# Patient Record
Sex: FEMALE | Race: WHITE | NOT HISPANIC OR LATINO | Employment: OTHER | URBAN - METROPOLITAN AREA
[De-identification: names, ages, dates, MRNs, and addresses within clinical notes are randomized per-mention and may not be internally consistent; named-entity substitution may affect disease eponyms.]

---

## 2017-01-20 ENCOUNTER — LAB (OUTPATIENT)
Dept: LAB | Facility: HOSPITAL | Age: 82
End: 2017-01-20

## 2017-01-20 DIAGNOSIS — C18.9 MALIGNANT NEOPLASM OF COLON, UNSPECIFIED PART OF COLON (HCC): Primary | ICD-10-CM

## 2017-01-20 LAB
ALBUMIN SERPL-MCNC: 4.1 G/DL (ref 3.5–5.2)
ALBUMIN/GLOB SERPL: 1.5 G/DL (ref 1.1–2.4)
ALP SERPL-CCNC: 57 U/L (ref 38–116)
ALT SERPL W P-5'-P-CCNC: 14 U/L (ref 0–33)
ANION GAP SERPL CALCULATED.3IONS-SCNC: 8.9 MMOL/L
AST SERPL-CCNC: 19 U/L (ref 0–32)
BASOPHILS # BLD AUTO: 0.04 10*3/MM3 (ref 0–0.1)
BASOPHILS NFR BLD AUTO: 0.5 % (ref 0–1.1)
BILIRUB SERPL-MCNC: 0.4 MG/DL (ref 0.1–1.2)
BUN BLD-MCNC: 15 MG/DL (ref 6–20)
BUN/CREAT SERPL: 25.9 (ref 7.3–30)
CALCIUM SPEC-SCNC: 9.2 MG/DL (ref 8.5–10.2)
CEA SERPL-MCNC: 3.51 NG/ML
CHLORIDE SERPL-SCNC: 103 MMOL/L (ref 98–107)
CO2 SERPL-SCNC: 29.1 MMOL/L (ref 22–29)
CREAT BLD-MCNC: 0.58 MG/DL (ref 0.6–1.1)
DEPRECATED RDW RBC AUTO: 43.7 FL (ref 37–49)
EOSINOPHIL # BLD AUTO: 0.34 10*3/MM3 (ref 0–0.36)
EOSINOPHIL NFR BLD AUTO: 4.2 % (ref 1–5)
ERYTHROCYTE [DISTWIDTH] IN BLOOD BY AUTOMATED COUNT: 12.5 % (ref 11.7–14.5)
GFR SERPL CREATININE-BSD FRML MDRD: 100 ML/MIN/1.73
GLOBULIN UR ELPH-MCNC: 2.7 GM/DL (ref 1.8–3.5)
GLUCOSE BLD-MCNC: 88 MG/DL (ref 74–124)
HCT VFR BLD AUTO: 42.3 % (ref 34–45)
HGB BLD-MCNC: 13.9 G/DL (ref 11.5–14.9)
IMM GRANULOCYTES # BLD: 0.05 10*3/MM3 (ref 0–0.03)
IMM GRANULOCYTES NFR BLD: 0.6 % (ref 0–0.5)
LDH SERPL-CCNC: 197 U/L (ref 99–259)
LYMPHOCYTES # BLD AUTO: 1.59 10*3/MM3 (ref 1–3.5)
LYMPHOCYTES NFR BLD AUTO: 19.7 % (ref 20–49)
MCH RBC QN AUTO: 31.3 PG (ref 27–33)
MCHC RBC AUTO-ENTMCNC: 32.9 G/DL (ref 32–35)
MCV RBC AUTO: 95.3 FL (ref 83–97)
MONOCYTES # BLD AUTO: 0.88 10*3/MM3 (ref 0.25–0.8)
MONOCYTES NFR BLD AUTO: 10.9 % (ref 4–12)
NEUTROPHILS # BLD AUTO: 5.17 10*3/MM3 (ref 1.5–7)
NEUTROPHILS NFR BLD AUTO: 64.1 % (ref 39–75)
NRBC BLD MANUAL-RTO: 0 /100 WBC (ref 0–0)
PLATELET # BLD AUTO: 217 10*3/MM3 (ref 150–375)
PMV BLD AUTO: 9.6 FL (ref 8.9–12.1)
POTASSIUM BLD-SCNC: 4.7 MMOL/L (ref 3.5–4.7)
PROT SERPL-MCNC: 6.8 G/DL (ref 6.3–8)
RBC # BLD AUTO: 4.44 10*6/MM3 (ref 3.9–5)
SODIUM BLD-SCNC: 141 MMOL/L (ref 134–145)
URATE SERPL-MCNC: 3.3 MG/DL (ref 2.8–7.4)
WBC NRBC COR # BLD: 8.07 10*3/MM3 (ref 4–10)

## 2017-01-20 PROCEDURE — 80053 COMPREHEN METABOLIC PANEL: CPT | Performed by: INTERNAL MEDICINE

## 2017-01-20 PROCEDURE — 85025 COMPLETE CBC W/AUTO DIFF WBC: CPT | Performed by: INTERNAL MEDICINE

## 2017-01-20 PROCEDURE — 82378 CARCINOEMBRYONIC ANTIGEN: CPT | Performed by: INTERNAL MEDICINE

## 2017-01-20 PROCEDURE — 83615 LACTATE (LD) (LDH) ENZYME: CPT | Performed by: INTERNAL MEDICINE

## 2017-01-20 PROCEDURE — 84550 ASSAY OF BLOOD/URIC ACID: CPT | Performed by: INTERNAL MEDICINE

## 2017-01-20 PROCEDURE — 36415 COLL VENOUS BLD VENIPUNCTURE: CPT | Performed by: INTERNAL MEDICINE

## 2017-01-27 ENCOUNTER — OFFICE VISIT (OUTPATIENT)
Dept: ONCOLOGY | Facility: CLINIC | Age: 82
End: 2017-01-27

## 2017-01-27 ENCOUNTER — APPOINTMENT (OUTPATIENT)
Dept: LAB | Facility: HOSPITAL | Age: 82
End: 2017-01-27

## 2017-01-27 VITALS
TEMPERATURE: 98.5 F | HEIGHT: 63 IN | BODY MASS INDEX: 23.92 KG/M2 | SYSTOLIC BLOOD PRESSURE: 130 MMHG | DIASTOLIC BLOOD PRESSURE: 72 MMHG | RESPIRATION RATE: 16 BRPM | HEART RATE: 86 BPM | WEIGHT: 135 LBS

## 2017-01-27 DIAGNOSIS — R10.13 EPIGASTRIC PAIN: Primary | ICD-10-CM

## 2017-01-27 PROCEDURE — G0463 HOSPITAL OUTPT CLINIC VISIT: HCPCS | Performed by: INTERNAL MEDICINE

## 2017-01-27 PROCEDURE — 99214 OFFICE O/P EST MOD 30 MIN: CPT | Performed by: INTERNAL MEDICINE

## 2017-01-27 NOTE — PROGRESS NOTES
REASONS FOR FOLLOWUP:  Stage IIIB colon cancer (T4N1a), status post resection with abdominal wall invasion.  On 02/02/2015, she had a laparoscopic right colectomy.  Her proximal and distal surgical margins were clear; however, the tumor had extended through the wall of the bowel and invaded into the abdominal wall.  The abdominal wall invasion area was marked with surgical clips to guide postoperative radiation therapy.         HISTORY OF PRESENT ILLNESS: Ms. Damon returns today with history of stage IIIB colon cancer 2015. She returns today; rather anxious, as she has noted dull, persistent, generalized abdominal pain subcostally,  that has developed over the last 2 weeks. She does feel that it is worsened with coffee consumption and persists throughout the night. No other foods exacerbate pain. No accompanying diarrhea, or dark, tarry stools. No fevers, or chills. Her appetite has remained adequate and it has not interfered with daily activities. Her weight has remained stable. She has not taken anything for pain relief and is not presently on any antacids or proton pump inhibitors. She denies fevers or chills, and nausea and vomiting.     She is also concerned as she is looking to a possible, future knee surgery as her right knee has become very painful and stiff with ambulation. She would like to ensure that she has no other health concerns prior to proceeding with surgery.              PAST MEDICAL HISTORY:    1.;  Arthritis.    2.; Hepatitis.    3.; Infectious mononucleosis.     4.; No history of blood transfusions.    5.; Colonoscopy 5/2016-[facility]      OB/GYN HISTORY:  Menarche age 12, heavy flow, irregular with intercurrent bleeding.  Menopause in her 40s status post hysterectomy.  G3, P3.         HEMATOLOGIC/ONCOLOGIC HISTORY:  History from previous dates can be found in the separate document.         MEDICATIONS:  The current medication list was reviewed with the patient and updates in the EMR this date  per the medical assistant.  Medication dosages and frequencies were confirmed to be accurate.        ALLERGIES:  No known drug allergies.         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  No cigarette smoking in her past.  No alcohol consumption reported.  No illicit drugs or HIV, or hepatitis risk factors.         FAMILY HISTORY: Sister with colon cancer diagnosed at 60,  at 76.  Otherwise heart disease seems to run in the family.         REVIEW OF SYSTEMS:      PAIN:  See VITAL SIGNS below.     GENERAL:  No change in appetite or weight, no fevers, chills or sweats.     SKIN:  No rashes or nonhealing lesions.    HEME/LYMPH:  No anemia, easy bruising, bleeding or swollen nodes.    EYES:  No vision changes or diplopia.      ENT:  No tinnitus, hearing loss, gum bleeding, epistaxis, hoarseness or dysphagia.    RESPIRATORY:  No cough, shortness of breath, hemoptysis or wheezing.    CVS:  No chest pain, palpitations, orthopnea, dyspnea on exertion or PND.    GI:  See HPI.   :  No dysuria or hematuria.  No abnormal vaginal discharge or bleeding.     MUSCULOSKELETAL:  See HPI.     NEUROLOGICAL:  No dizziness, global weakness, loss of consciousness or seizures.    PSYCHIATRIC:  Increased nervousness, mood changes or depression.        VITAL SIGNS:     TEMP:  98.8     WEIGHT: 135     BP:  122/78     PULSE: 79     RESP:  16     PAIN:  0 out of 10     ECO        PHYSICAL EXAMINATION:    GENERAL:  Well-developed, well-nourished female in no acute distress.    SKIN:  Warm, dry without rashes, purpura or petechiae.    HEAD:  Normocephalic.    EYES:  Pupils equal, round and reactive to light.  EOMs intact.  Conjunctivae normal.    EARS:  Hearing intact.    NOSE:  Septum midline.  No excoriations or nasal discharge.    MOUTH:  Tongue is well-papillated; no stomatitis or ulcers.  Lips normal.      NECK:  Supple with good range of motion; no thyromegaly or masses, no JVD or bruits.     LYMPHATICS:  No cervical, supraclavicular, or axillary adenopathy.    CHEST:  Lungs clear to percussion and auscultation.    CARDIAC:  Regular rate and rhythm without murmurs, rubs or gallops.    ABDOMEN:  Soft, nontender with no organomegaly or masses.    EXTREMITIES:  No clubbing, cyanosis or edema.    NEUROLOGICAL:  No focal neurological deficits.            LABORATORY DATA:  No labs.        ASSESSMENT/PLAN:     1.    History of stage IIIB colon cancer February 2015:. She does report new, generalized diffuse abdominal pain, which she describes as dull in nature. This pain transpired approximately 2 weeks ago. With impending knee surgery, I feel that it is prudent to proceed with a CT scan of chest, abdomen, and pelvis to ensure that there is no recurrence of disease or new issues for concern developing. I have also discussed trying other the counter antacids such as Ranitidine to reduce acid build up pain could be gastritis-related.   2.   She will have CT scans of Chest, Abdomen, and Pelvis in the next few weeks with lab and MD review in one month.   3. She was advised to call our office or seek emergency attention with worsening abdominal pain, or with any additional issues of concern.       I have reviewed the notes, assessments, and/or procedures performed by PADMINI Power.  I concur with her/his documentation of Riri Damon.

## 2017-02-16 RX ORDER — SIMVASTATIN 40 MG
TABLET ORAL
Qty: 90 TABLET | Refills: 0 | Status: SHIPPED | OUTPATIENT
Start: 2017-02-16 | End: 2017-04-17 | Stop reason: SDUPTHER

## 2017-02-20 ENCOUNTER — HOSPITAL ENCOUNTER (OUTPATIENT)
Dept: PET IMAGING | Facility: HOSPITAL | Age: 82
Discharge: HOME | End: 2017-02-20
Attending: INTERNAL MEDICINE | Admitting: INTERNAL MEDICINE

## 2017-02-20 DIAGNOSIS — R10.13 EPIGASTRIC PAIN: ICD-10-CM

## 2017-02-20 LAB — CREAT BLDA-MCNC: 0.5 MG/DL (ref 0.6–1.3)

## 2017-02-20 PROCEDURE — 82565 ASSAY OF CREATININE: CPT

## 2017-02-20 PROCEDURE — 74177 CT ABD & PELVIS W/CONTRAST: CPT

## 2017-02-20 PROCEDURE — 25510000001 DIATRIZOATE MEGLUMINE & SODIUM PER 1 ML: Performed by: INTERNAL MEDICINE

## 2017-02-20 PROCEDURE — 71260 CT THORAX DX C+: CPT

## 2017-02-20 PROCEDURE — 0 IOPAMIDOL 61 % SOLUTION: Performed by: INTERNAL MEDICINE

## 2017-02-20 RX ADMIN — DIATRIZOATE MEGLUMINE AND DIATRIZOATE SODIUM 30 ML: 660; 100 LIQUID ORAL; RECTAL at 08:30

## 2017-02-20 RX ADMIN — IOPAMIDOL 85 ML: 612 INJECTION, SOLUTION INTRAVENOUS at 09:45

## 2017-02-23 ENCOUNTER — OFFICE VISIT (OUTPATIENT)
Dept: ONCOLOGY | Facility: CLINIC | Age: 82
End: 2017-02-23

## 2017-02-23 ENCOUNTER — APPOINTMENT (OUTPATIENT)
Dept: LAB | Facility: HOSPITAL | Age: 82
End: 2017-02-23

## 2017-02-23 VITALS
SYSTOLIC BLOOD PRESSURE: 132 MMHG | HEIGHT: 64 IN | OXYGEN SATURATION: 93 % | WEIGHT: 130.6 LBS | BODY MASS INDEX: 22.3 KG/M2 | RESPIRATION RATE: 16 BRPM | HEART RATE: 81 BPM | DIASTOLIC BLOOD PRESSURE: 80 MMHG | TEMPERATURE: 98.6 F

## 2017-02-23 DIAGNOSIS — C18.9 MALIGNANT NEOPLASM OF COLON, UNSPECIFIED PART OF COLON (HCC): Primary | ICD-10-CM

## 2017-02-23 PROCEDURE — G0463 HOSPITAL OUTPT CLINIC VISIT: HCPCS | Performed by: INTERNAL MEDICINE

## 2017-02-23 PROCEDURE — 99214 OFFICE O/P EST MOD 30 MIN: CPT | Performed by: INTERNAL MEDICINE

## 2017-04-17 RX ORDER — SIMVASTATIN 40 MG
TABLET ORAL
Qty: 90 TABLET | Refills: 0 | Status: SHIPPED | OUTPATIENT
Start: 2017-04-17 | End: 2017-06-19 | Stop reason: SDUPTHER

## 2017-05-30 ENCOUNTER — OFFICE VISIT (OUTPATIENT)
Dept: INTERNAL MEDICINE | Facility: CLINIC | Age: 82
End: 2017-05-30

## 2017-05-30 VITALS
TEMPERATURE: 98.6 F | DIASTOLIC BLOOD PRESSURE: 66 MMHG | HEART RATE: 82 BPM | SYSTOLIC BLOOD PRESSURE: 136 MMHG | BODY MASS INDEX: 22.5 KG/M2 | OXYGEN SATURATION: 97 % | HEIGHT: 64 IN | WEIGHT: 131.8 LBS

## 2017-05-30 DIAGNOSIS — R93.7 ABNORMAL FINDINGS ON DIAGNOSTIC IMAGING OF OTHER PARTS OF MUSCULOSKELETAL SYSTEM: ICD-10-CM

## 2017-05-30 DIAGNOSIS — Z13.820 SCREENING FOR OSTEOPOROSIS: ICD-10-CM

## 2017-05-30 DIAGNOSIS — Z00.00 MEDICARE ANNUAL WELLNESS VISIT, SUBSEQUENT: ICD-10-CM

## 2017-05-30 DIAGNOSIS — E78.2 MIXED HYPERLIPIDEMIA: ICD-10-CM

## 2017-05-30 DIAGNOSIS — I27.20 PULMONARY HYPERTENSION (HCC): Primary | ICD-10-CM

## 2017-05-30 LAB
ALBUMIN SERPL-MCNC: 4.3 G/DL (ref 3.5–5.2)
ALBUMIN/GLOB SERPL: 1.6 G/DL
ALP SERPL-CCNC: 53 U/L (ref 39–117)
ALT SERPL-CCNC: 15 U/L (ref 1–33)
AST SERPL-CCNC: 20 U/L (ref 1–32)
BILIRUB SERPL-MCNC: 0.5 MG/DL (ref 0.1–1.2)
BUN SERPL-MCNC: 12 MG/DL (ref 8–23)
BUN/CREAT SERPL: 20 (ref 7–25)
CALCIUM SERPL-MCNC: 9.7 MG/DL (ref 8.6–10.5)
CHLORIDE SERPL-SCNC: 104 MMOL/L (ref 98–107)
CHOLEST SERPL-MCNC: 213 MG/DL (ref 0–200)
CO2 SERPL-SCNC: 26.7 MMOL/L (ref 22–29)
CREAT SERPL-MCNC: 0.6 MG/DL (ref 0.57–1)
GLOBULIN SER CALC-MCNC: 2.7 GM/DL
GLUCOSE SERPL-MCNC: 99 MG/DL (ref 65–99)
HDLC SERPL-MCNC: 68 MG/DL (ref 40–60)
LDLC SERPL CALC-MCNC: 130 MG/DL (ref 0–100)
POTASSIUM SERPL-SCNC: 5.1 MMOL/L (ref 3.5–5.2)
PROT SERPL-MCNC: 7 G/DL (ref 6–8.5)
SODIUM SERPL-SCNC: 141 MMOL/L (ref 136–145)
TRIGL SERPL-MCNC: 76 MG/DL (ref 0–150)
VLDLC SERPL CALC-MCNC: 15.2 MG/DL (ref 5–40)

## 2017-05-30 PROCEDURE — 99213 OFFICE O/P EST LOW 20 MIN: CPT | Performed by: FAMILY MEDICINE

## 2017-05-30 PROCEDURE — 96160 PT-FOCUSED HLTH RISK ASSMT: CPT | Performed by: FAMILY MEDICINE

## 2017-05-30 PROCEDURE — G0439 PPPS, SUBSEQ VISIT: HCPCS | Performed by: FAMILY MEDICINE

## 2017-05-30 RX ORDER — LISINOPRIL 2.5 MG/1
2.5 TABLET ORAL DAILY
Qty: 30 TABLET | Refills: 6 | Status: SHIPPED | OUTPATIENT
Start: 2017-05-30 | End: 2018-05-22 | Stop reason: SDUPTHER

## 2017-06-02 ENCOUNTER — TELEPHONE (OUTPATIENT)
Dept: INTERNAL MEDICINE | Facility: CLINIC | Age: 82
End: 2017-06-02

## 2017-06-02 NOTE — TELEPHONE ENCOUNTER
----- Message from Phoenix Velazquez MD sent at 5/31/2017 12:59 PM EDT -----  Cholesterol slightly elevated recommend following a low-cholesterol diet otherwise labs good follow-up 6 months

## 2017-06-05 ENCOUNTER — OFFICE VISIT (OUTPATIENT)
Dept: ONCOLOGY | Facility: CLINIC | Age: 82
End: 2017-06-05

## 2017-06-05 ENCOUNTER — LAB (OUTPATIENT)
Dept: LAB | Facility: HOSPITAL | Age: 82
End: 2017-06-05

## 2017-06-05 VITALS
SYSTOLIC BLOOD PRESSURE: 134 MMHG | DIASTOLIC BLOOD PRESSURE: 70 MMHG | OXYGEN SATURATION: 97 % | BODY MASS INDEX: 22.71 KG/M2 | HEART RATE: 86 BPM | WEIGHT: 133 LBS | RESPIRATION RATE: 14 BRPM | TEMPERATURE: 98.1 F | HEIGHT: 64 IN

## 2017-06-05 DIAGNOSIS — R42 ORTHOSTATIC LIGHTHEADEDNESS: ICD-10-CM

## 2017-06-05 DIAGNOSIS — M19.90 ARTHRITIS: ICD-10-CM

## 2017-06-05 DIAGNOSIS — C18.9 MALIGNANT NEOPLASM OF COLON, UNSPECIFIED PART OF COLON (HCC): Primary | ICD-10-CM

## 2017-06-05 LAB
BASOPHILS # BLD AUTO: 0.05 10*3/MM3 (ref 0–0.1)
BASOPHILS NFR BLD AUTO: 0.6 % (ref 0–1.1)
DEPRECATED RDW RBC AUTO: 43.1 FL (ref 37–49)
EOSINOPHIL # BLD AUTO: 0.51 10*3/MM3 (ref 0–0.36)
EOSINOPHIL NFR BLD AUTO: 5.6 % (ref 1–5)
ERYTHROCYTE [DISTWIDTH] IN BLOOD BY AUTOMATED COUNT: 12.7 % (ref 11.7–14.5)
HCT VFR BLD AUTO: 43 % (ref 34–45)
HGB BLD-MCNC: 15.3 G/DL (ref 11.5–14.9)
IMM GRANULOCYTES # BLD: 0.04 10*3/MM3 (ref 0–0.03)
IMM GRANULOCYTES NFR BLD: 0.4 % (ref 0–0.5)
LYMPHOCYTES # BLD AUTO: 2.26 10*3/MM3 (ref 1–3.5)
LYMPHOCYTES NFR BLD AUTO: 25 % (ref 20–49)
MCH RBC QN AUTO: 33.1 PG (ref 27–33)
MCHC RBC AUTO-ENTMCNC: 35.6 G/DL (ref 32–35)
MCV RBC AUTO: 93.1 FL (ref 83–97)
MONOCYTES # BLD AUTO: 1.13 10*3/MM3 (ref 0.25–0.8)
MONOCYTES NFR BLD AUTO: 12.5 % (ref 4–12)
NEUTROPHILS # BLD AUTO: 5.04 10*3/MM3 (ref 1.5–7)
NEUTROPHILS NFR BLD AUTO: 55.9 % (ref 39–75)
NRBC BLD MANUAL-RTO: 0 /100 WBC (ref 0–0)
PLATELET # BLD AUTO: 191 10*3/MM3 (ref 150–375)
PMV BLD AUTO: 10.2 FL (ref 8.9–12.1)
RBC # BLD AUTO: 4.62 10*6/MM3 (ref 3.9–5)
WBC NRBC COR # BLD: 9.03 10*3/MM3 (ref 4–10)

## 2017-06-05 PROCEDURE — 36416 COLLJ CAPILLARY BLOOD SPEC: CPT | Performed by: INTERNAL MEDICINE

## 2017-06-05 PROCEDURE — 85025 COMPLETE CBC W/AUTO DIFF WBC: CPT | Performed by: INTERNAL MEDICINE

## 2017-06-05 PROCEDURE — 99213 OFFICE O/P EST LOW 20 MIN: CPT | Performed by: INTERNAL MEDICINE

## 2017-06-05 NOTE — PROGRESS NOTES
Subjective .     REASONS FOR FOLLOWUP:     Stage IIIB colon cancer (T4N1a), status post resection with abdominal wall invasion.     History of Present Illness    Mrs. Damon returns today for the above mentioned history with persistent fatigue and lightheadedness. She has been receiving local steroid injections to her knees for chronic arthritis and is questioning if this may be related. This is likely not the case and upon further questioning, patient reports that she has had some recent hypertension. Her primary care doctor increased lisinopril to dosing and lightheadedness worsened after this likely inducing orthostatic hypotension. Her hemoglobin is actually mild increased at 15.3 so this is not the cause for fatigue and dizziness. She has since reduced her dose of lisinopril to one half tablet daily and I have asked her to continue on this and evaluate for improvement of symptoms. She is agreeable. Otherwise, she is feeling well. She is maintaining adequate nutrition and hydration. Bowels and urination remain regular. She had been experiencing epigastric pain at her last office visit and this has resolved with the addition of daily zantac. She has no other concerns today.     Past Medical History:   Diagnosis Date   • Colon carcinoma     Stage IIIB, T4N1a   • Edema    • History of edema     LE   • History of rheumatic fever    • Hyperlipidemia    • Hypertension     NO MEDS   • Infectious mononucleosis    • Infectious viral hepatitis    • Mitral regurgitation    • OA (osteoarthritis)    • Osteoporosis    • Palpitations    • Tricuspid regurgitation    • Venous insufficiency    • Vitamin D deficiency        ONCOLOGIC HISTORY:  (History from previous dates can be found in the separate document.)    Current Outpatient Prescriptions on File Prior to Visit   Medication Sig Dispense Refill   • aspirin 81 MG tablet Take 1 tablet by mouth 2 (two) times a week.     • lisinopril (PRINIVIL,ZESTRIL) 2.5 MG tablet Take 1  "tablet by mouth Daily. 30 tablet 6   • meloxicam (MOBIC) 7.5 MG tablet Take 1 tablet by mouth Daily. 90 tablet 2   • Multiple Vitamins-Minerals (MULTIVITAMIN ADULT PO) Take 1 tablet by mouth Daily.     • Omega-3 Fatty Acids (FISH OIL) 1000 MG capsule capsule Take 1 capsule by mouth 2 (two) times a week.     • simvastatin (ZOCOR) 40 MG tablet TAKE 1 TABLET EVERY DAY 90 tablet 0     No current facility-administered medications on file prior to visit.        ALLERGIES:   No Known Allergies    Social History     Social History   • Marital status:      Spouse name: Marcus   • Number of children: 3   • Years of education: College     Occupational History   [place] [employer] [place]     Social History Main Topics   • Smoking status: Never Smoker   • Smokeless tobacco: Never Used   • Alcohol use No   • Drug use: No      Comment: caffine use   • Sexual activity: Not on file     Other Topics Concern   • Not on file     Social History Narrative         Cancer-related family history includes Cancer (age of onset: 60) in her sister.     Review of Systems  A comprehensive 14 point review of systems was performed and was negative except as mentioned.    Objective      Vitals:    06/05/17 1047   BP: 134/70   Pulse: 86   Resp: 14   Temp: 98.1 °F (36.7 °C)   SpO2: 97%   Weight: 133 lb (60.3 kg)   Height: 63.58\" (161.5 cm)   PainSc: 5  Comment: pain in knees     Current Status 6/5/2017   ECOG score 0       Physical Exam    GENERAL:  Well-developed, well-nourished in no acute distress.   SKIN:  Warm, dry without rashes, purpura or petechiae.  EYES:  Pupils equal, round and reactive to light.  EOMs intact.  Conjunctivae normal.  EARS:  Hearing intact.  NOSE:  Septum midline.  No excoriations or nasal discharge.  MOUTH:  Tongue is well-papillated; no stomatitis or ulcers.  Lips normal.  THROAT:  Oropharynx without lesions or exudates.  NECK:  Supple with good range of motion; no thyromegaly or masses, no " JVD.  LYMPHATICS:  No cervical, supraclavicular, axillary or inguinal adenopathy.  CHEST:  Lungs clear to auscultation. Good airflow.  CARDIAC:  Regular rate and rhythm without murmurs, rubs or gallops. Normal S1,S2.  ABDOMEN:  Soft, nontender with no hepatosplenomegaly or masses.  EXTREMITIES:  No clubbing, cyanosis or edema.  NEUROLOGICAL:  Cranial Nerves II-XII grossly intact.  No focal neurological deficits.  PSYCHIATRIC:  Normal affect and mood.      RECENT LABS:  Hematology WBC   Date Value Ref Range Status   06/05/2017 9.03 4.00 - 10.00 10*3/mm3 Final     RBC   Date Value Ref Range Status   06/05/2017 4.62 3.90 - 5.00 10*6/mm3 Final     Hemoglobin   Date Value Ref Range Status   06/05/2017 15.3 (H) 11.5 - 14.9 g/dL Final     Hematocrit   Date Value Ref Range Status   06/05/2017 43.0 34.0 - 45.0 % Final     Platelets   Date Value Ref Range Status   06/05/2017 191 150 - 375 10*3/mm3 Final        Assessment/Plan   Orthostatic hypotension/fatigue: Due to recent hypertension, patient increased lisinopril dosing and then developed orthostatic hypotension. Of note, she did have fatigue and mild lightheadedness prior to lisinopril adjustments. She is now taking only one half a tablet daily to see if this provides improvement in dizziness. I have asked her to follow up with her primary care provider if this persists. She is agreeable.     Stage IIIB colon ca: [facility] per CT C/A/P (2/20/2017). She has no new concerning issues today and is feeling well, overall. Epigastric pain at previous office visit has resolved with the addition of a daily antacid. I will see her in 3 months with repeat CT scans just prior. She knows to call our office with any concerns prior to her next office visit.     I have reviewed the notes, assessments, and/or procedures performed by Shani De La O APR I concur with her/his documentation of Riri Damon.                  Cc:  Yfn Mcneil MD

## 2017-06-06 ENCOUNTER — HOSPITAL ENCOUNTER (OUTPATIENT)
Dept: BONE DENSITY | Facility: HOSPITAL | Age: 82
Discharge: HOME | End: 2017-06-06
Admitting: FAMILY MEDICINE

## 2017-06-06 DIAGNOSIS — R93.7 ABNORMAL FINDINGS ON DIAGNOSTIC IMAGING OF OTHER PARTS OF MUSCULOSKELETAL SYSTEM: ICD-10-CM

## 2017-06-06 DIAGNOSIS — Z00.00 MEDICARE ANNUAL WELLNESS VISIT, SUBSEQUENT: ICD-10-CM

## 2017-06-06 PROCEDURE — 77080 DXA BONE DENSITY AXIAL: CPT

## 2017-06-09 ENCOUNTER — TELEPHONE (OUTPATIENT)
Dept: INTERNAL MEDICINE | Facility: CLINIC | Age: 82
End: 2017-06-09

## 2017-06-09 NOTE — TELEPHONE ENCOUNTER
----- Message from Phoenix Velazquez MD sent at 6/7/2017  1:22 PM EDT -----  Bone density stable with some mild osteopenia continue present medications

## 2017-06-15 ENCOUNTER — OFFICE VISIT (OUTPATIENT)
Dept: CARDIOLOGY | Facility: CLINIC | Age: 82
End: 2017-06-15

## 2017-06-15 VITALS
BODY MASS INDEX: 22.53 KG/M2 | SYSTOLIC BLOOD PRESSURE: 124 MMHG | WEIGHT: 132 LBS | DIASTOLIC BLOOD PRESSURE: 76 MMHG | HEART RATE: 83 BPM | HEIGHT: 64 IN

## 2017-06-15 DIAGNOSIS — I10 ESSENTIAL HYPERTENSION: ICD-10-CM

## 2017-06-15 DIAGNOSIS — I05.1 RHEUMATIC MITRAL REGURGITATION: ICD-10-CM

## 2017-06-15 DIAGNOSIS — I27.20 PULMONARY HYPERTENSION (HCC): ICD-10-CM

## 2017-06-15 DIAGNOSIS — I07.1 RHEUMATIC TRICUSPID VALVE REGURGITATION: Primary | ICD-10-CM

## 2017-06-15 PROCEDURE — 99214 OFFICE O/P EST MOD 30 MIN: CPT | Performed by: NURSE PRACTITIONER

## 2017-06-16 PROBLEM — I10 ESSENTIAL HYPERTENSION: Status: ACTIVE | Noted: 2017-06-16

## 2017-06-16 PROCEDURE — 93000 ELECTROCARDIOGRAM COMPLETE: CPT | Performed by: NURSE PRACTITIONER

## 2017-06-16 NOTE — PROGRESS NOTES
Date of Office Visit: 06/15/2017  Encounter Provider: PADMINI Jiménez  Place of Service: [facility] CARDIOLOGY  Patient Name: Riri Damon  :1934    Chief Complaint   Patient presents with   • Muscle Pain   :     HPI: Riri Damon is a 82 y.o. female comes in today for  Yearly followup.  She is a patient of Dr. Serrano and I am seeing the patient for the first time today.  She has a history of mild tricuspid regurgitation.  She had rheumatic fever as a child and infectious mononucleosis.  She has some lower extremity swelling due to venous insufficiency and some hypertension.  Her last echocardiogram was in  and revealed an ejection fraction of 60%, normal diastolic function and normal right ventricular systolic pressure.  She had trace to mild tricuspid insufficiency and small to perimembranous ventricular septal defect.  She has had adenocarcinoma of the right colon and received treatment.      Today, she comes in for followup.  She has been feeling well over the past year.  She asked for more information regarding tricuspid insufficiency.  Her biggest complaint is that she has some leg cramps during the night and occurs in her calf.  This has happened for several years.  She also has a tickle in her throat at night which happens on and off.  She has had this for a couple of years.  She has some edema in the summer.  Otherwise, she feels well.          Past Medical History:   Diagnosis Date   • Colon carcinoma     Stage IIIB, T4N1a   • Edema    • History of edema     LE   • History of rheumatic fever    • Hyperlipidemia    • Hypertension     NO MEDS   • Infectious mononucleosis    • Infectious viral hepatitis    • Mitral regurgitation    • OA (osteoarthritis)    • Osteoporosis    • Palpitations    • Tricuspid regurgitation    • Venous insufficiency    • Vitamin D deficiency        Past Surgical History:   Procedure Laterality Date   • COLECTOMY PARTIAL /  "TOTAL  02/02/2015 2/2015 due to adenocarcinoma   • COLONOSCOPY      JAN 2015   • COLONOSCOPY N/A 5/25/2016    Procedure: COLONOSCOPY to ANASTAMOSIS AND TERMINAL ILEUM;  Surgeon: Yfn Mcneil MD;  Location: [facility] ENDOSCOPY;  Service:    • HYSTERECTOMY     • KNEE SURGERY             Review of Systems   Constitution: Negative for fever and malaise/fatigue.   HENT: Negative for ear pain, hearing loss, nosebleeds and sore throat.    Eyes: Negative for double vision, pain, vision loss in left eye, vision loss in right eye and visual disturbance.   Cardiovascular: Negative for claudication and leg swelling.        Leg cramping   Respiratory: Positive for cough. Negative for snoring and wheezing.    Endocrine: Negative for cold intolerance, heat intolerance and polyuria.   Skin: Negative for color change, itching and rash.   Musculoskeletal: Negative for joint pain, joint swelling and muscle cramps.   Gastrointestinal: Negative for abdominal pain, diarrhea, melena, nausea and vomiting.   Genitourinary: Negative for bladder incontinence and hematuria.   Neurological: Negative for excessive daytime sleepiness, dizziness, light-headedness, paresthesias and seizures.   Psychiatric/Behavioral: Negative for depression. The patient is not nervous/anxious.    All other systems reviewed and are negative.    All other systems reviewed and are negative    No Known Allergies    All aspects of family and social history reviewed.          Objective:     Vitals:    06/15/17 1033   BP: 124/76   BP Location: Left arm   Pulse: 83   Weight: 132 lb (59.9 kg)   Height: 63.5\" (161.3 cm)     Body mass index is 23.02 kg/(m^2).    PHYSICAL EXAM:  Physical Exam   Constitutional: She is oriented to person, place, and time. She appears well-developed and well-nourished. No distress.   HENT:   Head: Normocephalic and atraumatic.   Eyes: Conjunctivae are normal. No scleral icterus.   Neck: Neck supple. No JVD present. Carotid bruit is not " present. No thyromegaly present.   Cardiovascular: Normal rate, regular rhythm, S1 normal, S2 normal, normal heart sounds and intact distal pulses.   No extrasystoles are present. PMI is not displaced.  Exam reveals no gallop.    No murmur heard.  Pulses:       Carotid pulses are 2+ on the right side, and 2+ on the left side.       Radial pulses are 2+ on the right side, and 2+ on the left side.        Dorsalis pedis pulses are 2+ on the right side, and 2+ on the left side.        Posterior tibial pulses are 2+ on the right side, and 2+ on the left side.   Pulmonary/Chest: Effort normal and breath sounds normal. No respiratory distress. She has no wheezes. She has no rhonchi. She has no rales. She exhibits no tenderness.   Abdominal: Soft. Bowel sounds are normal. She exhibits no distension, no abdominal bruit and no mass. There is no tenderness.   Musculoskeletal: She exhibits no edema or deformity.   Lymphadenopathy:     She has no cervical adenopathy.   Neurological: She is alert and oriented to person, place, and time. No cranial nerve deficit.   Skin: Skin is warm and dry. No rash noted. She is not diaphoretic. No cyanosis. No pallor. Nails show no clubbing.   Psychiatric: She has a normal mood and affect. Judgment normal.   Vitals reviewed.        ECG 12 Lead  Date/Time: 6/16/2017 3:43 PM  Performed by: RAHEEM MITCHELL  Authorized by: RAHEEM MITCHELL   Rhythm: sinus rhythm  Rate: normal  BPM: 78  Conduction: conduction normal  Conduction: LAFB  ST Segments: ST segments normal  T Waves: T waves normal  QRS axis: normal  Other: no other findings  Clinical impression: normal ECG  Comments: Indication : HTN                Assessment:       Diagnosis Plan   1. Rheumatic tricuspid valve regurgitation     2. Rheumatic mitral regurgitation     3. Pulmonary hypertension     4. Essential hypertension          Orders Placed This Encounter   Procedures   • ECG 12 Lead     This order was created via procedure  documentation       Current Outpatient Prescriptions   Medication Sig Dispense Refill   • lisinopril (PRINIVIL,ZESTRIL) 2.5 MG tablet Take 1 tablet by mouth Daily. 30 tablet 6   • meloxicam (MOBIC) 7.5 MG tablet Take 1 tablet by mouth Daily. 90 tablet 2   • Multiple Vitamins-Minerals (MULTIVITAMIN ADULT PO) Take 1 tablet by mouth Daily.     • Omega-3 Fatty Acids (FISH OIL) 1000 MG capsule capsule Take 1 capsule by mouth 2 (two) times a week.     • simvastatin (ZOCOR) 40 MG tablet TAKE 1 TABLET EVERY DAY 90 tablet 0   • aspirin 81 MG tablet Take 1 tablet by mouth 4 (Four) Times a Week.       No current facility-administered medications for this visit.             Plan:        1. Mild tricuspid insufficiency and mild mitral insufficiency - she has no new symptoms to indicate that this would be worsening.    2. Hypertension - well controlled.    3. Hyperlipidemia - well controlled on Lipitor.    4. Lower extremity edema - due to venous insufficiency.  She has cramping.  She has had normal laboratory work drawn.  Recommended magnesium for her.              Follow up in office in one year.     As always, it has been a pleasure to participate in this patient's care.      Sincerely,      PADMINI Jiménez

## 2017-06-19 RX ORDER — SIMVASTATIN 40 MG
TABLET ORAL
Qty: 90 TABLET | Refills: 0 | Status: SHIPPED | OUTPATIENT
Start: 2017-06-19 | End: 2017-08-30 | Stop reason: SDUPTHER

## 2017-08-04 ENCOUNTER — RESULTS ENCOUNTER (OUTPATIENT)
Dept: INTERNAL MEDICINE | Facility: CLINIC | Age: 82
End: 2017-08-04

## 2017-08-04 DIAGNOSIS — M19.90 ARTHRITIS: ICD-10-CM

## 2017-08-30 ENCOUNTER — OFFICE VISIT (OUTPATIENT)
Dept: INTERNAL MEDICINE | Facility: CLINIC | Age: 82
End: 2017-08-30

## 2017-08-30 VITALS
HEIGHT: 64 IN | TEMPERATURE: 99.4 F | HEART RATE: 100 BPM | SYSTOLIC BLOOD PRESSURE: 118 MMHG | WEIGHT: 135 LBS | OXYGEN SATURATION: 96 % | BODY MASS INDEX: 23.05 KG/M2 | DIASTOLIC BLOOD PRESSURE: 60 MMHG

## 2017-08-30 DIAGNOSIS — G89.29 CHRONIC PAIN OF RIGHT KNEE: ICD-10-CM

## 2017-08-30 DIAGNOSIS — E78.2 MIXED HYPERLIPIDEMIA: ICD-10-CM

## 2017-08-30 DIAGNOSIS — M25.561 CHRONIC PAIN OF RIGHT KNEE: ICD-10-CM

## 2017-08-30 DIAGNOSIS — I10 ESSENTIAL HYPERTENSION: Primary | ICD-10-CM

## 2017-08-30 PROCEDURE — 99214 OFFICE O/P EST MOD 30 MIN: CPT | Performed by: FAMILY MEDICINE

## 2017-08-30 RX ORDER — SIMVASTATIN 40 MG
40 TABLET ORAL NIGHTLY
Qty: 90 TABLET | Refills: 2 | Status: SHIPPED | OUTPATIENT
Start: 2017-08-30 | End: 2018-11-08 | Stop reason: SDUPTHER

## 2017-08-30 NOTE — PROGRESS NOTES
Riri Damon is a 82 y.o. female.  Seen 08/30/2017    Assessment/Plan   Problem List Items Addressed This Visit        Cardiovascular and Mediastinum    Hyperlipidemia    Relevant Medications    simvastatin (ZOCOR) 40 MG tablet    Essential hypertension - Primary       Musculoskeletal and Integument    Knee pain           Continue monitoring blood pressure can continue lisinopril recommend 2.5 mg every other day regularly she had stopped it because she was getting the readings below 90 she discontinue the meloxicam is going to follow up with orthopedics for her right knee pain otherwise follow up in this office in 6 months or as needed  Subjective     Chief Complaint   Patient presents with   • follow up to hyperlipidemia   • follow up to hypertension     patient states she takes half an lisinopril 2.5 mg tablet when her blood pressure is elevated   Arthritis  Social History   Substance Use Topics   • Smoking status: Never Smoker   • Smokeless tobacco: Never Used   • Alcohol use No       History of Present Illness   Patient presents for follow-up of chronic medical problems of hyperlipidemia hypertension or arthritis she's been taking meloxicam daily however she reduce her dose of the lisinopril because low blood pressure readings is drinking adequate fluid and urinating 5 day ×24 hours is clear and colorless  The following portions of the patient's history were reviewed and updated as appropriate:PMHroutine: Social history , Past Medical History, Surgical history , Allergies, Current Medications, Active Problem List and Health Maintenance    Review of Systems   Constitutional: Negative for activity change, appetite change and unexpected weight change.   HENT: Negative for nosebleeds and trouble swallowing.    Eyes: Negative for pain and visual disturbance.   Respiratory: Negative for chest tightness, shortness of breath and wheezing.    Cardiovascular: Negative for chest pain and palpitations.  "  Gastrointestinal: Negative for abdominal pain and blood in stool.   Endocrine: Negative.    Genitourinary: Negative for difficulty urinating and hematuria.   Musculoskeletal: Negative for joint swelling.   Skin: Negative for color change and rash.   Allergic/Immunologic: Negative.    Neurological: Negative for syncope and speech difficulty.   Hematological: Negative for adenopathy.   Psychiatric/Behavioral: Negative for agitation and confusion.   All other systems reviewed and are negative.      Objective   Vitals:    08/30/17 0959   BP: 118/60   BP Location: Right arm   Patient Position: Sitting   Cuff Size: Adult   Pulse: 100   Temp: 99.4 °F (37.4 °C)   TempSrc: Oral   SpO2: 96%   Weight: 135 lb (61.2 kg)   Height: 63.5\" (161.3 cm)     Body mass index is 23.54 kg/(m^2).  Physical Exam   Constitutional: She is oriented to person, place, and time. She appears well-developed and well-nourished. No distress.   HENT:   Head: Normocephalic and atraumatic.   Right Ear: External ear normal.   Left Ear: External ear normal.   Eyes: Conjunctivae and EOM are normal. Pupils are equal, round, and reactive to light. Right eye exhibits no discharge. Left eye exhibits no discharge. No scleral icterus.   Neck: Normal range of motion. Neck supple. No tracheal deviation present. No thyromegaly present.   Cardiovascular: Normal rate, regular rhythm, normal heart sounds, intact distal pulses and normal pulses.  Exam reveals no gallop.    No murmur heard.  Pulmonary/Chest: Effort normal and breath sounds normal. No respiratory distress. She has no wheezes. She has no rales.   Musculoskeletal: Normal range of motion.   Neurological: She is alert and oriented to person, place, and time. She exhibits normal muscle tone. Coordination normal.   Skin: Skin is warm. No rash noted. No erythema. No pallor.   Psychiatric: She has a normal mood and affect. Her behavior is normal. Judgment and thought content normal.   Nursing note and vitals " reviewed.    Reviewed Data:  No visits with results within 1 Month(s) from this visit.  Latest known visit with results is:    Lab on 06/05/2017   Component Date Value Ref Range Status   • WBC 06/05/2017 9.03  4.00 - 10.00 10*3/mm3 Final   • RBC 06/05/2017 4.62  3.90 - 5.00 10*6/mm3 Final   • Hemoglobin 06/05/2017 15.3* 11.5 - 14.9 g/dL Final   • Hematocrit 06/05/2017 43.0  34.0 - 45.0 % Final   • MCV 06/05/2017 93.1  83.0 - 97.0 fL Final   • MCH 06/05/2017 33.1* 27.0 - 33.0 pg Final   • MCHC 06/05/2017 35.6* 32.0 - 35.0 g/dL Final   • RDW 06/05/2017 12.7  11.7 - 14.5 % Final   • RDW-SD 06/05/2017 43.1  37.0 - 49.0 fl Final   • MPV 06/05/2017 10.2  8.9 - 12.1 fL Final   • Platelets 06/05/2017 191  150 - 375 10*3/mm3 Final   • Neutrophil % 06/05/2017 55.9  39.0 - 75.0 % Final   • Lymphocyte % 06/05/2017 25.0  20.0 - 49.0 % Final   • Monocyte % 06/05/2017 12.5* 4.0 - 12.0 % Final   • Eosinophil % 06/05/2017 5.6* 1.0 - 5.0 % Final   • Basophil % 06/05/2017 0.6  0.0 - 1.1 % Final   • Immature Grans % 06/05/2017 0.4  0.0 - 0.5 % Final   • Neutrophils, Absolute 06/05/2017 5.04  1.50 - 7.00 10*3/mm3 Final   • Lymphocytes, Absolute 06/05/2017 2.26  1.00 - 3.50 10*3/mm3 Final   • Monocytes, Absolute 06/05/2017 1.13* 0.25 - 0.80 10*3/mm3 Final   • Eosinophils, Absolute 06/05/2017 0.51* 0.00 - 0.36 10*3/mm3 Final   • Basophils, Absolute 06/05/2017 0.05  0.00 - 0.10 10*3/mm3 Final   • Immature Grans, Absolute 06/05/2017 0.04* 0.00 - 0.03 10*3/mm3 Final   • nRBC 06/05/2017 0.0  0.0 - 0.0 /100 WBC Final

## 2017-09-06 ENCOUNTER — LAB (OUTPATIENT)
Dept: LAB | Facility: HOSPITAL | Age: 82
End: 2017-09-06

## 2017-09-06 ENCOUNTER — HOSPITAL ENCOUNTER (OUTPATIENT)
Dept: PET IMAGING | Facility: HOSPITAL | Age: 82
Discharge: HOME | End: 2017-09-06
Attending: INTERNAL MEDICINE | Admitting: INTERNAL MEDICINE

## 2017-09-06 DIAGNOSIS — I34.0 MITRAL VALVE INSUFFICIENCY, UNSPECIFIED ETIOLOGY: Primary | ICD-10-CM

## 2017-09-06 DIAGNOSIS — Z13.820 SCREENING FOR OSTEOPOROSIS: ICD-10-CM

## 2017-09-06 DIAGNOSIS — I27.20 PULMONARY HYPERTENSION (HCC): ICD-10-CM

## 2017-09-06 DIAGNOSIS — C18.9 MALIGNANT NEOPLASM OF COLON, UNSPECIFIED PART OF COLON (HCC): ICD-10-CM

## 2017-09-06 LAB
ALBUMIN SERPL-MCNC: 5.2 G/DL (ref 3.5–5.2)
ALBUMIN/GLOB SERPL: 1.5 G/DL (ref 1.1–2.4)
ALP SERPL-CCNC: 68 U/L (ref 38–116)
ALT SERPL W P-5'-P-CCNC: 15 U/L (ref 0–33)
ANION GAP SERPL CALCULATED.3IONS-SCNC: 17.3 MMOL/L
AST SERPL-CCNC: 22 U/L (ref 0–32)
BASOPHILS # BLD AUTO: 0.04 10*3/MM3 (ref 0–0.1)
BASOPHILS NFR BLD AUTO: 0.5 % (ref 0–1.1)
BILIRUB SERPL-MCNC: 0.6 MG/DL (ref 0.1–1.2)
BUN BLD-MCNC: 13 MG/DL (ref 6–20)
BUN/CREAT SERPL: 25.5 (ref 7.3–30)
CALCIUM SPEC-SCNC: 10 MG/DL (ref 8.5–10.2)
CEA SERPL-MCNC: 3.48 NG/ML
CHLORIDE SERPL-SCNC: 98 MMOL/L (ref 98–107)
CO2 SERPL-SCNC: 25.7 MMOL/L (ref 22–29)
CREAT BLD-MCNC: 0.51 MG/DL (ref 0.6–1.1)
CREAT BLDA-MCNC: 0.5 MG/DL (ref 0.6–1.3)
DEPRECATED RDW RBC AUTO: 44.4 FL (ref 37–49)
EOSINOPHIL # BLD AUTO: 0.22 10*3/MM3 (ref 0–0.36)
EOSINOPHIL NFR BLD AUTO: 2.5 % (ref 1–5)
ERYTHROCYTE [DISTWIDTH] IN BLOOD BY AUTOMATED COUNT: 12.5 % (ref 11.7–14.5)
GFR SERPL CREATININE-BSD FRML MDRD: 115 ML/MIN/1.73
GLOBULIN UR ELPH-MCNC: 3.4 GM/DL (ref 1.8–3.5)
GLUCOSE BLD-MCNC: 67 MG/DL (ref 74–124)
HCT VFR BLD AUTO: 48.8 % (ref 34–45)
HGB BLD-MCNC: 16.5 G/DL (ref 11.5–14.9)
IMM GRANULOCYTES # BLD: 0.03 10*3/MM3 (ref 0–0.03)
IMM GRANULOCYTES NFR BLD: 0.3 % (ref 0–0.5)
LYMPHOCYTES # BLD AUTO: 1.84 10*3/MM3 (ref 1–3.5)
LYMPHOCYTES NFR BLD AUTO: 21.1 % (ref 20–49)
MCH RBC QN AUTO: 32.9 PG (ref 27–33)
MCHC RBC AUTO-ENTMCNC: 33.8 G/DL (ref 32–35)
MCV RBC AUTO: 97.4 FL (ref 83–97)
MONOCYTES # BLD AUTO: 0.75 10*3/MM3 (ref 0.25–0.8)
MONOCYTES NFR BLD AUTO: 8.6 % (ref 4–12)
NEUTROPHILS # BLD AUTO: 5.83 10*3/MM3 (ref 1.5–7)
NEUTROPHILS NFR BLD AUTO: 67 % (ref 39–75)
NRBC BLD MANUAL-RTO: 0 /100 WBC (ref 0–0)
PLATELET # BLD AUTO: 288 10*3/MM3 (ref 150–375)
PMV BLD AUTO: 11 FL (ref 8.9–12.1)
POTASSIUM BLD-SCNC: 4.1 MMOL/L (ref 3.5–4.7)
PROT SERPL-MCNC: 8.6 G/DL (ref 6.3–8)
RBC # BLD AUTO: 5.01 10*6/MM3 (ref 3.9–5)
SODIUM BLD-SCNC: 141 MMOL/L (ref 134–145)
WBC NRBC COR # BLD: 8.71 10*3/MM3 (ref 4–10)

## 2017-09-06 PROCEDURE — 0 IOPAMIDOL 61 % SOLUTION: Performed by: INTERNAL MEDICINE

## 2017-09-06 PROCEDURE — 80053 COMPREHEN METABOLIC PANEL: CPT | Performed by: INTERNAL MEDICINE

## 2017-09-06 PROCEDURE — 74177 CT ABD & PELVIS W/CONTRAST: CPT

## 2017-09-06 PROCEDURE — 36415 COLL VENOUS BLD VENIPUNCTURE: CPT | Performed by: INTERNAL MEDICINE

## 2017-09-06 PROCEDURE — 0 DIATRIZOATE MEGLUMINE & SODIUM PER 1 ML: Performed by: INTERNAL MEDICINE

## 2017-09-06 PROCEDURE — 82378 CARCINOEMBRYONIC ANTIGEN: CPT | Performed by: INTERNAL MEDICINE

## 2017-09-06 PROCEDURE — 85025 COMPLETE CBC W/AUTO DIFF WBC: CPT | Performed by: INTERNAL MEDICINE

## 2017-09-06 PROCEDURE — 82565 ASSAY OF CREATININE: CPT

## 2017-09-06 PROCEDURE — 71260 CT THORAX DX C+: CPT

## 2017-09-06 RX ADMIN — DIATRIZOATE MEGLUMINE AND DIATRIZOATE SODIUM 30 ML: 660; 100 LIQUID ORAL; RECTAL at 08:00

## 2017-09-06 RX ADMIN — IOPAMIDOL 85 ML: 612 INJECTION, SOLUTION INTRAVENOUS at 08:34

## 2017-09-12 ENCOUNTER — APPOINTMENT (OUTPATIENT)
Dept: LAB | Facility: HOSPITAL | Age: 82
End: 2017-09-12

## 2017-09-12 ENCOUNTER — OFFICE VISIT (OUTPATIENT)
Dept: ONCOLOGY | Facility: CLINIC | Age: 82
End: 2017-09-12

## 2017-09-12 VITALS
DIASTOLIC BLOOD PRESSURE: 72 MMHG | TEMPERATURE: 98.4 F | WEIGHT: 131.8 LBS | HEART RATE: 93 BPM | BODY MASS INDEX: 23.35 KG/M2 | HEIGHT: 63 IN | OXYGEN SATURATION: 97 % | RESPIRATION RATE: 14 BRPM | SYSTOLIC BLOOD PRESSURE: 132 MMHG

## 2017-09-12 DIAGNOSIS — C18.9 MALIGNANT NEOPLASM OF COLON, UNSPECIFIED PART OF COLON (HCC): Primary | ICD-10-CM

## 2017-09-12 PROCEDURE — G0463 HOSPITAL OUTPT CLINIC VISIT: HCPCS | Performed by: INTERNAL MEDICINE

## 2017-09-12 PROCEDURE — 99213 OFFICE O/P EST LOW 20 MIN: CPT | Performed by: INTERNAL MEDICINE

## 2017-09-12 NOTE — PROGRESS NOTES
Subjective .     REASONS FOR FOLLOWUP:     Stage IIIB colon cancer (T4N1a), status post resection with abdominal wall invasion.     History of Present Illness    Mrs. Damon returns today for CT scans in review. Additionally she had anemia studies.    Past Medical History:   Diagnosis Date   • Colon carcinoma     Stage IIIB, T4N1a   • Edema    • History of edema     LE   • History of rheumatic fever    • Hyperlipidemia    • Hypertension     NO MEDS   • Infectious mononucleosis    • Infectious viral hepatitis    • Mitral regurgitation    • OA (osteoarthritis)    • Osteoporosis    • Palpitations    • Tricuspid regurgitation    • Venous insufficiency    • Vitamin D deficiency        ONCOLOGIC HISTORY:  (History from previous dates can be found in the separate document.)    Current Outpatient Prescriptions on File Prior to Visit   Medication Sig Dispense Refill   • aspirin 81 MG tablet Take 1 tablet by mouth 4 (Four) Times a Week.     • lisinopril (PRINIVIL,ZESTRIL) 2.5 MG tablet Take 1 tablet by mouth Daily. 30 tablet 6   • meloxicam (MOBIC) 7.5 MG tablet Take 1 tablet by mouth Daily. 90 tablet 2   • Multiple Vitamins-Minerals (MULTIVITAMIN ADULT PO) Take 1 tablet by mouth Daily.     • Omega-3 Fatty Acids (FISH OIL) 1000 MG capsule capsule Take 1 capsule by mouth 2 (two) times a week.     • simvastatin (ZOCOR) 40 MG tablet Take 1 tablet by mouth Every Night. 90 tablet 2     No current facility-administered medications on file prior to visit.        ALLERGIES:   No Known Allergies    Social History     Social History   • Marital status:      Spouse name: Marcus   • Number of children: 3   • Years of education: College     Occupational History   •  [employer] [place]   •  Retired     Social History Main Topics   • Smoking status: Never Smoker   • Smokeless tobacco: Never Used   • Alcohol use No   • Drug use: No      Comment: caffine use   • Sexual activity: Not on file     Other Topics Concern   • Not  "on file     Social History Narrative         Cancer-related family history includes Cancer (age of onset: 60) in her sister.     Review of Systems  A comprehensive 14 point review of systems was performed and was negative except as mentioned.    Objective      Vitals:    09/12/17 0856   BP: 132/72   Pulse: 93   Resp: 14   Temp: 98.4 °F (36.9 °C)   TempSrc: Oral   SpO2: 97%   Weight: 131 lb 12.8 oz (59.8 kg)   Height: 63.19\" (160.5 cm)  Comment: new height with out shoes on   PainSc: 0-No pain  Comment: RT knee pain at times when walking     Current Status 9/12/2017   ECOG score 0       Physical Exam    GENERAL:  Well-developed, well-nourished in no acute distress.   SKIN:  Warm, dry without rashes, purpura or petechiae.  EYES:  Pupils equal, round and reactive to light.  EOMs intact.  Conjunctivae normal.  EARS:  Hearing intact.  NOSE:  Septum midline.  No excoriations or nasal discharge.  MOUTH:  Tongue is well-papillated; no stomatitis or ulcers.  Lips normal.  THROAT:  Oropharynx without lesions or exudates.  NECK:  Supple with good range of motion; no thyromegaly or masses, no JVD.  LYMPHATICS:  No cervical, supraclavicular, axillary or inguinal adenopathy.  CHEST:  Lungs clear to auscultation. Good airflow.  CARDIAC:  Regular rate and rhythm without murmurs, rubs or gallops. Normal S1,S2.  ABDOMEN:  Soft, nontender with no hepatosplenomegaly or masses.  EXTREMITIES:  No clubbing, cyanosis or edema.  NEUROLOGICAL:  Cranial Nerves II-XII grossly intact.  No focal neurological deficits.  PSYCHIATRIC:  Normal affect and mood.      RECENT LABS:  Hematology WBC   Date Value Ref Range Status   09/06/2017 8.71 4.00 - 10.00 10*3/mm3 Final     RBC   Date Value Ref Range Status   09/06/2017 5.01 (H) 3.90 - 5.00 10*6/mm3 Final     Hemoglobin   Date Value Ref Range Status   09/06/2017 16.5 (H) 11.5 - 14.9 g/dL Final     Hematocrit   Date Value Ref Range Status   09/06/2017 48.8 (H) 34.0 - 45.0 % Final     Platelets   Date " Value Ref Range Status   09/06/2017 288 150 - 375 10*3/mm3 Final        Assessment/Plan   Orthostatic hypotension/fatigue: Stage IIIB colon ca: HERSON per CT C/A/P (9/6/2017).   We will plan a re-survey in 6 months.

## 2017-09-19 ENCOUNTER — CLINICAL SUPPORT (OUTPATIENT)
Dept: INTERNAL MEDICINE | Facility: CLINIC | Age: 82
End: 2017-09-19

## 2017-09-19 DIAGNOSIS — Z00.00 PREVENTATIVE HEALTH CARE: Primary | ICD-10-CM

## 2017-09-19 PROCEDURE — G0008 ADMIN INFLUENZA VIRUS VAC: HCPCS | Performed by: FAMILY MEDICINE

## 2017-10-25 ENCOUNTER — HOSPITAL ENCOUNTER (OUTPATIENT)
Dept: GENERAL RADIOLOGY | Facility: HOSPITAL | Age: 82
Discharge: HOME | End: 2017-10-25
Admitting: ORTHOPAEDIC SURGERY

## 2017-10-25 ENCOUNTER — APPOINTMENT (OUTPATIENT)
Dept: PREADMISSION TESTING | Facility: HOSPITAL | Age: 82
End: 2017-10-25

## 2017-10-25 ENCOUNTER — HOSPITAL ENCOUNTER (OUTPATIENT)
Dept: GENERAL RADIOLOGY | Facility: HOSPITAL | Age: 82
Discharge: HOME | End: 2017-10-25

## 2017-10-25 VITALS
SYSTOLIC BLOOD PRESSURE: 158 MMHG | BODY MASS INDEX: 22.33 KG/M2 | HEART RATE: 88 BPM | HEIGHT: 65 IN | RESPIRATION RATE: 18 BRPM | TEMPERATURE: 99 F | WEIGHT: 134 LBS | DIASTOLIC BLOOD PRESSURE: 80 MMHG | OXYGEN SATURATION: 98 %

## 2017-10-25 LAB
ALBUMIN SERPL-MCNC: 4.4 G/DL (ref 3.5–5.2)
ALBUMIN/GLOB SERPL: 1.6 G/DL
ALP SERPL-CCNC: 55 U/L (ref 39–117)
ALT SERPL W P-5'-P-CCNC: 16 U/L (ref 1–33)
ANION GAP SERPL CALCULATED.3IONS-SCNC: 10.4 MMOL/L
AST SERPL-CCNC: 20 U/L (ref 1–32)
BILIRUB SERPL-MCNC: 0.4 MG/DL (ref 0.1–1.2)
BILIRUB UR QL STRIP: NEGATIVE
BUN BLD-MCNC: 11 MG/DL (ref 8–23)
BUN/CREAT SERPL: 19 (ref 7–25)
CALCIUM SPEC-SCNC: 9.6 MG/DL (ref 8.6–10.5)
CHLORIDE SERPL-SCNC: 104 MMOL/L (ref 98–107)
CLARITY UR: ABNORMAL
CO2 SERPL-SCNC: 30.6 MMOL/L (ref 22–29)
COLOR UR: YELLOW
CREAT BLD-MCNC: 0.58 MG/DL (ref 0.57–1)
DEPRECATED RDW RBC AUTO: 46.6 FL (ref 37–54)
ERYTHROCYTE [DISTWIDTH] IN BLOOD BY AUTOMATED COUNT: 12.8 % (ref 11.7–13)
GFR SERPL CREATININE-BSD FRML MDRD: 99 ML/MIN/1.73
GLOBULIN UR ELPH-MCNC: 2.8 GM/DL
GLUCOSE BLD-MCNC: 97 MG/DL (ref 65–99)
GLUCOSE UR STRIP-MCNC: NEGATIVE MG/DL
HCT VFR BLD AUTO: 44.8 % (ref 35.6–45.5)
HGB BLD-MCNC: 14.7 G/DL (ref 11.9–15.5)
HGB UR QL STRIP.AUTO: NEGATIVE
INR PPP: 0.99 (ref 0.9–1.1)
KETONES UR QL STRIP: NEGATIVE
LEUKOCYTE ESTERASE UR QL STRIP.AUTO: NEGATIVE
MCH RBC QN AUTO: 32.5 PG (ref 26.9–32)
MCHC RBC AUTO-ENTMCNC: 32.8 G/DL (ref 32.4–36.3)
MCV RBC AUTO: 99.1 FL (ref 80.5–98.2)
NITRITE UR QL STRIP: NEGATIVE
PH UR STRIP.AUTO: 7.5 [PH] (ref 5–8)
PLATELET # BLD AUTO: 193 10*3/MM3 (ref 140–500)
PMV BLD AUTO: 11.1 FL (ref 6–12)
POTASSIUM BLD-SCNC: 4.1 MMOL/L (ref 3.5–5.2)
PROT SERPL-MCNC: 7.2 G/DL (ref 6–8.5)
PROT UR QL STRIP: NEGATIVE
PROTHROMBIN TIME: 12.7 SECONDS (ref 11.7–14.2)
RBC # BLD AUTO: 4.52 10*6/MM3 (ref 3.9–5.2)
SODIUM BLD-SCNC: 145 MMOL/L (ref 136–145)
SP GR UR STRIP: 1.01 (ref 1–1.03)
UROBILINOGEN UR QL STRIP: ABNORMAL
WBC NRBC COR # BLD: 6.69 10*3/MM3 (ref 4.5–10.7)

## 2017-10-25 PROCEDURE — 85610 PROTHROMBIN TIME: CPT | Performed by: ORTHOPAEDIC SURGERY

## 2017-10-25 PROCEDURE — 73560 X-RAY EXAM OF KNEE 1 OR 2: CPT

## 2017-10-25 PROCEDURE — 85027 COMPLETE CBC AUTOMATED: CPT | Performed by: ORTHOPAEDIC SURGERY

## 2017-10-25 PROCEDURE — 71020 HC CHEST PA AND LATERAL: CPT

## 2017-10-25 PROCEDURE — 81003 URINALYSIS AUTO W/O SCOPE: CPT | Performed by: ORTHOPAEDIC SURGERY

## 2017-10-25 PROCEDURE — 80053 COMPREHEN METABOLIC PANEL: CPT | Performed by: ORTHOPAEDIC SURGERY

## 2017-10-25 PROCEDURE — 36415 COLL VENOUS BLD VENIPUNCTURE: CPT

## 2017-10-25 ASSESSMENT — KOOS JR: KOOS JR SCORE: 10

## 2017-10-25 NOTE — DISCHARGE INSTRUCTIONS

## 2017-10-27 ENCOUNTER — RESULTS ENCOUNTER (OUTPATIENT)
Dept: INTERNAL MEDICINE | Facility: CLINIC | Age: 82
End: 2017-10-27

## 2017-10-27 DIAGNOSIS — M19.90 ARTHRITIS: ICD-10-CM

## 2017-10-31 ENCOUNTER — TRANSCRIBE ORDERS (OUTPATIENT)
Dept: ADMINISTRATIVE | Facility: HOSPITAL | Age: 82
End: 2017-10-31

## 2017-10-31 DIAGNOSIS — Z12.31 VISIT FOR SCREENING MAMMOGRAM: Primary | ICD-10-CM

## 2017-11-09 ENCOUNTER — ANESTHESIA EVENT (OUTPATIENT)
Dept: PERIOP | Facility: HOSPITAL | Age: 82
End: 2017-11-09

## 2017-11-09 ENCOUNTER — HOSPITAL ENCOUNTER (INPATIENT)
Facility: HOSPITAL | Age: 82
LOS: 3 days | Discharge: HOME HEALTH | End: 2017-11-12
Attending: ORTHOPAEDIC SURGERY | Admitting: ORTHOPAEDIC SURGERY

## 2017-11-09 ENCOUNTER — APPOINTMENT (OUTPATIENT)
Dept: GENERAL RADIOLOGY | Facility: HOSPITAL | Age: 82
End: 2017-11-09
Attending: ORTHOPAEDIC SURGERY

## 2017-11-09 ENCOUNTER — ANESTHESIA (OUTPATIENT)
Dept: PERIOP | Facility: HOSPITAL | Age: 82
End: 2017-11-09

## 2017-11-09 DIAGNOSIS — M19.90 ARTHRITIS: ICD-10-CM

## 2017-11-09 DIAGNOSIS — M25.561 ACUTE PAIN OF RIGHT KNEE: ICD-10-CM

## 2017-11-09 DIAGNOSIS — R26.2 DIFFICULTY WALKING: Primary | ICD-10-CM

## 2017-11-09 DIAGNOSIS — M17.11 OSTEOARTHRITIS OF RIGHT KNEE, UNSPECIFIED OSTEOARTHRITIS TYPE: ICD-10-CM

## 2017-11-09 PROBLEM — M17.9 OA (OSTEOARTHRITIS) OF KNEE: Status: ACTIVE | Noted: 2017-11-09

## 2017-11-09 LAB
ABO GROUP BLD: NORMAL
BLD GP AB SCN SERPL QL: NEGATIVE
RH BLD: NEGATIVE

## 2017-11-09 PROCEDURE — C1776 JOINT DEVICE (IMPLANTABLE): HCPCS | Performed by: ORTHOPAEDIC SURGERY

## 2017-11-09 PROCEDURE — 25010000002 HYDROMORPHONE PER 4 MG: Performed by: NURSE ANESTHETIST, CERTIFIED REGISTERED

## 2017-11-09 PROCEDURE — 25010000002 MIDAZOLAM PER 1 MG: Performed by: ANESTHESIOLOGY

## 2017-11-09 PROCEDURE — 97162 PT EVAL MOD COMPLEX 30 MIN: CPT

## 2017-11-09 PROCEDURE — C1713 ANCHOR/SCREW BN/BN,TIS/BN: HCPCS | Performed by: ORTHOPAEDIC SURGERY

## 2017-11-09 PROCEDURE — 73560 X-RAY EXAM OF KNEE 1 OR 2: CPT

## 2017-11-09 PROCEDURE — 25010000002 PROPOFOL 10 MG/ML EMULSION: Performed by: NURSE ANESTHETIST, CERTIFIED REGISTERED

## 2017-11-09 PROCEDURE — 25010000002 MORPHINE (PF) 10 MG/ML SOLUTION 1 ML VIAL: Performed by: ORTHOPAEDIC SURGERY

## 2017-11-09 PROCEDURE — 86850 RBC ANTIBODY SCREEN: CPT | Performed by: ORTHOPAEDIC SURGERY

## 2017-11-09 PROCEDURE — 25010000002 FENTANYL CITRATE (PF) 100 MCG/2ML SOLUTION: Performed by: ANESTHESIOLOGY

## 2017-11-09 PROCEDURE — 25010000003 CEFAZOLIN IN DEXTROSE 2-4 GM/100ML-% SOLUTION: Performed by: ORTHOPAEDIC SURGERY

## 2017-11-09 PROCEDURE — 86900 BLOOD TYPING SEROLOGIC ABO: CPT | Performed by: ORTHOPAEDIC SURGERY

## 2017-11-09 PROCEDURE — 25010000002 DEXAMETHASONE PER 1 MG: Performed by: NURSE ANESTHETIST, CERTIFIED REGISTERED

## 2017-11-09 PROCEDURE — 25010000002 FENTANYL CITRATE (PF) 100 MCG/2ML SOLUTION: Performed by: NURSE ANESTHETIST, CERTIFIED REGISTERED

## 2017-11-09 PROCEDURE — 25010000002 ROPIVACAINE PER 1 MG: Performed by: ORTHOPAEDIC SURGERY

## 2017-11-09 PROCEDURE — 25010000002 ONDANSETRON PER 1 MG: Performed by: NURSE ANESTHETIST, CERTIFIED REGISTERED

## 2017-11-09 PROCEDURE — 25010000002 EPINEPHRINE PER 0.1 MG: Performed by: ORTHOPAEDIC SURGERY

## 2017-11-09 PROCEDURE — 25010000002 FENTANYL CITRATE (PF) 250 MCG/5ML SOLUTION

## 2017-11-09 PROCEDURE — 25010000002 KETOROLAC TROMETHAMINE PER 15 MG: Performed by: ORTHOPAEDIC SURGERY

## 2017-11-09 PROCEDURE — 86901 BLOOD TYPING SEROLOGIC RH(D): CPT | Performed by: ORTHOPAEDIC SURGERY

## 2017-11-09 PROCEDURE — 0SRC0J9 REPLACEMENT OF RIGHT KNEE JOINT WITH SYNTHETIC SUBSTITUTE, CEMENTED, OPEN APPROACH: ICD-10-PCS | Performed by: ORTHOPAEDIC SURGERY

## 2017-11-09 PROCEDURE — 97110 THERAPEUTIC EXERCISES: CPT

## 2017-11-09 DEVICE — BEAR TIB/KN VANGUARD CR DCM 12X63/67MM NS: Type: IMPLANTABLE DEVICE | Site: KNEE | Status: FUNCTIONAL

## 2017-11-09 DEVICE — CMT BONE PALACOS 120001: Type: IMPLANTABLE DEVICE | Site: KNEE | Status: FUNCTIONAL

## 2017-11-09 DEVICE — COMP FEM/KN VANGUARD INTLK CR 62.5MM NS RT: Type: IMPLANTABLE DEVICE | Site: KNEE | Status: FUNCTIONAL

## 2017-11-09 DEVICE — TRY TIB INTERLK PRI 67MM: Type: IMPLANTABLE DEVICE | Site: KNEE | Status: FUNCTIONAL

## 2017-11-09 DEVICE — CAP TOTL KN CMT PREMIUM: Type: IMPLANTABLE DEVICE | Site: KNEE | Status: FUNCTIONAL

## 2017-11-09 DEVICE — STEM TIB PRI FINN 46X40MM: Type: IMPLANTABLE DEVICE | Site: KNEE | Status: FUNCTIONAL

## 2017-11-09 DEVICE — PAT 3PEG THN 31X6.2  31MM: Type: IMPLANTABLE DEVICE | Site: KNEE | Status: FUNCTIONAL

## 2017-11-09 RX ORDER — FENTANYL CITRATE 50 UG/ML
50 INJECTION, SOLUTION INTRAMUSCULAR; INTRAVENOUS
Status: DISCONTINUED | OUTPATIENT
Start: 2017-11-09 | End: 2017-11-09 | Stop reason: HOSPADM

## 2017-11-09 RX ORDER — FERROUS SULFATE 325(65) MG
325 TABLET ORAL
Status: DISCONTINUED | OUTPATIENT
Start: 2017-11-09 | End: 2017-11-12 | Stop reason: HOSPADM

## 2017-11-09 RX ORDER — MIDAZOLAM HYDROCHLORIDE 1 MG/ML
2 INJECTION INTRAMUSCULAR; INTRAVENOUS
Status: CANCELLED | OUTPATIENT
Start: 2017-11-09

## 2017-11-09 RX ORDER — MIDAZOLAM HYDROCHLORIDE 1 MG/ML
1 INJECTION INTRAMUSCULAR; INTRAVENOUS
Status: CANCELLED | OUTPATIENT
Start: 2017-11-09

## 2017-11-09 RX ORDER — DIAZEPAM 5 MG/1
5 TABLET ORAL EVERY 6 HOURS PRN
Status: DISCONTINUED | OUTPATIENT
Start: 2017-11-09 | End: 2017-11-12 | Stop reason: HOSPADM

## 2017-11-09 RX ORDER — PROMETHAZINE HYDROCHLORIDE 25 MG/ML
12.5 INJECTION, SOLUTION INTRAMUSCULAR; INTRAVENOUS ONCE AS NEEDED
Status: DISCONTINUED | OUTPATIENT
Start: 2017-11-09 | End: 2017-11-09 | Stop reason: HOSPADM

## 2017-11-09 RX ORDER — ACETAMINOPHEN 500 MG
1000 TABLET ORAL ONCE
Status: COMPLETED | OUTPATIENT
Start: 2017-11-09 | End: 2017-11-09

## 2017-11-09 RX ORDER — DOCUSATE SODIUM 100 MG/1
100 CAPSULE, LIQUID FILLED ORAL 2 TIMES DAILY PRN
Status: DISCONTINUED | OUTPATIENT
Start: 2017-11-09 | End: 2017-11-12 | Stop reason: HOSPADM

## 2017-11-09 RX ORDER — CLINDAMYCIN PHOSPHATE 900 MG/50ML
900 INJECTION INTRAVENOUS EVERY 8 HOURS
Status: COMPLETED | OUTPATIENT
Start: 2017-11-09 | End: 2017-11-09

## 2017-11-09 RX ORDER — MORPHINE SULFATE 2 MG/ML
6 INJECTION, SOLUTION INTRAMUSCULAR; INTRAVENOUS
Status: DISCONTINUED | OUTPATIENT
Start: 2017-11-09 | End: 2017-11-12 | Stop reason: HOSPADM

## 2017-11-09 RX ORDER — PROMETHAZINE HYDROCHLORIDE 25 MG/1
25 TABLET ORAL ONCE AS NEEDED
Status: DISCONTINUED | OUTPATIENT
Start: 2017-11-09 | End: 2017-11-09 | Stop reason: HOSPADM

## 2017-11-09 RX ORDER — HYDRALAZINE HYDROCHLORIDE 20 MG/ML
5 INJECTION INTRAMUSCULAR; INTRAVENOUS
Status: DISCONTINUED | OUTPATIENT
Start: 2017-11-09 | End: 2017-11-09 | Stop reason: HOSPADM

## 2017-11-09 RX ORDER — HYDROCODONE BITARTRATE AND ACETAMINOPHEN 7.5; 325 MG/1; MG/1
1 TABLET ORAL ONCE AS NEEDED
Status: DISCONTINUED | OUTPATIENT
Start: 2017-11-09 | End: 2017-11-09 | Stop reason: HOSPADM

## 2017-11-09 RX ORDER — EPHEDRINE SULFATE 50 MG/ML
5 INJECTION, SOLUTION INTRAVENOUS ONCE AS NEEDED
Status: DISCONTINUED | OUTPATIENT
Start: 2017-11-09 | End: 2017-11-09 | Stop reason: HOSPADM

## 2017-11-09 RX ORDER — PROMETHAZINE HYDROCHLORIDE 25 MG/1
12.5 TABLET ORAL ONCE AS NEEDED
Status: DISCONTINUED | OUTPATIENT
Start: 2017-11-09 | End: 2017-11-09 | Stop reason: HOSPADM

## 2017-11-09 RX ORDER — FLUMAZENIL 0.1 MG/ML
0.2 INJECTION INTRAVENOUS AS NEEDED
Status: DISCONTINUED | OUTPATIENT
Start: 2017-11-09 | End: 2017-11-09 | Stop reason: HOSPADM

## 2017-11-09 RX ORDER — SENNA AND DOCUSATE SODIUM 50; 8.6 MG/1; MG/1
2 TABLET, FILM COATED ORAL 2 TIMES DAILY
Status: DISCONTINUED | OUTPATIENT
Start: 2017-11-09 | End: 2017-11-12 | Stop reason: HOSPADM

## 2017-11-09 RX ORDER — DIAZEPAM 5 MG/ML
5 INJECTION, SOLUTION INTRAMUSCULAR; INTRAVENOUS 2 TIMES DAILY PRN
Status: ACTIVE | OUTPATIENT
Start: 2017-11-09 | End: 2017-11-10

## 2017-11-09 RX ORDER — OXYCODONE HYDROCHLORIDE AND ACETAMINOPHEN 5; 325 MG/1; MG/1
2 TABLET ORAL EVERY 4 HOURS PRN
Status: DISCONTINUED | OUTPATIENT
Start: 2017-11-09 | End: 2017-11-12 | Stop reason: HOSPADM

## 2017-11-09 RX ORDER — FENTANYL CITRATE 50 UG/ML
INJECTION, SOLUTION INTRAMUSCULAR; INTRAVENOUS
Status: COMPLETED
Start: 2017-11-09 | End: 2017-11-09

## 2017-11-09 RX ORDER — NALOXONE HCL 0.4 MG/ML
0.2 VIAL (ML) INJECTION AS NEEDED
Status: DISCONTINUED | OUTPATIENT
Start: 2017-11-09 | End: 2017-11-09 | Stop reason: HOSPADM

## 2017-11-09 RX ORDER — SODIUM CHLORIDE, SODIUM LACTATE, POTASSIUM CHLORIDE, CALCIUM CHLORIDE 600; 310; 30; 20 MG/100ML; MG/100ML; MG/100ML; MG/100ML
9 INJECTION, SOLUTION INTRAVENOUS CONTINUOUS
Status: CANCELLED | OUTPATIENT
Start: 2017-11-09

## 2017-11-09 RX ORDER — SODIUM CHLORIDE, SODIUM LACTATE, POTASSIUM CHLORIDE, CALCIUM CHLORIDE 600; 310; 30; 20 MG/100ML; MG/100ML; MG/100ML; MG/100ML
INJECTION, SOLUTION INTRAVENOUS CONTINUOUS PRN
Status: DISCONTINUED | OUTPATIENT
Start: 2017-11-09 | End: 2017-11-09 | Stop reason: SURG

## 2017-11-09 RX ORDER — LABETALOL HYDROCHLORIDE 5 MG/ML
5 INJECTION, SOLUTION INTRAVENOUS
Status: DISCONTINUED | OUTPATIENT
Start: 2017-11-09 | End: 2017-11-09 | Stop reason: HOSPADM

## 2017-11-09 RX ORDER — SODIUM CHLORIDE 450 MG/100ML
100 INJECTION, SOLUTION INTRAVENOUS CONTINUOUS
Status: DISCONTINUED | OUTPATIENT
Start: 2017-11-09 | End: 2017-11-12 | Stop reason: HOSPADM

## 2017-11-09 RX ORDER — DEXAMETHASONE SODIUM PHOSPHATE 10 MG/ML
INJECTION INTRAMUSCULAR; INTRAVENOUS AS NEEDED
Status: DISCONTINUED | OUTPATIENT
Start: 2017-11-09 | End: 2017-11-09 | Stop reason: SURG

## 2017-11-09 RX ORDER — CELECOXIB 200 MG/1
200 CAPSULE ORAL ONCE
Status: COMPLETED | OUTPATIENT
Start: 2017-11-09 | End: 2017-11-09

## 2017-11-09 RX ORDER — SODIUM CHLORIDE 0.9 % (FLUSH) 0.9 %
1-10 SYRINGE (ML) INJECTION AS NEEDED
Status: DISCONTINUED | OUTPATIENT
Start: 2017-11-09 | End: 2017-11-12 | Stop reason: HOSPADM

## 2017-11-09 RX ORDER — HYDROMORPHONE HCL 110MG/55ML
PATIENT CONTROLLED ANALGESIA SYRINGE INTRAVENOUS AS NEEDED
Status: DISCONTINUED | OUTPATIENT
Start: 2017-11-09 | End: 2017-11-09 | Stop reason: SURG

## 2017-11-09 RX ORDER — PROPOFOL 10 MG/ML
VIAL (ML) INTRAVENOUS AS NEEDED
Status: DISCONTINUED | OUTPATIENT
Start: 2017-11-09 | End: 2017-11-09 | Stop reason: SURG

## 2017-11-09 RX ORDER — DIPHENHYDRAMINE HCL 25 MG
50 CAPSULE ORAL EVERY 6 HOURS PRN
Status: DISCONTINUED | OUTPATIENT
Start: 2017-11-09 | End: 2017-11-12 | Stop reason: HOSPADM

## 2017-11-09 RX ORDER — ACETAMINOPHEN 325 MG/1
650 TABLET ORAL EVERY 4 HOURS PRN
Status: DISCONTINUED | OUTPATIENT
Start: 2017-11-09 | End: 2017-11-12 | Stop reason: HOSPADM

## 2017-11-09 RX ORDER — CLINDAMYCIN PHOSPHATE 900 MG/50ML
900 INJECTION INTRAVENOUS ONCE
Status: DISCONTINUED | OUTPATIENT
Start: 2017-11-09 | End: 2017-11-09

## 2017-11-09 RX ORDER — FENTANYL CITRATE 50 UG/ML
INJECTION, SOLUTION INTRAMUSCULAR; INTRAVENOUS
Status: COMPLETED | OUTPATIENT
Start: 2017-11-09 | End: 2017-11-09

## 2017-11-09 RX ORDER — ONDANSETRON 2 MG/ML
4 INJECTION INTRAMUSCULAR; INTRAVENOUS EVERY 6 HOURS PRN
Status: DISCONTINUED | OUTPATIENT
Start: 2017-11-09 | End: 2017-11-12 | Stop reason: HOSPADM

## 2017-11-09 RX ORDER — ONDANSETRON 4 MG/1
4 TABLET, FILM COATED ORAL EVERY 6 HOURS PRN
Status: DISCONTINUED | OUTPATIENT
Start: 2017-11-09 | End: 2017-11-12 | Stop reason: HOSPADM

## 2017-11-09 RX ORDER — NALOXONE HCL 0.4 MG/ML
0.4 VIAL (ML) INJECTION
Status: DISCONTINUED | OUTPATIENT
Start: 2017-11-09 | End: 2017-11-12 | Stop reason: HOSPADM

## 2017-11-09 RX ORDER — ONDANSETRON 2 MG/ML
4 INJECTION INTRAMUSCULAR; INTRAVENOUS ONCE AS NEEDED
Status: DISCONTINUED | OUTPATIENT
Start: 2017-11-09 | End: 2017-11-09 | Stop reason: HOSPADM

## 2017-11-09 RX ORDER — FENTANYL CITRATE 50 UG/ML
INJECTION, SOLUTION INTRAMUSCULAR; INTRAVENOUS AS NEEDED
Status: DISCONTINUED | OUTPATIENT
Start: 2017-11-09 | End: 2017-11-09 | Stop reason: SURG

## 2017-11-09 RX ORDER — LISINOPRIL 2.5 MG/1
2.5 TABLET ORAL AS NEEDED
Status: DISCONTINUED | OUTPATIENT
Start: 2017-11-09 | End: 2017-11-12 | Stop reason: HOSPADM

## 2017-11-09 RX ORDER — DIPHENHYDRAMINE HYDROCHLORIDE 50 MG/ML
12.5 INJECTION INTRAMUSCULAR; INTRAVENOUS
Status: DISCONTINUED | OUTPATIENT
Start: 2017-11-09 | End: 2017-11-09 | Stop reason: HOSPADM

## 2017-11-09 RX ORDER — OXYCODONE HYDROCHLORIDE AND ACETAMINOPHEN 5; 325 MG/1; MG/1
1 TABLET ORAL EVERY 4 HOURS PRN
Status: DISCONTINUED | OUTPATIENT
Start: 2017-11-09 | End: 2017-11-12 | Stop reason: HOSPADM

## 2017-11-09 RX ORDER — OXYCODONE AND ACETAMINOPHEN 7.5; 325 MG/1; MG/1
1 TABLET ORAL ONCE AS NEEDED
Status: DISCONTINUED | OUTPATIENT
Start: 2017-11-09 | End: 2017-11-09 | Stop reason: HOSPADM

## 2017-11-09 RX ORDER — FAMOTIDINE 10 MG/ML
20 INJECTION, SOLUTION INTRAVENOUS ONCE
Status: CANCELLED | OUTPATIENT
Start: 2017-11-09 | End: 2017-11-09

## 2017-11-09 RX ORDER — HYDROMORPHONE HYDROCHLORIDE 1 MG/ML
0.5 INJECTION, SOLUTION INTRAMUSCULAR; INTRAVENOUS; SUBCUTANEOUS
Status: DISCONTINUED | OUTPATIENT
Start: 2017-11-09 | End: 2017-11-09 | Stop reason: HOSPADM

## 2017-11-09 RX ORDER — CEFAZOLIN SODIUM 2 G/100ML
2 INJECTION, SOLUTION INTRAVENOUS ONCE
Status: COMPLETED | OUTPATIENT
Start: 2017-11-09 | End: 2017-11-09

## 2017-11-09 RX ORDER — ATORVASTATIN CALCIUM 20 MG/1
20 TABLET, FILM COATED ORAL DAILY
Status: DISCONTINUED | OUTPATIENT
Start: 2017-11-09 | End: 2017-11-12 | Stop reason: HOSPADM

## 2017-11-09 RX ORDER — ACETAMINOPHEN 325 MG/1
325 TABLET ORAL EVERY 4 HOURS PRN
Status: DISCONTINUED | OUTPATIENT
Start: 2017-11-09 | End: 2017-11-12 | Stop reason: HOSPADM

## 2017-11-09 RX ORDER — ONDANSETRON 4 MG/1
4 TABLET, ORALLY DISINTEGRATING ORAL EVERY 6 HOURS PRN
Status: DISCONTINUED | OUTPATIENT
Start: 2017-11-09 | End: 2017-11-12 | Stop reason: HOSPADM

## 2017-11-09 RX ORDER — MIDAZOLAM HYDROCHLORIDE 5 MG/ML
INJECTION INTRAMUSCULAR; INTRAVENOUS
Status: COMPLETED | OUTPATIENT
Start: 2017-11-09 | End: 2017-11-09

## 2017-11-09 RX ORDER — SODIUM CHLORIDE 0.9 % (FLUSH) 0.9 %
1-10 SYRINGE (ML) INJECTION AS NEEDED
Status: CANCELLED | OUTPATIENT
Start: 2017-11-09

## 2017-11-09 RX ORDER — ASPIRIN 325 MG
325 TABLET, DELAYED RELEASE (ENTERIC COATED) ORAL 2 TIMES DAILY WITH MEALS
Status: DISCONTINUED | OUTPATIENT
Start: 2017-11-09 | End: 2017-11-12 | Stop reason: HOSPADM

## 2017-11-09 RX ORDER — ROCURONIUM BROMIDE 10 MG/ML
INJECTION, SOLUTION INTRAVENOUS AS NEEDED
Status: DISCONTINUED | OUTPATIENT
Start: 2017-11-09 | End: 2017-11-09 | Stop reason: SURG

## 2017-11-09 RX ORDER — FENTANYL CITRATE 50 UG/ML
50 INJECTION, SOLUTION INTRAMUSCULAR; INTRAVENOUS
Status: CANCELLED | OUTPATIENT
Start: 2017-11-09

## 2017-11-09 RX ORDER — ONDANSETRON 2 MG/ML
INJECTION INTRAMUSCULAR; INTRAVENOUS AS NEEDED
Status: DISCONTINUED | OUTPATIENT
Start: 2017-11-09 | End: 2017-11-09 | Stop reason: SURG

## 2017-11-09 RX ORDER — LIDOCAINE HYDROCHLORIDE 20 MG/ML
INJECTION, SOLUTION INFILTRATION; PERINEURAL AS NEEDED
Status: DISCONTINUED | OUTPATIENT
Start: 2017-11-09 | End: 2017-11-09 | Stop reason: SURG

## 2017-11-09 RX ORDER — EPHEDRINE SULFATE 50 MG/ML
INJECTION, SOLUTION INTRAVENOUS AS NEEDED
Status: DISCONTINUED | OUTPATIENT
Start: 2017-11-09 | End: 2017-11-09 | Stop reason: SURG

## 2017-11-09 RX ORDER — BISACODYL 10 MG
10 SUPPOSITORY, RECTAL RECTAL DAILY PRN
Status: DISCONTINUED | OUTPATIENT
Start: 2017-11-09 | End: 2017-11-12 | Stop reason: HOSPADM

## 2017-11-09 RX ORDER — KETOROLAC TROMETHAMINE 30 MG/ML
15 INJECTION, SOLUTION INTRAMUSCULAR; INTRAVENOUS EVERY 8 HOURS PRN
Status: DISCONTINUED | OUTPATIENT
Start: 2017-11-09 | End: 2017-11-12 | Stop reason: HOSPADM

## 2017-11-09 RX ORDER — PROMETHAZINE HYDROCHLORIDE 25 MG/1
25 SUPPOSITORY RECTAL ONCE AS NEEDED
Status: DISCONTINUED | OUTPATIENT
Start: 2017-11-09 | End: 2017-11-09 | Stop reason: HOSPADM

## 2017-11-09 RX ORDER — MAGNESIUM HYDROXIDE 1200 MG/15ML
LIQUID ORAL AS NEEDED
Status: DISCONTINUED | OUTPATIENT
Start: 2017-11-09 | End: 2017-11-09 | Stop reason: HOSPADM

## 2017-11-09 RX ORDER — UREA 10 %
1 LOTION (ML) TOPICAL NIGHTLY PRN
Status: DISCONTINUED | OUTPATIENT
Start: 2017-11-09 | End: 2017-11-12 | Stop reason: HOSPADM

## 2017-11-09 RX ORDER — TRANEXAMIC ACID 100 MG/ML
INJECTION, SOLUTION INTRAVENOUS AS NEEDED
Status: DISCONTINUED | OUTPATIENT
Start: 2017-11-09 | End: 2017-11-09 | Stop reason: SURG

## 2017-11-09 RX ADMIN — ASPIRIN 325 MG: 325 TABLET, DELAYED RELEASE ORAL at 18:19

## 2017-11-09 RX ADMIN — FENTANYL CITRATE 50 MCG: 50 INJECTION INTRAMUSCULAR; INTRAVENOUS at 09:28

## 2017-11-09 RX ADMIN — TRANEXAMIC ACID 1000 MG: 100 INJECTION, SOLUTION INTRAVENOUS at 08:05

## 2017-11-09 RX ADMIN — SODIUM CHLORIDE, POTASSIUM CHLORIDE, SODIUM LACTATE AND CALCIUM CHLORIDE: 600; 310; 30; 20 INJECTION, SOLUTION INTRAVENOUS at 07:11

## 2017-11-09 RX ADMIN — CLINDAMYCIN PHOSPHATE 900 MG: 18 INJECTION, SOLUTION INTRAMUSCULAR; INTRAVENOUS at 18:18

## 2017-11-09 RX ADMIN — HYDROMORPHONE HYDROCHLORIDE 0.5 MG: 1 INJECTION, SOLUTION INTRAMUSCULAR; INTRAVENOUS; SUBCUTANEOUS at 09:21

## 2017-11-09 RX ADMIN — DOCUSATE SODIUM -SENNOSIDES 2 TABLET: 50; 8.6 TABLET, COATED ORAL at 18:18

## 2017-11-09 RX ADMIN — CELECOXIB 200 MG: 200 CAPSULE ORAL at 05:53

## 2017-11-09 RX ADMIN — OXYCODONE HYDROCHLORIDE AND ACETAMINOPHEN 1 TABLET: 5; 325 TABLET ORAL at 20:41

## 2017-11-09 RX ADMIN — FERROUS SULFATE TAB 325 MG (65 MG ELEMENTAL FE) 325 MG: 325 (65 FE) TAB at 13:00

## 2017-11-09 RX ADMIN — MIDAZOLAM HYDROCHLORIDE 2 MG: 5 INJECTION, SOLUTION INTRAMUSCULAR; INTRAVENOUS at 06:37

## 2017-11-09 RX ADMIN — CEFAZOLIN SODIUM 2 G: 2 INJECTION, SOLUTION INTRAVENOUS at 07:11

## 2017-11-09 RX ADMIN — SODIUM CHLORIDE 100 ML/HR: 4.5 INJECTION, SOLUTION INTRAVENOUS at 22:22

## 2017-11-09 RX ADMIN — SUGAMMADEX 200 MG: 100 INJECTION, SOLUTION INTRAVENOUS at 08:30

## 2017-11-09 RX ADMIN — ATORVASTATIN CALCIUM 20 MG: 20 TABLET, FILM COATED ORAL at 13:00

## 2017-11-09 RX ADMIN — FENTANYL CITRATE 50 MCG: 50 INJECTION INTRAMUSCULAR; INTRAVENOUS at 07:36

## 2017-11-09 RX ADMIN — FENTANYL CITRATE 50 MCG: 50 INJECTION INTRAMUSCULAR; INTRAVENOUS at 09:37

## 2017-11-09 RX ADMIN — FENTANYL CITRATE 50 MCG: 50 INJECTION INTRAMUSCULAR; INTRAVENOUS at 09:24

## 2017-11-09 RX ADMIN — HYDROMORPHONE HYDROCHLORIDE 0.4 MG: 2 INJECTION, SOLUTION INTRAMUSCULAR; INTRAVENOUS; SUBCUTANEOUS at 08:02

## 2017-11-09 RX ADMIN — ROCURONIUM BROMIDE 35 MG: 10 INJECTION INTRAVENOUS at 07:17

## 2017-11-09 RX ADMIN — FENTANYL CITRATE 50 MCG: 50 INJECTION INTRAMUSCULAR; INTRAVENOUS at 08:45

## 2017-11-09 RX ADMIN — CEFAZOLIN SODIUM 2 G: 2 INJECTION, SOLUTION INTRAVENOUS at 08:29

## 2017-11-09 RX ADMIN — FENTANYL CITRATE 50 MCG: 50 INJECTION INTRAMUSCULAR; INTRAVENOUS at 06:37

## 2017-11-09 RX ADMIN — SODIUM CHLORIDE 100 ML/HR: 4.5 INJECTION, SOLUTION INTRAVENOUS at 12:48

## 2017-11-09 RX ADMIN — ACETAMINOPHEN 1000 MG: 500 TABLET ORAL at 05:53

## 2017-11-09 RX ADMIN — CLINDAMYCIN PHOSPHATE 900 MG: 18 INJECTION, SOLUTION INTRAMUSCULAR; INTRAVENOUS at 13:00

## 2017-11-09 RX ADMIN — LIDOCAINE HYDROCHLORIDE 50 MG: 20 INJECTION, SOLUTION INFILTRATION; PERINEURAL at 07:17

## 2017-11-09 RX ADMIN — DEXAMETHASONE SODIUM PHOSPHATE 8 MG: 10 INJECTION INTRAMUSCULAR; INTRAVENOUS at 07:32

## 2017-11-09 RX ADMIN — EPHEDRINE SULFATE 10 MG: 50 INJECTION INTRAMUSCULAR; INTRAVENOUS; SUBCUTANEOUS at 07:56

## 2017-11-09 RX ADMIN — ONDANSETRON 4 MG: 2 INJECTION INTRAMUSCULAR; INTRAVENOUS at 07:32

## 2017-11-09 RX ADMIN — HYDROMORPHONE HYDROCHLORIDE 0.5 MG: 1 INJECTION, SOLUTION INTRAMUSCULAR; INTRAVENOUS; SUBCUTANEOUS at 09:15

## 2017-11-09 RX ADMIN — SODIUM CHLORIDE, POTASSIUM CHLORIDE, SODIUM LACTATE AND CALCIUM CHLORIDE: 600; 310; 30; 20 INJECTION, SOLUTION INTRAVENOUS at 08:35

## 2017-11-09 RX ADMIN — FENTANYL CITRATE 50 MCG: 50 INJECTION INTRAMUSCULAR; INTRAVENOUS at 09:45

## 2017-11-09 RX ADMIN — FENTANYL CITRATE 50 MCG: 50 INJECTION INTRAMUSCULAR; INTRAVENOUS at 07:17

## 2017-11-09 RX ADMIN — PROPOFOL 170 MG: 10 INJECTION, EMULSION INTRAVENOUS at 07:17

## 2017-11-09 RX ADMIN — MUPIROCIN: 20 OINTMENT TOPICAL at 18:18

## 2017-11-09 NOTE — H&P
History and Physical    Patient Name:  Riri Damon  YOB: 1934    Medical Records Number:  [id number]    Date of Admission:  11/9/2017  5:27 AM    Chief Complaint:  OA (osteoarthritis) of knee [M17.10]    Riri Damon is a 83 y.o. female who presents c/o severe right knee pain.  The pain has been on and off for many years, worsening to the point where the pain is becoming disabling.  The pain is a constant dull ache with occasional sharp, stabbing pain.  The patient has failed conservative treatment and would like to proceed with total knee arthroplasty.    /81  Pulse 91  Temp 98 °F (36.7 °C) (Oral)   Resp 12  LMP  (LMP Unknown)  SpO2 97%    Past Medical History:    Past Medical History:   Diagnosis Date   • Anxiety    • Colon carcinoma     Stage IIIB, T4N1a   • Edema    • History of edema     LE   • History of rheumatic fever    • Hyperlipidemia    • Hypertension     MEDS PRN   • Infectious mononucleosis    • Infectious viral hepatitis    • Mitral regurgitation    • OA (osteoarthritis)    • Osteoporosis    • Palpitations    • Tricuspid regurgitation    • Venous insufficiency    • Vitamin D deficiency        Social History:    Social History     Social History   • Marital status:      Spouse name: Marcus   • Number of children: 3   • Years of education: College     Occupational History   •  [employer] [place]   •  Retired     Social History Main Topics   • Smoking status: Never Smoker   • Smokeless tobacco: Never Used   • Alcohol use No   • Drug use: No      Comment: caffine use   • Sexual activity: Defer     Other Topics Concern   • Not on file     Social History Narrative       Family History:    Family History   Problem Relation Age of Onset   • Alzheimer's disease Mother    • Heart disease Mother    • Heart attack Father    • Heart disease Father    • Heart disease Other    • Cancer Sister 60     Colon   • Malig Hyperthermia Neg Hx        Current  Medications:    Current Facility-Administered Medications:   •  ceFAZolin in dextrose (ANCEF) IVPB solution 2 g, 2 g, Intravenous, Once, Andrea Caballero MD  •  ropivacaine (NAROPIN) 50 mL, Morphine (PF) 5 mg, ketorolac (TORADOL) 30 mg, EPINEPHrine PF (ADRENALIN) 0.3 mg in sodium chloride 0.9 % 101.8 mL, , Injection, Once, Andrea Caballero MD    Allergies:  No Known Allergies    Review of Systems:   HEENT: Patient denies any headaches, vision changes, change in hearing, or tinnitus, Patient denies any rhinorrhea,epistaxis, sinus pain, mouth or dental problems, sore throat or hoarseness, or dysphagia  Pulmonary: Patient denies any cough, congestion, SOA, or wheezing  Cardiovascular: Patient denies any chest pain, dyspnea, palpitations, weakness, intolerance of exercise, varicosities, swelling of extremities, known murmur  Gastrointestinal:  Patient denies nausea, vomiting, diarrhea, constipation, loss  of appetite, change in appetite, dysphagia, gas, heartburn, melena, change in bowel habits, use of laxatives or other drugs to alter the function of the gastrointestinal tract.  Genital/Urinary: Patient denies dysuria, change in color of urine, change in frequency of urination, pain with urgency, incontinence, retention, or nocturia.  Musculoskeletal: Patient denies increased warmth; redness; or swelling of joints; limitation of function; deformity; crepitation: pain in a joint or an extremity, the neck, or the back, especially with movement.  Neurological: Patient denies dizziness, tremor, ataxia, difficulty in speaking, change in speech, paresthesia, loss of sensation, seizures, syncope, changes in memory.  Endocrine system: Patient denies tremors, palpitations, intolerance of heat or cold, polyuria, polydipsia, polyphagia, diaphoresis, exophthalmos, or goiter.  Psychological: Patient denies thoughts/plans or harming self or other; depression,  insomnia, night terrors, dieudonne, memory loss, disorientation.  Skin:  Patient denies any bruising, rashes, discoloration, pruritus, wounds, ulcers, decubiti, changes in the hair or nails  Hematopoietic: Patient denies history of spontaneous or excessive bleeding, epistaxis, hematuria, melena, fatigue, enlarged or tender lymph nodes, pallor, history of anemia.      Physical Exam:   Awake, A&O x3, affect normal, no acute distress  Ambulating with a limp due to knee pain  Knee ROM is limited due to pain (5-115)  Strength is 4/5 in the quad, hamstring and calf  Cap refill is normal, Sensation intact    Card:  RR, HD Stable  Pulm:  Regular breathing, no S.O.A  Abd:  Soft, NT, ND    Lab Results (last 24 hours)     ** No results found for the last 24 hours. **          Xr Knee 1 Or 2 View Right    Result Date: 10/25/2017  Narrative: RIGHT KNEE, 2 VIEWS CHEST  HISTORY: Preop chest x-ray, knee pain.  FINDINGS:  RIGHT KNEE: There is severe loss of joint space involving the medial compartment. Moderate size marginal osteophytes are noted medially. Chondrocalcinosis is appreciated. There is no evidence of fracture. A small suprapatellar effusion is noted.  CHEST: 2 views of the chest demonstrate the heart to be within normal limits in size. There is mild dextroscoliosis of the thoracic spine. There is no evidence of focal infiltrate, effusion or of congestive failure.  This report was finalized on 10/25/2017 10:40 AM by Dr. Ion Krishna MD.      Xr Chest Pa & Lateral    Result Date: 10/25/2017  Narrative: RIGHT KNEE, 2 VIEWS CHEST  HISTORY: Preop chest x-ray, knee pain.  FINDINGS:  RIGHT KNEE: There is severe loss of joint space involving the medial compartment. Moderate size marginal osteophytes are noted medially. Chondrocalcinosis is appreciated. There is no evidence of fracture. A small suprapatellar effusion is noted.  CHEST: 2 views of the chest demonstrate the heart to be within normal limits in size. There is mild dextroscoliosis of the thoracic spine. There is no evidence of focal  infiltrate, effusion or of congestive failure.  This report was finalized on 10/25/2017 10:40 AM by Dr. Ion Krishna MD.        Assessment:  End-stage Primary Right Knee Osteoarthritis    Plan:  Patient's pain is becoming disabling, despite extensive conservative treatment.  Radiographs reveal end-stage degenerative changes.  The risks of surgery, including, but not limited to, heart attack, stroke, dying, DVT, arthrofibrosis and infection were discussed.  The alternatives and benefits were also discussed.  All questions answered and the patient wishes to proceed with right total knee arthroplasty.

## 2017-11-09 NOTE — ANESTHESIA POSTPROCEDURE EVALUATION
Patient: Riri Damon    Procedure Summary     Date Anesthesia Start Anesthesia Stop Room / Location    11/09/17 0711 0859  [facility] /  [facility]       Procedure Diagnosis Surgeon Provider    RIGHT TOTAL KNEE ARTHROPLASTY (Right Knee) No diagnosis on file. MD Rhiannon Pierre MD          Anesthesia Type: general  Last vitals  BP   150/74 (11/09/17 0948)   Temp   36.8 °C (98.2 °F) (11/09/17 0855)   Pulse   97 (11/09/17 0948)   Resp   20 (11/09/17 0933)     SpO2   98 % (11/09/17 0948)     Post Anesthesia Care and Evaluation    Patient location during evaluation: PACU  Patient participation: complete - patient participated  Level of consciousness: awake and alert  Pain management: adequate  Airway patency: patent  Anesthetic complications: No anesthetic complications    Cardiovascular status: acceptable  Respiratory status: acceptable  Hydration status: acceptable

## 2017-11-09 NOTE — PLAN OF CARE
Problem: Inpatient Physical Therapy  Goal: Transfer Training Goal 1 LTG- PT  Outcome: Ongoing (interventions implemented as appropriate)    11/09/17 1341   Transfer Training PT LTG   Transfer Training PT LTG, Date Established 11/09/17   Transfer Training PT LTG, Time to Achieve 4 days   Transfer Training PT LTG, Activity Type all transfers   Transfer Training PT LTG, [place] Level supervision required   Transfer Training PT LTG, Assist Device walker, rolling       Goal: Gait Training Goal LTG- PT  Outcome: Ongoing (interventions implemented as appropriate)    11/09/17 1341   Gait Training PT LTG   Gait Training Goal PT LTG, Date Established 11/09/17   Gait Training Goal PT LTG, Time to Achieve 4 days   Gait Training Goal PT LTG, [place] Level contact guard assist   Gait Training Goal PT LTG, Assist Device walker, rolling   Gait Training Goal PT LTG, Distance to Achieve household distances

## 2017-11-09 NOTE — THERAPY EVALUATION
Acute Care - Physical Therapy Initial Evaluation  [facility]     Patient Name: Riri Damon  : 1934  MRN: [id number]  Today's Date: 2017   Onset of Illness/Injury or Date of Surgery Date: 17  Date of Referral to PT: 17  Referring Physician: Federico      Admit Date: 2017     Visit Dx:    ICD-10-CM ICD-9-CM   1. Difficulty walking R26.2 719.7     Patient Active Problem List   Diagnosis   • Malignant neoplasm of colon   • Hyperlipidemia   • Low back pain   • Mitral valve insufficiency   • Palpitations   • Pulmonary hypertension   • Knee pain   • Tricuspid valve insufficiency   • Arthritis   • Medicare annual wellness visit, subsequent   • Screening for osteoporosis   • Orthostatic lightheadedness   • Essential hypertension   • OA (osteoarthritis) of knee     Past Medical History:   Diagnosis Date   • Anxiety    • Colon carcinoma     Stage IIIB, T4N1a   • Edema    • History of edema     LE   • History of rheumatic fever    • Hyperlipidemia    • Hypertension     MEDS PRN   • Infectious mononucleosis    • Infectious viral hepatitis    • Mitral regurgitation    • OA (osteoarthritis)    • Osteoporosis    • Palpitations    • Tricuspid regurgitation    • Venous insufficiency    • Vitamin D deficiency      Past Surgical History:   Procedure Laterality Date   • COLECTOMY PARTIAL / TOTAL  2015 due to adenocarcinoma   • COLONOSCOPY      2015   • COLONOSCOPY N/A 2016    Procedure: COLONOSCOPY to ANASTAMOSIS AND TERMINAL ILEUM;  Surgeon: Ynf Mcneil MD;  Location: [facility] ENDOSCOPY;  Service:    • HYSTERECTOMY     • KNEE SURGERY Left 2006    ARTHROSCOPY   • TONSILLECTOMY            PT ASSESSMENT (last 72 hours)      PT Evaluation       17 1310 17 1030    Rehab Evaluation    Document Type evaluation  -EJ     Subjective Information agree to therapy;complains of;weakness;fatigue;dizziness  -EJ     Patient Effort, Rehab Treatment adequate  -EJ     Symptoms  Noted During/After Treatment increased pain;fatigue;dizziness  -EJ     General Information    Onset of Illness/Injury or Date of Surgery Date 11/09/17  -EJ     Referring Physician Federico  -EJ     General Observations reclined in chair, no acute distress, lethargic  -EJ     Precautions/Limitations fall precautions  -EJ     Prior Level of Function independent:;all household mobility;community mobility;ADL's  -EJ     Equipment Currently Used at Home none  -EJ     Plans/Goals Discussed With patient and family;agreed upon  -EJ     Barriers to Rehab none identified  -EJ     Living Environment    Lives With spouse  -EJ spouse  -AH    Living Arrangements house  -EJ house  -AH    Home Accessibility stairs to enter home;stairs within home  -EJ bed and bath are not on the first floor  -AH    Number of Stairs to Enter Home 4  -EJ     Number of Stairs Within Home 12  -EJ     Stair Railings at Home inside, present on right side  -EJ inside, present on right side  -AH    Type of Financial/Environmental Concern  none  -AH    Transportation Available  car;family or friend will provide  -    Living Environment Comment  na  -    Clinical Impression    Date of Referral to PT 11/09/17  -EJ     PT Diagnosis R TKR  -EJ     Patient/Family Goals Statement Pt plans home at [place]  -EJ     Criteria for Skilled Therapeutic Interventions Met treatment indicated  -EJ     Impairments Found (describe specific impairments) gait, locomotion, and balance  -EJ     Rehab Potential good, to achieve stated therapy goals  -EJ     Pain Assessment    Pain Assessment 0-10  -EJ     Pain Score 5  -EJ     Pain Location Knee  -EJ     Pain Orientation Right  -EJ     Cognitive Assessment/Intervention    Current Cognitive/Communication Assessment functional  -EJ     Orientation Status oriented x 4  -EJ     Follows Commands/Answers Questions 100% of the time  -EJ     Personal Safety WNL/WFL  -EJ     Personal Safety Interventions fall prevention program  maintained;gait belt;supervised activity;nonskid shoes/slippers when out of bed  -EJ     ROM (Range of Motion)    General ROM no range of motion deficits identified   x R Knee  -EJ     MMT (Manual Muscle Testing)    General MMT Assessment no strength deficits identified   x R knee  -EJ     Mobility Assessment/Training    Extremity Weight-Bearing Status right lower extremity  -EJ     Right Lower Extremity Weight-Bearing weight-bearing as tolerated  -EJ     Bed Mobility, Assessment/Treatment    Bed Mob, Supine to Sit, [place] not tested  -EJ     Bed Mob, Sit to Supine, [place] not tested  -EJ     Bed Mobility, Comment up in chair  -EJ     Transfer Assessment/Treatment    Transfers, Sit-Stand [place] verbal cues required;nonverbal cues required (demo/gesture);minimum assist (75% patient effort)  -EJ     Transfers, Stand-Sit [place] verbal cues required;nonverbal cues required (demo/gesture);minimum assist (75% patient effort);moderate assist (50% patient effort)  -EJ     Transfers, Sit-Stand-Sit, Assist Device rolling walker  -EJ     Transfer, Safety Issues step length decreased;knees buckling;weight-shifting ability decreased  -EJ     Transfer, Impairments strength decreased;impaired balance;pain  -EJ     Transfer, Comment R knee giving out, pt also groggy and lethargic  -EJ     Gait Assessment/Treatment    Gait, [place] Level verbal cues required;nonverbal cues required (demo/gesture);moderate assist (50% patient effort)  -EJ     Gait, Assistive Device rolling walker  -EJ     Gait, Distance (Feet) 2  -EJ     Gait, Gait Deviations antalgic;[place] decreased;knee buckling;step length decreased  -EJ     Gait, Safety Issues sequencing ability decreased;step length decreased;weight-shifting ability decreased;loses balance backward  -EJ     Gait, Impairments impaired balance;strength decreased;ROM decreased;pain  -EJ     Gait, Comment R knee buckling, pt lethargic, unsafe to attempt further  ambulation.  -EJ     Therapy Exercises    Exercise Protocols total knee  -EJ     Total Knee Exercises right:;10 reps;completed protocol;with assist  -EJ     Positioning and Restraints    Pre-Treatment Position sitting in chair/recliner  -EJ     Post Treatment Position chair  -EJ     In Chair notified nsg;reclined;call light within reach;encouraged to call for assist;with family/caregiver  -EJ       11/09/17 1028 11/09/17 0605    General Information    Equipment Currently Used at Home none  -AH none  -SP    Living Environment    Lives With  spouse  -SP    Living Arrangements  house  -SP    Home Accessibility  bed and bath are not on the first floor  -SP    Stair Railings at Home  inside, present on right side  -SP    Type of Financial/Environmental Concern  none  -SP    Transportation Available  car;family or friend will provide  -SP      User Key  (r) = Recorded By, (t) = Taken By, (c) = Cosigned By    Initials Name Provider Type    SP Jody Ibarra, RN Registered Nurse    LYUDMILA Goodwin, RN Registered Nurse    ALEC Isidro, PT Physical Therapist          Physical Therapy Education     Title: PT OT SLP Therapies (Active)     Topic: Physical Therapy (Active)     Point: Mobility training (Done)    Learning Progress Summary    Learner Readiness Method Response Comment Documented by Status   Patient Acceptance HONG REILLY NR   11/09/17 1341 Done   Family Acceptance HONG REILLY NR   11/09/17 1341 Done               Point: Home exercise program (Done)    Learning Progress Summary    Learner Readiness Method Response Comment Documented by Status   Patient Acceptance HONG REILLY NR   11/09/17 1341 Done   Family Acceptance HONG REILLY NR   11/09/17 1341 Done               Point: Precautions (Done)    Learning Progress Summary    Learner Readiness Method Response Comment Documented by Status   Patient Acceptance HONG REILLY NR   11/09/17 1341 Done   Family Acceptance HONG REILLY NR   11/09/17 1341 Done                       User Key     Initials Effective Dates Name Provider Type Discipline    EJ 04/21/17 -  Ryann Isidro, PT Physical Therapist PT                PT Recommendation and Plan  Anticipated Discharge Disposition: home with assist, home with home health  Planned Therapy Interventions: bed mobility training, gait training, home exercise program, patient/family education, ROM (Range of Motion), stair training, strengthening, stretching, transfer training  PT Frequency: 2 times/day             IP PT Goals       11/09/17 1343 11/09/17 1341       Transfer Training PT LTG    Transfer Training PT LTG, Date Established  11/09/17  -EJ     Transfer Training PT LTG, Time to Achieve  4 days  -EJ     Transfer Training PT LTG, Activity Type  all transfers  -EJ     Transfer Training PT LTG, [place] Level  supervision required  -EJ     Transfer Training PT LTG, Assist Device  walker, rolling  -EJ     Gait Training PT LTG    Gait Training Goal PT LTG, Date Established  11/09/17  -EJ     Gait Training Goal PT LTG, Time to Achieve  4 days  -EJ     Gait Training Goal PT LTG, [place] Level  contact guard assist  -EJ     Gait Training Goal PT LTG, Assist Device  walker, rolling  -EJ     Gait Training Goal PT LTG, Distance to Achieve  household distances  -EJ     Stair Training PT LTG    Stair Training Goal PT LTG, Date Established 11/09/17  -EJ      Stair Training Goal PT LTG, Time to Achieve 4 days  -EJ      Stair Training Goal PT LTG, Number of Steps 4  -EJ      Stair Training Goal PT LTG, [place] Level contact guard assist  -EJ      Stair Training Goal PT LTG, Assist Device 1 handrail  -EJ      Range of Motion PT LTG    Range of Motion Goal PT LTG, Date Established 11/09/17  -EJ      Range of Motion Goal PT LTG, Time to Achieve 4 days  -EJ      Range fo Motion Goal PT LTG, Joint R knee  -EJ      Range of Motion Goal PT LTG, AAROM Measure 5-90  -EJ        User Key  (r) = Recorded By, (t) = Taken By, (c) = Cosigned By     Initials Name Provider Type    ALEC Isidro, PT Physical Therapist                Outcome Measures       11/09/17 1300          How much help from another person do you currently need...    Turning from your back to your side while in flat bed without using bedrails? 3  -EJ      Moving from lying on back to sitting on the side of a flat bed without bedrails? 2  -EJ      Moving to and from a bed to a chair (including a wheelchair)? 2  -EJ      Standing up from a chair using your arms (e.g., wheelchair, bedside chair)? 2  -EJ      Climbing 3-5 steps with a railing? 1  -EJ      To walk in hospital room? 1  -EJ      AM-PAC 6 Clicks Score 11  -EJ      Functional Assessment    Outcome Measure Options AM-PAC 6 Clicks Basic Mobility (PT)  -EJ        User Key  (r) = Recorded By, (t) = Taken By, (c) = Cosigned By    Initials Name Provider Type    ALEC Isidro PT Physical Therapist           Time Calculation:         PT Charges       11/09/17 1344          Time Calculation    Start Time 1310  -EJ      Stop Time 1333  -EJ      Time Calculation (min) 23 min  -EJ      PT Received On 11/09/17  -ALEC      PT - Next Appointment 11/10/17  -ALEC      PT Goal Re-Cert Due Date 11/13/17  -        User Key  (r) = Recorded By, (t) = Taken By, (c) = Cosigned By    Initials Name Provider Type    ALEC Isidro PT Physical Therapist          Therapy Charges for Today     Code Description Service Date Service Provider Modifiers Qty    16365479354 HC PT EVAL MOD COMPLEXITY 2 11/9/2017 Ryann Isidro, PT GP 1    98854207797 HC PT THER PROC EA 15 MIN 11/9/2017 Ryann Isidro, PT GP 1          PT G-Codes  Outcome Measure Options: AM-PAC 6 Clicks Basic Mobility (PT)      Ryann Isidro PT  11/9/2017

## 2017-11-09 NOTE — ANESTHESIA PROCEDURE NOTES
Airway  Urgency: elective    Date/Time: 11/9/2017 7:22 AM  End Time:11/9/2017 7:27 AM  Airway not difficult    General Information and Staff    Patient location during procedure: OR  Anesthesiologist: WAYNE HALL  CRNA: ANIRUDH TURNER    Indications and Patient Condition  Indications for airway management: airway protection    Preoxygenated: yes  MILS maintained throughout  Mask difficulty assessment: 1 - vent by mask    Final Airway Details  Final airway type: endotracheal airway      Successful airway: ETT  Cuffed: yes   Successful intubation technique: direct laryngoscopy  Facilitating devices/methods: intubating stylet and cricoid pressure  Endotracheal tube insertion site: oral  Blade: Kandy  Blade size: #3  ETT size: 7.0 mm  Cormack-Lehane Classification: grade I - full view of glottis  Placement verified by: chest auscultation and capnometry   Cuff volume (mL): 6  Measured from: lips  ETT to lips (cm): 21  Number of attempts at approach: 1    Additional Comments  PreO2 X 5 mins. SIVI. Atraumatic Intubation. BBS= , teeth intact as per preop exam

## 2017-11-09 NOTE — PLAN OF CARE
Problem: Patient Care Overview (Adult)  Goal: Plan of Care Review  Outcome: Ongoing (interventions implemented as appropriate)    11/09/17 1546   Coping/Psychosocial Response Interventions   Plan Of Care Reviewed With patient   Patient Care Overview   Progress improving   Outcome Evaluation   Outcome Summary/Follow up Plan pain under contrl and up to chair with an assist of 2. patient educated about htn and htn medications       Goal: Adult Individualization and Mutuality  Outcome: Ongoing (interventions implemented as appropriate)  Goal: Discharge Needs Assessment  Outcome: Ongoing (interventions implemented as appropriate)    Problem: Perioperative Period (Adult)  Goal: Signs and Symptoms of Listed Potential Problems Will be Absent or Manageable (Perioperative Period)  Outcome: Ongoing (interventions implemented as appropriate)    Problem: Fall Risk (Adult)  Goal: Identify Related Risk Factors and Signs and Symptoms  Outcome: Outcome(s) achieved Date Met:  11/09/17  Goal: Absence of Falls  Outcome: Ongoing (interventions implemented as appropriate)    Problem: Knee Replacement, Total (Adult)  Goal: Signs and Symptoms of Listed Potential Problems Will be Absent or Manageable (Knee Replacement, Total)  Outcome: Ongoing (interventions implemented as appropriate)

## 2017-11-09 NOTE — PROGRESS NOTES
Continued Stay Note  [facility]     Patient Name: Riri Damon  MRN: [id number]  Today's Date: 11/9/2017    Admit Date: 11/9/2017          Discharge Plan       11/09/17 1557    Case Management/Social Work Plan    Additional Comments Per Pre-Operative Orthopedic Assessment the patient plans to return home upon d/c.  Met with the patient, her spouse and daughter at bedside who confirmed plans to return home upon d/c with HH services but was unsure which HH agency she wanted to use.  The patient was provided with a HH/SNF list and informed that CCP will follow up to obtain patient's HH agency of choice to make referral.  CCP will follow up with patient and family for HH agency of choice and make referral for patient's HH services.  JOVANI Mcgregor              Discharge Codes     None            JOVANI Muñoz

## 2017-11-09 NOTE — ANESTHESIA PREPROCEDURE EVALUATION
Anesthesia Evaluation            Airway   Mallampati: II  TM distance: >3 FB  Neck ROM: full  small opening  Dental - normal exam     Pulmonary    (-) asthma, shortness of breath, recent URI, not a smoker  Cardiovascular     (+) hypertension, valvular problems/murmurs MR and TI, hyperlipidemia  Dysrhythmias: no assc symptoms.      Neuro/Psych  (-) seizures, dizziness/light headedness, syncope  GI/Hepatic/Renal/Endo    (+)  hepatitis,     Musculoskeletal     Abdominal    Substance History      OB/GYN          Other      history of cancer (colon)                                    Anesthesia Plan    ASA 3     general     intravenous induction   Anesthetic plan and risks discussed with patient.

## 2017-11-09 NOTE — PLAN OF CARE
Problem: Inpatient Physical Therapy  Goal: Stair Training Goal LTG- PT  Outcome: Ongoing (interventions implemented as appropriate)    11/09/17 1343   Stair Training PT LTG   Stair Training Goal PT LTG, Date Established 11/09/17   Stair Training Goal PT LTG, Time to Achieve 4 days   Stair Training Goal PT LTG, Number of Steps 4   Stair Training Goal PT LTG, [place] Level contact guard assist   Stair Training Goal PT LTG, Assist Device 1 handrail       Goal: Range of Motion Goal LTG- PT  Outcome: Ongoing (interventions implemented as appropriate)    11/09/17 1343   Range of Motion PT LTG   Range of Motion Goal PT LTG, Date Established 11/09/17   Range of Motion Goal PT LTG, Time to Achieve 4 days   Range fo Motion Goal PT LTG, Joint R knee   Range of Motion Goal PT LTG, AAROM Measure 5-90

## 2017-11-09 NOTE — OP NOTE
Operative Note    Patient Name:  Riri Damon  YOB: 1934  Medical Records Number:  [id number]    Date of Procedure:  11/9/2017    Pre-operative Diagnosis:  Primary Osteoarthritis Right Knee    Post-operative Diagnosis:  Primary Osteoarthritis Right Knee    Procedure Performed:  Right Total Knee Arthroplasty    Implants:  Biomet Vanguard TKA, 62.5 Femoral Component, 67 Tibial Tray with a 40 mm Modular Stem, 31 3-Peg Patella, 12 std. Polyethylene Insert    Surgeon:  Andrea Caballero M.D.    Assistant: Porter Mattson (who was present during the critical portions of the case, thereby decreasing operative time and patient morbidity)    Anesthetic Type:  General    Estimated Blood Loss:  250cc's    Specimens:   Order Name Source Comment Collection Info Order Time   TYPE AND SCREEN   Collected By: Jody Ibarra RN 11/9/2017  5:38 AM       No Complications      Indications for Procedure:  Riri Damno is a 83 y.o. female suffering from end stage degenerative changes in the right knee.  The patients pain is becoming disabling, despite extensive conservative care, including NSAIDS, therapy and injections.  The patient wishes to undergo right total knee arthroplasty.  The risks, benefits and alternatives were discussed and the patient wishes to proceed with total knee arthroplasty.      Procedure Performed:    After informed consent was obtained and pre-operative IV Kefzol given, prior to tourniquet inflation, the patient was taken to the operating room and placed supine on the operating table.  After general anesthesia induced, the patient's right lower extremity was prepped with chloraprep and draped in a sterile fashion.    A midline incision was made overlying the right knee and we sharply dissected down to expose the parapatellar retinaculum.  A mid-vastus, muscle sparing, parapatellar arthrotomy was performed and we elevated the soft tissue both medially and laterally.  The menisci and ACL  were excised.  We everted the patella and measured this to be 22 mm and we removed 7 mm of bone down to 15mm.  We measured the patella to be 31 and drilled our three drill holes.  We protected the patella with the patella protector and turned our attention to the femur and the tibia.    We drilled drill holes into the femoral and the tibial intramedullary canals and proceeded to irrigate the canals to remove the fatty marrow.  We used the intramedullary jacqui and the 5 degree valgus cut block to make our distal femoral cut.  Once we had a smooth surface, we measured the femur to be 62.5.  We assured we had rotational alignment using the epicondylar axis, then we used the four-in-one cut block to make our anterior, posterior, anterior and posterior chamfer cuts.  We placed our femoral trial, had excellent fit, and extended the knee and marked Long[place]'s line on the tibia.      We then turned our attention to the tibia, where we irrigated the canal again.  We used the intramedullary jacqui and the 3 degree posterior sloped cut block to remove 2 mm of bone from the effected side of the tibia.  Prior to making our cut, we assured we had rotational alignment using the external guide and [place]'s line.  Once we had a smooth surface, we measured the tibia to be 67.  We then removed soft tissue and bony debris from the posterior aspect of the knee.    We placed our trial implants and had excellent fit, range of motion, stability and ligament balance, throughout a complete arc of motion with a 12 std. polyethylene liner.  We removed our trial implants, punched the tibial keel, copiously irrigated the knee, gave 1 gm of IV Tranxemic Acid, then cemented our implants in place using palacos cement.  Once the cement cured, we trialed again with the 10, 12 and 14 standard and posterior lipped polyethylene liners.  The 12 std. gave us the best range of motion, stability and ligament balance, throughout a complete arc of motion.  We  "removed the trials, copiously irrigated the knee, gave IV antibiotics and local anesthetic, then placed our permanent polyethylene liner.  We placed a 1/8\" hemovac drain, then closed the arthrotomy with #1 Vicryl pop-off sutures.  The subcutaneous tissue was closed with 2-0 vicryl pop-off sutures.  The skin was closed with staples.  We placed a sterile dressing of Xeroform, 4x4's, abdominal pads, cast padding and an Ace Wrap.  The patient was then awakened from general anesthesia and taken to the recovery room in stable condition.    The patient will be started on Aspirin 325mg twice daily for DVT prophylaxis.  IV antibiotics will be discontinued within 24 hours of surgery.  Immediately prior to surgery, there were no acute Thromboembolic nor Cardiovascular risk factors.  An updated Medical Reconciliation form is on the chart.  "

## 2017-11-09 NOTE — ANESTHESIA PROCEDURE NOTES
Peripheral Block    Patient location during procedure: holding area  Start time: 11/9/2017 6:31 AM  Stop time: 11/9/2017 6:43 AM  Reason for block: at surgeon's request and post-op pain management  Performed by  Anesthesiologist: WAYNE HALL  Preanesthetic Checklist  Completed: patient identified, site marked, surgical consent, pre-op evaluation, timeout performed, IV checked, risks and benefits discussed and monitors and equipment checked  Prep:  Pt Position: prone  Sterile barriers:cap, gloves and mask  Prep: ChloraPrep  Patient monitoring: blood pressure monitoring, continuous pulse oximetry and EKG  Procedure  Sedation:yes  Performed under: local infiltration  Guidance:ultrasound guided    Laterality:right  Block Type:adductor canal block  Injection Technique:single-shotNeedle Gauge:20 G    Medications  Local Injected:ropivacaine 0.5% Local Amount Injected:30mL  Post Assessment  Injection Assessment: negative aspiration for heme, no paresthesia on injection and incremental injection  Patient Tolerance:comfortable throughout block  Complications:no  Additional Notes  Divided dosing, without difficulty

## 2017-11-10 LAB
ANION GAP SERPL CALCULATED.3IONS-SCNC: 12.2 MMOL/L
BUN BLD-MCNC: 7 MG/DL (ref 8–23)
BUN/CREAT SERPL: 14.9 (ref 7–25)
CALCIUM SPEC-SCNC: 8.8 MG/DL (ref 8.6–10.5)
CHLORIDE SERPL-SCNC: 105 MMOL/L (ref 98–107)
CO2 SERPL-SCNC: 24.8 MMOL/L (ref 22–29)
CREAT BLD-MCNC: 0.47 MG/DL (ref 0.57–1)
GFR SERPL CREATININE-BSD FRML MDRD: 127 ML/MIN/1.73
GLUCOSE BLD-MCNC: 92 MG/DL (ref 65–99)
HCT VFR BLD AUTO: 35.9 % (ref 35.6–45.5)
HGB BLD-MCNC: 11.9 G/DL (ref 11.9–15.5)
POTASSIUM BLD-SCNC: 3.8 MMOL/L (ref 3.5–5.2)
SODIUM BLD-SCNC: 142 MMOL/L (ref 136–145)

## 2017-11-10 PROCEDURE — 80048 BASIC METABOLIC PNL TOTAL CA: CPT | Performed by: ORTHOPAEDIC SURGERY

## 2017-11-10 PROCEDURE — 85014 HEMATOCRIT: CPT | Performed by: ORTHOPAEDIC SURGERY

## 2017-11-10 PROCEDURE — 97110 THERAPEUTIC EXERCISES: CPT

## 2017-11-10 PROCEDURE — 85018 HEMOGLOBIN: CPT | Performed by: ORTHOPAEDIC SURGERY

## 2017-11-10 PROCEDURE — 97150 GROUP THERAPEUTIC PROCEDURES: CPT

## 2017-11-10 PROCEDURE — 25010000002 KETOROLAC TROMETHAMINE PER 15 MG: Performed by: ORTHOPAEDIC SURGERY

## 2017-11-10 RX ADMIN — DOCUSATE SODIUM -SENNOSIDES 2 TABLET: 50; 8.6 TABLET, COATED ORAL at 17:55

## 2017-11-10 RX ADMIN — ASPIRIN 325 MG: 325 TABLET, DELAYED RELEASE ORAL at 17:55

## 2017-11-10 RX ADMIN — OXYCODONE HYDROCHLORIDE AND ACETAMINOPHEN 2 TABLET: 5; 325 TABLET ORAL at 19:21

## 2017-11-10 RX ADMIN — OXYCODONE HYDROCHLORIDE AND ACETAMINOPHEN 1 TABLET: 5; 325 TABLET ORAL at 11:07

## 2017-11-10 RX ADMIN — MUPIROCIN: 20 OINTMENT TOPICAL at 17:55

## 2017-11-10 RX ADMIN — KETOROLAC TROMETHAMINE 15 MG: 30 INJECTION, SOLUTION INTRAMUSCULAR at 20:49

## 2017-11-10 RX ADMIN — FERROUS SULFATE TAB 325 MG (65 MG ELEMENTAL FE) 325 MG: 325 (65 FE) TAB at 10:01

## 2017-11-10 RX ADMIN — ASPIRIN 325 MG: 325 TABLET, DELAYED RELEASE ORAL at 10:01

## 2017-11-10 RX ADMIN — OXYCODONE HYDROCHLORIDE AND ACETAMINOPHEN 1 TABLET: 5; 325 TABLET ORAL at 15:11

## 2017-11-10 RX ADMIN — MUPIROCIN: 20 OINTMENT TOPICAL at 10:01

## 2017-11-10 RX ADMIN — ATORVASTATIN CALCIUM 20 MG: 20 TABLET, FILM COATED ORAL at 20:48

## 2017-11-10 RX ADMIN — DOCUSATE SODIUM -SENNOSIDES 2 TABLET: 50; 8.6 TABLET, COATED ORAL at 10:01

## 2017-11-10 NOTE — PROGRESS NOTES
Continued Stay Note  [facility]     Patient Name: Riri Damon  MRN: [id number]  Today's Date: 11/10/2017    Admit Date: 11/9/2017          Discharge Plan       11/10/17 1045    Case Management/Social Work Plan    Plan Home with [facility]    Patient/Family In Agreement With Plan yes    Additional Comments Followed up with patient and she requests a referral to [facility]. Referral given to [facility].               Discharge Codes     None            Terri Gibson RN

## 2017-11-10 NOTE — PLAN OF CARE
Problem: Patient Care Overview (Adult)  Goal: Plan of Care Review  Outcome: Ongoing (interventions implemented as appropriate)    11/10/17 0544   Coping/Psychosocial Response Interventions   Plan Of Care Reviewed With patient   Patient Care Overview   Progress improving   Outcome Evaluation   Outcome Summary/Follow up Plan vitals stable. pt expressed pain. med given per orders. POD #1 today. plan is to go home with home health at some point. will continue to monitor.          Problem: Fall Risk (Adult)  Goal: Absence of Falls  Outcome: Ongoing (interventions implemented as appropriate)    Problem: Knee Replacement, Total (Adult)  Goal: Signs and Symptoms of Listed Potential Problems Will be Absent or Manageable (Knee Replacement, Total)  Outcome: Ongoing (interventions implemented as appropriate)

## 2017-11-10 NOTE — THERAPY TREATMENT NOTE
Acute Care - Physical Therapy Treatment Note  [facility]     Patient Name: Riri Daomn  : 1934  MRN: [id number]  Today's Date: 11/10/2017  Onset of Illness/Injury or Date of Surgery Date: 17  Date of Referral to PT: 17  Referring Physician: Federico    Admit Date: 2017    Visit Dx:    ICD-10-CM ICD-9-CM   1. Difficulty walking R26.2 719.7     Patient Active Problem List   Diagnosis   • Malignant neoplasm of colon   • Hyperlipidemia   • Low back pain   • Mitral valve insufficiency   • Palpitations   • Pulmonary hypertension   • Knee pain   • Tricuspid valve insufficiency   • Arthritis   • Medicare annual wellness visit, subsequent   • Screening for osteoporosis   • Orthostatic lightheadedness   • Essential hypertension   • OA (osteoarthritis) of knee               Adult Rehabilitation Note       11/10/17 4687          Rehab Assessment/Intervention    Discipline physical therapist  -PC      Document Type therapy note (daily note)  -PC      Subjective Information agree to therapy;complains of;weakness;fatigue;pain  -PC      Patient Effort, Rehab Treatment good  -PC      Recorded by [PC] Delores Mckinney, PT      Pain Assessment    Pain Score 5  -PC      Pain Location Knee  -PC      Pain Orientation Right  -PC      Recorded by [PC] Delores Mckinney, PT      Cognitive Assessment/Intervention    Current Cognitive/Communication Assessment functional  -PC      Recorded by [PC] Delores Mckinney, PT      ROM (Range of Motion)    General ROM Detail 15-80  -PC      Recorded by [PC] Delores Mckinney, PT      Transfer Assessment/Treatment    Transfers, Sit-Stand Oscar minimum assist (75% patient effort)  -PC      Transfers, Stand-Sit Oscar minimum assist (75% patient effort)  -PC      Transfers, Sit-Stand-Sit, Assist Device rolling walker  -PC      Recorded by [PC] Delores Mckinney, PT      Gait Assessment/Treatment    Gait, Oscar Level minimum assist (75% patient effort)  -PC      Gait,  Assistive Device rolling walker  -PC      Gait, Distance (Feet) 30  -PC      Gait, Gait Deviations forward flexed posture;antalgic;step length decreased  -PC      Recorded by [PC] Delores Mckinney, PT      Therapy Exercises    Exercise Protocols total knee  -PC      Total Knee Exercises right:;15 reps;completed protocol;with assist  -PC      Recorded by [PC] Delores Mckinney, PT      Positioning and Restraints    Pre-Treatment Position sitting in chair/recliner  -PC      Post Treatment Position chair  -PC      Recorded by [PC] Delores Mckinney, PT        User Key  (r) = Recorded By, (t) = Taken By, (c) = Cosigned By    Initials Name Effective Dates    PC Delores Mckinney, PT 12/01/15 -                 IP PT Goals       11/09/17 1343 11/09/17 1341       Transfer Training PT LTG    Transfer Training PT LTG, Date Established  11/09/17  -EJ     Transfer Training PT LTG, Time to Achieve  4 days  -EJ     Transfer Training PT LTG, Activity Type  all transfers  -EJ     Transfer Training PT LTG, [place] Level  supervision required  -EJ     Transfer Training PT LTG, Assist Device  walker, rolling  -EJ     Gait Training PT LTG    Gait Training Goal PT LTG, Date Established  11/09/17  -EJ     Gait Training Goal PT LTG, Time to Achieve  4 days  -EJ     Gait Training Goal PT LTG, [place] Level  contact guard assist  -EJ     Gait Training Goal PT LTG, Assist Device  walker, rolling  -EJ     Gait Training Goal PT LTG, Distance to Achieve  household distances  -EJ     Stair Training PT LTG    Stair Training Goal PT LTG, Date Established 11/09/17  -EJ      Stair Training Goal PT LTG, Time to Achieve 4 days  -EJ      Stair Training Goal PT LTG, Number of Steps 4  -EJ      Stair Training Goal PT LTG, [place] Level contact guard assist  -EJ      Stair Training Goal PT LTG, Assist Device 1 handrail  -EJ      Range of Motion PT LTG    Range of Motion Goal PT LTG, Date Established 11/09/17  -EJ      Range of Motion Goal PT LTG, Time  to Achieve 4 days  -EJ      Range fo Motion Goal PT LTG, Joint R knee  -EJ      Range of Motion Goal PT LTG, AAROM Measure 5-90  -EJ        User Key  (r) = Recorded By, (t) = Taken By, (c) = Cosigned By    Initials Name Provider Type    ALEC Isidro, PT Physical Therapist          Physical Therapy Education     Title: PT OT SLP Therapies (Done)     Topic: Physical Therapy (Done)     Point: Mobility training (Done)    Learning Progress Summary    Learner Readiness Method Response Comment Documented by Status   Patient Acceptance E,D DIONI AC,NR   11/10/17 1311 Done    Acceptance E,TB VU   11/09/17 1546 Done    Acceptance E,D VU,NR   11/09/17 1341 Done   Family Acceptance E,TB VU   11/09/17 1546 Done    Acceptance E,D VU,NR   11/09/17 1341 Done               Point: Home exercise program (Done)    Learning Progress Summary    Learner Readiness Method Response Comment Documented by Status   Patient Acceptance E,D DIONI AC,NR   11/10/17 1311 Done    Acceptance E,TB VU   11/09/17 1546 Done    Acceptance E,D VU,NR   11/09/17 1341 Done   Family Acceptance E,TB VU   11/09/17 1546 Done    Acceptance E,D VU,NR   11/09/17 1341 Done               Point: Body mechanics (Done)    Learning Progress Summary    Learner Readiness Method Response Comment Documented by Status   Patient Acceptance E,D DIONI AC,NR   11/10/17 1311 Done               Point: Precautions (Done)    Learning Progress Summary    Learner Readiness Method Response Comment Documented by Status   Patient Acceptance E,D DIONI AC,NR   11/10/17 1311 Done    Acceptance E,TB VU   11/09/17 1546 Done    Acceptance E,D VU,NR   11/09/17 1341 Done   Family Acceptance E,TB VU   11/09/17 1546 Done    Acceptance E,D VU,NR   11/09/17 1341 Done                      User Key     Initials Effective Dates Name Provider Type Discipline     08/31/17 -  Daysi Goodwin, RN Registered Nurse Nurse     12/01/15 -  Delores Mckinney PT Physical Therapist  PT    EJ 04/21/17 -  Ryann Isidro, PT Physical Therapist PT                    PT Recommendation and Plan  Anticipated Discharge Disposition: home with assist, home with home health  Planned Therapy Interventions: bed mobility training, gait training, home exercise program, patient/family education, ROM (Range of Motion), stair training, strengthening, stretching, transfer training  PT Frequency: 2 times/day  Plan of Care Review  Plan Of Care Reviewed With: patient  Progress: progress towards functional goals is fair  Outcome Summary/Follow up Plan: improved today, but still fatigues quickly, able to tolerate weight through RLE and walk 30 ft with walker, progressing with ROM as well          Outcome Measures       11/10/17 1300 11/09/17 1300       How much help from another person do you currently need...    Turning from your back to your side while in flat bed without using bedrails? 3  -PC 3  -EJ     Moving from lying on back to sitting on the side of a flat bed without bedrails? 3  -PC 2  -EJ     Moving to and from a bed to a chair (including a wheelchair)? 3  -PC 2  -EJ     Standing up from a chair using your arms (e.g., wheelchair, bedside chair)? 3  -PC 2  -EJ     Climbing 3-5 steps with a railing? 2  -PC 1  -EJ     To walk in hospital room? 3  -PC 1  -EJ     AM-PAC 6 Clicks Score 17  -PC 11  -EJ     Functional Assessment    Outcome Measure Options  AM-PAC 6 Clicks Basic Mobility (PT)  -EJ       User Key  (r) = Recorded By, (t) = Taken By, (c) = Cosigned By    Initials Name Provider Type    DIANDRA Mckinney, PT Physical Therapist    EJ Ryann Isidro, PT Physical Therapist           Time Calculation:         PT Charges       11/10/17 1312          Time Calculation    Start Time 0830  -PC      Stop Time 0915  -PC      Time Calculation (min) 45 min  -PC      PT Received On 11/10/17  -PC        User Key  (r) = Recorded By, (t) = Taken By, (c) = Cosigned By    Initials Name Provider Type    DIANDRA CASEY  Faye, PT Physical Therapist          Therapy Charges for Today     Code Description Service Date Service Provider Modifiers Qty    [id number]  PT THER PROC EA 15 MIN 11/10/2017 Delores Mckinney, PT GP 1    [id number]  PT THER PROC GROUP 11/10/2017 Dleores Mckinney, PT GP 1          PT G-Codes  Outcome Measure Options: AM-PAC 6 Clicks Basic Mobility (PT)    Delores Mckinney, PT  11/10/2017

## 2017-11-10 NOTE — DISCHARGE PLACEMENT REQUEST
"Riri Damon (83 y.o. Female)     Date of Birth Social Security Number Address Home Phone MRN    1934  [id number] [id number] [phone] [id number]    [place] Marital Status          Latter-day        Admission Date Admission Type Admitting Provider Attending Provider Department, Room/Bed    11/9/17 Elective Andrea Caballero MD Goodin, Robert A, MD [address], P889/1    Discharge Date Discharge Disposition Discharge Destination                      Attending Provider: Andrea Caballero MD     Allergies:  No Known Allergies    Isolation:  None   Infection:  None   Code Status:  FULL    Ht:  65\" (165.1 cm)   Wt:  134 lb (60.8 kg)    Admission Cmt:  None   Principal Problem:  None                Active Insurance as of 11/9/2017     Primary Coverage     Payor Plan Insurance Group Employer/Plan Group    HUMANA MEDICARE REPLACEMENT HUMANA MEDICARE REPL [id number]     Payor Plan Address Payor Plan Phone Number Effective From Effective To    [address] [address] [phone] 1/1/2014     [place] [address]       Subscriber Name Subscriber Birth Date Member ID       RIRI DAMON 1934                  Emergency Contacts      (Rel.) Home Phone Work Phone Mobile Phone    Marcus Damon (Spouse) [phone] -- --    Matt Damon (Son) [phone] -- [phone]    Marito Damon (Son) [phone] -- [phone]              "

## 2017-11-10 NOTE — PROGRESS NOTES
"Ortho POD 1    Patients Name:  Riri Damon  YOB: 1934  Medical Records Number:  [id number]    No complaints except pain    /69 (BP Location: Left arm, Patient Position: Sitting)  Pulse 76  Temp 97 °F (36.1 °C) (Oral)   Resp 18  Ht 65\" (165.1 cm)  Wt 134 lb (60.8 kg)  LMP  (LMP Unknown)  SpO2 98%  BMI 22.3 kg/m2    RLE:  NVI, calf nontender, sensation intact  No signs of DVT    Incision: clean, intact    Lab Results (last 24 hours)     Procedure Component Value Units Date/Time    Hemoglobin & Hematocrit, Blood [[id number]]  (Normal) Collected:  11/10/17 0400    Specimen:  Blood Updated:  11/10/17 0421     Hemoglobin 11.9 g/dL      Hematocrit 35.9 %     Basic Metabolic Panel [[id number]]  (Abnormal) Collected:  11/10/17 0400    Specimen:  Blood Updated:  11/10/17 0434     Glucose 92 mg/dL      BUN 7 (L) mg/dL      Creatinine 0.47 (L) mg/dL      Sodium 142 mmol/L      Potassium 3.8 mmol/L      Chloride 105 mmol/L      CO2 24.8 mmol/L      Calcium 8.8 mg/dL      eGFR Non African Amer 127 mL/min/1.73      BUN/Creatinine Ratio 14.9     Anion Gap 12.2 mmol/L     Narrative:       The MDRD GFR formula is only valid for adults with stable renal function between ages 18 and 70.          Xr Knee 1 Or 2 View Right    Result Date: 11/9/2017  Narrative: PORTABLE JOINT X-RAY  HISTORY: Right knee pain. Arthritis, postop arthroplasty.  Portable x-ray of the right knee is provided.  FINDINGS:  There is arthroplasty hardware, positioned as expected.  No periprosthetic fracture is identified.  There are expected post operative changes in the soft tissues.      Impression: Right knee arthroplasty as expected..  This report was finalized on 11/9/2017 10:08 AM by Dr. Se Hogue MD.      Xr Knee 1 Or 2 View Right    Result Date: 10/25/2017  Narrative: RIGHT KNEE, 2 VIEWS CHEST  HISTORY: Preop chest x-ray, knee pain.  FINDINGS:  RIGHT KNEE: There is severe loss of joint space involving the medial " compartment. Moderate size marginal osteophytes are noted medially. Chondrocalcinosis is appreciated. There is no evidence of fracture. A small suprapatellar effusion is noted.  CHEST: 2 views of the chest demonstrate the heart to be within normal limits in size. There is mild dextroscoliosis of the thoracic spine. There is no evidence of focal infiltrate, effusion or of congestive failure.  This report was finalized on 10/25/2017 10:40 AM by Dr. Ion Krishna MD.      Xr Chest Pa & Lateral    Result Date: 10/25/2017  Narrative: RIGHT KNEE, 2 VIEWS CHEST  HISTORY: Preop chest x-ray, knee pain.  FINDINGS:  RIGHT KNEE: There is severe loss of joint space involving the medial compartment. Moderate size marginal osteophytes are noted medially. Chondrocalcinosis is appreciated. There is no evidence of fracture. A small suprapatellar effusion is noted.  CHEST: 2 views of the chest demonstrate the heart to be within normal limits in size. There is mild dextroscoliosis of the thoracic spine. There is no evidence of focal infiltrate, effusion or of congestive failure.  This report was finalized on 10/25/2017 10:40 AM by Dr. Ion Krishna MD.        S/p Right TKA  PT-WBAT with walker  ASA for DVT prophylaxis

## 2017-11-10 NOTE — PLAN OF CARE
Problem: Patient Care Overview (Adult)  Goal: Plan of Care Review  Outcome: Ongoing (interventions implemented as appropriate)    11/10/17 1839   Coping/Psychosocial Response Interventions   Plan Of Care Reviewed With patient   Patient Care Overview   Progress improving   Outcome Evaluation   Outcome Summary/Follow up Plan POD1. Increased pain today, PO pain medication for management. Ambulates well with walker, assist x1. Hx htn, BP stable, continue to monitor.       Goal: Adult Individualization and Mutuality  Outcome: Ongoing (interventions implemented as appropriate)  Goal: Discharge Needs Assessment  Outcome: Ongoing (interventions implemented as appropriate)    Problem: Perioperative Period (Adult)  Goal: Signs and Symptoms of Listed Potential Problems Will be Absent or Manageable (Perioperative Period)  Outcome: Outcome(s) achieved Date Met:  11/10/17    11/10/17 1839   Perioperative Period   Problems Assessed (Perioperative Period) all   Problems Present (Perioperative Period) pain         Problem: Fall Risk (Adult)  Goal: Absence of Falls  Outcome: Ongoing (interventions implemented as appropriate)    11/10/17 1839   Fall Risk (Adult)   Absence of Falls achieves outcome         Problem: Knee Replacement, Total (Adult)  Goal: Signs and Symptoms of Listed Potential Problems Will be Absent or Manageable (Knee Replacement, Total)  Outcome: Ongoing (interventions implemented as appropriate)    11/10/17 1839   Knee Replacement, Total   Problems Assessed (Total Knee Replacement) all   Problems Present (Total Knee Replacement) pain;decreased range of motion

## 2017-11-10 NOTE — PLAN OF CARE
Problem: Patient Care Overview (Adult)  Goal: Plan of Care Review  Outcome: Ongoing (interventions implemented as appropriate)    11/10/17 1311   Coping/Psychosocial Response Interventions   Plan Of Care Reviewed With patient   Patient Care Overview   Progress progress towards functional goals is fair   Outcome Evaluation   Outcome Summary/Follow up Plan improved today, but still fatigues quickly, able to tolerate weight through RLE and walk 30 ft with walker, progressing with ROM as well

## 2017-11-10 NOTE — THERAPY TREATMENT NOTE
Acute Care - Physical Therapy Treatment Note  [facility]     Patient Name: Riri Damon  : 1934  MRN: [id number]  Today's Date: 11/10/2017  Onset of Illness/Injury or Date of Surgery Date: 17  Date of Referral to PT: 17  Referring Physician: Federico    Admit Date: 2017    Visit Dx:    ICD-10-CM ICD-9-CM   1. Difficulty walking R26.2 719.7     Patient Active Problem List   Diagnosis   • Malignant neoplasm of colon   • Hyperlipidemia   • Low back pain   • Mitral valve insufficiency   • Palpitations   • Pulmonary hypertension   • Knee pain   • Tricuspid valve insufficiency   • Arthritis   • Medicare annual wellness visit, subsequent   • Screening for osteoporosis   • Orthostatic lightheadedness   • Essential hypertension   • OA (osteoarthritis) of knee               Adult Rehabilitation Note       11/10/17 1423 11/10/17 1307       Rehab Assessment/Intervention    Discipline physical therapist  -PC physical therapist  -PC     Document Type therapy note (daily note)  -PC therapy note (daily note)  -PC     Subjective Information agree to therapy  -PC agree to therapy;complains of;weakness;fatigue;pain  -PC     Patient Effort, Rehab Treatment  good  -PC     Patient Effort, Rehab Treatment Comment feeling better this afternoon  -PC      Recorded by [PC] Delores Mckinney, PT [PC] Delores Mckinney, PT     Pain Assessment    Pain Assessment 0-10  -PC      Pain Score 5  -PC 5  -PC     Pain Location Knee  -PC Knee  -PC     Pain Orientation Right  -PC Right  -PC     Pain Intervention(s) Medication (See MAR);Repositioned  -PC      Recorded by [PC] Delores Mckinney PT [PC] Delores Mckinney PT     Cognitive Assessment/Intervention    Current Cognitive/Communication Assessment functional  -PC functional  -PC     Recorded by [PC] Delores Mckinney PT [PC] Delores Mckinney PT     ROM (Range of Motion)    General ROM Detail  15-80  -PC     Recorded by  [PC] Delores Mckinney PT     Transfer Assessment/Treatment     Transfers, Sit-Stand [place] stand by assist  -PC minimum assist (75% patient effort)  -PC     Transfers, Stand-Sit [place] stand by assist  -PC minimum assist (75% patient effort)  -PC     Transfers, Sit-Stand-Sit, Assist Device rolling walker  -PC rolling walker  -PC     Recorded by [PC] Delores Mckinney, PT [PC] Delores Mckinney PT     Gait Assessment/Treatment    Gait, [place] Level contact guard assist  -PC minimum assist (75% patient effort)  -PC     Gait, Assistive Device rolling walker  -PC rolling walker  -PC     Gait, Distance (Feet) 40  -PC 30  -PC     Gait, Gait Deviations antalgic;carolina decreased  -PC forward flexed posture;antalgic;step length decreased  -PC     Recorded by [PC] Delores Mckinney PT [PC] Delores Mckinney PT     Stairs Assessment/Treatment    Number of Stairs 4  -PC      Stairs, Handrail Location right side (ascending)  -PC      Stairs, [place] Level contact guard assist;minimum assist (75% patient effort)  -PC      Recorded by [PC] Delores Mckinney PT      Therapy Exercises    Exercise Protocols total knee  -PC total knee  -PC     Total Knee Exercises right:;completed protocol;with assist;20 reps  -PC right:;15 reps;completed protocol;with assist  -PC     Recorded by [PC] Delores Mckinney PT [PC] Delores Mckinney PT     Positioning and Restraints    Pre-Treatment Position sitting in chair/recliner  -PC sitting in chair/recliner  -PC     Post Treatment Position chair  -PC chair  -PC     Recorded by [PC] Delores Mckinney PT [PC] Delores Mckinney, PT       User Key  (r) = Recorded By, (t) = Taken By, (c) = Cosigned By    Initials Name Effective Dates    PC Delores Mckinney PT 12/01/15 -                 IP PT Goals       11/09/17 1343 11/09/17 1341       Transfer Training PT LTG    Transfer Training PT LTG, Date Established  11/09/17  -EJ     Transfer Training PT LTG, Time to Achieve  4 days  -EJ     Transfer Training PT LTG, Activity Type  all transfers  -EJ     Transfer Training  PT LTG, [place] Level  supervision required  -EJ     Transfer Training PT LTG, Assist Device  walker, rolling  -EJ     Gait Training PT LTG    Gait Training Goal PT LTG, Date Established  11/09/17  -EJ     Gait Training Goal PT LTG, Time to Achieve  4 days  -EJ     Gait Training Goal PT LTG, [place] Level  contact guard assist  -EJ     Gait Training Goal PT LTG, Assist Device  walker, rolling  -EJ     Gait Training Goal PT LTG, Distance to Achieve  household distances  -EJ     Stair Training PT LTG    Stair Training Goal PT LTG, Date Established 11/09/17  -EJ      Stair Training Goal PT LTG, Time to Achieve 4 days  -EJ      Stair Training Goal PT LTG, Number of Steps 4  -EJ      Stair Training Goal PT LTG, [place] Level contact guard assist  -EJ      Stair Training Goal PT LTG, Assist Device 1 handrail  -EJ      Range of Motion PT LTG    Range of Motion Goal PT LTG, Date Established 11/09/17  -EJ      Range of Motion Goal PT LTG, Time to Achieve 4 days  -EJ      Range fo Motion Goal PT LTG, Joint R knee  -EJ      Range of Motion Goal PT LTG, AAROM Measure 5-90  -EJ        User Key  (r) = Recorded By, (t) = Taken By, (c) = Cosigned By    Initials Name Provider Type    ALEC Isidro PT Physical Therapist          Physical Therapy Education     Title: PT OT SLP Therapies (Done)     Topic: Physical Therapy (Done)     Point: Mobility training (Done)    Learning Progress Summary    Learner Readiness Method Response Comment Documented by Status   Patient Acceptance HONG REILLY DU, NR   11/10/17 1311 Done    Acceptance LIZETTE REILLY   11/09/17 1546 Done    Acceptance HONG REILLY NR   11/09/17 1341 Done   Family Acceptance LIZETTE REILLY   11/09/17 1546 Done    Acceptance HONG REILLY NR   11/09/17 1341 Done               Point: Home exercise program (Done)    Learning Progress Summary    Learner Readiness Method Response Comment Documented by Status   Patient Acceptance HONG REILLY DU, NR   11/10/17 1311 Done    Acceptance  E,TB VU   11/09/17 1546 Done    Acceptance E,D VU,NR   11/09/17 1341 Done   Family Acceptance E,TB VU   11/09/17 1546 Done    Acceptance E,D VU,NR   11/09/17 1341 Done               Point: Body mechanics (Done)    Learning Progress Summary    Learner Readiness Method Response Comment Documented by Status   Patient Acceptance E,D VU,DU,NR   11/10/17 1311 Done               Point: Precautions (Done)    Learning Progress Summary    Learner Readiness Method Response Comment Documented by Status   Patient Acceptance E,D VU,DU,NR   11/10/17 1311 Done    Acceptance E,TB VU   11/09/17 1546 Done    Acceptance E,D VU,NR   11/09/17 1341 Done   Family Acceptance E,TB VU   11/09/17 1546 Done    Acceptance E,D VU,NR   11/09/17 1341 Done                      User Key     Initials Effective Dates Name Provider Type Discipline     08/31/17 -  Daysi Goodwin, RN Registered Nurse Nurse     12/01/15 -  Delores Mckinney, PT Physical Therapist PT     04/21/17 -  Ryann Isidro, PT Physical Therapist PT                    PT Recommendation and Plan  Anticipated Discharge Disposition: home with assist, home with home health  Planned Therapy Interventions: bed mobility training, gait training, home exercise program, patient/family education, ROM (Range of Motion), stair training, strengthening, stretching, transfer training  PT Frequency: 2 times/day  Plan of Care Review  Plan Of Care Reviewed With: patient  Progress: progress towards functional goals is fair  Outcome Summary/Follow up Plan: improved today, but still fatigues quickly, able to tolerate weight through RLE and walk 30 ft with walker, progressing with ROM as well          Outcome Measures       11/10/17 1300 11/09/17 1300       How much help from another person do you currently need...    Turning from your back to your side while in flat bed without using bedrails? 3  -PC 3  -EJ     Moving from lying on back to sitting on the side of a flat bed  without bedrails? 3  -PC 2  -EJ     Moving to and from a bed to a chair (including a wheelchair)? 3  -PC 2  -EJ     Standing up from a chair using your arms (e.g., wheelchair, bedside chair)? 3  -PC 2  -EJ     Climbing 3-5 steps with a railing? 2  -PC 1  -EJ     To walk in hospital room? 3  -PC 1  -EJ     AM-PAC 6 Clicks Score 17  -PC 11  -EJ     Functional Assessment    Outcome Measure Options  AM-PAC 6 Clicks Basic Mobility (PT)  -EJ       User Key  (r) = Recorded By, (t) = Taken By, (c) = Cosigned By    Initials Name Provider Type    PC Delores Mckinney, PT Physical Therapist    EJ Ryann Isidro, PT Physical Therapist           Time Calculation:         PT Charges       11/10/17 1427 11/10/17 1312       Time Calculation    Start Time 1300  -PC 0830  -PC     Stop Time 1340  -PC 0915  -PC     Time Calculation (min) 40 min  -PC 45 min  -PC     PT Received On 11/10/17  -PC 11/10/17  -PC     PT - Next Appointment 11/11/17  -PC        User Key  (r) = Recorded By, (t) = Taken By, (c) = Cosigned By    Initials Name Provider Type    PC Delores Mckinney, PT Physical Therapist          Therapy Charges for Today     Code Description Service Date Service Provider Modifiers Qty    17769285903 HC PT THER PROC EA 15 MIN 11/10/2017 Delores Mckinney, PT GP 1    52902483419 HC PT THER PROC GROUP 11/10/2017 Deloreslibra Mckinney, PT GP 1    39056261268 HC PT THER PROC EA 15 MIN 11/10/2017 Deloreslibra Mckinney, PT GP 1    36408412584 HC PT THER PROC GROUP 11/10/2017 Deloreslibra Mckinney, PT GP 1          PT G-Codes  Outcome Measure Options: AM-PAC 6 Clicks Basic Mobility (PT)    Delores Mckinney PT  11/10/2017

## 2017-11-11 LAB
HCT VFR BLD AUTO: 34.4 % (ref 35.6–45.5)
HGB BLD-MCNC: 11.2 G/DL (ref 11.9–15.5)

## 2017-11-11 PROCEDURE — 85014 HEMATOCRIT: CPT | Performed by: ORTHOPAEDIC SURGERY

## 2017-11-11 PROCEDURE — 97150 GROUP THERAPEUTIC PROCEDURES: CPT

## 2017-11-11 PROCEDURE — 85018 HEMOGLOBIN: CPT | Performed by: ORTHOPAEDIC SURGERY

## 2017-11-11 PROCEDURE — 25010000002 KETOROLAC TROMETHAMINE PER 15 MG: Performed by: ORTHOPAEDIC SURGERY

## 2017-11-11 PROCEDURE — 97110 THERAPEUTIC EXERCISES: CPT

## 2017-11-11 RX ORDER — OXYCODONE HYDROCHLORIDE AND ACETAMINOPHEN 5; 325 MG/1; MG/1
1-2 TABLET ORAL EVERY 4 HOURS PRN
Qty: 80 TABLET | Refills: 0 | Status: SHIPPED | OUTPATIENT
Start: 2017-11-11 | End: 2018-03-08

## 2017-11-11 RX ORDER — PSEUDOEPHEDRINE HCL 30 MG
100 TABLET ORAL 2 TIMES DAILY PRN
Qty: 40 CAPSULE | Refills: 1 | Status: SHIPPED | OUTPATIENT
Start: 2017-11-11

## 2017-11-11 RX ORDER — UREA 10 %
2 LOTION (ML) TOPICAL NIGHTLY PRN
Status: DISCONTINUED | OUTPATIENT
Start: 2017-11-11 | End: 2017-11-12 | Stop reason: HOSPADM

## 2017-11-11 RX ADMIN — OXYCODONE HYDROCHLORIDE AND ACETAMINOPHEN 2 TABLET: 5; 325 TABLET ORAL at 00:19

## 2017-11-11 RX ADMIN — ATORVASTATIN CALCIUM 20 MG: 20 TABLET, FILM COATED ORAL at 21:18

## 2017-11-11 RX ADMIN — ASPIRIN 325 MG: 325 TABLET, DELAYED RELEASE ORAL at 18:05

## 2017-11-11 RX ADMIN — Medication 2 MG: at 21:17

## 2017-11-11 RX ADMIN — KETOROLAC TROMETHAMINE 15 MG: 30 INJECTION, SOLUTION INTRAMUSCULAR at 21:17

## 2017-11-11 RX ADMIN — ASPIRIN 325 MG: 325 TABLET, DELAYED RELEASE ORAL at 08:35

## 2017-11-11 RX ADMIN — OXYCODONE HYDROCHLORIDE AND ACETAMINOPHEN 2 TABLET: 5; 325 TABLET ORAL at 06:32

## 2017-11-11 RX ADMIN — OXYCODONE HYDROCHLORIDE AND ACETAMINOPHEN 2 TABLET: 5; 325 TABLET ORAL at 21:17

## 2017-11-11 RX ADMIN — DOCUSATE SODIUM -SENNOSIDES 2 TABLET: 50; 8.6 TABLET, COATED ORAL at 18:05

## 2017-11-11 RX ADMIN — DOCUSATE SODIUM -SENNOSIDES 2 TABLET: 50; 8.6 TABLET, COATED ORAL at 08:35

## 2017-11-11 RX ADMIN — Medication 1 MG: at 00:26

## 2017-11-11 RX ADMIN — FERROUS SULFATE TAB 325 MG (65 MG ELEMENTAL FE) 325 MG: 325 (65 FE) TAB at 08:35

## 2017-11-11 RX ADMIN — OXYCODONE HYDROCHLORIDE AND ACETAMINOPHEN 2 TABLET: 5; 325 TABLET ORAL at 10:49

## 2017-11-11 NOTE — PROGRESS NOTES
"Ortho POD 2    Patient Name:  Riri Damon  YOB: 1934  Medical Records Number:  [id number]    No complaints except pain    /66 (BP Location: Right arm, Patient Position: Sitting)  Pulse 82  Temp 98.3 °F (36.8 °C) (Oral)   Resp 16  Ht 65\" (165.1 cm)  Wt 134 lb (60.8 kg)  LMP  (LMP Unknown)  SpO2 96%  BMI 22.3 kg/m2    RLE:  NVI, calf nontender, sensation intact  No signs of DVT    Incision: clean, no infection    Lab Results (last 24 hours)     Procedure Component Value Units Date/Time    Hemoglobin & Hematocrit, Blood [[id number]]  (Abnormal) Collected:  11/11/17 0505    Specimen:  Blood Updated:  11/11/17 0583     Hemoglobin 11.2 (L) g/dL      Hematocrit 34.4 (L) %           S/p Right TKA  WBAT with walker  ASA for DVT prophylaxis  Home tomorrow  "

## 2017-11-11 NOTE — THERAPY TREATMENT NOTE
Acute Care - Physical Therapy Treatment Note  [facility]     Patient Name: Riri Damon  : 1934  MRN: [id number]  Today's Date: 2017  Onset of Illness/Injury or Date of Surgery Date: 17  Date of Referral to PT: 17  Referring Physician: Federico    Admit Date: 2017    Visit Dx:    ICD-10-CM ICD-9-CM   1. Difficulty walking R26.2 719.7   2. Osteoarthritis of right knee, unspecified osteoarthritis type M17.11 715.96   3. Acute pain of right knee M25.561 719.46   4. Arthritis M19.90 716.90     Patient Active Problem List   Diagnosis   • Malignant neoplasm of colon   • Hyperlipidemia   • Low back pain   • Mitral valve insufficiency   • Palpitations   • Pulmonary hypertension   • Knee pain   • Tricuspid valve insufficiency   • Arthritis   • Medicare annual wellness visit, subsequent   • Screening for osteoporosis   • Orthostatic lightheadedness   • Essential hypertension   • OA (osteoarthritis) of knee               Adult Rehabilitation Note       17 1305 17 0900 11/10/17 1423    Rehab Assessment/Intervention    Discipline physical therapy assistant  - physical therapy assistant  - physical therapist  -PC    Document Type therapy note (daily note)  - therapy note (daily note)  - therapy note (daily note)  -PC    Subjective Information agree to therapy;complains of;fatigue;pain  - agree to therapy;complains of;weakness;fatigue;pain;swelling  - agree to therapy  -PC    Patient Effort, Rehab Treatment Comment   feeling better this afternoon  -PC    Precautions/Limitations fall precautions  - fall precautions  -     Recorded by [JM] Jossie Hebert PTA [JM] Jossie Hebert PTA [PC] Delores Mckinney, PT    Pain Assessment    Pain Assessment 0-10  -JM 0-10  -JM 0-10  -PC    Pain Score 6  -JM 5  -JM 5  -PC    Pain Type Surgical pain  -JM Surgical pain  -JM     Pain Location Knee  -JM Knee  -JM Knee  -PC    Pain Orientation Right  -JM Right  -JM Right  -PC    Pain  Intervention(s) Medication (See MAR);Repositioned;Cold applied  -JM Medication (See MAR);Repositioned;Cold applied  -JM Medication (See MAR);Repositioned  -PC    Response to Interventions nissa  -JM nissa, multiple rests req  -JM     Recorded by [JM] Jossie Hebert PTA [JM] Jossie Hebert PTA [PC] Delores Mckinney, PT    Cognitive Assessment/Intervention    Current Cognitive/Communication Assessment   functional  -PC    Recorded by   [PC] Delores Mckinney, PT    ROM (Range of Motion)    General ROM Detail  -10-78  -JM     Recorded by  [JM] Jossie Hebert PTA     Bed Mobility, Assessment/Treatment    Bed Mobility, Scoot/Bridge, [place] conditional independence;verbal cues required;nonverbal cues required (demo/gesture)  -JM conditional independence;verbal cues required;nonverbal cues required (demo/gesture)  -     Bed Mobility, Comment in chair  -JM in chair  -JM     Recorded by [JM] Jossie Hebert PTA [JM] Jossie Hebert PTA     Transfer Assessment/Treatment    Transfers, Sit-Stand [place] contact guard assist;verbal cues required  - contact guard assist;verbal cues required  - stand by assist  -PC    Transfers, Stand-Sit [place] contact guard assist;verbal cues required  - contact guard assist;verbal cues required  -JM stand by assist  -PC    Transfers, Sit-Stand-Sit, Assist Device rolling walker  - rolling walker  - rolling walker  -PC    Recorded by [JM] Jossie Hebert PTA [JM] Jossie Hebert PTA [PC] Delores Mckinney, PT    Gait Assessment/Treatment    Gait, [place] Level contact guard assist;verbal cues required  - contact guard assist;verbal cues required  - contact guard assist  -PC    Gait, Assistive Device rolling walker  -JM rolling walker  -JM rolling walker  -PC    Gait, Distance (Feet) 100  -JM 65  -JM 40  -PC    Gait, Gait Deviations antalgic;[place] decreased  -JM antalgic;[place] decreased;forward flexed posture;step length decreased  - antalgic;[place]  decreased  -PC    Gait, Safety Issues  step length decreased;balance decreased during turns  -     Gait, Impairments strength decreased  - strength decreased  -     Gait, Comment  improved step length w/cues  -JM     Recorded by [JM] Jossie Hebert PTA [JM] Jossie Hebert PTA [PC] Delores Mckinney, PT    Stairs Assessment/Treatment    Number of Stairs   4  -PC    Stairs, Handrail Location   right side (ascending)  -PC    Stairs, [place] Level   contact guard assist;minimum assist (75% patient effort)  -PC    Recorded by   [PC] Delores Mckinney, PT    Therapy Exercises    Exercise Protocols total knee  -JM total knee  -JM total knee  -PC    Total Knee Exercises right:;completed protocol;with assist;SLR;SAQ;30 reps   to initiate, then indep  -JM right:;25 reps;completed protocol;with assist;SLR;SAQ   to initiate, then indep  -JM right:;completed protocol;with assist;20 reps  -PC    Recorded by [JM] Jossie Hebert PTA [JM] Jossie Hebert PTA [PC] Delores Mckinney, PT    Positioning and Restraints    Pre-Treatment Position sitting in chair/recliner  -JM sitting in chair/recliner  -JM sitting in chair/recliner  -PC    Post Treatment Position   chair  -PC    In Chair reclined;with family/caregiver;encouraged to call for assist   educ on placing brakes on chair once in rm and calling nsg  -JM reclined;with family/caregiver;encouraged to call for assist  -     Recorded by [JM] Jossie Hebert PTA [JM] Jossie Hebert PTA [PC] Delores Mckinney, PT      11/10/17 3767          Rehab Assessment/Intervention    Discipline physical therapist  -PC      Document Type therapy note (daily note)  -PC      Subjective Information agree to therapy;complains of;weakness;fatigue;pain  -PC      Patient Effort, Rehab Treatment good  -PC      Recorded by [PC] Delores Mckinney, PT      Pain Assessment    Pain Score 5  -PC      Pain Location Knee  -PC      Pain Orientation Right  -PC      Recorded by [PC] Delores Mckinney, PT       Cognitive Assessment/Intervention    Current Cognitive/Communication Assessment functional  -PC      Recorded by [PC] Delores Mckinney, PT      ROM (Range of Motion)    General ROM Detail 15-80  -PC      Recorded by [PC] Delores Mckinney PT      Transfer Assessment/Treatment    Transfers, Sit-Stand [place] minimum assist (75% patient effort)  -PC      Transfers, Stand-Sit [place] minimum assist (75% patient effort)  -PC      Transfers, Sit-Stand-Sit, Assist Device rolling walker  -PC      Recorded by [PC] Delores Mckinney, PT      Gait Assessment/Treatment    Gait, [place] Level minimum assist (75% patient effort)  -PC      Gait, Assistive Device rolling walker  -PC      Gait, Distance (Feet) 30  -PC      Gait, Gait Deviations forward flexed posture;antalgic;step length decreased  -PC      Recorded by [PC] Delores Mckinney, PT      Therapy Exercises    Exercise Protocols total knee  -PC      Total Knee Exercises right:;15 reps;completed protocol;with assist  -PC      Recorded by [PC] Delores Mckinney, PT      Positioning and Restraints    Pre-Treatment Position sitting in chair/recliner  -PC      Post Treatment Position chair  -PC      Recorded by [PC] Delores Mckinney, PT        User Key  (r) = Recorded By, (t) = Taken By, (c) = Cosigned By    Initials Name Effective Dates    PC Delores Mckinney, PT 12/01/15 -     EDYTA Hebert, PTA 02/18/16 -                 IP PT Goals       11/09/17 1343 11/09/17 1341       Transfer Training PT LTG    Transfer Training PT LTG, Date Established  11/09/17  -EJ     Transfer Training PT LTG, Time to Achieve  4 days  -EJ     Transfer Training PT LTG, Activity Type  all transfers  -EJ     Transfer Training PT LTG, [place] Level  supervision required  -EJ     Transfer Training PT LTG, Assist Device  walker, rolling  -EJ     Gait Training PT LTG    Gait Training Goal PT LTG, Date Established  11/09/17  -EJ     Gait Training Goal PT LTG, Time to Achieve  4 days  -EJ     Gait  Training Goal PT LTG, [place] Level  contact guard assist  -EJ     Gait Training Goal PT LTG, Assist Device  walker, rolling  -EJ     Gait Training Goal PT LTG, Distance to Achieve  household distances  -EJ     Stair Training PT LTG    Stair Training Goal PT LTG, Date Established 11/09/17  -EJ      Stair Training Goal PT LTG, Time to Achieve 4 days  -EJ      Stair Training Goal PT LTG, Number of Steps 4  -EJ      Stair Training Goal PT LTG, [place] Level contact guard assist  -EJ      Stair Training Goal PT LTG, Assist Device 1 handrail  -EJ      Range of Motion PT LTG    Range of Motion Goal PT LTG, Date Established 11/09/17  -EJ      Range of Motion Goal PT LTG, Time to Achieve 4 days  -EJ      Range fo Motion Goal PT LTG, Joint R knee  -EJ      Range of Motion Goal PT LTG, AAROM Measure 5-90  -EJ        User Key  (r) = Recorded By, (t) = Taken By, (c) = Cosigned By    Initials Name Provider Type    ALEC Isidro, PT Physical Therapist          Physical Therapy Education     Title: PT OT SLP Therapies (Done)     Topic: Physical Therapy (Done)     Point: Mobility training (Done)    Learning Progress Summary    Learner Readiness Method Response Comment Documented by Status   Patient Acceptance E,TBHONG NR   11/11/17 1020 Done    Acceptance EHONG DU, NR   11/10/17 1311 Done    Acceptance LIZETTE REILLY [facility] 11/09/17 1546 Done    Acceptance E,D VU,NR   11/09/17 1341 Done   Family Acceptance E,TBHONG NR   11/11/17 1020 Done    Acceptance LIZETTE REILLY   11/09/17 1546 Done    Acceptance E,D VU,NR   11/09/17 1341 Done               Point: Home exercise program (Done)    Learning Progress Summary    Learner Readiness Method Response Comment Documented by Status   Patient Acceptance E,TBHONG NR   11/11/17 1020 Done    Acceptance ED DIONI AC NR   11/10/17 1311 Done    Acceptance LIZETTE REILLY [facility] 11/09/17 1546 Done    Acceptance E,D VU,NR   11/09/17 1341 Done   Family Acceptance E,TBHONG NR   11/11/17  1020 Done    Acceptance E,TB VU   11/09/17 1546 Done    Acceptance E,D VU,NR   11/09/17 1341 Done               Point: Body mechanics (Done)    Learning Progress Summary    Learner Readiness Method Response Comment Documented by Status   Patient Acceptance E,TB,D VU,NR   11/11/17 1020 Done    Acceptance E,D VU,DU,NR   11/10/17 1311 Done   Family Acceptance E,TB,D VU,NR   11/11/17 1020 Done               Point: Precautions (Done)    Learning Progress Summary    Learner Readiness Method Response Comment Documented by Status   Patient Acceptance E,TB,D VU,NR   11/11/17 1020 Done    Acceptance E,D VU,DU,NR   11/10/17 1311 Done    Acceptance E,TB VU   11/09/17 1546 Done    Acceptance E,D VU,NR   11/09/17 1341 Done   Family Acceptance E,TB,D VU,NR   11/11/17 1020 Done    Acceptance E,TB VU   11/09/17 1546 Done    Acceptance E,D VU,NR   11/09/17 1341 Done                      User Key     Initials Effective Dates Name Provider Type Discipline     08/31/17 -  Daysi Goodwin, RN Registered Nurse Nurse     12/01/15 -  Delores Mckinney, PT Physical Therapist PT     02/18/16 -  Jossie Hebert, RIC[facility] Physical Therapy Assistant PT     04/21/17 -  Ryann Isidro, PT Physical Therapist PT                    PT Recommendation and Plan  Anticipated Discharge Disposition: home with assist, home with home health  Planned Therapy Interventions: bed mobility training, gait training, home exercise program, patient/family education, ROM (Range of Motion), stair training, strengthening, stretching, transfer training  PT Frequency: 2 times/day  Plan of Care Review  Plan Of Care Reviewed With: patient, spouse, son  Progress: improving  Outcome Summary/Follow up Plan: incr amb dist          Outcome Measures       11/11/17 1000 11/10/17 1300 11/09/17 1300    How much help from another person do you currently need...    Turning from your back to your side while in flat bed without using bedrails? 3  - 3   -PC 3  -EJ    Moving from lying on back to sitting on the side of a flat bed without bedrails? 3  -JM 3  -PC 2  -EJ    Moving to and from a bed to a chair (including a wheelchair)? 3  -JM 3  -PC 2  -EJ    Standing up from a chair using your arms (e.g., wheelchair, bedside chair)? 3  -JM 3  -PC 2  -EJ    Climbing 3-5 steps with a railing? 3  -JM 2  -PC 1  -EJ    To walk in hospital room? 3  -JM 3  -PC 1  -EJ    AM-PAC 6 Clicks Score 18  -JM 17  -PC 11  -EJ    Functional Assessment    Outcome Measure Options   AM-PAC 6 Clicks Basic Mobility (PT)  -EJ      User Key  (r) = Recorded By, (t) = Taken By, (c) = Cosigned By    Initials Name Provider Type    DIANDRA Mckinney, PT Physical Therapist    EDYTA Hebert PTA Physical Therapy Assistant    ALEC Isidro, PT Physical Therapist           Time Calculation:         PT Charges       11/11/17 1729 11/11/17 1047       Time Calculation    Start Time 1304  - 0851  -     Stop Time 1415  - 1000  -     Time Calculation (min) 71 min  - 69 min  -     PT Received On 11/11/17  - 11/11/17  -     PT - Next Appointment 11/12/17  - 11/11/17  -       User Key  (r) = Recorded By, (t) = Taken By, (c) = Cosigned By    Initials Name Provider Type    EDYTA Hebert PTA Physical Therapy Assistant          Therapy Charges for Today     Code Description Service Date Service Provider Modifiers Qty    94660558844 HC PT THER PROC EA 15 MIN 11/11/2017 Jossie Hebert PTA GP 3    86111253372 HC PT THER PROC GROUP 11/11/2017 Jossie Hebert PTA GP 1    92707226939 HC PT THER PROC EA 15 MIN 11/11/2017 Jossie Hebert PTA GP 2    13386711968 HC PT THER PROC GROUP 11/11/2017 Jossie Hebert PTA GP 1          PT G-Codes  Outcome Measure Options: AM-PAC 6 Clicks Basic Mobility (PT)    Jossie Hebert PTA  11/11/2017

## 2017-11-11 NOTE — DISCHARGE SUMMARY
Discharge Summary    Patient Name:  Riri Damon  YOB: 1934  Medical Records Number:  [id number]    Date of Admission:  11/9/2017  Date of Discharge:  11/12/2017    Primary Discharge Diagnosis:  OA (osteoarthritis) of knee [M17.10]    Secondary Discharge Diagnosis:    Problem List Items Addressed This Visit     None          Procedures Performed:  Right Total Knee Arthroplasty      Hospital Course:    Riri Damon is a 83 y.o.  female who underwent successful right tka on 11/9/2017.  Riri Damon was started on Aspirin 325mg po twice daily immediately post-operatively for DVT prophylaxis.  On post-op day 1 the patients dressing was changed and their incision was clean, with no signs of infection and their calf was soft, with no signs of DVT.  The patient progressed well with physical therapy and the patients hemoglobin remained stable. On post-operative day 3 the patient was felt ready for discharge.      Total Knee Joint Replacement Discharge Instructions:    I. ACTIVITIES:  1. Exercises:  ? Complete exercise program as taught by the hospital physical therapist 2 times per day  ? Exercise program will be advanced by the physical therapist  ? During the day be up ambulating every 2 hours (while awake) for short distances  ? Complete the ankle pump exercises at least 10 times per hour (while awake)  ? Elevate legs most of the day the first week post operatively and thereafter elevate legs when in bed and for at least 30 minutes during the day. Caution must be taken to avoid pillow placement under the bend of the knee as this can led to flexion contractures of the knee.  ? Use cold packs 20-30 minutes approximately 5 times per day. This should be done before and after completing your exercises and at any time you are experiencing pain/ stiffness in your operative extremity.      2. Activities of Daily Living:  ? No tub baths, hot tubs, or swimming pools for 4 weeks  ? May shower and let  water run over the incision on post-operative day #5 if no drainage. Do not scrub or rub the incision. Simply let the water run over the incision and pat dry.    II. Restrictions  ? Do not cross legs or kneel  ? Your surgeon will discuss with you when you will be able to drive again.  ? Weight bearing is as tolerated  ? First week stay inside on even terrain. May go up and down stairs one stair at a time utilizing the hand rail  ? After one week, you may venture outside.    III. Precautions:  ? Everyone that comes near you should wash their hands  ? No elective dental, genital-urinary, or colon procedures or surgical procedures for 12 weeks after surgery unless absolutely necessary.  ?  If dental work or surgical procedure is deemed absolutely necessary, you will need to contact your surgeon as you will need to take antibiotics 1 hour prior to any dental work (including teeth cleanings).  ? Please discuss with your surgeon prophylactic antibiotics as the length of time this intervention will be necessary for you varies with each patient’s health history and situation.  ? Avoid sick people. If you must be around someone who is ill, they should wear a mask.  ? Avoid visits to the Emergency Room or Urgent Care unless you are having a life threatening event.   ? If ordered stockings are to be placed on in the morning and removed at night. Monitor the stockings to ensure that any swelling is not causing the stockings to become too tight. In this case, remove stockings immediately.    IV. INCISION CARE:  ? Wash your hands prior to dressing changes  ? Change the dressing as needed to keep incision clean and dry. Utilize dry gauze and paper tape. Avoid touching the side of the gauze that goes against the incision with your hands.  ? No creams or ointments to the incision  ? May remove dressing once the incision is free of drainage  ? Do not touch or pick at the incision  ? Check incision every day and notify surgeon  immediately if any of the following signs or symptoms are noted:  o Increase in redness  o Increase in swelling around the incision and of the entire extremity  o Increase in pain  o Drainage oozing from the incision  o Pulling apart of the edges of the incision  o Increase in overall body temperature (greater than 100.5 degrees)  ? Your surgeon will instruct you regarding suture or staple removal    V. Medications:   1. Anticoagulants: You will be discharged on an anticoagulant. This is a prophylactic medication that helps prevent blood clots during your post-operative period. The type and length of dosage varies based on your individual needs, procedure performed, and surgeon’s preference.  ? While taking the anticoagulant, you should avoid taking any additional aspirin, ibuprofen (Advil or Motrin), Aleve (Naprosyn) or other non-steroidal anti-inflammatory medications.   ? Notify surgeon immediately if any rizwana bleeding is noted in the urine, stool, emesis, or from the nose or the incision. Blood in the stool will often appear as black rather than red. Blood in urine may appear as pink. Blood in emesis may appear as brown/black like coffee grounds.  ? You will need to apply pressure for longer periods of time to any cuts or abrasions to stop bleeding  ? Avoid alcohol while taking anticoagulants    2. Stool Softeners: You will be at greater risk of constipation after surgery due to being less mobile and the pain medications.   ? Take stool softeners as instructed by your surgeon while on pain medications. Over the counter Colace 100 mg 1-2 capsules twice daily.   ? If stools become too loose or too frequent, please decreases the dosage or stop the stool softener.  ? If constipation occurs despite use of stool softeners, you are to continue the stool softeners and add a laxative (Milk of Magnesia 1 ounce daily as needed)  ? Drink plenty of fluids, and eat fruits and vegetables during your recovery time    3. Pain  Medications utilized after surgery are narcotics and the law requires that the following information be given to all patients that are prescribed narcotics:  ? CLASSIFICATION: Pain medications are called Opioids and are narcotics  ? LEGALITIES: It is illegal to share narcotics with others and to drive within 24 hours of taking narcotics  ? POTENTIAL SIDE EFFECTS: Potential side effects of opioids include: nausea, vomiting, itching, dizziness, drowsiness, dry mouth, constipation, and difficulty urinating.  ? POTENTIAL ADVERSE EFFECTS:   o Opioid tolerance can develop with use of pain medications and this simply means that it requires more and more of the medication to control pain; however, this is seen more in patients that use opioids for longer periods of time.  o Opioid dependence can develop with use of Opioids and this simply means that to stop the medication can cause withdrawal symptoms; however, this is seen with patients that use Opioids for longer periods of time.  o Opioid addiction can develop with use of Opioids and the incidence of this is very unlikely in patients who take the medications as ordered and stop the medications as instructed.  o Opioid overdose can be dangerous, but is unlikely when the medication is taken as ordered and stopped when ordered. It is important not to mix opioids with alcohol or with and type of sedative such as Benadryl as this can lead to over sedation and respiratory difficulty.  ? DOSAGE:   o Pain medications will need to be taken consistently for the first week to decrease pain and promote adequate pain relief and participation in physical therapy.  o After the initial surgical pain begins to resolve, you may begin to decrease the pain medication. By the end of 6-8 weeks, you should be off of pain medications.  o Refills will not be given by the office during evening hours, on weekends, or after 6-8 weeks post-op.  o To seek refills on pain medications during the  initial 6 week post-operative period, you must call the office 48 hours in advance to request the refill. The office will then notify you when to  the prescription. DO NOT wait until you are out of the medication to request a refill.    V. FOLLOW-UP VISITS:  ? You will need to follow up in the office with your surgeon in 3 weeks. Please call this number [phone] to schedule this appointment.  If you have any concerns or suspected complications prior to your follow up visit, please call your surgeons office. Do not wait until your appointment time if you suspect complications. These will need to be addressed in the office promptly.      Discharge Medications:     1) Percocet 5/325 mg  1-2 po q 4-6 hours for pain control  2)  Aspirin 325 mg po twice daily for 2 weeks, then once daily for 4 weeks.    CC:Phoenix Velazquez MD:Andrea Caballero MD

## 2017-11-11 NOTE — PROGRESS NOTES
Continued Stay Note  [facility]     Patient Name: Riri Damon  MRN: [id number]  Today's Date: 11/11/2017    Admit Date: 11/9/2017          Discharge Plan       11/11/17 1327    Case Management/Social Work Plan    Plan Home via private auto with [facility] to follow and hospital bed to be supplied by [facility]    Patient/Family In Agreement With Plan yes    Additional Comments Received CCP page from staff RN earlier in the day who states pt is requesting a hospital bed be delivered to her home at or after DC tomorrow. Spoke with pt via phone and she would like [facility] to provide hospital bed. Spoke with Suad with [facility] about the hospital bed and she states pt could have a 4-20% co-pay and she can not run the pt's insurance until after she has an order for the bed and the appropriate documentation of need for a hospital bed. Pt has an order in [employer] that needs co-signature. Updated staff RN. CCP to follow.............JW              Discharge Codes     None        Expected Discharge Date and Time     Expected Discharge Date Expected Discharge Time    Nov 12, 2017             Nikki Gutierrez, RN

## 2017-11-11 NOTE — THERAPY TREATMENT NOTE
Acute Care - Physical Therapy Treatment Note  [facility]     Patient Name: Riri Damon  : 1934  MRN: [id number]  Today's Date: 2017  Onset of Illness/Injury or Date of Surgery Date: 17  Date of Referral to PT: 17  Referring Physician: Federico    Admit Date: 2017    Visit Dx:    ICD-10-CM ICD-9-CM   1. Difficulty walking R26.2 719.7   2. Osteoarthritis of right knee, unspecified osteoarthritis type M17.11 715.96   3. Acute pain of right knee M25.561 719.46   4. Arthritis M19.90 716.90     Patient Active Problem List   Diagnosis   • Malignant neoplasm of colon   • Hyperlipidemia   • Low back pain   • Mitral valve insufficiency   • Palpitations   • Pulmonary hypertension   • Knee pain   • Tricuspid valve insufficiency   • Arthritis   • Medicare annual wellness visit, subsequent   • Screening for osteoporosis   • Orthostatic lightheadedness   • Essential hypertension   • OA (osteoarthritis) of knee               Adult Rehabilitation Note       17 0900 11/10/17 1423 11/10/17 1307    Rehab Assessment/Intervention    Discipline physical therapy assistant  -JM physical therapist  -PC physical therapist  -PC    Document Type therapy note (daily note)  -JM therapy note (daily note)  -PC therapy note (daily note)  -PC    Subjective Information agree to therapy;complains of;weakness;fatigue;pain;swelling  - agree to therapy  -PC agree to therapy;complains of;weakness;fatigue;pain  -PC    Patient Effort, Rehab Treatment   good  -PC    Patient Effort, Rehab Treatment Comment  feeling better this afternoon  -PC     Precautions/Limitations fall precautions  -      Recorded by [JM] Jossie Hebert PTA [PC] Delores Mckinney, PT [PC] Delorse Mckinney, PT    Pain Assessment    Pain Assessment 0-10  -JM 0-10  -PC     Pain Score 5  -JM 5  -PC 5  -PC    Pain Type Surgical pain  -      Pain Location Knee  -JM Knee  -PC Knee  -PC    Pain Orientation Right  -JM Right  -PC Right  -PC    Pain  Intervention(s) Medication (See MAR);Repositioned;Cold applied  - Medication (See MAR);Repositioned  -     Response to Interventions nissa, multiple rests req  -      Recorded by [JM] Jossie Hebert PTA [PC] Delores Mckinney, PT [PC] Delores Mckinney, PT    Cognitive Assessment/Intervention    Current Cognitive/Communication Assessment  functional  -PC functional  -PC    Recorded by  [PC] Delores Mckinney, PT [PC] Delores Mckinney, PT    ROM (Range of Motion)    General ROM Detail -10-78  -  15-80  -PC    Recorded by [JM] Jossie Hebert PTA  [PC] Delores Mckinney, PT    Bed Mobility, Assessment/Treatment    Bed Mobility, Scoot/Bridge, [place] conditional independence;verbal cues required;nonverbal cues required (demo/gesture)  -      Bed Mobility, Comment in chair  -      Recorded by [] Jossie Hebert PTA      Transfer Assessment/Treatment    Transfers, Sit-Stand [place] contact guard assist;verbal cues required  - stand by assist  - minimum assist (75% patient effort)  -    Transfers, Stand-Sit [place] contact guard assist;verbal cues required  - stand by assist  - minimum assist (75% patient effort)  -    Transfers, Sit-Stand-Sit, Assist Device rolling walker  - rolling walker  - rolling walker  -    Recorded by [] Jossie Hebert PTA [PC] Delores Mckinney, PT [PC] Delores Mckinney, PT    Gait Assessment/Treatment    Gait, [place] Level contact guard assist;verbal cues required  - contact guard assist  - minimum assist (75% patient effort)  -    Gait, Assistive Device rolling walker  - rolling walker  - rolling walker  -    Gait, Distance (Feet) 65  -JM 40  -PC 30  -PC    Gait, Gait Deviations antalgic;carol[place] decreased;forward flexed posture;step length decreased  - antalgic;[place]ol[place] decreased  - forward flexed posture;antalgic;step length decreased  -    Gait, Safety Issues step length decreased;balance decreased during turns  -      Gait,  Impairments strength decreased  -      Gait, Comment improved step length w/cues  -JM      Recorded by [JM] Jossie Hebert PTA [PC] Delores Mckinney, PT [PC] Delores Mckinney, PT    Stairs Assessment/Treatment    Number of Stairs  4  -PC     Stairs, Handrail Location  right side (ascending)  -     Stairs, [place] Level  contact guard assist;minimum assist (75% patient effort)  -PC     Recorded by  [PC] Delores Mckinney, PT     Therapy Exercises    Exercise Protocols total knee  -JM total knee  -PC total knee  -PC    Total Knee Exercises right:;25 reps;completed protocol;with assist;SLR;SAQ   to initiate, then indep  -JM right:;completed protocol;with assist;20 reps  -PC right:;15 reps;completed protocol;with assist  -PC    Recorded by [] Jossie Hebert PTA [PC] Delores Mckinney, PT [PC] Delores Mckinney, PT    Positioning and Restraints    Pre-Treatment Position sitting in chair/recliner  -JM sitting in chair/recliner  -PC sitting in chair/recliner  -PC    Post Treatment Position  chair  -PC chair  -PC    In Chair reclined;with family/caregiver;encouraged to call for assist  -JM      Recorded by [] Jossie Hebert PTA [PC] Delores Mckinney, PT [PC] Delores Mckinney, PT      User Key  (r) = Recorded By, (t) = Taken By, (c) = Cosigned By    Initials Name Effective Dates     Delores Mckinney PT 12/01/15 -     EDYTA Hebert PTA 02/18/16 -                 IP PT Goals       11/09/17 1343 11/09/17 1341       Transfer Training PT LTG    Transfer Training PT LTG, Date Established  11/09/17  -EJ     Transfer Training PT LTG, Time to Achieve  4 days  -EJ     Transfer Training PT LTG, Activity Type  all transfers  -EJ     Transfer Training PT LTG, [place] Level  supervision required  -EJ     Transfer Training PT LTG, Assist Device  walker, rolling  -EJ     Gait Training PT LTG    Gait Training Goal PT LTG, Date Established  11/09/17  -EJ     Gait Training Goal PT LTG, Time to Achieve  4 days  -EJ     Gait  Training Goal PT LTG, [place] Level  contact guard assist  -EJ     Gait Training Goal PT LTG, Assist Device  walker, rolling  -EJ     Gait Training Goal PT LTG, Distance to Achieve  household distances  -EJ     Stair Training PT LTG    Stair Training Goal PT LTG, Date Established 11/09/17  -EJ      Stair Training Goal PT LTG, Time to Achieve 4 days  -EJ      Stair Training Goal PT LTG, Number of Steps 4  -EJ      Stair Training Goal PT LTG, [place] Level contact guard assist  -EJ      Stair Training Goal PT LTG, Assist Device 1 handrail  -EJ      Range of Motion PT LTG    Range of Motion Goal PT LTG, Date Established 11/09/17  -EJ      Range of Motion Goal PT LTG, Time to Achieve 4 days  -EJ      Range fo Motion Goal PT LTG, Joint R knee  -EJ      Range of Motion Goal PT LTG, AAROM Measure 5-90  -EJ        User Key  (r) = Recorded By, (t) = Taken By, (c) = Cosigned By    Initials Name Provider Type    ALEC Isidro, PT Physical Therapist          Physical Therapy Education     Title: PT OT SLP Therapies (Done)     Topic: Physical Therapy (Done)     Point: Mobility training (Done)    Learning Progress Summary    Learner Readiness Method Response Comment Documented by Status   Patient Acceptance E,TBHONG NR   11/11/17 1020 Done    Acceptance EHONG DU, NR   11/10/17 1311 Done    Acceptance LIZETTE REILLY [facility] 11/09/17 1546 Done    Acceptance E,D VU,NR   11/09/17 1341 Done   Family Acceptance E,TBHONG NR   11/11/17 1020 Done    Acceptance LIZETTE REILLY   11/09/17 1546 Done    Acceptance E,D VU,NR   11/09/17 1341 Done               Point: Home exercise program (Done)    Learning Progress Summary    Learner Readiness Method Response Comment Documented by Status   Patient Acceptance E,TBHONG NR   11/11/17 1020 Done    Acceptance ED DIONI AC NR   11/10/17 1311 Done    Acceptance LIZETTE REILLY [facility] 11/09/17 1546 Done    Acceptance E,D VU,NR   11/09/17 1341 Done   Family Acceptance E,TBHONG NR   11/11/17  1020 Done    Acceptance E,TB VU   11/09/17 1546 Done    Acceptance E,D VU,NR   11/09/17 1341 Done               Point: Body mechanics (Done)    Learning Progress Summary    Learner Readiness Method Response Comment Documented by Status   Patient Acceptance E,TB,D VU,NR   11/11/17 1020 Done    Acceptance E,D VU,DU,NR   11/10/17 1311 Done   Family Acceptance E,TB,D VU,NR   11/11/17 1020 Done               Point: Precautions (Done)    Learning Progress Summary    Learner Readiness Method Response Comment Documented by Status   Patient Acceptance E,TB,D VU,NR   11/11/17 1020 Done    Acceptance E,D VU,DU,NR   11/10/17 1311 Done    Acceptance E,TB VU   11/09/17 1546 Done    Acceptance E,D VU,NR   11/09/17 1341 Done   Family Acceptance E,TB,D VU,NR   11/11/17 1020 Done    Acceptance E,TB VU   11/09/17 1546 Done    Acceptance E,D VU,NR   11/09/17 1341 Done                      User Key     Initials Effective Dates Name Provider Type Discipline     08/31/17 -  Daysi Goodwin, RN Registered Nurse Nurse     12/01/15 -  Delores Mckinney, PT Physical Therapist PT     02/18/16 -  Jossie Hebert, RICCO Physical Therapy Assistant PT     04/21/17 -  Ryann Isidro, PT Physical Therapist PT                    PT Recommendation and Plan  Anticipated Discharge Disposition: home with assist, home with home health  Planned Therapy Interventions: bed mobility training, gait training, home exercise program, patient/family education, ROM (Range of Motion), stair training, strengthening, stretching, transfer training  PT Frequency: 2 times/day  Plan of Care Review  Plan Of Care Reviewed With: patient, spouse, son  Progress: improving  Outcome Summary/Follow up Plan: incr amb dist          Outcome Measures       11/11/17 1000 11/10/17 1300 11/09/17 1300    How much help from another person do you currently need...    Turning from your back to your side while in flat bed without using bedrails? 3  - 3   -PC 3  -EJ    Moving from lying on back to sitting on the side of a flat bed without bedrails? 3  -JM 3  -PC 2  -EJ    Moving to and from a bed to a chair (including a wheelchair)? 3  -JM 3  -PC 2  -EJ    Standing up from a chair using your arms (e.g., wheelchair, bedside chair)? 3  -JM 3  -PC 2  -EJ    Climbing 3-5 steps with a railing? 3  -JM 2  -PC 1  -EJ    To walk in hospital room? 3  -JM 3  -PC 1  -EJ    AM-PAC 6 Clicks Score 18  -JM 17  -PC 11  -EJ    Functional Assessment    Outcome Measure Options   AM-PAC 6 Clicks Basic Mobility (PT)  -EJ      User Key  (r) = Recorded By, (t) = Taken By, (c) = Cosigned By    Initials Name Provider Type    DIANDRA Mckinney, PT Physical Therapist    EDYTA Hebert PTA Physical Therapy Assistant    ALEC Isidro, PT Physical Therapist           Time Calculation:         PT Charges       11/11/17 1729 11/11/17 1047       Time Calculation    Start Time 1304  - 0851  -     Stop Time 1415  - 1000  -     Time Calculation (min) 71 min  - 69 min  -     PT Received On 11/11/17  - 11/11/17  -     PT - Next Appointment 11/12/17  - 11/11/17  -       User Key  (r) = Recorded By, (t) = Taken By, (c) = Cosigned By    Initials Name Provider Type    EDYTA Hebert PTA Physical Therapy Assistant          Therapy Charges for Today     Code Description Service Date Service Provider Modifiers Qty    51977007775 HC PT THER PROC EA 15 MIN 11/11/2017 Jossie Hebert PTA GP 3    90987745610 HC PT THER PROC GROUP 11/11/2017 Jossie Hebert PTA GP 1    36264948430 HC PT THER PROC EA 15 MIN 11/11/2017 Jossie Hebert PTA GP 2    60257847849 HC PT THER PROC GROUP 11/11/2017 Jossie Hebert PTA GP 1          PT G-Codes  Outcome Measure Options: AM-PAC 6 Clicks Basic Mobility (PT)    Jossie Hebert PTA  11/11/2017

## 2017-11-11 NOTE — PLAN OF CARE
Problem: Patient Care Overview (Adult)  Goal: Plan of Care Review  Outcome: Ongoing (interventions implemented as appropriate)    11/11/17 7190   Coping/Psychosocial Response Interventions   Plan Of Care Reviewed With patient;spouse;son   Patient Care Overview   Progress improving   Outcome Evaluation   Outcome Summary/Follow up Plan incr amb dist

## 2017-11-12 VITALS
WEIGHT: 134 LBS | DIASTOLIC BLOOD PRESSURE: 58 MMHG | SYSTOLIC BLOOD PRESSURE: 98 MMHG | RESPIRATION RATE: 16 BRPM | OXYGEN SATURATION: 97 % | BODY MASS INDEX: 22.33 KG/M2 | HEART RATE: 82 BPM | HEIGHT: 65 IN | TEMPERATURE: 97.3 F

## 2017-11-12 LAB
HCT VFR BLD AUTO: 34 % (ref 35.6–45.5)
HGB BLD-MCNC: 11.3 G/DL (ref 11.9–15.5)

## 2017-11-12 PROCEDURE — 97110 THERAPEUTIC EXERCISES: CPT

## 2017-11-12 PROCEDURE — 85014 HEMATOCRIT: CPT | Performed by: ORTHOPAEDIC SURGERY

## 2017-11-12 PROCEDURE — 85018 HEMOGLOBIN: CPT | Performed by: ORTHOPAEDIC SURGERY

## 2017-11-12 PROCEDURE — 97150 GROUP THERAPEUTIC PROCEDURES: CPT

## 2017-11-12 RX ADMIN — ATORVASTATIN CALCIUM 20 MG: 20 TABLET, FILM COATED ORAL at 08:01

## 2017-11-12 RX ADMIN — DOCUSATE SODIUM -SENNOSIDES 2 TABLET: 50; 8.6 TABLET, COATED ORAL at 08:00

## 2017-11-12 RX ADMIN — ASPIRIN 325 MG: 325 TABLET, DELAYED RELEASE ORAL at 08:01

## 2017-11-12 RX ADMIN — OXYCODONE HYDROCHLORIDE AND ACETAMINOPHEN 2 TABLET: 5; 325 TABLET ORAL at 03:35

## 2017-11-12 RX ADMIN — OXYCODONE HYDROCHLORIDE AND ACETAMINOPHEN 2 TABLET: 5; 325 TABLET ORAL at 12:00

## 2017-11-12 RX ADMIN — OXYCODONE HYDROCHLORIDE AND ACETAMINOPHEN 2 TABLET: 5; 325 TABLET ORAL at 08:00

## 2017-11-12 RX ADMIN — FERROUS SULFATE TAB 325 MG (65 MG ELEMENTAL FE) 325 MG: 325 (65 FE) TAB at 08:01

## 2017-11-12 NOTE — PROGRESS NOTES
Case Management Discharge Note    Final Note: dc'd home today with [facility] to follow and hospital bed to be delivered by [facility] to her home.    Discharge Placement     Facility/Agency Request Status Selected? Address Phone Number Fax Number    [facility] Accepted    [address] [address] [phone]        Terri Gibson RN 11/10/2017 10:48    Called to [facility].                   Other: Other (private auto)    Discharge Codes: 06  Discharged/transferred to home under care of organized home health service organization in anticipation of skilled care

## 2017-11-12 NOTE — PLAN OF CARE
Problem: Patient Care Overview (Adult)  Goal: Plan of Care Review  Outcome: Ongoing (interventions implemented as appropriate)    11/12/17 0525   Coping/Psychosocial Response Interventions   Plan Of Care Reviewed With patient   Patient Care Overview   Progress improving   Outcome Evaluation   Outcome Summary/Follow up Plan HTN well controlled. pain well controlled. pt amb well with assist and wx.         Problem: Fall Risk (Adult)  Goal: Absence of Falls  Outcome: Ongoing (interventions implemented as appropriate)    11/12/17 0525   Fall Risk (Adult)   Absence of Falls making progress toward outcome         Problem: Knee Replacement, Total (Adult)  Goal: Signs and Symptoms of Listed Potential Problems Will be Absent or Manageable (Knee Replacement, Total)  Outcome: Ongoing (interventions implemented as appropriate)    11/12/17 0525   Knee Replacement, Total   Problems Assessed (Total Knee Replacement) all   Problems Present (Total Knee Replacement) decreased range of motion;functional decline/self care deficit

## 2017-11-12 NOTE — PROGRESS NOTES
Continued Stay Note  [facility]     Patient Name: Riri Damon  MRN: [id number]  Today's Date: 11/12/2017    Admit Date: 11/9/2017          Discharge Plan       11/12/17 1133    Case Management/Social Work Plan    Plan Home via private auto with [facility] to follow and hospital bed to be supplied by [facility].    Patient/Family In Agreement With Plan yes    Additional Comments Spoke with Dr. Iyer and he co-signed order for hospital bed. Faxed co-signed order to [facility] and called answering service who will contact Gissell--on call rep from [facility]. Spoke with staff RN, who states she is currently at pt's bedside, to update and staff RN updated pt on status of bed order with [facility]. Staff RN states she will wait to hear back from CCP before discharging pt in case there is anything further [facility] would need. Received return call from Lorenzo and she states she will call pt in her room to discuss hospital bed delivery. CCP to follow...........JW               Discharge Codes     None        Expected Discharge Date and Time     Expected Discharge Date Expected Discharge Time    Nov 12, 2017             Nikki Gutierrez, RN

## 2017-11-12 NOTE — PROGRESS NOTES
"Ortho POD 3    Patient Name:  Riri Damon  YOB: 1934  Medical Records Number:  [id number]    No complaints     BP 98/58 (BP Location: Left arm, Patient Position: Lying)  Pulse 82  Temp 97.3 °F (36.3 °C) (Oral)   Resp 16  Ht 65\" (165.1 cm)  Wt 134 lb (60.8 kg)  LMP  (LMP Unknown)  SpO2 97%  BMI 22.3 kg/m2    RLE:  NVI, calf nontender, sensation intact  No signs of DVT    Incision: clean, no infection    Lab Results (last 24 hours)     Procedure Component Value Units Date/Time    Hemoglobin & Hematocrit, Blood [[id number]]  (Abnormal) Collected:  11/12/17 0433    Specimen:  Blood Updated:  11/12/17 0459     Hemoglobin 11.3 (L) g/dL      Hematocrit 34.0 (L) %           S/p Right TKA  WBAT with walker  ASA for DVT prophylaxis  D/c today. Orders placed.    I have examined this patient with HARSHIL Gardiner.   I have reviewed the labs, done the physical examination.  I agree with above evaluation and plan and  Note.    "

## 2017-11-12 NOTE — PLAN OF CARE
Problem: Patient Care Overview (Adult)  Goal: Plan of Care Review  Outcome: Ongoing (interventions implemented as appropriate)    11/11/17 1800   Coping/Psychosocial Response Interventions   Plan Of Care Reviewed With patient   Patient Care Overview   Progress improving   Outcome Evaluation   Outcome Summary/Follow up Plan Patient is ambulaitng well using walker and stand by assist. Vitals are stable and voiding function is intact. Pain is well controlled with po meds. Patient educated on bp monitoring and med compliance. patient anticipates d/c home tomorrow with hh.          Problem: Fall Risk (Adult)  Goal: Absence of Falls  Outcome: Ongoing (interventions implemented as appropriate)    11/11/17 1800   Fall Risk (Adult)   Absence of Falls achieves outcome         Problem: Knee Replacement, Total (Adult)  Goal: Signs and Symptoms of Listed Potential Problems Will be Absent or Manageable (Knee Replacement, Total)  Outcome: Ongoing (interventions implemented as appropriate)    11/11/17 1800   Knee Replacement, Total   Problems Assessed (Total Knee Replacement) all   Problems Present (Total Knee Replacement) pain;functional decline/self care deficit;decreased range of motion

## 2017-11-12 NOTE — THERAPY TREATMENT NOTE
Acute Care - Physical Therapy Treatment Note  [facility]     Patient Name: Riri Damon  : 1934  MRN: [id number]  Today's Date: 2017  Onset of Illness/Injury or Date of Surgery Date: 17  Date of Referral to PT: 17  Referring Physician: Federico    Admit Date: 2017    Visit Dx:    ICD-10-CM ICD-9-CM   1. Difficulty walking R26.2 719.7   2. Osteoarthritis of right knee, unspecified osteoarthritis type M17.11 715.96   3. Acute pain of right knee M25.561 719.46   4. Arthritis M19.90 716.90     Patient Active Problem List   Diagnosis   • Malignant neoplasm of colon   • Hyperlipidemia   • Low back pain   • Mitral valve insufficiency   • Palpitations   • Pulmonary hypertension   • Knee pain   • Tricuspid valve insufficiency   • Arthritis   • Medicare annual wellness visit, subsequent   • Screening for osteoporosis   • Orthostatic lightheadedness   • Essential hypertension   • OA (osteoarthritis) of knee               Adult Rehabilitation Note       17 1036 17 1305 17 0900    Rehab Assessment/Intervention    Discipline physical therapy assistant  - physical therapy assistant  - physical therapy assistant  -    Document Type therapy note (daily note)  - therapy note (daily note)  - therapy note (daily note)  -    Subjective Information agree to therapy;complains of;weakness;fatigue;pain  - agree to therapy;complains of;fatigue;pain  - agree to therapy;complains of;weakness;fatigue;pain;swelling  -    Precautions/Limitations fall precautions  - fall precautions  - fall precautions  -    Recorded by [JM] Jossie Hebert PTA [JM] Jossie Hebert PTA [JM] Jossie Hebert PTA    Pain Assessment    Pain Assessment 0-10  -JM 0-10  -JM 0-10  -JM    Pain Score 6  -JM 6  -JM 5  -JM    Pain Type Surgical pain  -JM Surgical pain  -JM Surgical pain  -    Pain Location Knee  - Knee  -JM Knee  -    Pain Orientation Right  - Right  - Right  -     Pain Intervention(s) Medication (See MAR);Repositioned;Cold applied  -JM Medication (See MAR);Repositioned;Cold applied  -JM Medication (See MAR);Repositioned;Cold applied  -JM    Response to Interventions nissa  -JM nissa  -JM nissa, multiple rests req  -JM    Recorded by [JM] Jossie Hebert PTA [JM] Jossie Hebert PTA [JM] Jossie Hebert PTA    ROM (Range of Motion)    General ROM Detail -3-82  -JM  -10-78  -JM    Recorded by [JM] Jossie Hebert PTA  [JM] Jossie Hebert PTA    Bed Mobility, Assessment/Treatment    Bed Mobility, Scoot/Bridge, Bent conditional independence;verbal cues required;nonverbal cues required (demo/gesture)  - conditional independence;verbal cues required;nonverbal cues required (demo/gesture)  - conditional independence;verbal cues required;nonverbal cues required (demo/gesture)  -    Bed Mobility, Comment  in chair  -JM in chair  -JM    Recorded by [EDYTA] Jossie Hebert PTA [JM] Jossie Hebert PTA [JM] Jossie Hebert PTA    Transfer Assessment/Treatment    Transfers, Sit-Stand Bent verbal cues required;supervision required  - contact guard assist;verbal cues required  - contact guard assist;verbal cues required  -    Transfers, Stand-Sit Bent verbal cues required;supervision required  - contact guard assist;verbal cues required  - contact guard assist;verbal cues required  -    Transfers, Sit-Stand-Sit, Assist Device rolling walker  - rolling walker  -JM rolling walker  -JM    Recorded by [JM] Jossie Hebert PTA [JM] Jossie Hebert PTA [JM] Jossie Hebert PTA    Gait Assessment/Treatment    Gait, Bent Level supervision required;verbal cues required  - contact guard assist;verbal cues required  - contact guard assist;verbal cues required  -    Gait, Assistive Device rolling walker  -JM rolling walker  - rolling walker  -JM    Gait, Distance (Feet) 150  -  -JM 65  -JM    Gait, Gait Deviations  antalgic;carolina  decreased  -JM antalgic;carolina decreased;forward flexed posture;step length decreased  -JM    Gait, Safety Issues   step length decreased;balance decreased during turns  -JM    Gait, Impairments strength decreased  -JM strength decreased  -JM strength decreased  -    Gait, Comment fatigued, but nissa well  -JM  improved step length w/cues  -JM    Recorded by [EDYTA] Jossie Hebert PTA [JM] Jossie Hebert PTA [JM] Jossie Hebert PTA    Therapy Exercises    Exercise Protocols total knee  -JM total knee  -JM total knee  -JM    Total Knee Exercises right:;completed protocol;30 reps  -JM right:;completed protocol;with assist;SLR;SAQ;30 reps   to initiate, then indep  -JM right:;25 reps;completed protocol;with assist;SLR;SAQ   to initiate, then indep  -JM    Recorded by [EDYTA] Jossie Hebert PTA [EDYTA] Jossie Hebert PTA [JM] Jossie Hebert PTA    Positioning and Restraints    Pre-Treatment Position sitting in chair/recliner  -JM sitting in chair/recliner  -JM sitting in chair/recliner  -JM    Post Treatment Position bed  -JM      In Bed supine;call light within reach;encouraged to call for assist;with family/caregiver;with nsg  -JM      In Chair  reclined;with family/caregiver;encouraged to call for assist   educ on placing brakes on chair once in rm and calling nsg  -JM reclined;with family/caregiver;encouraged to call for assist  -JM    Recorded by [EDYTA] Jossie Hebert PTA [JM] Jossie Hebert PTA [JM] Jossie Hebert PTA      11/10/17 1423 11/10/17 1307       Rehab Assessment/Intervention    Discipline physical therapist  -PC physical therapist  -PC     Document Type therapy note (daily note)  -PC therapy note (daily note)  -PC     Subjective Information agree to therapy  -PC agree to therapy;complains of;weakness;fatigue;pain  -PC     Patient Effort, Rehab Treatment  good  -PC     Patient Effort, Rehab Treatment Comment feeling better this afternoon  -PC      Recorded by [PC] Delores Mckinney, PT [PC] Delores CASEY  Faye, MARTHA     Pain Assessment    Pain Assessment 0-10  -PC      Pain Score 5  -PC 5  -PC     Pain Location Knee  -PC Knee  -PC     Pain Orientation Right  -PC Right  -PC     Pain Intervention(s) Medication (See MAR);Repositioned  -PC      Recorded by [PC] Delores Mckinney, PT [PC] Delores Mckinney PT     Cognitive Assessment/Intervention    Current Cognitive/Communication Assessment functional  -PC functional  -PC     Recorded by [PC] Delores Mckinney, PT [PC] Delores Mckinney, PT     ROM (Range of Motion)    General ROM Detail  15-80  -PC     Recorded by  [PC] Delores Mckinney PT     Transfer Assessment/Treatment    Transfers, Sit-Stand [place] stand by assist  -PC minimum assist (75% patient effort)  -PC     Transfers, Stand-Sit [place] stand by assist  -PC minimum assist (75% patient effort)  -PC     Transfers, Sit-Stand-Sit, Assist Device rolling walker  -PC rolling walker  -PC     Recorded by [PC] Delores Mckinney, PT [PC] Delores Mckinney PT     Gait Assessment/Treatment    Gait, [place] Level contact guard assist  -PC minimum assist (75% patient effort)  -PC     Gait, Assistive Device rolling walker  -PC rolling walker  -PC     Gait, Distance (Feet) 40  -PC 30  -PC     Gait, Gait Deviations antalgic;carolina decreased  -PC forward flexed posture;antalgic;step length decreased  -PC     Recorded by [PC] Delores Mckinney PT [PC] Delores Mckinney PT     Stairs Assessment/Treatment    Number of Stairs 4  -PC      Stairs, Handrail Location right side (ascending)  -PC      Stairs, [place] Level contact guard assist;minimum assist (75% patient effort)  -PC      Recorded by [PC] Delores Mckinney PT      Therapy Exercises    Exercise Protocols total knee  -PC total knee  -PC     Total Knee Exercises right:;completed protocol;with assist;20 reps  -PC right:;15 reps;completed protocol;with assist  -PC     Recorded by [PC] Delores Mckinney PT [PC] Delores Mckinney, PT     Positioning and Restraints    Pre-Treatment  Position sitting in chair/recliner  -PC sitting in chair/recliner  -PC     Post Treatment Position chair  -PC chair  -PC     Recorded by [PC] Delores Mckinney, PT [PC] Delores Mckinney, PT       User Key  (r) = Recorded By, (t) = Taken By, (c) = Cosigned By    Initials Name Effective Dates    PC Delores Mckinney, PT 12/01/15 -     JM Jossie Hebert, PTA 02/18/16 -                 IP PT Goals       11/09/17 1343 11/09/17 1341       Transfer Training PT LTG    Transfer Training PT LTG, Date Established  11/09/17  -EJ     Transfer Training PT LTG, Time to Achieve  4 days  -EJ     Transfer Training PT LTG, Activity Type  all transfers  -EJ     Transfer Training PT LTG, [place] Level  supervision required  -EJ     Transfer Training PT LTG, Assist Device  walker, rolling  -EJ     Gait Training PT LTG    Gait Training Goal PT LTG, Date Established  11/09/17  -EJ     Gait Training Goal PT LTG, Time to Achieve  4 days  -EJ     Gait Training Goal PT LTG, [place] Level  contact guard assist  -EJ     Gait Training Goal PT LTG, Assist Device  walker, rolling  -EJ     Gait Training Goal PT LTG, Distance to Achieve  household distances  -EJ     Stair Training PT LTG    Stair Training Goal PT LTG, Date Established 11/09/17  -EJ      Stair Training Goal PT LTG, Time to Achieve 4 days  -EJ      Stair Training Goal PT LTG, Number of Steps 4  -EJ      Stair Training Goal PT LTG, [place] Level contact guard assist  -EJ      Stair Training Goal PT LTG, Assist Device 1 handrail  -EJ      Range of Motion PT LTG    Range of Motion Goal PT LTG, Date Established 11/09/17  -EJ      Range of Motion Goal PT LTG, Time to Achieve 4 days  -EJ      Range fo Motion Goal PT LTG, Joint R knee  -EJ      Range of Motion Goal PT LTG, AAROM Measure 5-90  -EJ        User Key  (r) = Recorded By, (t) = Taken By, (c) = Cosigned By    Initials Name Provider Type    ALEC Isidro, MARTHA Physical Therapist          Physical Therapy Education      Title: PT OT SLP Therapies (Resolved)     Topic: Physical Therapy (Resolved)     Point: Mobility training (Resolved)    Learning Progress Summary    Learner Readiness Method Response Comment Documented by Status   Patient Acceptance E,TB,D DU   11/12/17 1039 Done    Acceptance E,TB,D VU,NR   11/11/17 1020 Done    Acceptance E,D VU,DU,NR   11/10/17 1311 Done    Acceptance E,TB VU   11/09/17 1546 Done    Acceptance E,D VU,NR   11/09/17 1341 Done   Family Acceptance E,TB,D DU   11/12/17 1039 Done    Acceptance E,TB,D VU,NR   11/11/17 1020 Done    Acceptance E,TB VU   11/09/17 1546 Done    Acceptance E,D VU,NR   11/09/17 1341 Done               Point: Home exercise program (Resolved)    Learning Progress Summary    Learner Readiness Method Response Comment Documented by Status   Patient Acceptance E,TB,D DU   11/12/17 1039 Done    Acceptance E,TB,D VU,NR   11/11/17 1020 Done    Acceptance E,D VU,DU,NR   11/10/17 1311 Done    Acceptance E,TB VU   11/09/17 1546 Done    Acceptance E,D VU,NR   11/09/17 1341 Done   Family Acceptance E,TB,D DU   11/12/17 1039 Done    Acceptance E,TB,D VU,NR   11/11/17 1020 Done    Acceptance E,TB VU   11/09/17 1546 Done    Acceptance E,D VU,NR  EJ 11/09/17 1341 Done               Point: Body mechanics (Resolved)    Learning Progress Summary    Learner Readiness Method Response Comment Documented by Status   Patient Acceptance E,TB,D DU   11/12/17 1039 Done    Acceptance E,TB,D VU,NR   11/11/17 1020 Done    Acceptance E,D VU,DU,NR   11/10/17 1311 Done   Family Acceptance E,TB,D DU   11/12/17 1039 Done    Acceptance E,TB,D VU,NR   11/11/17 1020 Done               Point: Precautions (Resolved)    Learning Progress Summary    Learner Readiness Method Response Comment Documented by Status   Patient Acceptance E,TB,D DU   11/12/17 1039 Done    Acceptance E,TB,D VU,NR   11/11/17 1020 Done    Acceptance HONG REILLY DU, NR PC 11/10/17 1311 Done    Acceptance  E,TB VU   11/09/17 1546 Done    Acceptance E,D VU,NR   11/09/17 1341 Done   Family Acceptance E,TB,D DU   11/12/17 1039 Done    Acceptance E,TB,D VU,NR   11/11/17 1020 Done    Acceptance E,TB VU   11/09/17 1546 Done    Acceptance E,D VU,NR   11/09/17 1341 Done                      User Key     Initials Effective Dates Name Provider Type Discipline     08/31/17 -  Daysi Goodwin, RN Registered Nurse Nurse     12/01/15 -  Delores Mckinney, PT Physical Therapist PT     02/18/16 -  Jossie Hebert, RICCO Physical Therapy Assistant PT     04/21/17 -  Ryann Isidro, PT Physical Therapist PT                    PT Recommendation and Plan  Anticipated Discharge Disposition: home with assist, home with home health  Planned Therapy Interventions: bed mobility training, gait training, home exercise program, patient/family education, ROM (Range of Motion), stair training, strengthening, stretching, transfer training  PT Frequency: 2 times/day  Plan of Care Review  Plan Of Care Reviewed With: patient, spouse, son  Progress: improving  Outcome Summary/Follow up Plan: incr amb dist          Outcome Measures       11/12/17 1000 11/11/17 1000 11/10/17 1300    How much help from another person do you currently need...    Turning from your back to your side while in flat bed without using bedrails? 4  -JM 3  -JM 3  -PC    Moving from lying on back to sitting on the side of a flat bed without bedrails? 4  - 3  -JM 3  -PC    Moving to and from a bed to a chair (including a wheelchair)? 4  - 3  -JM 3  -PC    Standing up from a chair using your arms (e.g., wheelchair, bedside chair)? 4  - 3  -JM 3  -PC    Climbing 3-5 steps with a railing? 3  -JM 3  -JM 2  -PC    To walk in hospital room? 4  - 3  -JM 3  -PC    AM-PAC 6 Clicks Score 23  -JM 18  -JM 17  -PC      11/09/17 1300          How much help from another person do you currently need...    Turning from your back to your side while in flat bed without  using bedrails? 3  -EJ      Moving from lying on back to sitting on the side of a flat bed without bedrails? 2  -EJ      Moving to and from a bed to a chair (including a wheelchair)? 2  -EJ      Standing up from a chair using your arms (e.g., wheelchair, bedside chair)? 2  -EJ      Climbing 3-5 steps with a railing? 1  -EJ      To walk in hospital room? 1  -EJ      AM-PAC 6 Clicks Score 11  -EJ      Functional Assessment    Outcome Measure Options AM-PAC 6 Clicks Basic Mobility (PT)  -        User Key  (r) = Recorded By, (t) = Taken By, (c) = Cosigned By    Initials Name Provider Type    DIANDRA Mckinney, PT Physical Therapist    EDYTA Hebert PTA Physical Therapy Assistant    ALEC Isidro, PT Physical Therapist           Time Calculation:         PT Charges       11/12/17 1040          Time Calculation    Start Time 0835  -      Stop Time 0930  -      Time Calculation (min) 55 min  -      PT Received On 11/12/17  -        User Key  (r) = Recorded By, (t) = Taken By, (c) = Cosigned By    Initials Name Provider Type    EDYTA Hebert PTA Physical Therapy Assistant          Therapy Charges for Today     Code Description Service Date Service Provider Modifiers Qty    37842931518 HC PT THER PROC EA 15 MIN 11/11/2017 Jossie Hebert PTA GP 3    57413498128 HC PT THER PROC GROUP 11/11/2017 Jossie Hebert PTA GP 1    49838251381 HC PT THER PROC EA 15 MIN 11/11/2017 Jossie Hebert PTA GP 2    78199278995 HC PT THER PROC GROUP 11/11/2017 Jossie Hebert PTA GP 1    04861262642 HC PT THER PROC EA 15 MIN 11/12/2017 Jossie Hebert PTA GP 1    43974761336 HC PT THER PROC GROUP 11/12/2017 Jossie Hebert PTA GP 1          PT G-Codes  Outcome Measure Options: AM-PAC 6 Clicks Basic Mobility (PT)    Jossie Hebert PTA  11/12/2017

## 2017-11-12 NOTE — PLAN OF CARE
Problem: Patient Care Overview (Adult)  Goal: Plan of Care Review  Outcome: Ongoing (interventions implemented as appropriate)    11/12/17 1025   Coping/Psychosocial Response Interventions   Plan Of Care Reviewed With patient   Patient Care Overview   Progress improving   Outcome Evaluation   Outcome Summary/Follow up Plan pt doing well with therapy. vss. dressing c/d/i. family at bedside. ambulating with min assist x1. walker in room. pain is controlled wtih PO pain medicaiton. discussed with pt the imporatnce of the use of IS every 2 hours. pt demonstrates understanding. educated pt on the importance of blood pressure monitoring and medications as pt has a h/o htn. pt to be d/c'd home with home health today.        Goal: Adult Individualization and Mutuality  Outcome: Ongoing (interventions implemented as appropriate)  Goal: Discharge Needs Assessment  Outcome: Ongoing (interventions implemented as appropriate)    Problem: Perioperative Period (Adult)  Goal: Signs and Symptoms of Listed Potential Problems Will be Absent or Manageable (Perioperative Period)  Outcome: Ongoing (interventions implemented as appropriate)    Problem: Fall Risk (Adult)  Goal: Identify Related Risk Factors and Signs and Symptoms  Outcome: Ongoing (interventions implemented as appropriate)  Goal: Absence of Falls  Outcome: Ongoing (interventions implemented as appropriate)    Problem: Knee Replacement, Total (Adult)  Goal: Signs and Symptoms of Listed Potential Problems Will be Absent or Manageable (Knee Replacement, Total)  Outcome: Ongoing (interventions implemented as appropriate)

## 2017-11-24 ENCOUNTER — HOSPITAL ENCOUNTER (EMERGENCY)
Facility: HOSPITAL | Age: 82
Discharge: HOME | End: 2017-11-24
Attending: EMERGENCY MEDICINE | Admitting: EMERGENCY MEDICINE

## 2017-11-24 ENCOUNTER — APPOINTMENT (OUTPATIENT)
Dept: CARDIOLOGY | Facility: HOSPITAL | Age: 82
End: 2017-11-24
Attending: EMERGENCY MEDICINE

## 2017-11-24 VITALS
HEIGHT: 65 IN | OXYGEN SATURATION: 97 % | WEIGHT: 134 LBS | HEART RATE: 78 BPM | DIASTOLIC BLOOD PRESSURE: 77 MMHG | RESPIRATION RATE: 16 BRPM | BODY MASS INDEX: 22.33 KG/M2 | SYSTOLIC BLOOD PRESSURE: 160 MMHG | TEMPERATURE: 98.3 F

## 2017-11-24 DIAGNOSIS — I82.491 ACUTE DEEP VEIN THROMBOSIS (DVT) OF OTHER SPECIFIED VEIN OF RIGHT LOWER EXTREMITY (HCC): Primary | ICD-10-CM

## 2017-11-24 LAB
ALBUMIN SERPL-MCNC: 3.8 G/DL (ref 3.5–5.2)
ALBUMIN/GLOB SERPL: 1.4 G/DL
ALP SERPL-CCNC: 57 U/L (ref 39–117)
ALT SERPL W P-5'-P-CCNC: 30 U/L (ref 1–33)
ANION GAP SERPL CALCULATED.3IONS-SCNC: 9.5 MMOL/L
AST SERPL-CCNC: 28 U/L (ref 1–32)
BASOPHILS # BLD AUTO: 0.02 10*3/MM3 (ref 0–0.2)
BASOPHILS NFR BLD AUTO: 0.3 % (ref 0–1.5)
BH CV LOW VAS RIGHT GASTRONEMIUS VESSEL: 1
BH CV LOWER VASCULAR LEFT COMMON FEMORAL AUGMENT: NORMAL
BH CV LOWER VASCULAR LEFT COMMON FEMORAL COMPETENT: NORMAL
BH CV LOWER VASCULAR LEFT COMMON FEMORAL COMPRESS: NORMAL
BH CV LOWER VASCULAR LEFT COMMON FEMORAL PHASIC: NORMAL
BH CV LOWER VASCULAR LEFT COMMON FEMORAL SPONT: NORMAL
BH CV LOWER VASCULAR RIGHT COMMON FEMORAL AUGMENT: NORMAL
BH CV LOWER VASCULAR RIGHT COMMON FEMORAL COMPETENT: NORMAL
BH CV LOWER VASCULAR RIGHT COMMON FEMORAL COMPRESS: NORMAL
BH CV LOWER VASCULAR RIGHT COMMON FEMORAL PHASIC: NORMAL
BH CV LOWER VASCULAR RIGHT COMMON FEMORAL SPONT: NORMAL
BH CV LOWER VASCULAR RIGHT DISTAL FEMORAL COMPRESS: NORMAL
BH CV LOWER VASCULAR RIGHT GASTRONEMIUS COMPRESS: NORMAL
BH CV LOWER VASCULAR RIGHT GASTRONEMIUS THROMBUS: NORMAL
BH CV LOWER VASCULAR RIGHT GREATER SAPH AK COMPRESS: NORMAL
BH CV LOWER VASCULAR RIGHT GREATER SAPH BK COMPRESS: NORMAL
BH CV LOWER VASCULAR RIGHT LESSER SAPH COMPRESS: NORMAL
BH CV LOWER VASCULAR RIGHT MID FEMORAL AUGMENT: NORMAL
BH CV LOWER VASCULAR RIGHT MID FEMORAL COMPETENT: NORMAL
BH CV LOWER VASCULAR RIGHT MID FEMORAL COMPRESS: NORMAL
BH CV LOWER VASCULAR RIGHT MID FEMORAL PHASIC: NORMAL
BH CV LOWER VASCULAR RIGHT MID FEMORAL SPONT: NORMAL
BH CV LOWER VASCULAR RIGHT PERONEAL COMPRESS: NORMAL
BH CV LOWER VASCULAR RIGHT POPLITEAL AUGMENT: NORMAL
BH CV LOWER VASCULAR RIGHT POPLITEAL COMPETENT: NORMAL
BH CV LOWER VASCULAR RIGHT POPLITEAL COMPRESS: NORMAL
BH CV LOWER VASCULAR RIGHT POPLITEAL PHASIC: NORMAL
BH CV LOWER VASCULAR RIGHT POPLITEAL SPONT: NORMAL
BH CV LOWER VASCULAR RIGHT POSTERIOR TIBIAL COMPRESS: NORMAL
BH CV LOWER VASCULAR RIGHT PROXIMAL FEMORAL COMPRESS: NORMAL
BH CV LOWER VASCULAR RIGHT SAPHENOFEMORAL JUNCTION AUGMENT: NORMAL
BH CV LOWER VASCULAR RIGHT SAPHENOFEMORAL JUNCTION COMPETENT: NORMAL
BH CV LOWER VASCULAR RIGHT SAPHENOFEMORAL JUNCTION COMPRESS: NORMAL
BH CV LOWER VASCULAR RIGHT SAPHENOFEMORAL JUNCTION PHASIC: NORMAL
BH CV LOWER VASCULAR RIGHT SAPHENOFEMORAL JUNCTION SPONT: NORMAL
BILIRUB SERPL-MCNC: 0.2 MG/DL (ref 0.1–1.2)
BUN BLD-MCNC: 12 MG/DL (ref 8–23)
BUN/CREAT SERPL: 24.5 (ref 7–25)
CALCIUM SPEC-SCNC: 9.1 MG/DL (ref 8.6–10.5)
CHLORIDE SERPL-SCNC: 103 MMOL/L (ref 98–107)
CO2 SERPL-SCNC: 27.5 MMOL/L (ref 22–29)
CREAT BLD-MCNC: 0.49 MG/DL (ref 0.57–1)
DEPRECATED RDW RBC AUTO: 50.2 FL (ref 37–54)
EOSINOPHIL # BLD AUTO: 0.21 10*3/MM3 (ref 0–0.7)
EOSINOPHIL NFR BLD AUTO: 3.3 % (ref 0.3–6.2)
ERYTHROCYTE [DISTWIDTH] IN BLOOD BY AUTOMATED COUNT: 13.8 % (ref 11.7–13)
GFR SERPL CREATININE-BSD FRML MDRD: 121 ML/MIN/1.73
GLOBULIN UR ELPH-MCNC: 2.7 GM/DL
GLUCOSE BLD-MCNC: 93 MG/DL (ref 65–99)
HCT VFR BLD AUTO: 35.1 % (ref 35.6–45.5)
HGB BLD-MCNC: 11.4 G/DL (ref 11.9–15.5)
IMM GRANULOCYTES # BLD: 0.02 10*3/MM3 (ref 0–0.03)
IMM GRANULOCYTES NFR BLD: 0.3 % (ref 0–0.5)
LYMPHOCYTES # BLD AUTO: 1.11 10*3/MM3 (ref 0.9–4.8)
LYMPHOCYTES NFR BLD AUTO: 17.3 % (ref 19.6–45.3)
MCH RBC QN AUTO: 32.3 PG (ref 26.9–32)
MCHC RBC AUTO-ENTMCNC: 32.5 G/DL (ref 32.4–36.3)
MCV RBC AUTO: 99.4 FL (ref 80.5–98.2)
MONOCYTES # BLD AUTO: 0.76 10*3/MM3 (ref 0.2–1.2)
MONOCYTES NFR BLD AUTO: 11.8 % (ref 5–12)
NEUTROPHILS # BLD AUTO: 4.3 10*3/MM3 (ref 1.9–8.1)
NEUTROPHILS NFR BLD AUTO: 67 % (ref 42.7–76)
PLATELET # BLD AUTO: 313 10*3/MM3 (ref 140–500)
PMV BLD AUTO: 10 FL (ref 6–12)
POTASSIUM BLD-SCNC: 4.3 MMOL/L (ref 3.5–5.2)
PROT SERPL-MCNC: 6.5 G/DL (ref 6–8.5)
RBC # BLD AUTO: 3.53 10*6/MM3 (ref 3.9–5.2)
SODIUM BLD-SCNC: 140 MMOL/L (ref 136–145)
WBC NRBC COR # BLD: 6.42 10*3/MM3 (ref 4.5–10.7)

## 2017-11-24 PROCEDURE — 93971 EXTREMITY STUDY: CPT

## 2017-11-24 PROCEDURE — 85025 COMPLETE CBC W/AUTO DIFF WBC: CPT | Performed by: EMERGENCY MEDICINE

## 2017-11-24 PROCEDURE — 99284 EMERGENCY DEPT VISIT MOD MDM: CPT

## 2017-11-24 PROCEDURE — 80053 COMPREHEN METABOLIC PANEL: CPT | Performed by: EMERGENCY MEDICINE

## 2017-11-24 RX ADMIN — RIVAROXABAN 15 MG: 15 TABLET, FILM COATED ORAL at 13:32

## 2017-11-24 NOTE — PROGRESS NOTES
Clinical Pharmacy Services: Patient Discharge Counseling    Ms. Damon is a 84 y/o F diagnosed in the ED today with acute right calf vein thrombosis. Patient is being discharged on rivaroxaban as a new medications.    I counseled patient and family on the following:  · Dosing: Rivaroxaban 15mg PO BID x 3 weeks, then rivaroxaban 20mg daily thereafter  · Administration: Take BID with food x 3 weeks, then take 20mg once daily with dinner  · Side effects: Reviewed signs/symptoms of bleeding and when to seek care  · Drug interactions: Counseled to avoid NSAIDs but that acetaminophen was okay    Patient and family were given opportunity to ask questions. Patient/family verbalized understanding of information provided. Patient was provided with co-pay card.    Please call if questions.    Opal Garcia, PharmD, BCPS  Clinical Pharmacy Specialist, Emergency Medicine  Asc Phone: [phone]

## 2017-11-24 NOTE — DISCHARGE INSTRUCTIONS
Take xarelto as directed and follow up with Starr Velazquez and Federico. You will need to follow up within the next 1-2 weeks.  Please return to the ED if you develop trouble breathing or chest pain.

## 2017-11-24 NOTE — ED PROVIDER NOTES
" EMERGENCY DEPARTMENT ENCOUNTER    CHIEF COMPLAINT  Chief Complaint: right knee swelling  History given by: pt  History limited by: nothing  Room Number: HALE/E  PMD: Phoenix Velazquez MD      HPI:  Pt is a 83 y.o. female who presents complaining of right knee swelling which worsened this week after recieving a knee replacement on 11/9/17. The pt called Dr. Caballero (ortho) this morning and was instructed to present to the ED for a doppler. The pt has been able to ambulate with a walker. The pt also c/o calf \"aching\" and behind the knee pain. The pt denies rash, redness, fever, SOA, discharge from knee joint, and chest pain. The pt has a history of jake cancer but is currently in remission. The pt states that she took two pain pills this morning with mild relief.         Duration:  One week  Onset: gradual  Timing: constant  Location: right knee  Radiation: none  Quality: swelling  Intensity/Severity: moderate  Progression: worsening   Associated Symptoms: calf \"aching\"  Aggravating Factors: none  Alleviating Factors: none  Previous Episodes: The pt has recently had her right knee replaced on 11/9  Treatment before arrival: The pt states that she took two pain pills this morning with mild relief.       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   • Arthritis 05/18/2016   • Medicare annual wellness visit, subsequent 05/30/2017   • Screening for osteoporosis 05/30/2017   • Orthostatic lightheadedness 06/05/2017   • Essential hypertension 06/16/2017   • OA (osteoarthritis) of knee 11/09/2017     Resolved Ambulatory Problems     Diagnosis Date Noted   • Abdominal pain 05/18/2016   • Abnormal mammogram 05/18/2016   • " Mass of colon 05/18/2016   • Vitamin D deficiency 05/18/2016     Past Medical History:   Diagnosis Date   • Anxiety    • Colon carcinoma    • Edema    • History of edema    • History of rheumatic fever    • Hyperlipidemia    • Hypertension    • Infectious mononucleosis    • Infectious viral hepatitis    • Mitral regurgitation    • OA (osteoarthritis)    • Osteoporosis    • Palpitations    • Tricuspid regurgitation    • Venous insufficiency    • Vitamin D deficiency        PAST SURGICAL HISTORY  Past Surgical History:   Procedure Laterality Date   • COLECTOMY PARTIAL / TOTAL  02/02/2015 2/2015 due to adenocarcinoma   • COLONOSCOPY      JAN 2015   • COLONOSCOPY N/A 5/25/2016    Procedure: COLONOSCOPY to ANASTAMOSIS AND TERMINAL ILEUM;  Surgeon: Yfn Mcneil MD;  Location: [facility] ENDOSCOPY;  Service:    • HYSTERECTOMY     • KNEE SURGERY Left 2006    ARTHROSCOPY   • MA TOTAL KNEE ARTHROPLASTY Right 11/9/2017    Procedure: RIGHT TOTAL KNEE ARTHROPLASTY;  Surgeon: Andrea Caballero MD;  Location: [facility] MAIN OR;  Service: Orthopedics   • TONSILLECTOMY         FAMILY HISTORY  Family History   Problem Relation Age of Onset   • Alzheimer's disease Mother    • Heart disease Mother    • Heart attack Father    • Heart disease Father    • Heart disease Other    • Cancer Sister 60     Colon   • Malig Hyperthermia Neg Hx        SOCIAL HISTORY  Social History     Social History   • Marital status:      Spouse name: Marcus   • Number of children: 3   • Years of education: College     Occupational History   •  [employer]   •  Retired     Social History Main Topics   • Smoking status: Never Smoker   • Smokeless tobacco: Never Used   • Alcohol use No   • Drug use: No      Comment: caffine use   • Sexual activity: Defer     Other Topics Concern   • Not on file     Social History Narrative       ALLERGIES  Review of patient's allergies indicates no known allergies.    REVIEW OF SYSTEMS  Review of Systems  "  Constitutional: Negative for fever.   HENT: Negative for sore throat.    Eyes: Negative.    Respiratory: Negative for cough and shortness of breath.    Cardiovascular: Negative for chest pain.   Gastrointestinal: Negative for abdominal pain, diarrhea and vomiting.   Genitourinary: Negative for dysuria.   Musculoskeletal: Negative for neck pain.        Right knee swelling and pain. R calf \"aching and pain behind R knee\"   Skin: Negative for rash.   Allergic/Immunologic: Negative.    Neurological: Negative for weakness, numbness and headaches.   Hematological: Negative.    Psychiatric/Behavioral: Negative.    All other systems reviewed and are negative.      PHYSICAL EXAM  ED Triage Vitals   Temp Heart Rate Resp BP SpO2   11/24/17 1110 11/24/17 1110 11/24/17 1110 11/24/17 1110 11/24/17 1110   98.5 °F (36.9 °C) 92 16 167/74 97 %      Temp src Heart Rate Source Patient Position BP Location FiO2 (%)   11/24/17 1343 11/24/17 1110 11/24/17 1239 11/24/17 1239 --   Tympanic Monitor Lying Left arm          Physical Exam   Constitutional: She is oriented to person, place, and time. She appears distressed (mild).   HENT:   Head: Normocephalic and atraumatic.   Eyes: EOM are normal. Pupils are equal, round, and reactive to light.   Neck: Normal range of motion.   Cardiovascular: Normal rate, regular rhythm and normal heart sounds.    No murmur heard.  Pulmonary/Chest: Effort normal and breath sounds normal. No respiratory distress. She has no wheezes. She has no rales.   Abdominal: Soft. Bowel sounds are normal. She exhibits no distension. There is no tenderness.   Musculoskeletal:   Right leg more swollen then L. R calf tenderness, no Shola's sign. R knee anterior knee incision with staples in place, no erythema and no discharge. R medial thigh tenderness.   Neurological: She is alert and oriented to person, place, and time. She has normal sensation and normal strength.   Skin: Skin is warm and dry.   Psychiatric: Affect " normal.   Nursing note and vitals reviewed.      LAB RESULTS  Lab Results (last 24 hours)     Procedure Component Value Units Date/Time    CBC & Differential [[id number]] Collected:  11/24/17 1133    Specimen:  Blood Updated:  11/24/17 1144    Narrative:       The following orders were created for panel order CBC & Differential.  Procedure                               Abnormality         Status                     ---------                               -----------         ------                     CBC Auto Differential[[id number]]        Abnormal            Final result                 Please view results for these tests on the individual orders.    Comprehensive Metabolic Panel [[id number]]  (Abnormal) Collected:  11/24/17 1133    Specimen:  Blood Updated:  11/24/17 1208     Glucose 93 mg/dL      BUN 12 mg/dL      Creatinine 0.49 (L) mg/dL      Sodium 140 mmol/L      Potassium 4.3 mmol/L      Chloride 103 mmol/L      CO2 27.5 mmol/L      Calcium 9.1 mg/dL      Total Protein 6.5 g/dL      Albumin 3.80 g/dL      ALT (SGPT) 30 U/L      AST (SGOT) 28 U/L      Alkaline Phosphatase 57 U/L      Total Bilirubin 0.2 mg/dL      eGFR Non African Amer 121 mL/min/1.73      Globulin 2.7 gm/dL      A/G Ratio 1.4 g/dL      BUN/Creatinine Ratio 24.5     Anion Gap 9.5 mmol/L     Narrative:       The MDRD GFR formula is only valid for adults with stable renal function between ages 18 and 70.    CBC Auto Differential [[id number]]  (Abnormal) Collected:  11/24/17 1133    Specimen:  Blood Updated:  11/24/17 1144     WBC 6.42 10*3/mm3      RBC 3.53 (L) 10*6/mm3      Hemoglobin 11.4 (L) g/dL      Hematocrit 35.1 (L) %      MCV 99.4 (H) fL      MCH 32.3 (H) pg      MCHC 32.5 g/dL      RDW 13.8 (H) %      RDW-SD 50.2 fl      MPV 10.0 fL      Platelets 313 10*3/mm3      Neutrophil % 67.0 %      Lymphocyte % 17.3 (L) %      Monocyte % 11.8 %      Eosinophil % 3.3 %      Basophil % 0.3 %      Immature Grans % 0.3 %      Neutrophils, Absolute  4.30 10*3/mm3      Lymphocytes, Absolute 1.11 10*3/mm3      Monocytes, Absolute 0.76 10*3/mm3      Eosinophils, Absolute 0.21 10*3/mm3      Basophils, Absolute 0.02 10*3/mm3      Immature Grans, Absolute 0.02 10*3/mm3           I ordered the above labs and reviewed the results    RADIOLOGY  No orders to display      Doppler US: acute calf vein DVT to RLE.     I ordered the above noted radiological studies. Interpreted by radiologist. Reviewed by me in PACS.       PROCEDURES  Procedures      PROGRESS AND CONSULTS  ED Course     1115: Ordered labs and Doppler of RLE for further evaluation.     1238: Rechecked pt, she was resting comfortably. Informed pt that I am still waiting on her doppler US results.     1240: Discussed US results with US tech, which showed an acute calf vein DVT.     1244: Placed call to ortho.     1315: Discussed pt's case with Della Arizmendi for Dr. Caballero (ortho), who instructed for pt to be put on xarelto and discharged home.    1319: Rechecked pt, she was resting comfortably. Discussed imaging results which showed that the pt has a DVT in her right calf. Discussed plan to discharge pt with xarelto. Pt was informed that she cannot take non steroidal with xarelto. The pt was informed that she can use tylenol. Pt informed that she will need to be evaluated if she hits her head. Pt also informed that bruising easily will occur. The pt was instructed to refrain from massaging to her right calf during physical therapy. The pt should stop her mobic and aspirin while on xarelto. The pt agrees with and understands plan to discharge. All questions were addressed at this time.    MEDICAL DECISION MAKING  Results were reviewed/discussed with the patient and they were also made aware of online access. Pt also made aware that some labs, such as cultures, will not be resulted during ER visit and follow up with PMD is necessary.     MDM  Number of Diagnoses or Management Options     Amount and/or Complexity of  Data Reviewed  Clinical lab tests: ordered and reviewed (Creatinine: 0.49)  Tests in the radiology section of CPT®: ordered and reviewed (Doppler RLE: showed )  Decide to obtain previous medical records or to obtain history from someone other than the patient: yes  Review and summarize past medical records: yes (Pt had a R knee replacement on 11/9 by Dr. Caballero (ortho))  Discuss the patient with other providers: yes (Della Arizmendi for Dr. Caballero (ortho))    Patient Progress  Patient progress: stable         DIAGNOSIS  Final diagnoses:   Acute deep vein thrombosis (DVT) of other specified vein of right lower extremity       DISPOSITION  DISCHARGE    Patient discharged in stable condition.    Reviewed implications of results, diagnosis, meds, responsibility to follow up, warning signs and symptoms of possible worsening, potential complications and reasons to return to ER.    Patient/Family voiced understanding of above instructions.    Discussed plan for discharge, as there is no emergent indication for admission.  Pt/family is agreeable and understands need for follow up and repeat testing.  Pt is aware that discharge does not mean that nothing is wrong but it indicates no emergency is present that requires admission and they must continue care with follow-up as given below or physician of their choice.     FOLLOW-UP  Andrea Caballero MD  [address]  [address]  [place] [facility] [id number] [phone]    Schedule an appointment as soon as possible for a visit      Phoenix Velazquez MD  [address]  [address]  [place] [facility] [id number] [phone]    Schedule an appointment as soon as possible for a visit           Medication List      New Prescriptions          rivaroxaban 15 MG tablet   Commonly known as:  XARELTO   Take 1 tablet by mouth 2 (Two) Times a Day With Meals.         Changed          lisinopril 2.5 MG tablet   Commonly known as:  PRINIVIL,ZESTRIL   Take 1 tablet by mouth Daily.   What changed:    - when to  take this  - reasons to take this         Stop          aspirin 325 MG EC tablet               Latest Documented Vital Signs:  As of 8:35 PM  BP- 160/77 HR- 78 Temp- 98.3 °F (36.8 °C) (Tympanic) O2 sat- 97%    --  Documentation assistance provided by sushil Lobato for Dr. Olvera.  Information recorded by the scribe was done at my direction and has been verified and validated by me.     Richelle Lobato  11/24/17 1328       Jaxon Olvera MD  11/24/17 2039

## 2017-12-05 ENCOUNTER — OFFICE VISIT (OUTPATIENT)
Dept: INTERNAL MEDICINE | Facility: CLINIC | Age: 82
End: 2017-12-05

## 2017-12-05 VITALS
WEIGHT: 134.6 LBS | BODY MASS INDEX: 22.4 KG/M2 | RESPIRATION RATE: 18 BRPM | TEMPERATURE: 98.5 F | HEART RATE: 107 BPM | SYSTOLIC BLOOD PRESSURE: 124 MMHG | OXYGEN SATURATION: 96 % | DIASTOLIC BLOOD PRESSURE: 79 MMHG

## 2017-12-05 DIAGNOSIS — I82.4Z1 DVT, LOWER EXTREMITY, DISTAL, ACUTE, RIGHT (HCC): Primary | ICD-10-CM

## 2017-12-05 PROCEDURE — 99213 OFFICE O/P EST LOW 20 MIN: CPT | Performed by: FAMILY MEDICINE

## 2017-12-05 NOTE — PROGRESS NOTES
Riri Damon is a 83 y.o. female.      Assessment/Plan   Problem List Items Addressed This Visit        Cardiovascular and Mediastinum    DVT, lower extremity, distal, acute, right - Primary           Continue present management of clot with anticoagulant therapy should be referred back to orthopedics for ongoing management      Chief Complaint   Patient presents with   • blood clot     s/p right knee replacement on 11/09     Social History   Substance Use Topics   • Smoking status: Never Smoker   • Smokeless tobacco: Never Used   • Alcohol use No       History of Present Illness   Patient comes in for follow-up of a blood clot status post right knee replacement 1109 she has persistent pain she joe anticoagulation therapy is not falling down there is no weakness she has no shortness of breath.  RECORDS WERE REVIEWED  The following portions of the patient's history were reviewed and updated as appropriate:PMHroutine: Social history , Past Medical History, Allergies, Current Medications, Active Problem List and Health Maintenance    Review of Systems   Constitutional: Negative.    Respiratory: Negative.    Cardiovascular: Negative.    Gastrointestinal: Negative.    Musculoskeletal: Positive for gait problem, joint swelling and myalgias.   Hematological: Negative.        Objective   Vitals:    12/05/17 1053   BP: 124/79   BP Location: Right arm   Patient Position: Sitting   Cuff Size: Adult   Pulse: 107   Resp: 18   Temp: 98.5 °F (36.9 °C)   TempSrc: Oral   SpO2: 96%   Weight: 61.1 kg (134 lb 9.6 oz)     Body mass index is 22.4 kg/(m^2).  Physical Exam   Constitutional: She is oriented to person, place, and time. She appears well-developed and well-nourished.   HENT:   Head: Normocephalic and atraumatic.   Cardiovascular: Normal rate and regular rhythm.    Pulmonary/Chest: Effort normal and breath sounds normal.   Musculoskeletal:   Right knee swollen with Steri-Strips applied has support hose inferior patella  right knee 50% larger left knee   Neurological: She is alert and oriented to person, place, and time.     Reviewed Data:  Admission on 11/24/2017, Discharged on 11/24/2017   Component Date Value Ref Range Status   • Glucose 11/24/2017 93  65 - 99 mg/dL Final   • BUN 11/24/2017 12  8 - 23 mg/dL Final   • Creatinine 11/24/2017 0.49* 0.57 - 1.00 mg/dL Final   • Sodium 11/24/2017 140  136 - 145 mmol/L Final   • Potassium 11/24/2017 4.3  3.5 - 5.2 mmol/L Final   • Chloride 11/24/2017 103  98 - 107 mmol/L Final   • CO2 11/24/2017 27.5  22.0 - 29.0 mmol/L Final   • Calcium 11/24/2017 9.1  8.6 - 10.5 mg/dL Final   • Total Protein 11/24/2017 6.5  6.0 - 8.5 g/dL Final   • Albumin 11/24/2017 3.80  3.50 - 5.20 g/dL Final   • ALT (SGPT) 11/24/2017 30  1 - 33 U/L Final   • AST (SGOT) 11/24/2017 28  1 - 32 U/L Final   • Alkaline Phosphatase 11/24/2017 57  39 - 117 U/L Final   • Total Bilirubin 11/24/2017 0.2  0.1 - 1.2 mg/dL Final   • eGFR Non  Amer 11/24/2017 121  >60 mL/min/1.73 Final   • Globulin 11/24/2017 2.7  gm/dL Final   • A/G Ratio 11/24/2017 1.4  g/dL Final   • BUN/Creatinine Ratio 11/24/2017 24.5  7.0 - 25.0 Final   • Anion Gap 11/24/2017 9.5  mmol/L Final   • Right GastronemiusSoleal Vessel 11/24/2017 1   Final   • Right Common Femoral Spont 11/24/2017 Y   Final   • Right Common Femoral Phasic 11/24/2017 Y   Final   • Right Common Femoral Augment 11/24/2017 Y   Final   • Right Common Femoral Competent 11/24/2017 Y   Final   • Right Common Femoral Compress 11/24/2017 C   Final   • Right Saphenofemoral Junction Spont 11/24/2017 Y   Final   • Right Saphenofemoral Junction Phas* 11/24/2017 Y   Final   • Right Saphenofemoral Junction Augm* 11/24/2017 Y   Final   • Right Saphenofemoral Junction Comp* 11/24/2017 Y   Final   • Right Saphenofemoral Junction Comp* 11/24/2017 C   Final   • Right Proximal Femoral Compress 11/24/2017 C   Final   • Right Mid Femoral Spont 11/24/2017 Y   Final   • Right Mid Femoral Phasic  11/24/2017 Y   Final   • Right Mid Femoral Augment 11/24/2017 Y   Final   • Right Mid Femoral Competent 11/24/2017 Y   Final   • Right Mid Femoral Compress 11/24/2017 C   Final   • Right Distal Femoral Compress 11/24/2017 C   Final   • Right Popliteal Spont 11/24/2017 Y   Final   • Right Popliteal Phasic 11/24/2017 Y   Final   • Right Popliteal Augment 11/24/2017 Y   Final   • Right Popliteal Competent 11/24/2017 Y   Final   • Right Popliteal Compress 11/24/2017 C   Final   • Right Posterior Tibial Compress 11/24/2017 C   Final   • Right Peroneal Compress 11/24/2017 C   Final   • Right GastronemiusSoleal Compress 11/24/2017 P   Final   • Right GastronemiusSoleal Thrombus 11/24/2017 A   Final   • Right Greater Saph AK Compress 11/24/2017 C   Final   • Right Greater Saph BK Compress 11/24/2017 C   Final   • Right Lesser Saph Compress 11/24/2017 C   Final   • Left Common Femoral Spont 11/24/2017 Y   Final   • Left Common Femoral Phasic 11/24/2017 Y   Final   • Left Common Femoral Augment 11/24/2017 Y   Final   • Left Common Femoral Competent 11/24/2017 Y   Final   • Left Common Femoral Compress 11/24/2017 C   Final   • WBC 11/24/2017 6.42  4.50 - 10.70 10*3/mm3 Final   • RBC 11/24/2017 3.53* 3.90 - 5.20 10*6/mm3 Final   • Hemoglobin 11/24/2017 11.4* 11.9 - 15.5 g/dL Final   • Hematocrit 11/24/2017 35.1* 35.6 - 45.5 % Final   • MCV 11/24/2017 99.4* 80.5 - 98.2 fL Final   • MCH 11/24/2017 32.3* 26.9 - 32.0 pg Final   • MCHC 11/24/2017 32.5  32.4 - 36.3 g/dL Final   • RDW 11/24/2017 13.8* 11.7 - 13.0 % Final   • RDW-SD 11/24/2017 50.2  37.0 - 54.0 fl Final   • MPV 11/24/2017 10.0  6.0 - 12.0 fL Final   • Platelets 11/24/2017 313  140 - 500 10*3/mm3 Final   • Neutrophil % 11/24/2017 67.0  42.7 - 76.0 % Final   • Lymphocyte % 11/24/2017 17.3* 19.6 - 45.3 % Final   • Monocyte % 11/24/2017 11.8  5.0 - 12.0 % Final   • Eosinophil % 11/24/2017 3.3  0.3 - 6.2 % Final   • Basophil % 11/24/2017 0.3  0.0 - 1.5 % Final   • Immature  Grans % 11/24/2017 0.3  0.0 - 0.5 % Final   • Neutrophils, Absolute 11/24/2017 4.30  1.90 - 8.10 10*3/mm3 Final   • Lymphocytes, Absolute 11/24/2017 1.11  0.90 - 4.80 10*3/mm3 Final   • Monocytes, Absolute 11/24/2017 0.76  0.20 - 1.20 10*3/mm3 Final   • Eosinophils, Absolute 11/24/2017 0.21  0.00 - 0.70 10*3/mm3 Final   • Basophils, Absolute 11/24/2017 0.02  0.00 - 0.20 10*3/mm3 Final   • Immature Grans, Absolute 11/24/2017 0.02  0.00 - 0.03 10*3/mm3 Final   Admission on 11/09/2017, Discharged on 11/12/2017   Component Date Value Ref Range Status   • ABO Type 11/09/2017 B   Final   • RH type 11/09/2017 Negative   Final   • Antibody Screen 11/09/2017 Negative   Final   • Glucose 11/10/2017 92  65 - 99 mg/dL Final   • BUN 11/10/2017 7* 8 - 23 mg/dL Final   • Creatinine 11/10/2017 0.47* 0.57 - 1.00 mg/dL Final   • Sodium 11/10/2017 142  136 - 145 mmol/L Final   • Potassium 11/10/2017 3.8  3.5 - 5.2 mmol/L Final   • Chloride 11/10/2017 105  98 - 107 mmol/L Final   • CO2 11/10/2017 24.8  22.0 - 29.0 mmol/L Final   • Calcium 11/10/2017 8.8  8.6 - 10.5 mg/dL Final   • eGFR Non African Amer 11/10/2017 127  >60 mL/min/1.73 Final   • BUN/Creatinine Ratio 11/10/2017 14.9  7.0 - 25.0 Final   • Anion Gap 11/10/2017 12.2  mmol/L Final   • Hemoglobin 11/10/2017 11.9  11.9 - 15.5 g/dL Final   • Hematocrit 11/10/2017 35.9  35.6 - 45.5 % Final   • Hemoglobin 11/11/2017 11.2* 11.9 - 15.5 g/dL Final   • Hematocrit 11/11/2017 34.4* 35.6 - 45.5 % Final   • Hemoglobin 11/12/2017 11.3* 11.9 - 15.5 g/dL Final   • Hematocrit 11/12/2017 34.0* 35.6 - 45.5 % Final

## 2017-12-08 ENCOUNTER — TRANSCRIBE ORDERS (OUTPATIENT)
Dept: ADMINISTRATIVE | Facility: HOSPITAL | Age: 82
End: 2017-12-08

## 2017-12-08 DIAGNOSIS — Z12.39 SCREENING BREAST EXAMINATION: Primary | ICD-10-CM

## 2017-12-12 PROBLEM — I82.4Z1 DVT, LOWER EXTREMITY, DISTAL, ACUTE, RIGHT (HCC): Status: ACTIVE | Noted: 2017-12-12

## 2017-12-19 ENCOUNTER — HOSPITAL ENCOUNTER (OUTPATIENT)
Dept: MAMMOGRAPHY | Facility: HOSPITAL | Age: 82
Discharge: HOME | End: 2017-12-19
Admitting: FAMILY MEDICINE

## 2017-12-19 DIAGNOSIS — Z12.39 SCREENING BREAST EXAMINATION: ICD-10-CM

## 2017-12-19 PROCEDURE — G0202 SCR MAMMO BI INCL CAD: HCPCS

## 2017-12-20 ENCOUNTER — TELEPHONE (OUTPATIENT)
Dept: INTERNAL MEDICINE | Facility: CLINIC | Age: 82
End: 2017-12-20

## 2017-12-20 NOTE — TELEPHONE ENCOUNTER
----- Message from Phoenix Velazquez MD sent at 12/19/2017  5:07 PM EST -----  Mammography no evidence of malignancy

## 2018-01-08 ENCOUNTER — LAB (OUTPATIENT)
Dept: LAB | Facility: HOSPITAL | Age: 83
End: 2018-01-08
Attending: ORTHOPAEDIC SURGERY

## 2018-01-08 ENCOUNTER — TRANSCRIBE ORDERS (OUTPATIENT)
Dept: ADMINISTRATIVE | Facility: HOSPITAL | Age: 83
End: 2018-01-08

## 2018-01-08 DIAGNOSIS — M25.50 ARTHRALGIA, UNSPECIFIED JOINT: ICD-10-CM

## 2018-01-08 DIAGNOSIS — M25.50 ARTHRALGIA, UNSPECIFIED JOINT: Primary | ICD-10-CM

## 2018-01-08 LAB
ALBUMIN SERPL-MCNC: 4.5 G/DL (ref 3.5–5.2)
ALBUMIN/GLOB SERPL: 1.4 G/DL
ALP SERPL-CCNC: 69 U/L (ref 39–117)
ALT SERPL W P-5'-P-CCNC: 10 U/L (ref 1–33)
ANION GAP SERPL CALCULATED.3IONS-SCNC: 12.7 MMOL/L
AST SERPL-CCNC: 20 U/L (ref 1–32)
BASOPHILS # BLD AUTO: 0.02 10*3/MM3 (ref 0–0.2)
BASOPHILS NFR BLD AUTO: 0.2 % (ref 0–1.5)
BILIRUB SERPL-MCNC: 0.4 MG/DL (ref 0.1–1.2)
BUN BLD-MCNC: 13 MG/DL (ref 8–23)
BUN/CREAT SERPL: 22 (ref 7–25)
CALCIUM SPEC-SCNC: 9.2 MG/DL (ref 8.6–10.5)
CHLORIDE SERPL-SCNC: 100 MMOL/L (ref 98–107)
CO2 SERPL-SCNC: 26.3 MMOL/L (ref 22–29)
CREAT BLD-MCNC: 0.59 MG/DL (ref 0.57–1)
DEPRECATED RDW RBC AUTO: 45.9 FL (ref 37–54)
EOSINOPHIL # BLD AUTO: 0.1 10*3/MM3 (ref 0–0.7)
EOSINOPHIL NFR BLD AUTO: 1.2 % (ref 0.3–6.2)
ERYTHROCYTE [DISTWIDTH] IN BLOOD BY AUTOMATED COUNT: 12.7 % (ref 11.7–13)
GFR SERPL CREATININE-BSD FRML MDRD: 97 ML/MIN/1.73
GLOBULIN UR ELPH-MCNC: 3.2 GM/DL
GLUCOSE BLD-MCNC: 93 MG/DL (ref 65–99)
HCT VFR BLD AUTO: 45.3 % (ref 35.6–45.5)
HGB BLD-MCNC: 14.6 G/DL (ref 11.9–15.5)
IMM GRANULOCYTES # BLD: 0.02 10*3/MM3 (ref 0–0.03)
IMM GRANULOCYTES NFR BLD: 0.2 % (ref 0–0.5)
LYMPHOCYTES # BLD AUTO: 1.59 10*3/MM3 (ref 0.9–4.8)
LYMPHOCYTES NFR BLD AUTO: 19.7 % (ref 19.6–45.3)
MCH RBC QN AUTO: 31.8 PG (ref 26.9–32)
MCHC RBC AUTO-ENTMCNC: 32.2 G/DL (ref 32.4–36.3)
MCV RBC AUTO: 98.7 FL (ref 80.5–98.2)
MONOCYTES # BLD AUTO: 0.92 10*3/MM3 (ref 0.2–1.2)
MONOCYTES NFR BLD AUTO: 11.4 % (ref 5–12)
NEUTROPHILS # BLD AUTO: 5.43 10*3/MM3 (ref 1.9–8.1)
NEUTROPHILS NFR BLD AUTO: 67.3 % (ref 42.7–76)
PLATELET # BLD AUTO: 250 10*3/MM3 (ref 140–500)
PMV BLD AUTO: 10.9 FL (ref 6–12)
POTASSIUM BLD-SCNC: 4.4 MMOL/L (ref 3.5–5.2)
PROT SERPL-MCNC: 7.7 G/DL (ref 6–8.5)
RBC # BLD AUTO: 4.59 10*6/MM3 (ref 3.9–5.2)
SODIUM BLD-SCNC: 139 MMOL/L (ref 136–145)
WBC NRBC COR # BLD: 8.08 10*3/MM3 (ref 4.5–10.7)

## 2018-01-08 PROCEDURE — 36415 COLL VENOUS BLD VENIPUNCTURE: CPT

## 2018-01-08 PROCEDURE — 80053 COMPREHEN METABOLIC PANEL: CPT

## 2018-01-08 PROCEDURE — 85025 COMPLETE CBC W/AUTO DIFF WBC: CPT

## 2018-01-09 ENCOUNTER — ANESTHESIA EVENT (OUTPATIENT)
Dept: PERIOP | Facility: HOSPITAL | Age: 83
End: 2018-01-09

## 2018-01-09 ENCOUNTER — ANESTHESIA (OUTPATIENT)
Dept: PERIOP | Facility: HOSPITAL | Age: 83
End: 2018-01-09

## 2018-01-09 ENCOUNTER — HOSPITAL ENCOUNTER (OUTPATIENT)
Facility: HOSPITAL | Age: 83
Setting detail: HOSPITAL OUTPATIENT SURGERY
Discharge: HOME | End: 2018-01-09
Attending: ORTHOPAEDIC SURGERY | Admitting: ORTHOPAEDIC SURGERY

## 2018-01-09 VITALS
TEMPERATURE: 98.4 F | BODY MASS INDEX: 22.01 KG/M2 | SYSTOLIC BLOOD PRESSURE: 150 MMHG | RESPIRATION RATE: 18 BRPM | HEART RATE: 76 BPM | HEIGHT: 65 IN | WEIGHT: 132.1 LBS | OXYGEN SATURATION: 98 % | DIASTOLIC BLOOD PRESSURE: 71 MMHG

## 2018-01-09 PROCEDURE — 25010000002 FENTANYL CITRATE (PF) 100 MCG/2ML SOLUTION: Performed by: NURSE ANESTHETIST, CERTIFIED REGISTERED

## 2018-01-09 PROCEDURE — 93005 ELECTROCARDIOGRAM TRACING: CPT | Performed by: ORTHOPAEDIC SURGERY

## 2018-01-09 PROCEDURE — 25010000002 ONDANSETRON PER 1 MG: Performed by: NURSE ANESTHETIST, CERTIFIED REGISTERED

## 2018-01-09 PROCEDURE — 25010000002 DEXAMETHASONE PER 1 MG: Performed by: NURSE ANESTHETIST, CERTIFIED REGISTERED

## 2018-01-09 PROCEDURE — 25010000002 MIDAZOLAM PER 1 MG: Performed by: ANESTHESIOLOGY

## 2018-01-09 PROCEDURE — 93010 ELECTROCARDIOGRAM REPORT: CPT | Performed by: INTERNAL MEDICINE

## 2018-01-09 PROCEDURE — 25010000002 METHYLPREDNISOLONE PER 80 MG: Performed by: ORTHOPAEDIC SURGERY

## 2018-01-09 PROCEDURE — 25010000002 PROPOFOL 10 MG/ML EMULSION: Performed by: NURSE ANESTHETIST, CERTIFIED REGISTERED

## 2018-01-09 RX ORDER — DEXAMETHASONE SODIUM PHOSPHATE 10 MG/ML
INJECTION INTRAMUSCULAR; INTRAVENOUS AS NEEDED
Status: DISCONTINUED | OUTPATIENT
Start: 2018-01-09 | End: 2018-01-09 | Stop reason: SURG

## 2018-01-09 RX ORDER — METHYLPREDNISOLONE ACETATE 80 MG/ML
INJECTION, SUSPENSION INTRA-ARTICULAR; INTRALESIONAL; INTRAMUSCULAR; SOFT TISSUE AS NEEDED
Status: DISCONTINUED | OUTPATIENT
Start: 2018-01-09 | End: 2018-01-09 | Stop reason: HOSPADM

## 2018-01-09 RX ORDER — LIDOCAINE HYDROCHLORIDE 20 MG/ML
INJECTION, SOLUTION INFILTRATION; PERINEURAL AS NEEDED
Status: DISCONTINUED | OUTPATIENT
Start: 2018-01-09 | End: 2018-01-09 | Stop reason: SURG

## 2018-01-09 RX ORDER — LABETALOL HYDROCHLORIDE 5 MG/ML
5 INJECTION, SOLUTION INTRAVENOUS
Status: DISCONTINUED | OUTPATIENT
Start: 2018-01-09 | End: 2018-01-09 | Stop reason: HOSPADM

## 2018-01-09 RX ORDER — SODIUM CHLORIDE, SODIUM LACTATE, POTASSIUM CHLORIDE, CALCIUM CHLORIDE 600; 310; 30; 20 MG/100ML; MG/100ML; MG/100ML; MG/100ML
9 INJECTION, SOLUTION INTRAVENOUS CONTINUOUS
Status: DISCONTINUED | OUTPATIENT
Start: 2018-01-09 | End: 2018-01-09 | Stop reason: HOSPADM

## 2018-01-09 RX ORDER — ONDANSETRON 2 MG/ML
4 INJECTION INTRAMUSCULAR; INTRAVENOUS ONCE AS NEEDED
Status: DISCONTINUED | OUTPATIENT
Start: 2018-01-09 | End: 2018-01-09 | Stop reason: HOSPADM

## 2018-01-09 RX ORDER — MIDAZOLAM HYDROCHLORIDE 1 MG/ML
2 INJECTION INTRAMUSCULAR; INTRAVENOUS
Status: DISCONTINUED | OUTPATIENT
Start: 2018-01-09 | End: 2018-01-09 | Stop reason: HOSPADM

## 2018-01-09 RX ORDER — OXYCODONE HYDROCHLORIDE AND ACETAMINOPHEN 5; 325 MG/1; MG/1
1 TABLET ORAL ONCE AS NEEDED
Status: COMPLETED | OUTPATIENT
Start: 2018-01-09 | End: 2018-01-09

## 2018-01-09 RX ORDER — HYDRALAZINE HYDROCHLORIDE 20 MG/ML
5 INJECTION INTRAMUSCULAR; INTRAVENOUS
Status: DISCONTINUED | OUTPATIENT
Start: 2018-01-09 | End: 2018-01-09 | Stop reason: HOSPADM

## 2018-01-09 RX ORDER — FLUMAZENIL 0.1 MG/ML
0.2 INJECTION INTRAVENOUS AS NEEDED
Status: DISCONTINUED | OUTPATIENT
Start: 2018-01-09 | End: 2018-01-09 | Stop reason: HOSPADM

## 2018-01-09 RX ORDER — EPHEDRINE SULFATE 50 MG/ML
5 INJECTION, SOLUTION INTRAVENOUS ONCE AS NEEDED
Status: DISCONTINUED | OUTPATIENT
Start: 2018-01-09 | End: 2018-01-09 | Stop reason: HOSPADM

## 2018-01-09 RX ORDER — MIDAZOLAM HYDROCHLORIDE 1 MG/ML
1 INJECTION INTRAMUSCULAR; INTRAVENOUS
Status: DISCONTINUED | OUTPATIENT
Start: 2018-01-09 | End: 2018-01-09 | Stop reason: HOSPADM

## 2018-01-09 RX ORDER — FENTANYL CITRATE 50 UG/ML
50 INJECTION, SOLUTION INTRAMUSCULAR; INTRAVENOUS
Status: DISCONTINUED | OUTPATIENT
Start: 2018-01-09 | End: 2018-01-09 | Stop reason: HOSPADM

## 2018-01-09 RX ORDER — OXYCODONE HYDROCHLORIDE AND ACETAMINOPHEN 5; 325 MG/1; MG/1
1-2 TABLET ORAL EVERY 4 HOURS PRN
Qty: 80 TABLET | Refills: 0 | Status: SHIPPED | OUTPATIENT
Start: 2018-01-09 | End: 2018-05-17

## 2018-01-09 RX ORDER — PROPOFOL 10 MG/ML
VIAL (ML) INTRAVENOUS AS NEEDED
Status: DISCONTINUED | OUTPATIENT
Start: 2018-01-09 | End: 2018-01-09 | Stop reason: SURG

## 2018-01-09 RX ORDER — FENTANYL CITRATE 50 UG/ML
INJECTION, SOLUTION INTRAMUSCULAR; INTRAVENOUS AS NEEDED
Status: DISCONTINUED | OUTPATIENT
Start: 2018-01-09 | End: 2018-01-09 | Stop reason: SURG

## 2018-01-09 RX ORDER — DIPHENHYDRAMINE HYDROCHLORIDE 50 MG/ML
12.5 INJECTION INTRAMUSCULAR; INTRAVENOUS
Status: DISCONTINUED | OUTPATIENT
Start: 2018-01-09 | End: 2018-01-09 | Stop reason: HOSPADM

## 2018-01-09 RX ORDER — NALOXONE HCL 0.4 MG/ML
0.2 VIAL (ML) INJECTION AS NEEDED
Status: DISCONTINUED | OUTPATIENT
Start: 2018-01-09 | End: 2018-01-09 | Stop reason: HOSPADM

## 2018-01-09 RX ORDER — SODIUM CHLORIDE 0.9 % (FLUSH) 0.9 %
1-10 SYRINGE (ML) INJECTION AS NEEDED
Status: DISCONTINUED | OUTPATIENT
Start: 2018-01-09 | End: 2018-01-09 | Stop reason: HOSPADM

## 2018-01-09 RX ORDER — FAMOTIDINE 10 MG/ML
20 INJECTION, SOLUTION INTRAVENOUS ONCE
Status: COMPLETED | OUTPATIENT
Start: 2018-01-09 | End: 2018-01-09

## 2018-01-09 RX ORDER — ONDANSETRON 2 MG/ML
INJECTION INTRAMUSCULAR; INTRAVENOUS AS NEEDED
Status: DISCONTINUED | OUTPATIENT
Start: 2018-01-09 | End: 2018-01-09 | Stop reason: SURG

## 2018-01-09 RX ADMIN — PROPOFOL 150 MG: 10 INJECTION, EMULSION INTRAVENOUS at 07:01

## 2018-01-09 RX ADMIN — FENTANYL CITRATE 50 MCG: 50 INJECTION INTRAMUSCULAR; INTRAVENOUS at 07:15

## 2018-01-09 RX ADMIN — SODIUM CHLORIDE, POTASSIUM CHLORIDE, SODIUM LACTATE AND CALCIUM CHLORIDE 9 ML/HR: 600; 310; 30; 20 INJECTION, SOLUTION INTRAVENOUS at 06:21

## 2018-01-09 RX ADMIN — ONDANSETRON 4 MG: 2 INJECTION INTRAMUSCULAR; INTRAVENOUS at 07:02

## 2018-01-09 RX ADMIN — FENTANYL CITRATE 50 MCG: 50 INJECTION, SOLUTION INTRAMUSCULAR; INTRAVENOUS at 07:52

## 2018-01-09 RX ADMIN — FAMOTIDINE 20 MG: 10 INJECTION, SOLUTION INTRAVENOUS at 06:21

## 2018-01-09 RX ADMIN — DEXAMETHASONE SODIUM PHOSPHATE 8 MG: 10 INJECTION INTRAMUSCULAR; INTRAVENOUS at 07:02

## 2018-01-09 RX ADMIN — LIDOCAINE HYDROCHLORIDE 60 MG: 20 INJECTION, SOLUTION INFILTRATION; PERINEURAL at 07:01

## 2018-01-09 RX ADMIN — SODIUM CHLORIDE, POTASSIUM CHLORIDE, SODIUM LACTATE AND CALCIUM CHLORIDE: 600; 310; 30; 20 INJECTION, SOLUTION INTRAVENOUS at 06:56

## 2018-01-09 RX ADMIN — OXYCODONE HYDROCHLORIDE AND ACETAMINOPHEN 1 TABLET: 5; 325 TABLET ORAL at 08:06

## 2018-01-09 RX ADMIN — Medication 1 MG: at 06:21

## 2018-01-09 RX ADMIN — FENTANYL CITRATE 50 MCG: 50 INJECTION, SOLUTION INTRAMUSCULAR; INTRAVENOUS at 08:15

## 2018-01-09 RX ADMIN — FENTANYL CITRATE 50 MCG: 50 INJECTION, SOLUTION INTRAMUSCULAR; INTRAVENOUS at 07:42

## 2018-01-09 NOTE — ANESTHESIA PREPROCEDURE EVALUATION
Anesthesia Evaluation     Patient summary reviewed and Nursing notes reviewed   NPO Solid Status: > 8 hours       Airway   Mallampati: II  no difficulty expected  Dental - normal exam     Pulmonary     breath sounds clear to auscultation  Cardiovascular     Rhythm: regular  Rate: normal    (+) hypertension, valvular problems/murmurs MR and TI, DVT, hyperlipidemia      Neuro/Psych  (+) psychiatric history,     GI/Hepatic/Renal/Endo      Musculoskeletal     Abdominal    Substance History      OB/GYN          Other   (+) arthritis   history of cancer                                            Anesthesia Plan    ASA 3     general     intravenous induction   Anesthetic plan and risks discussed with patient.

## 2018-01-09 NOTE — PLAN OF CARE
Problem: Patient Care Overview (Adult)  Goal: Plan of Care Review  Outcome: Outcome(s) achieved Date Met: 01/09/18 01/09/18 0922   Coping/Psychosocial Response Interventions   Plan Of Care Reviewed With patient;spouse;son   Patient Care Overview   Progress improving   Outcome Evaluation   Outcome Summary/Follow up Plan ready for discharge     Goal: Adult Individualization and Mutuality  Outcome: Outcome(s) achieved Date Met: 01/09/18    Goal: Discharge Needs Assessment  Outcome: Outcome(s) achieved Date Met: 01/09/18      Problem: Perioperative Period (Adult)  Goal: Signs and Symptoms of Listed Potential Problems Will be Absent or Manageable (Perioperative Period)  Outcome: Outcome(s) achieved Date Met: 01/09/18 01/09/18 0922   Perioperative Period   Problems Assessed (Perioperative Period) pain;wound complications;embolism;hemorrhage;hypothermia;hypoxia/hypoxemia;perioperative injury;infection;physiologic stress response;situational response   Problems Present (Perioperative Period) pain

## 2018-01-09 NOTE — PLAN OF CARE
Problem: Patient Care Overview (Adult)  Goal: Plan of Care Review  Outcome: Ongoing (interventions implemented as appropriate)   01/09/18 0607   Coping/Psychosocial Response Interventions   Plan Of Care Reviewed With patient   Patient Care Overview   Progress no change     Goal: Adult Individualization and Mutuality  Outcome: Ongoing (interventions implemented as appropriate)   01/09/18 0607   Individualization   Patient Specific Preferences Call pt Pat   Patient Specific Goals No infection after surgery.   Patient Specific Interventions Teach good hand hygiene.     Goal: Discharge Needs Assessment  Outcome: Ongoing (interventions implemented as appropriate)   01/09/18 0607   Discharge Needs Assessment   Concerns To Be Addressed denies needs/concerns at this time   Current Health   Anticipated Changes Related to Illness none   Self-Care   Equipment Currently Used at Home none       Problem: Perioperative Period (Adult)  Goal: Signs and Symptoms of Listed Potential Problems Will be Absent or Manageable (Perioperative Period)  Outcome: Ongoing (interventions implemented as appropriate)   01/09/18 0607   Perioperative Period   Problems Assessed (Perioperative Period) pain   Problems Present (Perioperative Period) pain

## 2018-01-09 NOTE — OP NOTE
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    Anesthetic Type:  General    Estimated Blood Loss:  min    Specimens: * No orders in the log *    No Complications      Indications for Procedure:  Riri Damon is a 83 y.o. female suffering from end stage degenerative changes in the right knee.  The patients pain is becoming disabling, despite extensive conservative care, including NSAIDS, therapy and injections.  The risks, benefits and alternatives were discussed and the patient wishes to proceed with right total knee arthroplasty.      Procedure Performed:    After informed consent was obtained the patient was taken to the operating room.  General anesthesia induced and the right knee was manipulated without difficulty.  She had significant adhesions around 75-80 degrees.  We were able to obtain full extension and flexion to 120 degrees.  The patella was mobilized as well.  We then injected 80mg depmedrol and 30 cc's .5% Marcaine without epi.  Patient awakened from general anesthesia and taken to the recovery room in stable condition.

## 2018-01-09 NOTE — ANESTHESIA POSTPROCEDURE EVALUATION
"Patient: Riri Damon    Procedure Summary     Date Anesthesia Start Anesthesia Stop Room / Location    01/09/18 0656 0716  [facility]       Procedure Diagnosis Surgeon Provider    MANIPULATION OF RT KNEE (Right ) No diagnosis on file. MD Demarcus Pierre MD          Anesthesia Type: general  Last vitals  BP   151/84 (01/09/18 0725)   Temp   36.6 °C (97.9 °F) (01/09/18 0712)   Pulse   73 (01/09/18 0725)   Resp   16 (01/09/18 0725)     SpO2   100 % (01/09/18 0725)     Post Anesthesia Care and Evaluation    Patient location during evaluation: bedside  Patient participation: complete - patient participated  Level of consciousness: awake  Pain score: 2  Pain management: adequate  Airway patency: patent  Anesthetic complications: No anesthetic complications    Cardiovascular status: acceptable  Respiratory status: acceptable  Hydration status: acceptable    Comments: /84  Pulse 73  Temp 36.6 °C (97.9 °F) (Oral)   Resp 16  Ht 165.1 cm (65\")  Wt 59.9 kg (132 lb 1.6 oz)  SpO2 100%  BMI 21.98 kg/m2        "

## 2018-01-09 NOTE — H&P
"History and Physical    Patient Name:  Riri Damon  YOB: 1934    Medical Records Number:  [id number]    Date of Admission:  1/9/2018  5:33 AM    Chief Complaint:  MANIPULATION OF RT KNEE    Riri Damon is a 83 y.o. female who presents c/o severe right knee pain.  The pain has been on and off since her right tka 6 weeks ago, worsening to the point where the pain is becoming disabling.  The pain is a constant dull ache with occasional sharp, stabbing pain.  She has only gained 80 degrees of flexion and has not progressed despite PT.  The patient has failed conservative treatment and would like to proceed with right total knee arthroplasty.    /89 (BP Location: Right arm, Patient Position: Lying)  Pulse 82  Temp 98.7 °F (37.1 °C) (Oral)   Resp 16  Ht 165.1 cm (65\")  Wt 59.9 kg (132 lb 1.6 oz)  SpO2 97%  BMI 21.98 kg/m2    Past Medical History:    Past Medical History:   Diagnosis Date   • Anxiety    • Colon carcinoma 2015    Stage IIIB, T4N1a   • Deep vein thrombosis 11/24/2017    right leg   • Edema    • History of edema     LE   • History of rheumatic fever    • Hyperlipidemia    • Hypertension     MEDS PRN   • Infectious mononucleosis    • Infectious viral hepatitis    • Mitral regurgitation    • OA (osteoarthritis)    • Osteoporosis    • Palpitations    • Tricuspid regurgitation    • Venous insufficiency    • Vitamin D deficiency        Social History:    Social History     Social History   • Marital status:      Spouse name: Marcus   • Number of children: 3   • Years of education: College     Occupational History   •  [employer] [place]   •  Retired     Social History Main Topics   • Smoking status: Never Smoker   • Smokeless tobacco: Never Used   • Alcohol use No   • Drug use: Yes     Special: Oxycodone      Comment: caffine use   • Sexual activity: Defer     Other Topics Concern   • Not on file     Social History Narrative       Family History:    Family " History   Problem Relation Age of Onset   • Alzheimer's disease Mother    • Heart disease Mother    • Heart attack Father    • Heart disease Father    • Heart disease Other    • Cancer Sister 60     Colon   • Malig Hyperthermia Neg Hx        Current Medications:    Current Facility-Administered Medications:   •  fentaNYL citrate (PF) (SUBLIMAZE) injection 50 mcg, 50 mcg, Intravenous, Q10 Min PRN, Demarcus Gallegos MD  •  lactated ringers infusion, 9 mL/hr, Intravenous, Continuous, Demarcus Gallegos MD, Last Rate: 9 mL/hr at 01/09/18 0621, 9 mL/hr at 01/09/18 0621  •  midazolam (VERSED) injection 1 mg, 1 mg, Intravenous, Q5 Min PRN, 1 mg at 01/09/18 0621 **OR** midazolam (VERSED) injection 2 mg, 2 mg, Intravenous, Q5 Min PRN, Demarcus Gallegos MD  •  ropivacaine (NAROPIN) 50 mL, Morphine (PF) 5 mg, ketorolac (TORADOL) 30 mg, EPINEPHrine PF (ADRENALIN) 0.3 mg in sodium chloride 0.9 % 101.8 mL, , Injection, Once, Andrea Caballero MD  •  sodium chloride 0.9 % flush 1-10 mL, 1-10 mL, Intravenous, PRN, Demarcus Gallegos MD    Allergies:  No Known Allergies    Review of Systems:   HEENT: Patient denies any headaches, vision changes, change in hearing, or tinnitus, Patient denies any rhinorrhea,epistaxis, sinus pain, mouth or dental problems, sore throat or hoarseness, or dysphagia  Pulmonary: Patient denies any cough, congestion, SOA, or wheezing  Cardiovascular: Patient denies any chest pain, dyspnea, palpitations, weakness, intolerance of exercise, varicosities, swelling of extremities, known murmur  Gastrointestinal:  Patient denies nausea, vomiting, diarrhea, constipation, loss  of appetite, change in appetite, dysphagia, gas, heartburn, melena, change in bowel habits, use of laxatives or other drugs to alter the function of the gastrointestinal tract.  Genital/Urinary: Patient denies dysuria, change in color of urine, change in frequency of urination, pain with urgency, incontinence, retention, or nocturia.  Musculoskeletal:  Patient denies increased warmth; redness; or swelling of joints; limitation of function; deformity; crepitation: pain in a joint or an extremity, the neck, or the back, especially with movement.  Neurological: Patient denies dizziness, tremor, ataxia, difficulty in speaking, change in speech, paresthesia, loss of sensation, seizures, syncope, changes in memory.  Endocrine system: Patient denies tremors, palpitations, intolerance of heat or cold, polyuria, polydipsia, polyphagia, diaphoresis, exophthalmos, or goiter.  Psychological: Patient denies thoughts/plans or harming self or other; depression,  insomnia, night terrors, dieudonne, memory loss, disorientation.  Skin: Patient denies any bruising, rashes, discoloration, pruritus, wounds, ulcers, decubiti, changes in the hair or nails  Hematopoietic: Patient denies history of spontaneous or excessive bleeding, epistaxis, hematuria, melena, fatigue, enlarged or tender lymph nodes, pallor, history of anemia.      Physical Exam:   Awake, A&O x3, affect normal, no acute distress  Ambulating with a limp due to knee pain  Knee ROM is limited due to pain (5-80)  Strength is 4/5 in the quad, hamstring and calf  Cap refill is normal, Sensation intact    Card:  RR, HD Stable  Pulm:  Regular breathing, no S.O.A  Abd:  Soft, NT, ND    Lab Results (last 24 hours)     ** No results found for the last 24 hours. **          Mammo Screening Bilateral With Cad    Result Date: 12/19/2017  Narrative: HISTORY: 83 year-old asymptomatic female  EXAMINATION: Bilateral digital mammography with R2 computer aided detection  FINDINGS: Bilateral digital CC and MLO mammographic images were obtained. Comparison is made to prior studies dated 11/14/2016 and 11/12/2015 . Scattered fibroglandular densities are seen throughout both breasts in a pattern which is unchanged. I see no new or dominant masses, areas of architectural distortion or skin thickening. There is no evidence for axillary  lymphadenopathy or nipple retraction.      Impression: 1. There is no evidence for malignancy or significant change in either breast. Routine followup mammography is recommended.  BI-RADS category 1: Negative.  This report was finalized on 12/19/2017 4:41 PM by Dr. Bj Durham MD.        Assessment:  Right Knee Arthrofibrosis s/p TKA    Plan:  Patient's pain is becoming disabling, despite extensive conservative treatment.  Radiographs reveal good implant position and alignment, no signs of infection.  The risks of surgery, including, but not limited to, heart attack, stroke, dying, DVT, arthrofibrosis and infection were discussed.  The alternatives and benefits were also discussed.  All questions answered and the patient wishes to proceed with right total knee arthroplasty manipulation and injection.

## 2018-02-27 ENCOUNTER — HOSPITAL ENCOUNTER (OUTPATIENT)
Dept: PET IMAGING | Facility: HOSPITAL | Age: 83
Discharge: HOME | End: 2018-02-27
Attending: INTERNAL MEDICINE | Admitting: INTERNAL MEDICINE

## 2018-02-27 DIAGNOSIS — C18.9 MALIGNANT NEOPLASM OF COLON, UNSPECIFIED PART OF COLON (HCC): ICD-10-CM

## 2018-02-27 LAB — CREAT BLDA-MCNC: 0.5 MG/DL (ref 0.6–1.3)

## 2018-02-27 PROCEDURE — 0 DIATRIZOATE MEGLUMINE & SODIUM PER 1 ML: Performed by: INTERNAL MEDICINE

## 2018-02-27 PROCEDURE — 82565 ASSAY OF CREATININE: CPT

## 2018-02-27 PROCEDURE — 74177 CT ABD & PELVIS W/CONTRAST: CPT

## 2018-02-27 PROCEDURE — 0 IOPAMIDOL 61 % SOLUTION: Performed by: INTERNAL MEDICINE

## 2018-02-27 PROCEDURE — 71260 CT THORAX DX C+: CPT

## 2018-02-27 RX ADMIN — DIATRIZOATE MEGLUMINE AND DIATRIZOATE SODIUM 30 ML: 660; 100 LIQUID ORAL; RECTAL at 08:15

## 2018-02-27 RX ADMIN — IOPAMIDOL 85 ML: 612 INJECTION, SOLUTION INTRAVENOUS at 09:10

## 2018-03-08 ENCOUNTER — OFFICE VISIT (OUTPATIENT)
Dept: INTERNAL MEDICINE | Facility: CLINIC | Age: 83
End: 2018-03-08

## 2018-03-08 VITALS
DIASTOLIC BLOOD PRESSURE: 81 MMHG | HEIGHT: 65 IN | OXYGEN SATURATION: 97 % | RESPIRATION RATE: 16 BRPM | WEIGHT: 132 LBS | HEART RATE: 85 BPM | SYSTOLIC BLOOD PRESSURE: 134 MMHG | BODY MASS INDEX: 21.99 KG/M2

## 2018-03-08 DIAGNOSIS — M19.90 ARTHRITIS: ICD-10-CM

## 2018-03-08 DIAGNOSIS — E78.5 HYPERLIPIDEMIA, UNSPECIFIED HYPERLIPIDEMIA TYPE: ICD-10-CM

## 2018-03-08 DIAGNOSIS — I10 ESSENTIAL HYPERTENSION: ICD-10-CM

## 2018-03-08 DIAGNOSIS — I82.4Z1 DVT, LOWER EXTREMITY, DISTAL, ACUTE, RIGHT (HCC): Primary | ICD-10-CM

## 2018-03-08 PROCEDURE — 99214 OFFICE O/P EST MOD 30 MIN: CPT | Performed by: FAMILY MEDICINE

## 2018-03-08 RX ORDER — MELOXICAM 15 MG/1
15 TABLET ORAL DAILY
Qty: 90 TABLET | Refills: 3 | Status: SHIPPED | OUTPATIENT
Start: 2018-03-08 | End: 2018-05-17 | Stop reason: SDUPTHER

## 2018-03-08 NOTE — PROGRESS NOTES
"Riri Damon is a 83 y.o. female.      Assessment/Plan   Problem List Items Addressed This Visit        Cardiovascular and Mediastinum    Hyperlipidemia    Essential hypertension    DVT, lower extremity, distal, acute, right - Primary       Musculoskeletal and Integument    Arthritis      Other Visit Diagnoses    None.            Follow-up in May for scheduled health maintenance check monitor blood pressure goal is less than 140/90 she thinks might be creeping up since starting minimal back continue lisinopril 2.5 mg and hyperlipidemic on occasion fasting lipid profiles in 3 months    Chief Complaint   Patient presents with   • follow up for DVT   • follow up for hypertension   • follow up for arthritis     meloxicam rx     Social History   Substance Use Topics   • Smoking status: Never Smoker   • Smokeless tobacco: Never Used   • Alcohol use No       History of Present Illness   Follow up chronic problems HTN, Lipids, Arthritis, DVT after knee replacement, doing better however still chronic knee pain since surgery followed by Ortho.  No CP, Sob, Or worsening leg swelling.  The following portions of the patient's history were reviewed and updated as appropriate:PMHroutine: Social history , Past Medical History, Allergies, Current Medications, Active Problem List and Health Maintenance    Review of Systems   Constitutional: Negative.    Respiratory: Negative.    Cardiovascular: Negative.    Gastrointestinal: Negative.    Musculoskeletal: Positive for gait problem, joint swelling and myalgias.   Hematological: Negative.        Objective   Vitals:    03/08/18 1501   BP: 134/81   BP Location: Left arm   Patient Position: Sitting   Cuff Size: Adult   Pulse: 85   Resp: 16   SpO2: 97%   Weight: 59.9 kg (132 lb)   Height: 165.1 cm (65\")     Body mass index is 21.97 kg/m².  Physical Exam   Constitutional: She is oriented to person, place, and time. She appears well-developed and well-nourished. No distress.   HENT: "   Head: Normocephalic and atraumatic.   Right Ear: External ear normal.   Left Ear: External ear normal.   Eyes: Conjunctivae and EOM are normal. Pupils are equal, round, and reactive to light. Right eye exhibits no discharge. Left eye exhibits no discharge. No scleral icterus.   Neck: Normal range of motion. Neck supple. No tracheal deviation present. No thyromegaly present.   Cardiovascular: Normal rate, regular rhythm, normal heart sounds, intact distal pulses and normal pulses.  Exam reveals no gallop.    No murmur heard.  Pulmonary/Chest: Effort normal and breath sounds normal. No respiratory distress. She has no wheezes. She has no rales.   Musculoskeletal: Normal range of motion.   Neurological: She is alert and oriented to person, place, and time. She exhibits normal muscle tone. Coordination normal.   Skin: Skin is warm. No rash noted. No erythema. No pallor.   Psychiatric: She has a normal mood and affect. Her behavior is normal. Judgment and thought content normal.   Nursing note and vitals reviewed.    Reviewed Data:  Hospital Outpatient Visit on 02/27/2018   Component Date Value Ref Range Status   • Creatinine 02/27/2018 0.50* 0.60 - 1.30 mg/dL Final

## 2018-03-12 ENCOUNTER — APPOINTMENT (OUTPATIENT)
Dept: LAB | Facility: HOSPITAL | Age: 83
End: 2018-03-12

## 2018-03-12 ENCOUNTER — OFFICE VISIT (OUTPATIENT)
Dept: ONCOLOGY | Facility: CLINIC | Age: 83
End: 2018-03-12

## 2018-03-12 VITALS
DIASTOLIC BLOOD PRESSURE: 70 MMHG | BODY MASS INDEX: 23.39 KG/M2 | HEIGHT: 63 IN | TEMPERATURE: 99.2 F | WEIGHT: 132 LBS | SYSTOLIC BLOOD PRESSURE: 138 MMHG | RESPIRATION RATE: 16 BRPM | HEART RATE: 82 BPM

## 2018-03-12 DIAGNOSIS — C18.9 MALIGNANT NEOPLASM OF COLON, UNSPECIFIED PART OF COLON (HCC): Primary | ICD-10-CM

## 2018-03-12 PROCEDURE — 99214 OFFICE O/P EST MOD 30 MIN: CPT | Performed by: INTERNAL MEDICINE

## 2018-05-17 ENCOUNTER — OFFICE VISIT (OUTPATIENT)
Dept: INTERNAL MEDICINE | Facility: CLINIC | Age: 83
End: 2018-05-17

## 2018-05-17 VITALS
HEIGHT: 63 IN | WEIGHT: 133.1 LBS | SYSTOLIC BLOOD PRESSURE: 138 MMHG | RESPIRATION RATE: 14 BRPM | DIASTOLIC BLOOD PRESSURE: 82 MMHG | HEART RATE: 83 BPM | BODY MASS INDEX: 23.58 KG/M2 | OXYGEN SATURATION: 98 % | TEMPERATURE: 99.1 F

## 2018-05-17 DIAGNOSIS — E78.5 HYPERLIPIDEMIA, UNSPECIFIED HYPERLIPIDEMIA TYPE: ICD-10-CM

## 2018-05-17 DIAGNOSIS — I10 ESSENTIAL HYPERTENSION: Primary | ICD-10-CM

## 2018-05-17 DIAGNOSIS — M19.90 ARTHRITIS: ICD-10-CM

## 2018-05-17 LAB
CHOLEST SERPL-MCNC: 162 MG/DL (ref 0–200)
HDLC SERPL-MCNC: 66 MG/DL (ref 40–60)
LDLC SERPL CALC-MCNC: 80 MG/DL (ref 0–100)
TRIGL SERPL-MCNC: 81 MG/DL (ref 0–150)
VLDLC SERPL CALC-MCNC: 16.2 MG/DL (ref 5–40)

## 2018-05-17 PROCEDURE — 96160 PT-FOCUSED HLTH RISK ASSMT: CPT | Performed by: FAMILY MEDICINE

## 2018-05-17 PROCEDURE — G0009 ADMIN PNEUMOCOCCAL VACCINE: HCPCS | Performed by: FAMILY MEDICINE

## 2018-05-17 PROCEDURE — 90732 PPSV23 VACC 2 YRS+ SUBQ/IM: CPT | Performed by: FAMILY MEDICINE

## 2018-05-17 PROCEDURE — G0439 PPPS, SUBSEQ VISIT: HCPCS | Performed by: FAMILY MEDICINE

## 2018-05-17 PROCEDURE — 99214 OFFICE O/P EST MOD 30 MIN: CPT | Performed by: FAMILY MEDICINE

## 2018-05-17 RX ORDER — MELOXICAM 7.5 MG/1
7.5 TABLET ORAL DAILY
Qty: 90 TABLET | Refills: 2 | Status: SHIPPED | OUTPATIENT
Start: 2018-05-17 | End: 2019-02-22 | Stop reason: SDUPTHER

## 2018-05-17 NOTE — PROGRESS NOTES
Riri Damon is a 83 y.o. female.      Assessment/Plan   Problem List Items Addressed This Visit        Cardiovascular and Mediastinum    Hyperlipidemia    Relevant Orders    Lipid Panel    Essential hypertension - Primary       Musculoskeletal and Integument    Arthritis             Results of blood per continue present management hyperlipidemia hypertension and arthritis follow up otherwise as needed or in 6 months    Chief Complaint   Patient presents with   • follow up for hypertension   • sub medicare wellness exam   Hyperlipidemia arthritis  Social History   Substance Use Topics   • Smoking status: Never Smoker   • Smokeless tobacco: Never Used   • Alcohol use No       History of Present Illness   Follow-up visit for chronic problems of hypertension hyperlipidemia and arthritis which is been fairly stable at this point well-controlled she did have a knee replacement with postoperative course was fairly cristina however now her knee is feeling much better she has less pain in the swelling and she is getting around more agilely she is taking medication as prescribed and no unwanted side effects we did discuss reduction of her meloxicam since she doesn't have significant knee pain anymore  The following portions of the patient's history were reviewed and updated as appropriate:PMHroutine: Social history , Past Medical History, Allergies, Current Medications, Active Problem List and Health Maintenance    Review of Systems   Constitutional: Negative for activity change, appetite change and unexpected weight change.   HENT: Negative for nosebleeds and trouble swallowing.    Eyes: Negative for pain and visual disturbance.   Respiratory: Negative for chest tightness, shortness of breath and wheezing.    Cardiovascular: Negative for chest pain and palpitations.   Gastrointestinal: Negative for abdominal pain and blood in stool.   Endocrine: Negative.    Genitourinary: Negative for difficulty urinating and hematuria.  "  Musculoskeletal: Negative for joint swelling.   Skin: Negative for color change and rash.   Allergic/Immunologic: Negative.    Neurological: Negative for syncope and speech difficulty.   Hematological: Negative for adenopathy.   Psychiatric/Behavioral: Negative for agitation and confusion.   All other systems reviewed and are negative.      Objective   Vitals:    05/17/18 0909   BP: 138/82   BP Location: Left arm   Patient Position: Sitting   Cuff Size: Small Adult   Pulse: 83   Resp: 14   Temp: 99.1 °F (37.3 °C)   TempSrc: Oral   SpO2: 98%   Weight: 60.4 kg (133 lb 1.6 oz)   Height: 160 cm (62.99\")     Body mass index is 23.59 kg/m².  Physical Exam   Constitutional: She is oriented to person, place, and time. She appears well-developed and well-nourished. No distress.   HENT:   Head: Normocephalic and atraumatic.   Right Ear: External ear normal.   Left Ear: External ear normal.   Eyes: Conjunctivae and EOM are normal. Pupils are equal, round, and reactive to light. Right eye exhibits no discharge. Left eye exhibits no discharge. No scleral icterus.   Neck: Normal range of motion. Neck supple. No tracheal deviation present. No thyromegaly present.   Cardiovascular: Normal rate, regular rhythm, normal heart sounds, intact distal pulses and normal pulses.  Exam reveals no gallop.    No murmur heard.  Pulmonary/Chest: Effort normal and breath sounds normal. No respiratory distress. She has no wheezes. She has no rales.   Musculoskeletal: Normal range of motion.   Neurological: She is alert and oriented to person, place, and time. She exhibits normal muscle tone. Coordination normal.   Skin: Skin is warm. No rash noted. No erythema. No pallor.   Psychiatric: She has a normal mood and affect. Her behavior is normal. Judgment and thought content normal.   Nursing note and vitals reviewed.    Reviewed Data:  No visits with results within 1 Month(s) from this visit.   Latest known visit with results is:   Hospital " Outpatient Visit on 02/27/2018   Component Date Value Ref Range Status   • Creatinine 02/27/2018 0.50* 0.60 - 1.30 mg/dL Final    Serial Number: [id number]:  [id number]

## 2018-05-17 NOTE — PROGRESS NOTES
QUICK REFERENCE INFORMATION:  The ABCs of the Annual Wellness Visit    Subsequent Medicare Wellness Visit    HEALTH RISK ASSESSMENT    1934    Recent Hospitalizations:  Recently treated at the following:  [facility].        Current Medical Providers:  Patient Care Team:  Phoenix Velazquez MD as PCP - General  Ion Richards MD as Consulting Physician (Hematology and Oncology)  Yfn Mcneil MD as Referring Physician (General Surgery)        Smoking Status:  History   Smoking Status   • Never Smoker   Smokeless Tobacco   • Never Used       Alcohol Consumption:  History   Alcohol Use No       Depression Screen:   PHQ-2/PHQ-9 Depression Screening 5/17/2018   Little interest or pleasure in doing things 0   Feeling down, depressed, or hopeless 0   Total Score 0       Health Habits and Functional and Cognitive Screening:  Functional & Cognitive Status 5/17/2018   Do you have difficulty preparing food and eating? No   Do you have difficulty bathing yourself, getting dressed or grooming yourself? No   Do you have difficulty using the toilet? No   Do you have difficulty moving around from place to place? No   Do you have trouble with steps or getting out of a bed or a chair? Yes   In the past year have you fallen or experienced a near fall? No   Current Diet Well Balanced Diet   Dental Exam Up to date   Eye Exam Not up to date   Exercise (times per week) 7 times per week   Current Exercise Activities Include Yard Work   Do you need help using the phone?  No   Are you deaf or do you have serious difficulty hearing?  No   Do you need help with transportation? No   Do you need help shopping? No   Do you need help preparing meals?  No   Do you need help with housework?  No   Do you need help with laundry? No   Do you need help taking your medications? No   Do you need help managing money? No   Do you ever drive or ride in a car without wearing a seat belt? No   Have you felt unusual stress, anger or  loneliness in the last month? No   Who do you live with? Spouse   If you need help, do you have trouble finding someone available to you? No   Have you been bothered in the last four weeks by sexual problems? No   Do you have difficulty concentrating, remembering or making decisions? No           Does the patient have evidence of cognitive impairment? No    Aspirin use counseling: Does not need ASA (and currently is not on it)      Recent Lab Results:  CMP:  Lab Results   Component Value Date    GLU 99 05/30/2017    BUN 13 01/08/2018    CREATININE 0.50 (L) 02/27/2018    EGFRIFNONA 97 01/08/2018    EGFRIFAFRI 116 05/30/2017    BCR 22.0 01/08/2018     01/08/2018    K 4.4 01/08/2018    CO2 26.3 01/08/2018    CALCIUM 9.2 01/08/2018    PROTENTOTREF 7.0 05/30/2017    ALBUMIN 4.50 01/08/2018    LABGLOBREF 2.7 05/30/2017    LABIL2 1.4 01/08/2018    BILITOT 0.4 01/08/2018    ALKPHOS 69 01/08/2018    AST 20 01/08/2018    ALT 10 01/08/2018     Lipid Panel:  Lab Results   Component Value Date    TRIG 76 05/30/2017    HDL 68 (H) 05/30/2017    VLDL 15.2 05/30/2017    LDLHDL 1.6 11/18/2015     HbA1c:       Visual Acuity:  No exam data present    Age-appropriate Screening Schedule:  Refer to the list below for future screening recommendations based on patient's age, sex and/or medical conditions. Orders for these recommended tests are listed in the plan section. The patient has been provided with a written plan.    Health Maintenance   Topic Date Due   • ZOSTER VACCINE (2 of 2) 02/26/2010   • PNEUMOCOCCAL VACCINES (65+ LOW/MEDIUM RISK) (2 of 2 - PPSV23) 11/28/2017   • LIPID PANEL  05/30/2018   • INFLUENZA VACCINE  08/01/2018   • DXA SCAN  06/06/2019   • MAMMOGRAM  12/19/2019   • TDAP/TD VACCINES (2 - Td) 10/29/2024        Subjective   History of Present Illness    Riri Damon is a 83 y.o. female who presents for an Subsequent Wellness Visit.    The following portions of the patient's history were reviewed and updated as  appropriate: allergies, current medications, past family history, past medical history, past social history, past surgical history and problem list.    Outpatient Medications Prior to Visit   Medication Sig Dispense Refill   • docusate sodium 100 MG capsule Take 100 mg by mouth 2 (Two) Times a Day As Needed for Constipation. 40 capsule 1   • lisinopril (PRINIVIL,ZESTRIL) 2.5 MG tablet Take 1 tablet by mouth Daily. (Patient taking differently: Take 2.5 mg by mouth As Needed (IF B/P IS> 124 SYSTOLIC-DIASTOLIC IS USUALLY IN THE 60'S).) 30 tablet 6   • Multiple Vitamins-Minerals (MULTIVITAMIN ADULT PO) Take 1 tablet by mouth Daily. STOP 1 WEEK PRIOR TO SX     • simvastatin (ZOCOR) 40 MG tablet Take 1 tablet by mouth Every Night. 90 tablet 2   • meloxicam (MOBIC) 15 MG tablet Take 1 tablet by mouth Daily. 90 tablet 3   • oxyCODONE-acetaminophen (PERCOCET) 5-325 MG per tablet Take 1-2 tablets by mouth Every 4 (Four) Hours As Needed (pain). 80 tablet 0     No facility-administered medications prior to visit.        Patient Active Problem List   Diagnosis   • Malignant neoplasm of colon   • Hyperlipidemia   • Low back pain   • Mitral valve insufficiency   • Palpitations   • Pulmonary hypertension   • Knee pain   • Tricuspid valve insufficiency   • Arthritis   • Medicare annual wellness visit, subsequent   • Screening for osteoporosis   • Orthostatic lightheadedness   • Essential hypertension   • OA (osteoarthritis) of knee   • DVT, lower extremity, distal, acute, right       Advance Care Planning:  has an advance directive - a copy has been provided and is in file    Identification of Risk Factors:  Risk factors include: none.    Review of Systems    Compared to one year ago, the patient feels her physical health is better.  Compared to one year ago, the patient feels her mental health is better.    Objective     Physical Exam    Vitals:    05/17/18 0909   BP: 138/82   BP Location: Left arm   Patient Position: Sitting  "  Cuff Size: Small Adult   Pulse: 83   Resp: 14   Temp: 99.1 °F (37.3 °C)   TempSrc: Oral   SpO2: 98%   Weight: 60.4 kg (133 lb 1.6 oz)   Height: 160 cm (62.99\")       Patient's Body mass index is 23.59 kg/m². BMI is within normal parameters. No follow-up required.      Assessment/Plan   Patient Self-Management and Personalized Health Advice  The patient has been provided with information about: exercise and preventive services including:   · Hep  A and shingrix.    Visit Diagnoses:    ICD-10-CM ICD-9-CM   1. Essential hypertension I10 401.9   2. Hyperlipidemia, unspecified hyperlipidemia type E78.5 272.4   3. Arthritis M19.90 716.90       Orders Placed This Encounter   Procedures   • Pneumococcal Polysaccharide Vaccine 23-Valent Greater Than or Equal To 3yo Subcutaneous / IM   • Lipid Panel       Outpatient Encounter Prescriptions as of 5/17/2018   Medication Sig Dispense Refill   • docusate sodium 100 MG capsule Take 100 mg by mouth 2 (Two) Times a Day As Needed for Constipation. 40 capsule 1   • lisinopril (PRINIVIL,ZESTRIL) 2.5 MG tablet Take 1 tablet by mouth Daily. (Patient taking differently: Take 2.5 mg by mouth As Needed (IF B/P IS> 124 SYSTOLIC-DIASTOLIC IS USUALLY IN THE 60'S).) 30 tablet 6   • meloxicam (MOBIC) 7.5 MG tablet Take 1 tablet by mouth Daily. 90 tablet 2   • Multiple Vitamins-Minerals (MULTIVITAMIN ADULT PO) Take 1 tablet by mouth Daily. STOP 1 WEEK PRIOR TO SX     • simvastatin (ZOCOR) 40 MG tablet Take 1 tablet by mouth Every Night. 90 tablet 2   • [DISCONTINUED] meloxicam (MOBIC) 15 MG tablet Take 1 tablet by mouth Daily. 90 tablet 3   • [DISCONTINUED] oxyCODONE-acetaminophen (PERCOCET) 5-325 MG per tablet Take 1-2 tablets by mouth Every 4 (Four) Hours As Needed (pain). 80 tablet 0     No facility-administered encounter medications on file as of 5/17/2018.        Reviewed use of high risk medication in the elderly: yes  Reviewed for potential of harmful drug interactions in the elderly: " yes    Follow Up:  Chronic medical problems    An After Visit Summary and PPPS with all of these plans were given to the patient.

## 2018-05-18 ENCOUNTER — TELEPHONE (OUTPATIENT)
Dept: INTERNAL MEDICINE | Facility: CLINIC | Age: 83
End: 2018-05-18

## 2018-05-18 NOTE — TELEPHONE ENCOUNTER
Patient called to report local reaction to vaccine PPSV23 yesterday. Stated as soon as she left office arm started itching, now is swollen and red on upper area of arm.

## 2018-05-23 RX ORDER — LISINOPRIL 2.5 MG/1
TABLET ORAL
Qty: 90 TABLET | Refills: 0 | Status: SHIPPED | OUTPATIENT
Start: 2018-05-23 | End: 2018-08-07 | Stop reason: SDUPTHER

## 2018-06-19 ENCOUNTER — OFFICE VISIT (OUTPATIENT)
Dept: CARDIOLOGY | Facility: CLINIC | Age: 83
End: 2018-06-19

## 2018-06-19 VITALS
HEART RATE: 83 BPM | WEIGHT: 135 LBS | SYSTOLIC BLOOD PRESSURE: 140 MMHG | DIASTOLIC BLOOD PRESSURE: 80 MMHG | HEIGHT: 62 IN | BODY MASS INDEX: 24.84 KG/M2

## 2018-06-19 DIAGNOSIS — R00.2 PALPITATIONS: ICD-10-CM

## 2018-06-19 DIAGNOSIS — Q21.0 VENTRICULAR SEPTAL DEFECT: ICD-10-CM

## 2018-06-19 DIAGNOSIS — I44.4 LEFT ANTERIOR FASCICULAR BLOCK: ICD-10-CM

## 2018-06-19 DIAGNOSIS — I05.1 RHEUMATIC MITRAL REGURGITATION: ICD-10-CM

## 2018-06-19 DIAGNOSIS — I10 ESSENTIAL HYPERTENSION: Primary | ICD-10-CM

## 2018-06-19 DIAGNOSIS — I07.1 RHEUMATIC TRICUSPID VALVE REGURGITATION: ICD-10-CM

## 2018-06-19 PROCEDURE — 93000 ELECTROCARDIOGRAM COMPLETE: CPT | Performed by: NURSE PRACTITIONER

## 2018-06-19 PROCEDURE — 99213 OFFICE O/P EST LOW 20 MIN: CPT | Performed by: NURSE PRACTITIONER

## 2018-06-19 RX ORDER — ASPIRIN 81 MG/1
81 TABLET ORAL EVERY OTHER DAY
COMMUNITY

## 2018-06-19 NOTE — PROGRESS NOTES
Date of Office Visit: 2018  Encounter Provider: PADMINI Ayers  Place of Service: [facility] CARDIOLOGY  Patient Name: Riri Damon  :1934    Chief Complaint   Patient presents with   • Follow-up   :     HPI: Riri Damon is a 83 y.o. female who presents today for annual cardiac follow-up.  She is a new patient to me and her previous records have been reviewed.  She has a history of rheumatic fever as a child and infectious mononucleosis.  She had an echocardiogram in  which revealed an EF of 60%, trace to mild tricuspid insufficiency, and small perimembranous ventricular septal defect.  She also has a history of chronic lower extremity edema secondary to venous insufficiency and hypertension.  She's had adenocarcinoma of the right colon and underwent treatment for that.  She was last seen in our office by Lynne WASHINGTON in 2017.    She presents today for follow-up.  She denies chest pain, shortness of air, PND, orthopnea, dizziness, or syncope.  She continues to experience mild chronic lower extremity edema.  On occasion she felt second and restarting.  This happens very rarely and not enough to where a 30 day monitor.  She denies any other associated symptoms when she feels that palpitation.  Her blood pressure at home has ranged from 116//90s.     The following portion of the patient's history were reviewed and updated as appropriate: past medical history, past surgical history, past social history, past family history, allergies, current medications, and problem list.    Past Medical History:   Diagnosis Date   • Anxiety    • Colon carcinoma     Stage IIIB, T4N1a; underwent radiation therapy and colon resection by Dr. Mcneil   • Deep vein thrombosis 2017    right leg   • History of edema     LE   • History of rheumatic fever    • Hyperlipidemia    • Hypertension     MEDS PRN   • Infectious mononucleosis    •  Infectious viral hepatitis    • Left anterior fascicular block    • Mitral regurgitation     8/2010: mild per echo   • OA (osteoarthritis)    • Osteoporosis    • Palpitations    • Pulmonary hypertension     8/2010- mild per echo; 8/2012 - normal RVSP per echo   • Tricuspid regurgitation     8/2010: Mild to moderate per echo; 8/2012: Trace to mild per echo   • Venous insufficiency    • Ventricular septal defect     Per echocardiogram 2012   • Vitamin D deficiency        Past Surgical History:   Procedure Laterality Date   • COLECTOMY PARTIAL / TOTAL  02/02/2015 2/2015 due to adenocarcinoma   • COLONOSCOPY      JAN 2015   • COLONOSCOPY N/A 5/25/2016    Procedure: COLONOSCOPY to ANASTAMOSIS AND TERMINAL ILEUM;  Surgeon: Yfn Mcneil MD;  Location: [facility] ENDOSCOPY;  Service:    • HYSTERECTOMY     • JOINT MANIPULATION Right 1/9/2018    Procedure: MANIPULATION OF RT KNEE;  Surgeon: Andrea Caballero MD;  Location: [facility] OR;  Service:    • JOINT REPLACEMENT Right 11/09/2017   • KNEE SURGERY Left 2006    ARTHROSCOPY   • OOPHORECTOMY     • AZ TOTAL KNEE ARTHROPLASTY Right 11/9/2017    Procedure: RIGHT TOTAL KNEE ARTHROPLASTY;  Surgeon: Andrea Caballero MD;  Location: [facility] OR;  Service: Orthopedics   • TONSILLECTOMY         Social History     Social History   • Marital status:      Spouse name: Marcus   • Number of children: 3   • Years of education: College     Occupational History   •  [employer]   •  Retired     Social History Main Topics   • Smoking status: Never Smoker   • Smokeless tobacco: Never Used   • Alcohol use No   • Drug use: Yes     Types: Oxycodone      Comment: caffine use   • Sexual activity: Defer       Family History   Problem Relation Age of Onset   • Alzheimer's disease Mother    • Heart disease Mother    • Heart attack Father    • Heart disease Father    • Heart disease Other    • Cancer Sister 60        Colon   • Malig Hyperthermia Neg Hx        Review of  Systems   Constitution: Negative for chills, diaphoresis, fever, malaise/fatigue, night sweats, weight gain and weight loss.   HENT: Negative for hearing loss, nosebleeds, sore throat and tinnitus.    Eyes: Negative for blurred vision, double vision, pain and visual disturbance.   Cardiovascular: Positive for leg swelling. Negative for chest pain, claudication, cyanosis, dyspnea on exertion, irregular heartbeat, near-syncope, orthopnea, palpitations, paroxysmal nocturnal dyspnea and syncope.   Respiratory: Negative for cough, hemoptysis, shortness of breath, snoring and wheezing.    Endocrine: Negative for cold intolerance, heat intolerance and polyuria.   Hematologic/Lymphatic: Negative for bleeding problem. Does not bruise/bleed easily.   Skin: Negative for color change, dry skin, flushing and itching.   Musculoskeletal: Positive for joint pain. Negative for falls, joint swelling, muscle cramps, muscle weakness and myalgias.   Gastrointestinal: Negative for abdominal pain, constipation, heartburn, melena, nausea and vomiting.   Genitourinary: Negative for dysuria and hematuria.   Neurological: Negative for excessive daytime sleepiness, dizziness, light-headedness, loss of balance, numbness, paresthesias, seizures and vertigo.   Psychiatric/Behavioral: Negative for altered mental status, depression, memory loss and substance abuse. The patient does not have insomnia and is not nervous/anxious.    Allergic/Immunologic: Negative for environmental allergies.       No Known Allergies      Current Outpatient Prescriptions:   •  aspirin 81 MG EC tablet, Take 81 mg by mouth Daily., Disp: , Rfl:   •  docusate sodium 100 MG capsule, Take 100 mg by mouth 2 (Two) Times a Day As Needed for Constipation., Disp: 40 capsule, Rfl: 1  •  lisinopril (PRINIVIL,ZESTRIL) 2.5 MG tablet, TAKE 1 TABLET EVERY DAY, Disp: 90 tablet, Rfl: 0  •  meloxicam (MOBIC) 7.5 MG tablet, Take 1 tablet by mouth Daily., Disp: 90 tablet, Rfl: 2  •   "Multiple Vitamins-Minerals (MULTIVITAMIN ADULT PO), Take 1 tablet by mouth Daily. STOP 1 WEEK PRIOR TO SX, Disp: , Rfl:   •  simvastatin (ZOCOR) 40 MG tablet, Take 1 tablet by mouth Every Night., Disp: 90 tablet, Rfl: 2      Objective:     Vitals:    06/19/18 1109   BP: 140/80   Pulse: 83   Weight: 61.2 kg (135 lb)   Height: 157.5 cm (62\")     Body mass index is 24.69 kg/m².    PHYSICAL EXAM:    Vitals Reviewed.   General Appearance: No acute distress, well developed and well nourished.   Eyes: Conjunctiva and lids: No erythema, swelling, or discharge. Sclera non-icteric.   HENT: Atraumatic, normocephalic. External eyes, ears, and nose normal. No hearing loss noted. Mucous membranes normal. Lips not cyanotic. Neck supple with no tenderness.  Respiratory: No signs of respiratory distress. Respiration rhythm and depth normal.   Clear to auscultation. No rales, crackles, rhonchi, or wheezing auscultated.   Cardiovascular:  Jugular Venous Pressure: Normal  Heart Rate and Rhythm: Normal, Heart Sounds: Normal S1 and S2. No S3 or S4 noted.  Murmurs: No murmurs noted. No rubs, thrills, or gallops.   Arterial Pulses: Carotid pulses normal. No carotid bruit noted. Posterior tibialis and dorsalis pedis pulses normal.   Lower Extremities: Trace lower extremity edema noted.  Gastrointestinal:  Abdomen soft, non-distended, non-tender. Normal bowel sounds. No hepatomegaly.   Musculoskeletal: Normal movement of extremities  Skin and Nails: General appearance normal. No pallor, cyanosis, diaphoresis. Skin temperature normal. No clubbing of fingernails.   Psychiatric: Patient alert and oriented to person, place, and time. Speech and behavior appropriate. Normal mood and affect.       ECG 12 Lead  Date/Time: 6/19/2018 11:06 AM  Performed by: YULISSA SYED  Authorized by: YULISSA SYED   Comparison: compared with previous ECG from 6/15/2017  Similar to previous ECG  Rhythm: sinus rhythm  Rate: normal  BPM: 83  Conduction: " LAFB  ST Segments: ST segments normal  T Waves: T waves normal  QRS axis: normal  Other findings: LVH  Clinical impression: abnormal ECG              Assessment:       Diagnosis Plan   1. Essential hypertension     2. Left anterior fascicular block     3. Rheumatic tricuspid valve regurgitation     4. Rheumatic mitral regurgitation     5. Ventricular septal defect     6. Palpitations            Plan:       1.  Hypertension: Blood pressure is borderline elevated today.  I feel that she would benefit from increasing her lisinopril to 5 mg daily and she is given a further discuss with Dr. Doll.  2.  Left Anterior Fascicular Block: Chronic and unchanged.  3.  Tricuspid and mitral valve regurgitation: Last echocardiogram in August 2012 revealed trace to mild tricuspid insufficiency and mitral insufficiency was not noted at that time.  4.  Ventricular Septal Defect: Noted on echocardiogram and remains on aspirin 81 mg daily.  5.  Palpitations: On a very rare occasion she'll notice a brief palpitation which she feels her heart stopping with no other associated symptoms.  She does not even have the symptoms monthly to be able to evaluate with a 30 day monitor.  If they become more frequent, sustained, or more concerning, I have asked her to contact me at the office.  6.  She will follow-up with Dr. Rowan Serrano in one year, unless otherwise needed sooner.    As always, it has been a pleasure to participate in your patient's care.      Sincerely,         PADMINI Hansen

## 2018-06-20 NOTE — PROGRESS NOTES
.     REASONS FOR FOLLOWUP:     Stage IIIB colon Ca     HISTORY OF PRESENT ILLNESS:  The patient is a 83 y.o. year old female  who is here for follow-up with the above-mentioned history. She underwent CT evaluation prior to this visit. Much to our surprise and delight, the exam was stable with no progression.    Past Medical History:   Diagnosis Date   • Anxiety    • Colon carcinoma 2015    Stage IIIB, T4N1a   • Deep vein thrombosis 11/24/2017    right leg   • Edema    • History of edema     LE   • History of rheumatic fever    • Hyperlipidemia    • Hypertension     MEDS PRN   • Infectious mononucleosis    • Infectious viral hepatitis    • Mitral regurgitation    • OA (osteoarthritis)    • Osteoporosis    • Palpitations    • Tricuspid regurgitation    • Venous insufficiency    • Vitamin D deficiency      Past Surgical History:   Procedure Laterality Date   • COLECTOMY PARTIAL / TOTAL  02/02/2015 2/2015 due to adenocarcinoma   • COLONOSCOPY      JAN 2015   • COLONOSCOPY N/A 5/25/2016    Procedure: COLONOSCOPY to ANASTAMOSIS AND TERMINAL ILEUM;  Surgeon: Yfn Mcneil MD;  Location: [facility] ENDOSCOPY;  Service:    • HYSTERECTOMY     • JOINT MANIPULATION Right 1/9/2018    Procedure: MANIPULATION OF RT KNEE;  Surgeon: Andrea Caballero MD;  Location: [facility] OR;  Service:    • JOINT REPLACEMENT Right 11/09/2017   • KNEE SURGERY Left 2006    ARTHROSCOPY   • OOPHORECTOMY     • [place] TOTAL KNEE ARTHROPLASTY Right 11/9/2017    Procedure: RIGHT TOTAL KNEE ARTHROPLASTY;  Surgeon: Andrea Caballero MD;  Location: [facility] OR;  Service: Orthopedics   • TONSILLECTOMY         MEDICATIONS    Current Outpatient Prescriptions:   •  docusate sodium 100 MG capsule, Take 100 mg by mouth 2 (Two) Times a Day As Needed for Constipation., Disp: 40 capsule, Rfl: 1  •  Multiple Vitamins-Minerals (MULTIVITAMIN ADULT PO), Take 1 tablet by mouth Daily. STOP 1 WEEK PRIOR TO SX, Disp: , Rfl:   •  simvastatin (ZOCOR) 40 MG tablet, Take  "1 tablet by mouth Every Night., Disp: 90 tablet, Rfl: 2  •  aspirin 81 MG EC tablet, Take 81 mg by mouth Daily., Disp: , Rfl:   •  lisinopril (PRINIVIL,ZESTRIL) 2.5 MG tablet, TAKE 1 TABLET EVERY DAY, Disp: 90 tablet, Rfl: 0  •  meloxicam (MOBIC) 7.5 MG tablet, Take 1 tablet by mouth Daily., Disp: 90 tablet, Rfl: 2    ALLERGIES:   No Known Allergies    SOCIAL HISTORY:       Social History     Social History   • Marital status:      Spouse name: Marcus   • Number of children: 3   • Years of education: College     Occupational History   •  [employer]   •  Retired     Social History Main Topics   • Smoking status: Never Smoker   • Smokeless tobacco: Never Used   • Alcohol use No   • Drug use: Yes     Types: Oxycodone      Comment: caffine use   • Sexual activity: Defer     Other Topics Concern   • Not on file     Social History Narrative   • No narrative on file         FAMILY HISTORY:  Family History   Problem Relation Age of Onset   • Alzheimer's disease Mother    • Heart disease Mother    • Heart attack Father    • Heart disease Father    • Heart disease Other    • Cancer Sister 60        Colon   • Malig Hyperthermia Neg Hx        REVIEW OF SYSTEMS:  Review of Systems         Vitals:    03/12/18 1113   BP: 138/70   Pulse: 82   Resp: 16   Temp: 99.2 °F (37.3 °C)   Weight: 59.9 kg (132 lb)   Height: 160 cm (62.99\")  Comment: new ht. without shoes   PainSc: 0-No pain     Current Status 3/12/2018   ECOG score 0        PHYSICAL EXAM:      CONSTITUTIONAL:  Vital signs reviewed.  No distress, looks comfortable.  EYES:  Conjunctiva and lids unremarkable.  PERRLA  EARS,NOSE,MOUTH,THROAT:  Ears and nose appear unremarkable.  Lips, teeth, gums appear unremarkable.  RESPIRATORY:  Normal respiratory effort.  Lungs clear to auscultation bilaterally.  CARDIOVASCULAR:  Normal S1, S2.  No murmurs rubs or gallops.  No significant lower extremity edema.  GASTROINTESTINAL: Abdomen appears unremarkable.  " Nontender.  No hepatomegaly.  No splenomegaly.  LYMPHATIC:  No cervical, supraclavicular, axillary lymphadenopathy.  MUSCULOSKELETAL:  Unremarkable gait and station.  Unremarkable digits/nails.  No cyanosis or clubbing.  SKIN:  Warm.  No rashes.  PSYCHIATRIC:  Normal judgment and insight.  Normal mood and affect.      RECENT LABS:      CT C/A/P(2/27/2018):   Stable-images are reviewed with patient    Assessment/Plan   Stage IIIB colon ca: HERSON. We will repeat CT images in 6 months

## 2018-06-24 ENCOUNTER — PATIENT MESSAGE (OUTPATIENT)
Dept: CARDIOLOGY | Facility: CLINIC | Age: 83
End: 2018-06-24

## 2018-06-25 NOTE — TELEPHONE ENCOUNTER
From: Riri Damon  To: PADMINI Rojas  Sent: 6/24/2018 1:43 PM EDT  Subject: Test Results Question    After reviewing results of tests online with my son..I just wanted to know if I have a hole in my heart or is that another explanation or definition of a leaky valve. A hole in heart is news to us.  Please explain. Son would like to know if we need to follow up visit with explanation.    Thank you,  Riri Damon Family carbon copy

## 2018-06-25 NOTE — TELEPHONE ENCOUNTER
From: Riri Damon  To: Rowan Serrano MD  Sent: 6/24/2018 1:49 PM EDT  Subject: Test Results Question    After reviewing all the bullet points of my results online with my son I was a little concerned with the findings.  Do I have a hole in my heart or is that another way of saying I have a leaky valve.  Son wanted to know if EKG is all the tests that are done. Are WE just testing once a year?  Follow up visit needed?    Thank you    Riri Damon Family copy

## 2018-06-26 ENCOUNTER — TELEPHONE (OUTPATIENT)
Dept: CARDIOLOGY | Facility: CLINIC | Age: 83
End: 2018-06-26

## 2018-06-26 NOTE — TELEPHONE ENCOUNTER
Regarding: Test Results Question  Contact: [phone]  ----- Message from Yandy Greer MA sent at 6/25/2018  1:23 PM EDT -----       ----- Message from Riri Damon to PADMINI Rojas sent at 6/24/2018  1:43 PM -----   After reviewing results of tests online with my son..I just wanted to know if I have a hole in my heart or is that another explanation or definition of a leaky valve.  A hole in heart is news to us.  Please explain. Son would like to know if we need to follow up visit  with explanation.    Thank you,  Riri Damon Family carbon copy

## 2018-06-26 NOTE — TELEPHONE ENCOUNTER
Dr. Serrano received the same email and here was her response.     Rowan Serrano MD   to Yandy Greer MA           2:56 PM   There is a very small hole that has been there since birth and 2 slightly leaky valves.  These are not concerning.     pls call     RM     ___________________________________________    I spoke with patient via telephone about the echocardiogram results.  She verbalizes understanding and has no further concerns.  She will remain on aspirin therapy.

## 2018-08-07 RX ORDER — LISINOPRIL 2.5 MG/1
TABLET ORAL
Qty: 90 TABLET | Refills: 0 | Status: SHIPPED | OUTPATIENT
Start: 2018-08-07 | End: 2018-11-13 | Stop reason: SINTOL

## 2018-10-10 ENCOUNTER — CLINICAL SUPPORT (OUTPATIENT)
Dept: INTERNAL MEDICINE | Facility: CLINIC | Age: 83
End: 2018-10-10

## 2018-10-10 DIAGNOSIS — Z23 NEED FOR INFLUENZA VACCINATION: Primary | ICD-10-CM

## 2018-10-10 PROCEDURE — G0008 ADMIN INFLUENZA VIRUS VAC: HCPCS | Performed by: FAMILY MEDICINE

## 2018-10-10 PROCEDURE — 90662 IIV NO PRSV INCREASED AG IM: CPT | Performed by: FAMILY MEDICINE

## 2018-11-05 RX ORDER — SIMVASTATIN 40 MG
TABLET ORAL
Qty: 90 TABLET | Refills: 2 | OUTPATIENT
Start: 2018-11-05

## 2018-11-08 RX ORDER — SIMVASTATIN 40 MG
40 TABLET ORAL NIGHTLY
Qty: 90 TABLET | Refills: 0 | Status: SHIPPED | OUTPATIENT
Start: 2018-11-08 | End: 2019-02-22 | Stop reason: SDUPTHER

## 2018-11-12 ENCOUNTER — TRANSCRIBE ORDERS (OUTPATIENT)
Dept: ADMINISTRATIVE | Facility: HOSPITAL | Age: 83
End: 2018-11-12

## 2018-11-12 DIAGNOSIS — Z12.31 VISIT FOR SCREENING MAMMOGRAM: Primary | ICD-10-CM

## 2018-11-13 ENCOUNTER — OFFICE VISIT (OUTPATIENT)
Dept: INTERNAL MEDICINE | Facility: CLINIC | Age: 83
End: 2018-11-13

## 2018-11-13 VITALS
BODY MASS INDEX: 25.21 KG/M2 | DIASTOLIC BLOOD PRESSURE: 88 MMHG | HEART RATE: 86 BPM | RESPIRATION RATE: 16 BRPM | SYSTOLIC BLOOD PRESSURE: 132 MMHG | HEIGHT: 62 IN | OXYGEN SATURATION: 96 % | WEIGHT: 137 LBS

## 2018-11-13 DIAGNOSIS — E78.5 HYPERLIPIDEMIA, UNSPECIFIED HYPERLIPIDEMIA TYPE: ICD-10-CM

## 2018-11-13 DIAGNOSIS — M19.90 ARTHRITIS: ICD-10-CM

## 2018-11-13 DIAGNOSIS — I10 ESSENTIAL HYPERTENSION: Primary | ICD-10-CM

## 2018-11-13 LAB
ALBUMIN SERPL-MCNC: 4.6 G/DL (ref 3.5–5.2)
ALBUMIN/GLOB SERPL: 1.7 G/DL
ALP SERPL-CCNC: 66 U/L (ref 39–117)
ALT SERPL-CCNC: 17 U/L (ref 1–33)
AST SERPL-CCNC: 20 U/L (ref 1–32)
BILIRUB SERPL-MCNC: 0.5 MG/DL (ref 0.1–1.2)
BUN SERPL-MCNC: 13 MG/DL (ref 8–23)
BUN/CREAT SERPL: 22.4 (ref 7–25)
CALCIUM SERPL-MCNC: 9.8 MG/DL (ref 8.6–10.5)
CHLORIDE SERPL-SCNC: 104 MMOL/L (ref 98–107)
CO2 SERPL-SCNC: 27.8 MMOL/L (ref 22–29)
CREAT SERPL-MCNC: 0.58 MG/DL (ref 0.57–1)
GLOBULIN SER CALC-MCNC: 2.7 GM/DL
GLUCOSE SERPL-MCNC: 101 MG/DL (ref 65–99)
POTASSIUM SERPL-SCNC: 4.6 MMOL/L (ref 3.5–5.2)
PROT SERPL-MCNC: 7.3 G/DL (ref 6–8.5)
SODIUM SERPL-SCNC: 143 MMOL/L (ref 136–145)

## 2018-11-13 PROCEDURE — 99214 OFFICE O/P EST MOD 30 MIN: CPT | Performed by: FAMILY MEDICINE

## 2018-11-13 RX ORDER — UREA 10 %
1 LOTION (ML) TOPICAL NIGHTLY
COMMUNITY

## 2018-11-13 RX ORDER — LOSARTAN POTASSIUM 25 MG/1
25 TABLET ORAL DAILY
Qty: 90 TABLET | Refills: 2 | Status: SHIPPED | OUTPATIENT
Start: 2018-11-13 | End: 2019-08-19 | Stop reason: SDUPTHER

## 2018-11-13 RX ORDER — CHLORAL HYDRATE 500 MG
CAPSULE ORAL CONTINUOUS PRN
COMMUNITY
End: 2020-06-11

## 2018-11-13 NOTE — PROGRESS NOTES
Riri Damon is a 84 y.o. female.      Assessment/Plan   Problem List Items Addressed This Visit        Cardiovascular and Mediastinum    Hyperlipidemia    Essential hypertension - Primary    Relevant Medications    losartan (COZAAR) 25 MG tablet    Other Relevant Orders    Comprehensive Metabolic Panel       Musculoskeletal and Integument    Arthritis         stop lisinopril start losartan 25 mg daily monitor blood pressure goal is less than 140/90 she's going to increase her meloxicam also to one whole pill daily to see this benefits her arthritis she is also no start CoQ10 100 mg daily she'll monitor blood pressure: 1-2 months appointment in 3-6 months we'll also give her results of CMP pending today's blood draw        Chief Complaint   Patient presents with   • Follow-up     for hypertension   • Follow-up     for arthritis paain     Social History     Tobacco Use   • Smoking status: Never Smoker   • Smokeless tobacco: Never Used   Substance Use Topics   • Alcohol use: No   • Drug use: Yes     Types: Oxycodone     Comment: caffine use       History of Present Illness   Also for chronic problems of hypertension and hyperlipidemia arthritis is doing fairly well although she stopped her lisinopril revised her cough has gotten better but her blood pressure increased and she restarted it presently she's taking 2.5 mg daily still have fluctuating blood pressures she has no chest pain or shortness of breath or increased fatigue arthritis is bothering her with aches and pains seem to be changing locations she has had a knee replacement in's intermittent swelling distally her cholesterol is well-controlled with statin therapy although she is not taking any CoQ10  The following portions of the patient's history were reviewed and updated as appropriate:PMHroutine: Social history , Past Medical History, Surgical history , Allergies, Current Medications, Active Problem List, Family History and Health  "Maintenance    Review of Systems   Constitutional: Negative for activity change, appetite change and unexpected weight change.   HENT: Negative for nosebleeds and trouble swallowing.    Eyes: Negative for pain and visual disturbance.   Respiratory: Negative for chest tightness, shortness of breath and wheezing.    Cardiovascular: Negative for chest pain and palpitations.   Gastrointestinal: Negative for abdominal pain and blood in stool.   Endocrine: Negative.    Genitourinary: Negative for difficulty urinating and hematuria.   Musculoskeletal: Negative for joint swelling.   Skin: Negative for color change and rash.   Allergic/Immunologic: Negative.    Neurological: Negative for syncope and speech difficulty.   Hematological: Negative for adenopathy.   Psychiatric/Behavioral: Negative for agitation and confusion.   All other systems reviewed and are negative.      Objective   Vitals:    11/13/18 1005   BP: 132/88   BP Location: Left arm   Patient Position: Sitting   Cuff Size: Adult   Pulse: 86   Resp: 16   SpO2: 96%   Weight: 62.1 kg (137 lb)   Height: 157.5 cm (62\")     Body mass index is 25.06 kg/m².  Physical Exam   Constitutional: She is oriented to person, place, and time. She appears well-developed and well-nourished. No distress.   HENT:   Head: Normocephalic and atraumatic.   Eyes: Conjunctivae and EOM are normal. Pupils are equal, round, and reactive to light. Right eye exhibits no discharge. Left eye exhibits no discharge. No scleral icterus.   Neck: Normal range of motion. Neck supple. No tracheal deviation present. No thyromegaly present.   Cardiovascular: Normal rate, regular rhythm, normal heart sounds, intact distal pulses and normal pulses. Exam reveals no gallop.   No murmur heard.  Pulmonary/Chest: Effort normal and breath sounds normal. No respiratory distress. She has no wheezes. She has no rales.   Musculoskeletal: Normal range of motion. She exhibits no edema.   Neurological: She is alert and " oriented to person, place, and time. She exhibits normal muscle tone. Coordination normal.   Skin: Skin is warm. No rash noted. No erythema. No pallor.   Psychiatric: She has a normal mood and affect. Her behavior is normal. Judgment and thought content normal.   Nursing note and vitals reviewed.    Reviewed Data:  No visits with results within 1 Month(s) from this visit.   Latest known visit with results is:   Office Visit on 05/17/2018   Component Date Value Ref Range Status   • Total Cholesterol 05/17/2018 162  0 - 200 mg/dL Final   • Triglycerides 05/17/2018 81  0 - 150 mg/dL Final   • HDL Cholesterol 05/17/2018 66* 40 - 60 mg/dL Final   • VLDL Cholesterol 05/17/2018 16.2  5 - 40 mg/dL Final   • LDL Cholesterol  05/17/2018 80  0 - 100 mg/dL Final

## 2018-11-14 ENCOUNTER — TELEPHONE (OUTPATIENT)
Dept: INTERNAL MEDICINE | Facility: CLINIC | Age: 83
End: 2018-11-14

## 2018-12-20 ENCOUNTER — HOSPITAL ENCOUNTER (OUTPATIENT)
Dept: MAMMOGRAPHY | Facility: HOSPITAL | Age: 83
Discharge: HOME | End: 2018-12-20
Admitting: FAMILY MEDICINE

## 2018-12-20 DIAGNOSIS — Z12.31 VISIT FOR SCREENING MAMMOGRAM: ICD-10-CM

## 2018-12-20 PROCEDURE — 77067 SCR MAMMO BI INCL CAD: CPT

## 2018-12-20 PROCEDURE — 77063 BREAST TOMOSYNTHESIS BI: CPT

## 2019-02-22 RX ORDER — SIMVASTATIN 40 MG
40 TABLET ORAL NIGHTLY
Qty: 90 TABLET | Refills: 0 | Status: SHIPPED | OUTPATIENT
Start: 2019-02-22 | End: 2019-05-18 | Stop reason: SDUPTHER

## 2019-02-22 RX ORDER — MELOXICAM 7.5 MG/1
TABLET ORAL
Qty: 90 TABLET | Refills: 2 | Status: SHIPPED | OUTPATIENT
Start: 2019-02-22 | End: 2019-11-12 | Stop reason: SDUPTHER

## 2019-04-15 ENCOUNTER — PREP FOR SURGERY (OUTPATIENT)
Dept: OTHER | Facility: HOSPITAL | Age: 84
End: 2019-04-15

## 2019-04-15 DIAGNOSIS — Z85.038 HISTORY OF COLON CANCER: Primary | ICD-10-CM

## 2019-04-15 DIAGNOSIS — Z80.0 FAMILY HISTORY OF COLON CANCER: ICD-10-CM

## 2019-05-06 PROBLEM — Z85.038 HISTORY OF COLON CANCER: Status: ACTIVE | Noted: 2019-05-06

## 2019-05-06 PROBLEM — Z80.0 FAMILY HISTORY OF COLON CANCER: Status: ACTIVE | Noted: 2019-05-06

## 2019-05-09 ENCOUNTER — OFFICE VISIT (OUTPATIENT)
Dept: INTERNAL MEDICINE | Facility: CLINIC | Age: 84
End: 2019-05-09

## 2019-05-09 VITALS
OXYGEN SATURATION: 96 % | BODY MASS INDEX: 25.83 KG/M2 | DIASTOLIC BLOOD PRESSURE: 70 MMHG | HEART RATE: 84 BPM | SYSTOLIC BLOOD PRESSURE: 130 MMHG | TEMPERATURE: 98.9 F | WEIGHT: 141.2 LBS

## 2019-05-09 DIAGNOSIS — E78.5 HYPERLIPIDEMIA, UNSPECIFIED HYPERLIPIDEMIA TYPE: ICD-10-CM

## 2019-05-09 DIAGNOSIS — M19.90 ARTHRITIS: ICD-10-CM

## 2019-05-09 DIAGNOSIS — I10 ESSENTIAL HYPERTENSION: Primary | ICD-10-CM

## 2019-05-09 PROCEDURE — 99214 OFFICE O/P EST MOD 30 MIN: CPT | Performed by: FAMILY MEDICINE

## 2019-05-09 NOTE — PROGRESS NOTES
Riri Damon is a 84 y.o. female.      Assessment/Plan   Problem List Items Addressed This Visit        Cardiovascular and Mediastinum    Hyperlipidemia    Relevant Orders    Lipid Panel    Essential hypertension - Primary    Relevant Orders    Lipid Panel    Comprehensive Metabolic Panel       Musculoskeletal and Integument    Arthritis         Follow-up results of blood work for ongoing management of chronic problems and hyperlipidemia and arthritis as well as blood pressure medication adjustments she can use meloxicam 15 mg flareup of her hands then use that for about a week or 2 then go back to 7.5 mg dose follow-up otherwise as needed continue monitoring blood pressure goals less than 140/90 or    Return in about 6 months (around 11/9/2019), or if symptoms worsen or fail to improve, for Recheck, Next scheduled follow up.      Chief Complaint   Patient presents with   • follow up to hypertension   • follow up to hyperlipidemia   • follow up to arthritis     Social History     Tobacco Use   • Smoking status: Never Smoker   • Smokeless tobacco: Never Used   Substance Use Topics   • Alcohol use: No   • Drug use: Yes     Types: Oxycodone     Comment: caffine use       History of Present Illness   Patient follows up for chronic problems of hypertension hyperlipidemia arthritis doing fairly well although she is having intermittent flares of her arthritis is mostly osteoarthritis and no evidence of rheumatoid arthritis and has had consultation in the past she gets some relief with the meloxicam but she is also interested in using any type of topical that could help blood pressure is well controlled she has no chest pain shortness of breath or increased fatigue she is scheduled to have follow-up colonoscopy July she has no unwanted side effects or medication  The following portions of the patient's history were reviewed and updated as appropriate:PMHroutine: Social history , Allergies, Current Medications, Active  Problem List and Health Maintenance    Review of Systems   Constitutional: Negative.    HENT: Negative.    Respiratory: Negative.    Cardiovascular: Negative.    Musculoskeletal: Positive for arthralgias.   Hematological: Negative.    Psychiatric/Behavioral: Negative.        Objective   Vitals:    05/09/19 1000   BP: 130/70   BP Location: Left arm   Patient Position: Sitting   Cuff Size: Adult   Pulse: 84   Temp: 98.9 °F (37.2 °C)   TempSrc: Oral   SpO2: 96%   Weight: 64 kg (141 lb 3.2 oz)     Body mass index is 25.83 kg/m².  Physical Exam   Constitutional: She is oriented to person, place, and time. She appears well-developed and well-nourished. No distress.   HENT:   Head: Normocephalic and atraumatic.   Eyes: Conjunctivae and EOM are normal. Pupils are equal, round, and reactive to light. Right eye exhibits no discharge. Left eye exhibits no discharge. No scleral icterus.   Neck: Normal range of motion. Neck supple. No tracheal deviation present. No thyromegaly present.   Cardiovascular: Normal rate, regular rhythm, normal heart sounds, intact distal pulses and normal pulses. Exam reveals no gallop.   No murmur heard.  Pulmonary/Chest: Effort normal and breath sounds normal. No respiratory distress. She has no wheezes. She has no rales.   Musculoskeletal: Normal range of motion. She exhibits tenderness and deformity. She exhibits no edema.   Neurological: She is alert and oriented to person, place, and time. She exhibits normal muscle tone. Coordination normal.   Skin: Skin is warm. No rash noted. No erythema. No pallor.   Psychiatric: She has a normal mood and affect. Her behavior is normal. Judgment and thought content normal.   Nursing note and vitals reviewed.    Reviewed Data:  No visits with results within 1 Month(s) from this visit.   Latest known visit with results is:   Office Visit on 11/13/2018   Component Date Value Ref Range Status   • Glucose 11/13/2018 101* 65 - 99 mg/dL Final   • BUN 11/13/2018 13   8 - 23 mg/dL Final   • Creatinine 11/13/2018 0.58  0.57 - 1.00 mg/dL Final   • eGFR Non African Am 11/13/2018 99  >60 mL/min/1.73 Final    Comment: The MDRD GFR formula is only valid for adults with stable  renal function between ages 18 and 70.     • eGFR  Am 11/13/2018 120  >60 mL/min/1.73 Final   • BUN/Creatinine Ratio 11/13/2018 22.4  7.0 - 25.0 Final   • Sodium 11/13/2018 143  136 - 145 mmol/L Final   • Potassium 11/13/2018 4.6  3.5 - 5.2 mmol/L Final   • Chloride 11/13/2018 104  98 - 107 mmol/L Final   • Total CO2 11/13/2018 27.8  22.0 - 29.0 mmol/L Final   • Calcium 11/13/2018 9.8  8.6 - 10.5 mg/dL Final   • Total Protein 11/13/2018 7.3  6.0 - 8.5 g/dL Final   • Albumin 11/13/2018 4.60  3.50 - 5.20 g/dL Final   • Globulin 11/13/2018 2.7  gm/dL Final   • A/G Ratio 11/13/2018 1.7  g/dL Final   • Total Bilirubin 11/13/2018 0.5  0.1 - 1.2 mg/dL Final   • Alkaline Phosphatase 11/13/2018 66  39 - 117 U/L Final   • AST (SGOT) 11/13/2018 20  1 - 32 U/L Final   • ALT (SGPT) 11/13/2018 17  1 - 33 U/L Final

## 2019-05-10 LAB
ALBUMIN SERPL-MCNC: 4.9 G/DL (ref 3.5–5.2)
ALBUMIN/GLOB SERPL: 3.1 G/DL
ALP SERPL-CCNC: 56 U/L (ref 39–117)
ALT SERPL-CCNC: 15 U/L (ref 1–33)
AST SERPL-CCNC: 19 U/L (ref 1–32)
BILIRUB SERPL-MCNC: 0.6 MG/DL (ref 0.2–1.2)
BUN SERPL-MCNC: 11 MG/DL (ref 8–23)
BUN/CREAT SERPL: 19.6 (ref 7–25)
CALCIUM SERPL-MCNC: 9.6 MG/DL (ref 8.6–10.5)
CHLORIDE SERPL-SCNC: 105 MMOL/L (ref 98–107)
CHOLEST SERPL-MCNC: 137 MG/DL (ref 0–200)
CO2 SERPL-SCNC: 27.7 MMOL/L (ref 22–29)
CREAT SERPL-MCNC: 0.56 MG/DL (ref 0.57–1)
GLOBULIN SER CALC-MCNC: 1.6 GM/DL
GLUCOSE SERPL-MCNC: 95 MG/DL (ref 65–99)
HDLC SERPL-MCNC: 58 MG/DL (ref 40–60)
LDLC SERPL CALC-MCNC: 67 MG/DL (ref 0–100)
POTASSIUM SERPL-SCNC: 4.4 MMOL/L (ref 3.5–5.2)
PROT SERPL-MCNC: 6.5 G/DL (ref 6–8.5)
SODIUM SERPL-SCNC: 142 MMOL/L (ref 136–145)
TRIGL SERPL-MCNC: 60 MG/DL (ref 0–150)
VLDLC SERPL CALC-MCNC: 12 MG/DL

## 2019-05-14 ENCOUNTER — TELEPHONE (OUTPATIENT)
Dept: INTERNAL MEDICINE | Facility: CLINIC | Age: 84
End: 2019-05-14

## 2019-05-20 RX ORDER — SIMVASTATIN 40 MG
TABLET ORAL
Qty: 90 TABLET | Refills: 0 | Status: SHIPPED | OUTPATIENT
Start: 2019-05-20 | End: 2019-08-19 | Stop reason: SDUPTHER

## 2019-06-11 ENCOUNTER — OFFICE VISIT (OUTPATIENT)
Dept: ONCOLOGY | Facility: CLINIC | Age: 84
End: 2019-06-11

## 2019-06-11 ENCOUNTER — LAB (OUTPATIENT)
Dept: LAB | Facility: HOSPITAL | Age: 84
End: 2019-06-11

## 2019-06-11 VITALS
HEART RATE: 84 BPM | BODY MASS INDEX: 25.16 KG/M2 | OXYGEN SATURATION: 93 % | HEIGHT: 63 IN | WEIGHT: 142 LBS | RESPIRATION RATE: 14 BRPM | DIASTOLIC BLOOD PRESSURE: 93 MMHG | SYSTOLIC BLOOD PRESSURE: 176 MMHG | TEMPERATURE: 98.3 F

## 2019-06-11 DIAGNOSIS — C18.9 MALIGNANT NEOPLASM OF COLON, UNSPECIFIED PART OF COLON (HCC): ICD-10-CM

## 2019-06-11 DIAGNOSIS — C18.2 MALIGNANT NEOPLASM OF ASCENDING COLON (HCC): Primary | ICD-10-CM

## 2019-06-11 DIAGNOSIS — R53.82 CHRONIC FATIGUE: ICD-10-CM

## 2019-06-11 DIAGNOSIS — I27.20 PULMONARY HYPERTENSION (HCC): Primary | ICD-10-CM

## 2019-06-11 DIAGNOSIS — R10.9 ABDOMINAL PAIN, RIGHT LATERAL: ICD-10-CM

## 2019-06-11 LAB
ALBUMIN SERPL-MCNC: 4.5 G/DL (ref 3.5–5.2)
ALBUMIN/GLOB SERPL: 1.8 G/DL (ref 1.1–2.4)
ALP SERPL-CCNC: 59 U/L (ref 38–116)
ALT SERPL W P-5'-P-CCNC: 13 U/L (ref 0–33)
ANION GAP SERPL CALCULATED.3IONS-SCNC: 9.6 MMOL/L
AST SERPL-CCNC: 19 U/L (ref 0–32)
BASOPHILS # BLD AUTO: 0.04 10*3/MM3 (ref 0–0.2)
BASOPHILS NFR BLD AUTO: 0.5 % (ref 0–1.5)
BILIRUB SERPL-MCNC: 0.5 MG/DL (ref 0.2–1.2)
BUN BLD-MCNC: 14 MG/DL (ref 6–20)
BUN/CREAT SERPL: 23.7 (ref 7.3–30)
CALCIUM SPEC-SCNC: 9.4 MG/DL (ref 8.5–10.2)
CEA SERPL-MCNC: 3.14 NG/ML
CHLORIDE SERPL-SCNC: 103 MMOL/L (ref 98–107)
CO2 SERPL-SCNC: 28.4 MMOL/L (ref 22–29)
CREAT BLD-MCNC: 0.59 MG/DL (ref 0.6–1.1)
DEPRECATED RDW RBC AUTO: 42.5 FL (ref 37–54)
EOSINOPHIL # BLD AUTO: 0.25 10*3/MM3 (ref 0–0.4)
EOSINOPHIL NFR BLD AUTO: 3.3 % (ref 0.3–6.2)
ERYTHROCYTE [DISTWIDTH] IN BLOOD BY AUTOMATED COUNT: 12.2 % (ref 12.3–15.4)
GFR SERPL CREATININE-BSD FRML MDRD: 97 ML/MIN/1.73
GLOBULIN UR ELPH-MCNC: 2.5 GM/DL (ref 1.8–3.5)
GLUCOSE BLD-MCNC: 104 MG/DL (ref 74–124)
HCT VFR BLD AUTO: 42.2 % (ref 34–46.6)
HGB BLD-MCNC: 14.2 G/DL (ref 12–15.9)
IMM GRANULOCYTES # BLD AUTO: 0.02 10*3/MM3 (ref 0–0.05)
IMM GRANULOCYTES NFR BLD AUTO: 0.3 % (ref 0–0.5)
LYMPHOCYTES # BLD AUTO: 1.94 10*3/MM3 (ref 0.7–3.1)
LYMPHOCYTES NFR BLD AUTO: 25.9 % (ref 19.6–45.3)
MCH RBC QN AUTO: 32.3 PG (ref 26.6–33)
MCHC RBC AUTO-ENTMCNC: 33.6 G/DL (ref 31.5–35.7)
MCV RBC AUTO: 95.9 FL (ref 79–97)
MONOCYTES # BLD AUTO: 0.81 10*3/MM3 (ref 0.1–0.9)
MONOCYTES NFR BLD AUTO: 10.8 % (ref 5–12)
NEUTROPHILS # BLD AUTO: 4.44 10*3/MM3 (ref 1.7–7)
NEUTROPHILS NFR BLD AUTO: 59.2 % (ref 42.7–76)
NRBC BLD AUTO-RTO: 0 /100 WBC (ref 0–0.2)
PLATELET # BLD AUTO: 215 10*3/MM3 (ref 140–450)
PMV BLD AUTO: 10.2 FL (ref 6–12)
POTASSIUM BLD-SCNC: 4.4 MMOL/L (ref 3.5–4.7)
PROT SERPL-MCNC: 7 G/DL (ref 6.3–8)
RBC # BLD AUTO: 4.4 10*6/MM3 (ref 3.77–5.28)
SODIUM BLD-SCNC: 141 MMOL/L (ref 134–145)
VIT B12 BLD-MCNC: 865 PG/ML (ref 211–946)
WBC NRBC COR # BLD: 7.5 10*3/MM3 (ref 3.4–10.8)

## 2019-06-11 PROCEDURE — 80053 COMPREHEN METABOLIC PANEL: CPT

## 2019-06-11 PROCEDURE — 82378 CARCINOEMBRYONIC ANTIGEN: CPT | Performed by: INTERNAL MEDICINE

## 2019-06-11 PROCEDURE — 36415 COLL VENOUS BLD VENIPUNCTURE: CPT | Performed by: INTERNAL MEDICINE

## 2019-06-11 PROCEDURE — 99214 OFFICE O/P EST MOD 30 MIN: CPT | Performed by: INTERNAL MEDICINE

## 2019-06-11 PROCEDURE — 82607 VITAMIN B-12: CPT | Performed by: INTERNAL MEDICINE

## 2019-06-11 PROCEDURE — 85025 COMPLETE CBC W/AUTO DIFF WBC: CPT

## 2019-06-11 NOTE — PROGRESS NOTES
Subjective     CHIEF COMPLAINT:      Chief Complaint   Patient presents with   • Annual Exam     no concerns       HISTORY OF PRESENT ILLNESS:     Riri Damon is a 84 y.o. female patient who returns today for follow up on colon cancer.  Patient used to follow with Dr. Richards.  The last visit she had was on 3/12/2018.  Her last CT scans were in late February 2018.    I am seeing the patient for the first time today.  She is accompanied by her  and son.  She reports fatigue but she continues to be very active.  She also reports pain in the right side of the abdomen since the surgery.  No nausea or vomiting.  No blood in the stool.    REVIEW OF SYSTEMS:  Review of Systems   Constitutional: Positive for fatigue. Negative for chills, fever and unexpected weight change.   HENT: Negative for mouth sores, nosebleeds, sore throat and voice change.    Eyes: Negative for visual disturbance.   Respiratory: Negative for cough and shortness of breath.    Cardiovascular: Negative for chest pain and leg swelling.   Gastrointestinal: Positive for abdominal pain. Negative for blood in stool, constipation, diarrhea, nausea and vomiting.   Genitourinary: Negative for dysuria, frequency and hematuria.   Musculoskeletal: Negative for arthralgias, back pain and joint swelling.   Skin: Negative for rash.   Neurological: Negative for dizziness, numbness and headaches.   Hematological: Negative for adenopathy. Bruises/bleeds easily.   Psychiatric/Behavioral: Negative for dysphoric mood. The patient is not nervous/anxious.      I verified the ROS obtained by the MA.      Past Medical History:   Diagnosis Date   • Anxiety    • Colon carcinoma (CMS/HCC) 2015    Stage IIIB, T4N1a; underwent radiation therapy and colon resection by Dr. Mcneil   • Deep vein thrombosis (CMS/HCC) 11/24/2017    right leg   • History of edema     LE   • History of rheumatic fever    • Hyperlipidemia    • Hypertension     MEDS PRN   • Infectious  mononucleosis    • Infectious viral hepatitis    • Left anterior fascicular block    • Mitral regurgitation     8/2010: mild per echo   • OA (osteoarthritis)    • Osteoporosis    • Palpitations    • Pulmonary hypertension (CMS/HCC)     8/2010- mild per echo; 8/2012 - normal RVSP per echo   • Tricuspid regurgitation     8/2010: Mild to moderate per echo; 8/2012: Trace to mild per echo   • Venous insufficiency    • Ventricular septal defect     Per echocardiogram 2012   • Vitamin D deficiency        Past Surgical History:   Procedure Laterality Date   • COLECTOMY PARTIAL / TOTAL  02/02/2015 2/2015 due to adenocarcinoma   • COLONOSCOPY      JAN 2015   • COLONOSCOPY N/A 5/25/2016    Procedure: COLONOSCOPY to ANASTAMOSIS AND TERMINAL ILEUM;  Surgeon: Yfn Mcneil MD;  Location: [facility] ENDOSCOPY;  Service:    • HYSTERECTOMY     • JOINT MANIPULATION Right 1/9/2018    Procedure: MANIPULATION OF RT KNEE;  Surgeon: Andrea Caballero MD;  Location: [facility] OR;  Service:    • JOINT REPLACEMENT Right 11/09/2017   • KNEE SURGERY Left 2006    ARTHROSCOPY   • OOPHORECTOMY     • VT TOTAL KNEE ARTHROPLASTY Right 11/9/2017    Procedure: RIGHT TOTAL KNEE ARTHROPLASTY;  Surgeon: Andrea Caballero MD;  Location: [facility] OR;  Service: Orthopedics   • TONSILLECTOMY         Cancer-related family history includes Cancer (age of onset: 60) in her sister.  Social History     Tobacco Use   • Smoking status: Never Smoker   • Smokeless tobacco: Never Used   Substance Use Topics   • Alcohol use: No       MEDICATIONS:    Current Outpatient Medications:   •  aspirin 81 MG EC tablet, Take 81 mg by mouth Every Other Day., Disp: , Rfl:   •  docusate sodium 100 MG capsule, Take 100 mg by mouth 2 (Two) Times a Day As Needed for Constipation., Disp: 40 capsule, Rfl: 1  •  losartan (COZAAR) 25 MG tablet, Take 1 tablet by mouth Daily., Disp: 90 tablet, Rfl: 2  •  melatonin 1 MG tablet, Take 1 mg by mouth Every Night., Disp: , Rfl:   •   "meloxicam (MOBIC) 7.5 MG tablet, TAKE 1 TABLET EVERY DAY (DOSE DECREASE), Disp: 90 tablet, Rfl: 2  •  Multiple Vitamins-Minerals (MULTIVITAMIN ADULT PO), Take 1 tablet by mouth Daily. STOP 1 WEEK PRIOR TO SX, Disp: , Rfl:   •  Omega-3 Fatty Acids (FISH OIL) 1000 MG capsule capsule, Take  by mouth Continuous As Needed., Disp: , Rfl:   •  simvastatin (ZOCOR) 40 MG tablet, TAKE 1 TABLET EVERY NIGHT, Disp: 90 tablet, Rfl: 0  •  Cholecalciferol (VITAMIN D3) 1000 units capsule, Take  by mouth Every 7 (Seven) Days., Disp: , Rfl:     ALLERGIES:  No Known Allergies      Objective   VITAL SIGNS:     Vitals:    06/11/19 1152   BP: 176/93   Pulse: 84   Resp: 14   Temp: 98.3 °F (36.8 °C)   TempSrc: Oral   SpO2: 93%   Weight: 64.4 kg (142 lb)   Height: 159.5 cm (62.8\")  Comment: new ht without shoes   PainSc: 0-No pain     Body mass index is 25.32 kg/m².     Wt Readings from Last 3 Encounters:   06/11/19 64.4 kg (142 lb)   05/09/19 64 kg (141 lb 3.2 oz)   11/13/18 62.1 kg (137 lb)       PHYSICAL EXAMINATION:  GENERAL:  The patient appears in good general condition, not in acute distress.  SKIN: Warm and dry. No skin rashes, ecchymosis or petechiae.  HEAD:  Normocephalic.  EYES:  No Jaundice. No Pallor. Pupils equal. EOMI.  NECK:  Supple with Good ROM. No Thyromegaly. No Masses.  LYMPHATICS:  No cervical or supraclavicular lymphadenopathy.  CHEST: Normal respiratory effort. Lungs clear to auscultation.   ABDOMEN:  Soft.  Right-sided tenderness. No Hepatomegaly. No Splenomegaly. No definite masses.  EXTREMITIES:  No clubbing. No joint swelling in the hands.   NEUROLOGICAL:  No Focal neurological deficits.         DIAGNOSTIC DATA:     Results from last 7 days   Lab Units 06/11/19  1144   WBC 10*3/mm3 7.50   NEUTROS ABS 10*3/mm3 4.44   HEMOGLOBIN g/dL 14.2   HEMATOCRIT % 42.2   PLATELETS 10*3/mm3 215           Pathology exam from 2/2/2015:  SURGICAL PATHOLOGY CANCER CASE SUMMARY:  Web Protocol Posting Date October 2013.  Colon " Resection Protocol.    Specimen: Terminal ileum, cecum, appendix, ascending colon.  Procedure: Right hemicolectomy.  Tumor site: Right (ascending) colon.  Tumor size, greatest dimension: 7.1 cm.  Macroscopic tumor perforation: Not identified.  Histologic type: Adenocarcinoma.  Histologic grade: Low-grade (moderately differentiated).  Microscopic tumor extension: Tumor penetrates to the surface of the visceral  peritoneum (serosa).  Margins:       Proximal margin: Uninvolved by invasive carcinoma/adenoma/dysplasia.       Distal margin: Uninvolved by invasive carcinoma/adenoma/dysplasia       Circumferential radial (mesenteric root) margin: Involved by invasive  carcinoma tumor less             than 1 mm from the disrupted fibrofatty  surface).         Note: Tumor penetrates the serosal surface and there is an adjacent  granulation tissue               reaction. The possibility that this tumor focus  might have been adherent to another               structure cannot be excluded.     Lymph/vascular invasion: Present.   Perineural invasion: Not identified.  Tumor deposits: Not identified.  Pathologic staging:       Primary tumor (pT): pT4a (clinical correlation required to exclude T4b).       Regional lymph nodes: pN1a.            Number examined: 29.            Number involved: 1.       Distant metastasis (pM): Not applicable.  Additional pathologic findings: Not identified.  Ancillary studies: Available if requested.    Assessment/Plan   1.  Adenocarcinoma of the ascending colon, stage III  · T4 a N1 a  · 1 out of 29 lymph nodes were positive.  · The circumferential margin was involved by invasive carcinoma.  · Patient is status post right hemicolectomy on 2/2/2015.    Last imaging studies were in February 2018 and were negative.    2.  Abdominal pain with tenderness on exam.  This may be attributed to scar tissue but we need to rule out local recurrence of the colon cancer.    3.  Fatigue.  Despite the presence of  fatigue, she continues to be very active.  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  MCV is at the upper end of normal and this may indicate underlying vitamin B12 deficiency.  She is at increased risk for vitamin B12 deficiency due to resection of the terminal ileum.    PLAN:    1.  Obtain CEA and vitamin B12 level today.  We will contact her with the results.  2.  Obtain a CT scan of the chest abdomen pelvis in 1 week.  3.  We will contact her with the results of the CT scan.  If the scan is clear, we will see her in follow-up in 6 months with CBC CMP CEA.  4.  Patient is going to have colonoscopy done in July 2019.      Asiya Hinton MD  06/11/19

## 2019-06-12 ENCOUNTER — TELEPHONE (OUTPATIENT)
Dept: ONCOLOGY | Facility: CLINIC | Age: 84
End: 2019-06-12

## 2019-06-12 NOTE — TELEPHONE ENCOUNTER
Called and inform the patient that CEA was normal. No vitamin B 12 deficiency per Dr. Hinton. Pt v/u      ----- Message from Asiya Hinton MD sent at 6/11/2019  4:23 PM EDT -----  Please inform the patient that CEA was normal.  No vitamin B12 deficiency.    Thank you

## 2019-06-18 ENCOUNTER — HOSPITAL ENCOUNTER (OUTPATIENT)
Dept: PET IMAGING | Facility: HOSPITAL | Age: 84
Discharge: HOME | End: 2019-06-18
Admitting: INTERNAL MEDICINE

## 2019-06-18 DIAGNOSIS — R10.9 ABDOMINAL PAIN, RIGHT LATERAL: ICD-10-CM

## 2019-06-18 DIAGNOSIS — C18.2 MALIGNANT NEOPLASM OF ASCENDING COLON (HCC): ICD-10-CM

## 2019-06-18 LAB — CREAT BLDA-MCNC: 0.5 MG/DL (ref 0.6–1.3)

## 2019-06-18 PROCEDURE — 25010000002 IOPAMIDOL 61 % SOLUTION: Performed by: INTERNAL MEDICINE

## 2019-06-18 PROCEDURE — 74177 CT ABD & PELVIS W/CONTRAST: CPT

## 2019-06-18 PROCEDURE — 71260 CT THORAX DX C+: CPT

## 2019-06-18 PROCEDURE — 0 DIATRIZOATE MEGLUMINE & SODIUM PER 1 ML: Performed by: INTERNAL MEDICINE

## 2019-06-18 PROCEDURE — 82565 ASSAY OF CREATININE: CPT

## 2019-06-18 RX ADMIN — DIATRIZOATE MEGLUMINE AND DIATRIZOATE SODIUM 30 ML: 660; 100 LIQUID ORAL; RECTAL at 12:45

## 2019-06-18 RX ADMIN — IOPAMIDOL 85 ML: 612 INJECTION, SOLUTION INTRAVENOUS at 13:30

## 2019-06-19 ENCOUNTER — OFFICE VISIT (OUTPATIENT)
Dept: CARDIOLOGY | Facility: CLINIC | Age: 84
End: 2019-06-19

## 2019-06-19 VITALS
BODY MASS INDEX: 26.13 KG/M2 | HEART RATE: 83 BPM | HEIGHT: 62 IN | DIASTOLIC BLOOD PRESSURE: 80 MMHG | WEIGHT: 142 LBS | SYSTOLIC BLOOD PRESSURE: 148 MMHG

## 2019-06-19 DIAGNOSIS — I10 ESSENTIAL HYPERTENSION: ICD-10-CM

## 2019-06-19 DIAGNOSIS — E78.5 HYPERLIPIDEMIA, UNSPECIFIED HYPERLIPIDEMIA TYPE: ICD-10-CM

## 2019-06-19 DIAGNOSIS — Q21.0 VENTRICULAR SEPTAL DEFECT: ICD-10-CM

## 2019-06-19 DIAGNOSIS — I44.4 LEFT ANTERIOR FASCICULAR BLOCK: Primary | ICD-10-CM

## 2019-06-19 DIAGNOSIS — I05.1 RHEUMATIC MITRAL REGURGITATION: ICD-10-CM

## 2019-06-19 PROCEDURE — 99214 OFFICE O/P EST MOD 30 MIN: CPT | Performed by: INTERNAL MEDICINE

## 2019-06-19 PROCEDURE — 93000 ELECTROCARDIOGRAM COMPLETE: CPT | Performed by: INTERNAL MEDICINE

## 2019-06-19 NOTE — PROGRESS NOTES
Date of Office Visit: 2019  Encounter Provider: Rowan Serrano MD  Place of Service: [facility] CARDIOLOGY  Patient Name: Riri Damon  :1934      Patient ID:  Riri Damon is a 84 y.o. female is here for  followup for hypertension.         History of Present Illness    She had rheumatic fever as a kid and infectious mononucleosis as a teenager.  She said she never had really any cardiac sequelae from it. She has  hyperlipidemia and osteoporosis. She has lower extremity swelling due to  venous insufficiency, and she has got some hypertension. Her blood pressure  has been fairly well controlled with medications.     She had an echocardiogram done on 2010, showing an ejection  fraction of 59%, mild systolic anterior motion of the anterior mitral  leaflet, cordal structures but no LVOT obstruction, and mild mitral  insufficiency. She had mild to moderate tricuspid insufficiency and mild  pulmonary hypertension. She had a dual-isotope nuclear perfusion study done  for chest pain on 2010, which showed no ischemia.     She had an echocardiogram performed on 08/15/2012 which showed an ejection fraction of  60%, normal diastolic function, normal RVSP, trace to mild tricuspid insufficiency, small  perimembranous ventricular septal defect.       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 She underwent radiation therapy and is now on Xeloda twice daily. She had  resection done by Dr. Mcneil; a laparoscopic right colectomy on 2015.      Laboratory values done 2019 show normal CMP, normal CBC.  Lipids done 5/10/2019 show HDL 58, LDL 67.    She has occasional palpitations but is able to do her activities of daily living and they do not wake her out of sleep.  She has some midday dizziness but takes her losartan in the morning.  She is had no falls or syncope associate with this.  She has  no orthopnea or PND.  She has no chest pain.  She has no exertional dyspnea.  She has no orthopnea.  The palpitations do make her feel funny.    Past Medical History:   Diagnosis Date   • Anxiety    • Colon carcinoma (CMS/HCC) 2015    Stage IIIB, T4N1a; underwent radiation therapy and colon resection by Dr. Mcneil   • Deep vein thrombosis (CMS/HCC) 11/24/2017    right leg   • History of edema     LE   • History of rheumatic fever    • Hyperlipidemia    • Hypertension     MEDS PRN   • Infectious mononucleosis    • Infectious viral hepatitis    • Left anterior fascicular block    • Mitral regurgitation     8/2010: mild per echo   • OA (osteoarthritis)    • Osteoporosis    • Palpitations    • Pulmonary hypertension (CMS/HCC)     8/2010- mild per echo; 8/2012 - normal RVSP per echo   • Tricuspid regurgitation     8/2010: Mild to moderate per echo; 8/2012: Trace to mild per echo   • Venous insufficiency    • Ventricular septal defect     Per echocardiogram 2012   • Vitamin D deficiency          Past Surgical History:   Procedure Laterality Date   • COLECTOMY PARTIAL / TOTAL  02/02/2015 2/2015 due to adenocarcinoma   • COLONOSCOPY      JAN 2015   • COLONOSCOPY N/A 5/25/2016    Procedure: COLONOSCOPY to ANASTAMOSIS AND TERMINAL ILEUM;  Surgeon: Yfn Mcneil MD;  [facility] [facility];  Service:    • HYSTERECTOMY     • JOINT MANIPULATION Right 1/9/2018    Procedure: MANIPULATION OF RT KNEE;  Surgeon: Andrea Caballero MD;  Location: [facility] OR;  Service:    • JOINT REPLACEMENT Right 11/09/2017   • KNEE SURGERY Left 2006    ARTHROSCOPY   • OOPHORECTOMY     • AK TOTAL KNEE ARTHROPLASTY Right 11/9/2017    Procedure: RIGHT TOTAL KNEE ARTHROPLASTY;  Surgeon: Andrea Caballero MD;  Location: [facility] OR;  Service: Orthopedics   • TONSILLECTOMY         Current Outpatient Medications on File Prior to Visit   Medication Sig Dispense Refill   • aspirin 81 MG EC tablet Take 81 mg by mouth Every Other Day.     •  Cholecalciferol (VITAMIN D3) 1000 units capsule Take  by mouth Every 7 (Seven) Days.     • docusate sodium 100 MG capsule Take 100 mg by mouth 2 (Two) Times a Day As Needed for Constipation. 40 capsule 1   • losartan (COZAAR) 25 MG tablet Take 1 tablet by mouth Daily. 90 tablet 2   • melatonin 1 MG tablet Take 1 mg by mouth Every Night.     • meloxicam (MOBIC) 7.5 MG tablet TAKE 1 TABLET EVERY DAY (DOSE DECREASE) 90 tablet 2   • Multiple Vitamins-Minerals (MULTIVITAMIN ADULT PO) Take 1 tablet by mouth Daily. STOP 1 WEEK PRIOR TO SX     • Omega-3 Fatty Acids (FISH OIL) 1000 MG capsule capsule Take  by mouth Continuous As Needed.     • simvastatin (ZOCOR) 40 MG tablet TAKE 1 TABLET EVERY NIGHT 90 tablet 0     Current Facility-Administered Medications on File Prior to Visit   Medication Dose Route Frequency Provider Last Rate Last Dose   • [COMPLETED] diatrizoate meglumine-sodium (GASTROGRAFIN) 66-10 % solution 30 mL  30 mL Oral Once in imaging Asiya Hinton MD   30 mL at 06/18/19 1245   • [COMPLETED] iopamidol (ISOVUE-300) 61 % injection 100 mL  100 mL Intravenous Once in imaging Asiya Hinton MD   85 mL at 06/18/19 1330       Social History     Socioeconomic History   • Marital status:      Spouse name: Marcus   • Number of children: 3   • Years of education: College   • Highest education level: Not on file   Occupational History     Employer: [employer] [place] [employer]     Employer: RETIRED   Tobacco Use   • Smoking status: Never Smoker   • Smokeless tobacco: Never Used   Substance and Sexual Activity   • Alcohol use: No   • Drug use: Yes     Types: Oxycodone     Comment: caffine use   • Sexual activity: Defer           Review of Systems   Constitution: Negative.   HENT: Negative for congestion.    Eyes: Negative for vision loss in left eye and vision loss in right eye.   Cardiovascular: Positive for leg swelling.   Respiratory: Negative.  Negative for cough, hemoptysis, shortness of breath,  "sleep disturbances due to breathing, snoring, sputum production and wheezing.    Endocrine: Negative.    Hematologic/Lymphatic: Negative.    Skin: Negative for poor wound healing and rash.   Musculoskeletal: Positive for joint swelling. Negative for falls, gout, muscle cramps and myalgias.   Gastrointestinal: Negative for abdominal pain, diarrhea, dysphagia, hematemesis, melena, nausea and vomiting.   Neurological: Negative for excessive daytime sleepiness, dizziness, headaches, light-headedness, loss of balance, seizures and vertigo.   Psychiatric/Behavioral: Negative for depression and substance abuse. The patient is not nervous/anxious.        Procedures    ECG 12 Lead  Date/Time: 6/19/2019 10:25 AM  Performed by: Rowan Serrano MD  Authorized by: Rowan Serrano MD   Comparison: compared with previous ECG   Similar to previous ECG  Rhythm: sinus rhythm  Conduction: left anterior fascicular block  Other findings: left ventricular hypertrophy    Clinical impression: abnormal EKG                Objective:      Vitals:    06/19/19 1004   BP: 148/80   BP Location: Left arm   Pulse: 83   Weight: 64.4 kg (142 lb)   Height: 157.5 cm (62\")     Body mass index is 25.97 kg/m².    Physical Exam   Constitutional: She is oriented to person, place, and time. She appears well-developed and well-nourished. No distress.   HENT:   Head: Normocephalic and atraumatic.   Eyes: Conjunctivae are normal. No scleral icterus.   Neck: Neck supple. No JVD present. Carotid bruit is not present. No thyromegaly present.   Cardiovascular: Normal rate, regular rhythm, S1 normal, S2 normal, normal heart sounds and intact distal pulses.  No extrasystoles are present. PMI is not displaced. Exam reveals no gallop.   No murmur heard.  Pulses:       Carotid pulses are 2+ on the right side, and 2+ on the left side.       Radial pulses are 2+ on the right side, and 2+ on the left side.        Dorsalis pedis pulses are 2+ on the right side, " and 2+ on the left side.        Posterior tibial pulses are 2+ on the right side, and 2+ on the left side.   Pulmonary/Chest: Effort normal and breath sounds normal. No respiratory distress. She has no wheezes. She has no rhonchi. She has no rales. She exhibits no tenderness.   Abdominal: Soft. Bowel sounds are normal. She exhibits no distension, no abdominal bruit and no mass. There is no tenderness.   Musculoskeletal: She exhibits no edema or deformity.   Lymphadenopathy:     She has no cervical adenopathy.   Neurological: She is alert and oriented to person, place, and time. No cranial nerve deficit.   Skin: Skin is warm and dry. No rash noted. She is not diaphoretic. No cyanosis. No pallor. Nails show no clubbing.   Psychiatric: She has a normal mood and affect. Judgment normal.   Vitals reviewed.      Lab Review:       Assessment:      Diagnosis Plan   1. Left anterior fascicular block     2. Ventricular septal defect     3. Essential hypertension     4. Hyperlipidemia, unspecified hyperlipidemia type     5. Rheumatic mitral regurgitation       1. Hypertension; well controlled.   2. Hyperlipidemia, well controlled on Lipitor.   3. Lower extremity edema due to venous insufficiency.   4. Osteoporosis.   5. Palpitations, stable.   6. trace tricuspid insufficiency and mitral insufficiency.   7. Left anterior fascicular block on ECG.   8. Small membranous VSD     Plan:       See rupinder in 1 year. No med changes.

## 2019-06-21 ENCOUNTER — TELEPHONE (OUTPATIENT)
Dept: ONCOLOGY | Facility: CLINIC | Age: 84
End: 2019-06-21

## 2019-06-21 NOTE — TELEPHONE ENCOUNTER
Called and inform the patient that the Ct scans were normal. No evidence of cancer per Dr. Hinton. Pt v/u  ----- Message from Asiya Hinton MD sent at 6/21/2019  8:50 AM EDT -----  Please inform the patient that the CT scans were normal.  No evidence of cancer.    Thank you

## 2019-07-11 ENCOUNTER — ANESTHESIA (OUTPATIENT)
Dept: GASTROENTEROLOGY | Facility: HOSPITAL | Age: 84
End: 2019-07-11

## 2019-07-11 ENCOUNTER — ANESTHESIA EVENT (OUTPATIENT)
Dept: GASTROENTEROLOGY | Facility: HOSPITAL | Age: 84
End: 2019-07-11

## 2019-07-11 ENCOUNTER — HOSPITAL ENCOUNTER (OUTPATIENT)
Facility: HOSPITAL | Age: 84
Setting detail: HOSPITAL OUTPATIENT SURGERY
Discharge: HOME | End: 2019-07-11
Attending: SURGERY | Admitting: SURGERY

## 2019-07-11 VITALS
WEIGHT: 136.4 LBS | OXYGEN SATURATION: 97 % | HEART RATE: 71 BPM | BODY MASS INDEX: 22.73 KG/M2 | HEIGHT: 65 IN | DIASTOLIC BLOOD PRESSURE: 74 MMHG | RESPIRATION RATE: 18 BRPM | SYSTOLIC BLOOD PRESSURE: 139 MMHG

## 2019-07-11 PROCEDURE — 25010000002 PROPOFOL 10 MG/ML EMULSION: Performed by: ANESTHESIOLOGY

## 2019-07-11 PROCEDURE — G0105 COLORECTAL SCRN; HI RISK IND: HCPCS | Performed by: SURGERY

## 2019-07-11 RX ORDER — PROPOFOL 10 MG/ML
VIAL (ML) INTRAVENOUS AS NEEDED
Status: DISCONTINUED | OUTPATIENT
Start: 2019-07-11 | End: 2019-07-11 | Stop reason: SURG

## 2019-07-11 RX ORDER — SODIUM CHLORIDE 0.9 % (FLUSH) 0.9 %
3 SYRINGE (ML) INJECTION AS NEEDED
Status: DISCONTINUED | OUTPATIENT
Start: 2019-07-11 | End: 2019-07-11 | Stop reason: HOSPADM

## 2019-07-11 RX ORDER — SODIUM CHLORIDE, SODIUM LACTATE, POTASSIUM CHLORIDE, CALCIUM CHLORIDE 600; 310; 30; 20 MG/100ML; MG/100ML; MG/100ML; MG/100ML
1000 INJECTION, SOLUTION INTRAVENOUS CONTINUOUS
Status: DISCONTINUED | OUTPATIENT
Start: 2019-07-11 | End: 2019-07-11 | Stop reason: HOSPADM

## 2019-07-11 RX ORDER — PROPOFOL 10 MG/ML
VIAL (ML) INTRAVENOUS CONTINUOUS PRN
Status: DISCONTINUED | OUTPATIENT
Start: 2019-07-11 | End: 2019-07-11 | Stop reason: SURG

## 2019-07-11 RX ORDER — LIDOCAINE HYDROCHLORIDE 10 MG/ML
0.5 INJECTION, SOLUTION INFILTRATION; PERINEURAL ONCE AS NEEDED
Status: DISCONTINUED | OUTPATIENT
Start: 2019-07-11 | End: 2019-07-11 | Stop reason: HOSPADM

## 2019-07-11 RX ORDER — LIDOCAINE HYDROCHLORIDE 20 MG/ML
INJECTION, SOLUTION INFILTRATION; PERINEURAL AS NEEDED
Status: DISCONTINUED | OUTPATIENT
Start: 2019-07-11 | End: 2019-07-11 | Stop reason: SURG

## 2019-07-11 RX ADMIN — LIDOCAINE HYDROCHLORIDE 40 MG: 20 INJECTION, SOLUTION INFILTRATION; PERINEURAL at 12:35

## 2019-07-11 RX ADMIN — PROPOFOL 120 MG: 10 INJECTION, EMULSION INTRAVENOUS at 12:35

## 2019-07-11 RX ADMIN — PROPOFOL 120 MCG/KG/MIN: 10 INJECTION, EMULSION INTRAVENOUS at 12:35

## 2019-07-11 RX ADMIN — SODIUM CHLORIDE, POTASSIUM CHLORIDE, SODIUM LACTATE AND CALCIUM CHLORIDE 1000 ML: 600; 310; 30; 20 INJECTION, SOLUTION INTRAVENOUS at 11:17

## 2019-07-11 NOTE — DISCHARGE INSTRUCTIONS
For the next 24 hours patient needs to be with a responsible adult.    For 24 hours DO NOT drive, operate machinery, appliances, drink alcohol, make important decisions or sign legal documents.    Start with a light or bland diet if you are feeling sick to your stomach otherwise advance to regular diet as tolerated.    Call Dr. Mcneil for problems [phone]    Problems may include but not limited to: large amounts of bleeding, trouble breathing, repeated vomiting, severe unrelieved pain, fever or chills.

## 2019-07-11 NOTE — H&P
HPI: Personal history of colon cancer, status post laparoscopic right colectomy 2015    PMH, PSH, MEDS AND ALLERGIES reviewed and reconciled with EPIC    PHYSICAL EXAM:  -  Constitutional:  no acute distress  -  Respiratory:  normal inspiratory effort  -  Cardiovascular: regular rate  -  Gastrointestinal: Soft    ASSESSMENT/PLAN:    Colonoscopy    Yfn Mcneil M.D.

## 2019-07-11 NOTE — OP NOTE
PREOPERATIVE DIAGNOSIS:  Personal history of colon cancer    POSTOPERATIVE DIAGNOSIS AND FINDINGS:  Normal postop anatomy    PROCEDURE:  Colonoscopy to ileocolic anastamosis    SURGEON:  Yfn Mcneil MD    ANESTHESIA:  MAC    SPECIMEN(S):  none    DESCRIPTION:  In decubitus position digital rectal exam was normal. Colonoscope inserted under direct visualization of lumen to ileocolic anastomosis.    Scope slowly withdrawn circumferentially examining all mucosal surfaces.    Quality of bowel preparation good.    There were no mucosal abnormalities.     Tolerated well.    RECOMMENDATION FOR FUTURE SURVEILLANCE:  5 years    Yfn Mcneil M.D.

## 2019-07-11 NOTE — ANESTHESIA POSTPROCEDURE EVALUATION
Patient: Riri Damon    Procedure Summary     Date:  07/11/19 Room / Location:   [facility] /  [facility]    Anesthesia Start:  1236 Anesthesia Stop:  1303    Procedure:  COLONOSCOPY to cecum: (N/A ) Diagnosis:       History of colon cancer      Family history of colon cancer      (History of colon cancer [Z85.038])      (Family history of colon cancer [Z80.0])    Surgeon:  Yfn Mcneil MD Provider:  Rhiannon Corona MD    Anesthesia Type:  MAC ASA Status:  3          Anesthesia Type: MAC  Last vitals  BP   139/74 (07/11/19 1320)   Temp       Pulse   71 (07/11/19 1320)   Resp   18 (07/11/19 1320)     SpO2   97 % (07/11/19 1320)     Post Anesthesia Care and Evaluation    Patient participation: complete - patient cannot participate  Anesthetic complications: No anesthetic complications    Cardiovascular status: acceptable  Hydration status: acceptable    Comments: Patient was not seen prior to discharge.  Per review of chart, no apparent anesthesia complications were noted.  Not Medically Directed

## 2019-07-11 NOTE — ANESTHESIA PREPROCEDURE EVALUATION
Anesthesia Evaluation     no history of anesthetic complications:               Airway   Mallampati: II  TM distance: >3 FB  Neck ROM: full  Dental - normal exam     Pulmonary    (-) shortness of breath, recent URI, not a smoker    ROS comment: Pulmonary hypertension  Cardiovascular     (+) hypertension, valvular problems/murmurs (VSD), dysrhythmias (occ palpitation), PVD, DVT, hyperlipidemia,       Neuro/Psych  (+) dizziness/light headedness (infrequent),     GI/Hepatic/Renal/Endo    (+)   hepatitis, liver disease,   (-) diabetes    Musculoskeletal     Abdominal    Substance History      OB/GYN          Other      history of cancer (colon)                    Anesthesia Plan    ASA 3     MAC     intravenous induction   Anesthetic plan, all risks, benefits, and alternatives have been provided, discussed and informed consent has been obtained with: patient.

## 2019-08-19 RX ORDER — SIMVASTATIN 40 MG
TABLET ORAL
Qty: 90 TABLET | Refills: 0 | Status: SHIPPED | OUTPATIENT
Start: 2019-08-19 | End: 2019-11-12 | Stop reason: SDUPTHER

## 2019-08-19 RX ORDER — LOSARTAN POTASSIUM 25 MG/1
TABLET ORAL
Qty: 90 TABLET | Refills: 0 | Status: SHIPPED | OUTPATIENT
Start: 2019-08-19 | End: 2019-11-12 | Stop reason: SDUPTHER

## 2019-10-18 ENCOUNTER — CLINICAL SUPPORT (OUTPATIENT)
Dept: INTERNAL MEDICINE | Facility: CLINIC | Age: 84
End: 2019-10-18

## 2019-10-18 DIAGNOSIS — Z23 NEED FOR INFLUENZA VACCINATION: Primary | ICD-10-CM

## 2019-10-18 PROCEDURE — 90653 IIV ADJUVANT VACCINE IM: CPT | Performed by: FAMILY MEDICINE

## 2019-10-18 PROCEDURE — G0008 ADMIN INFLUENZA VIRUS VAC: HCPCS | Performed by: FAMILY MEDICINE

## 2019-11-06 ENCOUNTER — TRANSCRIBE ORDERS (OUTPATIENT)
Dept: ADMINISTRATIVE | Facility: HOSPITAL | Age: 84
End: 2019-11-06

## 2019-11-06 DIAGNOSIS — Z12.31 VISIT FOR SCREENING MAMMOGRAM: Primary | ICD-10-CM

## 2019-11-12 ENCOUNTER — OFFICE VISIT (OUTPATIENT)
Dept: INTERNAL MEDICINE | Facility: CLINIC | Age: 84
End: 2019-11-12

## 2019-11-12 ENCOUNTER — HOSPITAL ENCOUNTER (OUTPATIENT)
Dept: GENERAL RADIOLOGY | Facility: HOSPITAL | Age: 84
Discharge: HOME | End: 2019-11-12
Admitting: FAMILY MEDICINE

## 2019-11-12 VITALS
WEIGHT: 143 LBS | SYSTOLIC BLOOD PRESSURE: 122 MMHG | TEMPERATURE: 98.5 F | OXYGEN SATURATION: 97 % | DIASTOLIC BLOOD PRESSURE: 68 MMHG | HEART RATE: 87 BPM | BODY MASS INDEX: 23.8 KG/M2

## 2019-11-12 DIAGNOSIS — M19.90 ARTHRITIS: ICD-10-CM

## 2019-11-12 DIAGNOSIS — M25.512 ACUTE PAIN OF LEFT SHOULDER: ICD-10-CM

## 2019-11-12 DIAGNOSIS — M85.852 OSTEOPENIA OF LEFT HIP: ICD-10-CM

## 2019-11-12 DIAGNOSIS — E78.5 HYPERLIPIDEMIA, UNSPECIFIED HYPERLIPIDEMIA TYPE: ICD-10-CM

## 2019-11-12 DIAGNOSIS — I10 ESSENTIAL HYPERTENSION: ICD-10-CM

## 2019-11-12 DIAGNOSIS — C18.2 MALIGNANT NEOPLASM OF ASCENDING COLON (HCC): ICD-10-CM

## 2019-11-12 DIAGNOSIS — Z00.00 MEDICARE ANNUAL WELLNESS VISIT, SUBSEQUENT: Primary | ICD-10-CM

## 2019-11-12 PROBLEM — C18.6 MALIGNANT NEOPLASM OF DESCENDING COLON: Status: ACTIVE | Noted: 2019-05-06

## 2019-11-12 PROCEDURE — 99214 OFFICE O/P EST MOD 30 MIN: CPT | Performed by: FAMILY MEDICINE

## 2019-11-12 PROCEDURE — 73030 X-RAY EXAM OF SHOULDER: CPT

## 2019-11-12 PROCEDURE — 96160 PT-FOCUSED HLTH RISK ASSMT: CPT | Performed by: FAMILY MEDICINE

## 2019-11-12 PROCEDURE — G0439 PPPS, SUBSEQ VISIT: HCPCS | Performed by: FAMILY MEDICINE

## 2019-11-12 RX ORDER — LOSARTAN POTASSIUM 25 MG/1
25 TABLET ORAL DAILY
Qty: 90 TABLET | Refills: 3 | Status: SHIPPED | OUTPATIENT
Start: 2019-11-12 | End: 2020-06-11

## 2019-11-12 RX ORDER — SIMVASTATIN 40 MG
40 TABLET ORAL NIGHTLY
Qty: 90 TABLET | Refills: 3 | Status: SHIPPED | OUTPATIENT
Start: 2019-11-12 | End: 2020-10-26

## 2019-11-12 RX ORDER — MELOXICAM 7.5 MG/1
7.5 TABLET ORAL DAILY
Qty: 90 TABLET | Refills: 2 | Status: SHIPPED | OUTPATIENT
Start: 2019-11-12 | End: 2020-07-08

## 2019-11-12 RX ORDER — ERYTHROMYCIN 5 MG/G
OINTMENT OPHTHALMIC
COMMUNITY
Start: 2019-11-06 | End: 2020-06-11

## 2019-11-12 NOTE — PROGRESS NOTES
The ABCs of the Annual Wellness Visit  Subsequent Medicare Wellness Visit    Chief Complaint   Patient presents with   • Follow-up     6 month labs   • Medicare Wellness-subsequent   • Med Refill     All medications       Subjective   History of Present Illness:  Riri Damon is a 85 y.o. female who presents for a Subsequent Medicare Wellness Visit.    HEALTH RISK ASSESSMENT    Recent Hospitalizations:  No hospitalization(s) within the last year.    Current Medical Providers:  Patient Care Team:  Phoenix Velazquez MD as PCP - Ion Lopez MD as Consulting Physician (Hematology and Oncology)  Yfn Mcneil MD as Referring Physician (General Surgery)  Asiya Hinton MD as Consulting Physician (Hematology and Oncology)    Smoking Status:  Social History     Tobacco Use   Smoking Status Never Smoker   Smokeless Tobacco Never Used       Alcohol Consumption:  Social History     Substance and Sexual Activity   Alcohol Use No   • Frequency: Never   • Binge frequency: Never       Depression Screen:   PHQ-2/PHQ-9 Depression Screening 5/9/2019   Little interest or pleasure in doing things 0   Feeling down, depressed, or hopeless 0   Total Score 0       Fall Risk Screen:  STEADI Fall Risk Assessment was completed, and patient is at MODERATE risk for falls. Assessment completed on:11/12/2019    Health Habits and Functional and Cognitive Screening:  Functional & Cognitive Status 11/12/2019   Do you have difficulty preparing food and eating? No   Do you have difficulty bathing yourself, getting dressed or grooming yourself? No   Do you have difficulty using the toilet? No   Do you have difficulty moving around from place to place? No   Do you have trouble with steps or getting out of a bed or a chair? No   Current Diet Well Balanced Diet   Dental Exam Up to date   Eye Exam Up to date   Exercise (times per week) 0 times per week   Current Exercise Activities Include None   Do you need help using the  phone?  No   Are you deaf or do you have serious difficulty hearing?  No   Do you need help with transportation? No   Do you need help shopping? No   Do you need help preparing meals?  No   Do you need help with housework?  No   Do you need help with laundry? No   Do you need help taking your medications? No   Do you need help managing money? No   Do you ever drive or ride in a car without wearing a seat belt? No   Have you felt unusual stress, anger or loneliness in the last month? No   Who do you live with? Spouse   If you need help, do you have trouble finding someone available to you? No   Have you been bothered in the last four weeks by sexual problems? No   Do you have difficulty concentrating, remembering or making decisions? No         Does the patient have evidence of cognitive impairment? No    Asprin use counseling:Taking ASA appropriately as indicated    Age-appropriate Screening Schedule:  Refer to the list below for future screening recommendations based on patient's age, sex and/or medical conditions. Orders for these recommended tests are listed in the plan section. The patient has been provided with a written plan.    Health Maintenance   Topic Date Due   • ZOSTER VACCINE (2 of 2) 02/26/2010   • DXA SCAN  06/06/2019   • LIPID PANEL  05/10/2020   • MAMMOGRAM  12/20/2020   • TDAP/TD VACCINES (2 - Td) 10/29/2024   • INFLUENZA VACCINE  Completed   • PNEUMOCOCCAL VACCINES (65+ LOW/MEDIUM RISK)  Completed          The following portions of the patient's history were reviewed and updated as appropriate: allergies, current medications, past family history, past medical history, past social history, past surgical history and problem list.    Outpatient Medications Prior to Visit   Medication Sig Dispense Refill   • aspirin 81 MG EC tablet Take 81 mg by mouth Every Other Day.     • docusate sodium 100 MG capsule Take 100 mg by mouth 2 (Two) Times a Day As Needed for Constipation. 40 capsule 1   • erythromycin  (ROMYCIN) 5 MG/GM ophthalmic ointment      • melatonin 1 MG tablet Take 1 mg by mouth Every Night.     • Multiple Vitamins-Minerals (MULTIVITAMIN ADULT PO) Take 1 tablet by mouth Daily. STOP 1 WEEK PRIOR TO SX     • Omega-3 Fatty Acids (FISH OIL) 1000 MG capsule capsule Take  by mouth Continuous As Needed.     • losartan (COZAAR) 25 MG tablet TAKE 1 TABLET EVERY DAY  (  STOP  LISINOPRIL  ) 90 tablet 0   • meloxicam (MOBIC) 7.5 MG tablet TAKE 1 TABLET EVERY DAY (DOSE DECREASE) 90 tablet 2   • simvastatin (ZOCOR) 40 MG tablet TAKE 1 TABLET EVERY NIGHT 90 tablet 0     No facility-administered medications prior to visit.        Patient Active Problem List   Diagnosis   • Hyperlipidemia   • Low back pain   • Mitral valve insufficiency   • Palpitations   • Knee pain   • Tricuspid valve insufficiency   • Arthritis   • Medicare annual wellness visit, subsequent   • Screening for osteoporosis   • Orthostatic lightheadedness   • Essential hypertension   • OA (osteoarthritis) of knee   • DVT, lower extremity, distal, acute, right (CMS/HCC)   • Ventricular septal defect   • Left anterior fascicular block   • Malignant neoplasm of ascending colon (CMS/HCC)   • Family history of colon cancer   • Osteopenia of left hip   • Acute pain of left shoulder       Advanced Care Planning:  Patient has an advance directive - a copy has not been provided. Have asked the patient to send this to us to add to record    Review of Systems    Compared to one year ago, the patient feels her physical health is worse.  Compared to one year ago, the patient feels her mental health is the same.    Reviewed chart for potential of high risk medication in the elderly: yes  Reviewed chart for potential of harmful drug interactions in the elderly:yes    Objective         Vitals:    11/12/19 1125   BP: 122/68   BP Location: Left arm   Pulse: 87   Temp: 98.5 °F (36.9 °C)   SpO2: 97%   Weight: 64.9 kg (143 lb)       Body mass index is 23.8 kg/m².  Discussed  the patient's BMI with her. The BMI is in the acceptable range.    Physical Exam          Assessment/Plan   Medicare Risks and Personalized Health Plan  CMS Preventative Services Quick Reference  Breast Cancer/Mammogram Screening  Immunizations Discussed/Encouraged (specific immunizations; Shingrix )  Osteoprorosis Risk    The above risks/problems have been discussed with the patient.  Pertinent information has been shared with the patient in the After Visit Summary.  Follow up plans and orders are seen below in the Assessment/Plan Section.    Diagnoses and all orders for this visit:    1. Medicare annual wellness visit, subsequent (Primary)    2. Hyperlipidemia, unspecified hyperlipidemia type  -     Lipid Panel  -     simvastatin (ZOCOR) 40 MG tablet; Take 1 tablet by mouth Every Night.  Dispense: 90 tablet; Refill: 3    3. Essential hypertension  -     losartan (COZAAR) 25 MG tablet; Take 1 tablet by mouth Daily.  Dispense: 90 tablet; Refill: 3    4. Arthritis  -     meloxicam (MOBIC) 7.5 MG tablet; Take 1 tablet by mouth Daily.  Dispense: 90 tablet; Refill: 2    5. Malignant neoplasm of ascending colon (CMS/HCC)    6. Osteopenia of left hip  -     DEXA Bone Density Axial; Future    7. Acute pain of left shoulder  -     XR Shoulder 2+ View Left; Future      Follow Up:  Return in about 6 months (around 5/12/2020), or if symptoms worsen or fail to improve, for Recheck, Next scheduled follow up.     An After Visit Summary and PPPS were given to the patient.         Follow-up management of chronic medical problems

## 2019-11-12 NOTE — PROGRESS NOTES
Riri Damon is a 85 y.o. female.      Assessment/Plan   Problem List Items Addressed This Visit        Cardiovascular and Mediastinum    Hyperlipidemia    Relevant Medications    simvastatin (ZOCOR) 40 MG tablet    Other Relevant Orders    Lipid Panel    Essential hypertension    Relevant Medications    losartan (COZAAR) 25 MG tablet       Digestive    Malignant neoplasm of ascending colon (CMS/HCC)    Overview     Added automatically from request for surgery [id number]            Nervous and Auditory    Acute pain of left shoulder    Relevant Orders    XR Shoulder 2+ View Left       Musculoskeletal and Integument    Arthritis    Relevant Medications    meloxicam (MOBIC) 7.5 MG tablet    Osteopenia of left hip    Relevant Orders    DEXA Bone Density Axial       Other    Medicare annual wellness visit, subsequent - Primary           Follow-up results of x-ray for left shoulder pain continue treatment of pain hypertension arthritis present plan follow-up otherwise as needed or  Return in about 6 months (around 5/12/2020), or if symptoms worsen or fail to improve, for Recheck, Next scheduled follow up.      Chief Complaint   Patient presents with   • Follow-up     6 month labs   • Medicare Wellness-subsequent   • Med Refill     All medications     Social History     Tobacco Use   • Smoking status: Never Smoker   • Smokeless tobacco: Never Used   Substance Use Topics   • Alcohol use: No     Frequency: Never     Binge frequency: Never   • Drug use: Yes     Types: Oxycodone     Comment: caffine use       History of Present Illness   Patient follows up for chronic problems of hypertension hyperlipidemia arthritis osteopenia she is doing fairly well with complaints of left shoulder pain after she fell in her yard 2 days ago she has decreased range of motion no specific radiating quality she has not taken any medication to help with the pain is persistent  The following portions of the patient's history were reviewed and  updated as appropriate:PMHroutine: Social history , Allergies, Current Medications, Active Problem List and Health Maintenance    Review of Systems   Constitutional: Negative.    HENT: Negative.    Eyes: Negative.    Respiratory: Negative.    Cardiovascular: Negative.    Gastrointestinal: Negative.    Genitourinary: Negative.    Musculoskeletal: Positive for arthralgias.   Neurological: Negative.    Hematological: Negative.    Psychiatric/Behavioral: Negative.        Objective   Vitals:    11/12/19 1125   BP: 122/68   BP Location: Left arm   Pulse: 87   Temp: 98.5 °F (36.9 °C)   SpO2: 97%   Weight: 64.9 kg (143 lb)     Body mass index is 23.8 kg/m².  Physical Exam   Constitutional: She appears well-developed and well-nourished.   HENT:   Head: Normocephalic and atraumatic.   Left Ear: External ear normal.   Mouth/Throat: Oropharynx is clear and moist.   Eyes: Conjunctivae are normal. No scleral icterus.   Cardiovascular: Normal rate and regular rhythm.   Pulmonary/Chest: Effort normal and breath sounds normal.   Abdominal: Soft. Bowel sounds are normal. She exhibits no distension.   Musculoskeletal: She exhibits tenderness. She exhibits no deformity.        Left shoulder: She exhibits decreased range of motion, tenderness and bony tenderness.   Nursing note and vitals reviewed.    Reviewed Data:  No visits with results within 1 Month(s) from this visit.   Latest known visit with results is:   Hospital Outpatient Visit on 06/18/2019   Component Date Value Ref Range Status   • Creatinine 06/18/2019 0.50* 0.60 - 1.30 mg/dL Final    Serial Number: [id number]:  [id number]

## 2019-11-13 ENCOUNTER — APPOINTMENT (OUTPATIENT)
Dept: LAB | Facility: HOSPITAL | Age: 84
End: 2019-11-13

## 2019-11-13 ENCOUNTER — HOSPITAL ENCOUNTER (OUTPATIENT)
Dept: BONE DENSITY | Facility: HOSPITAL | Age: 84
Discharge: HOME | End: 2019-11-13
Admitting: FAMILY MEDICINE

## 2019-11-13 DIAGNOSIS — M85.852 OSTEOPENIA OF LEFT HIP: ICD-10-CM

## 2019-11-13 LAB
CHOLEST SERPL-MCNC: 147 MG/DL (ref 0–200)
HDLC SERPL-MCNC: 58 MG/DL (ref 40–60)
LDLC SERPL CALC-MCNC: 73 MG/DL (ref 0–100)
LDLC/HDLC SERPL: 1.27 {RATIO}
TRIGL SERPL-MCNC: 78 MG/DL (ref 0–150)
VLDLC SERPL-MCNC: 15.6 MG/DL (ref 5–40)

## 2019-11-13 PROCEDURE — 80061 LIPID PANEL: CPT | Performed by: FAMILY MEDICINE

## 2019-11-13 PROCEDURE — 77080 DXA BONE DENSITY AXIAL: CPT

## 2019-11-13 PROCEDURE — 36415 COLL VENOUS BLD VENIPUNCTURE: CPT | Performed by: FAMILY MEDICINE

## 2019-11-14 ENCOUNTER — TELEPHONE (OUTPATIENT)
Dept: INTERNAL MEDICINE | Facility: CLINIC | Age: 84
End: 2019-11-14

## 2019-11-14 DIAGNOSIS — M25.512 ACUTE PAIN OF LEFT SHOULDER: Primary | ICD-10-CM

## 2019-11-14 RX ORDER — ALENDRONATE SODIUM 70 MG/1
70 TABLET ORAL
Qty: 13 TABLET | Refills: 2 | Status: SHIPPED | OUTPATIENT
Start: 2019-11-14 | End: 2020-06-11 | Stop reason: SINTOL

## 2019-11-14 NOTE — TELEPHONE ENCOUNTER
Patient called stating that she received her results for her shoulder and wanted to know if a cortisone injection would be just as effective and if she needs to go to PT she would like to go to [facility] in [place].  Please advise.

## 2019-11-15 NOTE — TELEPHONE ENCOUNTER
Because of the tendinitis in the shoulder injection may be helpful would recommend consultation with Dr. Crawley sports medicine

## 2019-11-15 NOTE — TELEPHONE ENCOUNTER
Patient notified and expressed understanding.  Patient stated that she has seen Dr. Caballero (?)  with [facility] and would prefer to see him.  She will call to schedule this appointment.

## 2019-11-26 ENCOUNTER — OFFICE VISIT (OUTPATIENT)
Dept: ONCOLOGY | Facility: CLINIC | Age: 84
End: 2019-11-26

## 2019-11-26 ENCOUNTER — LAB (OUTPATIENT)
Dept: LAB | Facility: HOSPITAL | Age: 84
End: 2019-11-26

## 2019-11-26 VITALS
OXYGEN SATURATION: 95 % | BODY MASS INDEX: 25.36 KG/M2 | DIASTOLIC BLOOD PRESSURE: 85 MMHG | SYSTOLIC BLOOD PRESSURE: 145 MMHG | TEMPERATURE: 98.8 F | HEIGHT: 63 IN | WEIGHT: 143.1 LBS | HEART RATE: 94 BPM | RESPIRATION RATE: 16 BRPM

## 2019-11-26 DIAGNOSIS — R53.82 CHRONIC FATIGUE: ICD-10-CM

## 2019-11-26 DIAGNOSIS — C18.2 MALIGNANT NEOPLASM OF ASCENDING COLON (HCC): Primary | ICD-10-CM

## 2019-11-26 DIAGNOSIS — D75.89 MACROCYTOSIS: ICD-10-CM

## 2019-11-26 DIAGNOSIS — C18.2 MALIGNANT NEOPLASM OF ASCENDING COLON (HCC): ICD-10-CM

## 2019-11-26 LAB
ALBUMIN SERPL-MCNC: 4.3 G/DL (ref 3.5–5.2)
ALBUMIN/GLOB SERPL: 1.6 G/DL (ref 1.1–2.4)
ALP SERPL-CCNC: 58 U/L (ref 38–116)
ALT SERPL W P-5'-P-CCNC: 16 U/L (ref 0–33)
ANION GAP SERPL CALCULATED.3IONS-SCNC: 10.1 MMOL/L (ref 5–15)
AST SERPL-CCNC: 22 U/L (ref 0–32)
BASOPHILS # BLD AUTO: 0.05 10*3/MM3 (ref 0–0.2)
BASOPHILS NFR BLD AUTO: 0.6 % (ref 0–1.5)
BILIRUB SERPL-MCNC: 0.5 MG/DL (ref 0.2–1.2)
BUN BLD-MCNC: 16 MG/DL (ref 6–20)
BUN/CREAT SERPL: 28.6 (ref 7.3–30)
CALCIUM SPEC-SCNC: 9.2 MG/DL (ref 8.5–10.2)
CEA SERPL-MCNC: 3.04 NG/ML
CHLORIDE SERPL-SCNC: 104 MMOL/L (ref 98–107)
CO2 SERPL-SCNC: 26.9 MMOL/L (ref 22–29)
CREAT BLD-MCNC: 0.56 MG/DL (ref 0.6–1.1)
DEPRECATED RDW RBC AUTO: 44.4 FL (ref 37–54)
EOSINOPHIL # BLD AUTO: 0.21 10*3/MM3 (ref 0–0.4)
EOSINOPHIL NFR BLD AUTO: 2.5 % (ref 0.3–6.2)
ERYTHROCYTE [DISTWIDTH] IN BLOOD BY AUTOMATED COUNT: 12.3 % (ref 12.3–15.4)
FOLATE SERPL-MCNC: >20 NG/ML (ref 4.78–24.2)
GFR SERPL CREATININE-BSD FRML MDRD: 103 ML/MIN/1.73
GLOBULIN UR ELPH-MCNC: 2.7 GM/DL (ref 1.8–3.5)
GLUCOSE BLD-MCNC: 106 MG/DL (ref 74–124)
HCT VFR BLD AUTO: 42.6 % (ref 34–46.6)
HGB BLD-MCNC: 14 G/DL (ref 12–15.9)
IMM GRANULOCYTES # BLD AUTO: 0.02 10*3/MM3 (ref 0–0.05)
IMM GRANULOCYTES NFR BLD AUTO: 0.2 % (ref 0–0.5)
LYMPHOCYTES # BLD AUTO: 1.62 10*3/MM3 (ref 0.7–3.1)
LYMPHOCYTES NFR BLD AUTO: 19.4 % (ref 19.6–45.3)
MCH RBC QN AUTO: 32.1 PG (ref 26.6–33)
MCHC RBC AUTO-ENTMCNC: 32.9 G/DL (ref 31.5–35.7)
MCV RBC AUTO: 97.7 FL (ref 79–97)
MONOCYTES # BLD AUTO: 0.9 10*3/MM3 (ref 0.1–0.9)
MONOCYTES NFR BLD AUTO: 10.8 % (ref 5–12)
NEUTROPHILS # BLD AUTO: 5.53 10*3/MM3 (ref 1.7–7)
NEUTROPHILS NFR BLD AUTO: 66.5 % (ref 42.7–76)
NRBC BLD AUTO-RTO: 0 /100 WBC (ref 0–0.2)
PLATELET # BLD AUTO: 216 10*3/MM3 (ref 140–450)
PMV BLD AUTO: 9.9 FL (ref 6–12)
POTASSIUM BLD-SCNC: 4.6 MMOL/L (ref 3.5–4.7)
PROT SERPL-MCNC: 7 G/DL (ref 6.3–8)
RBC # BLD AUTO: 4.36 10*6/MM3 (ref 3.77–5.28)
SODIUM BLD-SCNC: 141 MMOL/L (ref 134–145)
VIT B12 BLD-MCNC: 815 PG/ML (ref 211–946)
WBC NRBC COR # BLD: 8.33 10*3/MM3 (ref 3.4–10.8)

## 2019-11-26 PROCEDURE — 82607 VITAMIN B-12: CPT | Performed by: INTERNAL MEDICINE

## 2019-11-26 PROCEDURE — 82746 ASSAY OF FOLIC ACID SERUM: CPT | Performed by: INTERNAL MEDICINE

## 2019-11-26 PROCEDURE — 80053 COMPREHEN METABOLIC PANEL: CPT

## 2019-11-26 PROCEDURE — 36415 COLL VENOUS BLD VENIPUNCTURE: CPT

## 2019-11-26 PROCEDURE — 99214 OFFICE O/P EST MOD 30 MIN: CPT | Performed by: INTERNAL MEDICINE

## 2019-11-26 PROCEDURE — 82378 CARCINOEMBRYONIC ANTIGEN: CPT | Performed by: INTERNAL MEDICINE

## 2019-11-26 PROCEDURE — 85025 COMPLETE CBC W/AUTO DIFF WBC: CPT

## 2019-11-26 NOTE — PROGRESS NOTES
Subjective     CHIEF COMPLAINT:      Chief Complaint   Patient presents with   • Follow-up     no concerns       HISTORY OF PRESENT ILLNESS:     Riri Damon is a 85 y.o. female patient who returns today for follow up on her colon cancer.  She is accompanied by her family.  She reports feeling well.  She reports intermittent increase in the bowel sounds but she is not experiencing abdominal pain.  No blood in the stool.  No diarrhea.    Patient had a colonoscopy on 7/11/2019.  It showed normal postoperative findings.    REVIEW OF SYSTEMS:  Review of Systems   Constitutional: Positive for fatigue. Negative for chills, fever and unexpected weight change.   HENT: Negative for mouth sores, nosebleeds, sore throat and voice change.    Eyes: Negative for visual disturbance.   Respiratory: Negative for cough and shortness of breath.    Cardiovascular: Positive for palpitations. Negative for chest pain and leg swelling.   Gastrointestinal: Negative for abdominal pain, blood in stool, constipation, diarrhea, nausea and vomiting.   Genitourinary: Negative for dysuria, frequency and hematuria.   Musculoskeletal: Negative for arthralgias, back pain and joint swelling.   Skin: Negative for rash.   Neurological: Negative for dizziness, numbness and headaches.   Hematological: Negative for adenopathy. Bruises/bleeds easily.   Psychiatric/Behavioral: Negative for dysphoric mood. The patient is not nervous/anxious.      I verified the ROS obtained by the MA.      Past Medical History:   Diagnosis Date   • Anxiety    • Colon carcinoma (CMS/HCC) 2015    Stage IIIB, T4N1a; underwent radiation therapy and colon resection by Dr. Mcneil   • Deep vein thrombosis (CMS/HCC) 11/24/2017    right leg   • History of edema     LE   • History of rheumatic fever    • Hyperlipidemia    • Hypertension     MEDS PRN   • Infectious mononucleosis    • Infectious viral hepatitis    • Left anterior fascicular block    • Mitral regurgitation      8/2010: mild per echo   • OA (osteoarthritis)    • Palpitations    • Pulmonary hypertension (CMS/HCC)     8/2010- mild per echo; 8/2012 - normal RVSP per echo   • Tricuspid regurgitation     8/2010: Mild to moderate per echo; 8/2012: Trace to mild per echo   • Venous insufficiency    • Ventricular septal defect     Per echocardiogram 2012   • Vitamin D deficiency        Past Surgical History:   Procedure Laterality Date   • APPENDECTOMY     • COLECTOMY PARTIAL / TOTAL  02/02/2015 2/2015 due to adenocarcinoma   • COLONOSCOPY      JAN 2015   • COLONOSCOPY N/A 5/25/2016    Procedure: COLONOSCOPY to ANASTAMOSIS AND TERMINAL ILEUM;  Surgeon: Yfn Mcneil MD;  Location: [facility] ENDOSCOPY;  Service:    • COLONOSCOPY N/A 7/11/2019    Procedure: COLONOSCOPY to cecum:;  Surgeon: Yfn Mcneil MD;  Location: [facility] ENDOSCOPY;  Service: General   • HYSTERECTOMY     • INTRAOCULAR LENS EXCHANGE Bilateral    • JOINT MANIPULATION Right 1/9/2018    Procedure: MANIPULATION OF RT KNEE;  Surgeon: Andrea Caballero MD;  Location: [facility] MAIN OR;  Service:    • JOINT REPLACEMENT Right 11/09/2017   • KNEE SURGERY Left 2006    ARTHROSCOPY   • OOPHORECTOMY     • SC TOTAL KNEE ARTHROPLASTY Right 11/9/2017    Procedure: RIGHT TOTAL KNEE ARTHROPLASTY;  Surgeon: Andrea Caballero MD;  Location: [facility] MAIN OR;  Service: Orthopedics   • TONSILLECTOMY         Cancer-related family history includes Cancer (age of onset: 60) in her sister.  Social History     Tobacco Use   • Smoking status: Never Smoker   • Smokeless tobacco: Never Used   Substance Use Topics   • Alcohol use: No     Frequency: Never     Binge frequency: Never       MEDICATIONS:    Current Outpatient Medications:   •  alendronate (FOSAMAX) 70 MG tablet, Take 1 tablet by mouth Every 7 (Seven) Days., Disp: 13 tablet, Rfl: 2  •  aspirin 81 MG EC tablet, Take 81 mg by mouth Every Other Day., Disp: , Rfl:   •  docusate sodium 100 MG capsule, Take 100 mg by mouth 2 (Two) Times a  "Day As Needed for Constipation., Disp: 40 capsule, Rfl: 1  •  erythromycin (ROMYCIN) 5 MG/GM ophthalmic ointment, , Disp: , Rfl:   •  losartan (COZAAR) 25 MG tablet, Take 1 tablet by mouth Daily., Disp: 90 tablet, Rfl: 3  •  melatonin 1 MG tablet, Take 1 mg by mouth Every Night., Disp: , Rfl:   •  meloxicam (MOBIC) 7.5 MG tablet, Take 1 tablet by mouth Daily., Disp: 90 tablet, Rfl: 2  •  Multiple Vitamins-Minerals (MULTIVITAMIN ADULT PO), Take 1 tablet by mouth Daily. STOP 1 WEEK PRIOR TO SX, Disp: , Rfl:   •  Omega-3 Fatty Acids (FISH OIL) 1000 MG capsule capsule, Take  by mouth Continuous As Needed., Disp: , Rfl:   •  simvastatin (ZOCOR) 40 MG tablet, Take 1 tablet by mouth Every Night., Disp: 90 tablet, Rfl: 3    ALLERGIES:  No Known Allergies      Objective   VITAL SIGNS:     Vitals:    11/26/19 1101   BP: 145/85   Pulse: 94   Resp: 16   Temp: 98.8 °F (37.1 °C)   TempSrc: Oral   SpO2: 95%   Weight: 64.9 kg (143 lb 1.6 oz)   Height: 159.5 cm (62.8\")   PainSc: 0-No pain     Body mass index is 25.51 kg/m².     Wt Readings from Last 3 Encounters:   11/26/19 64.9 kg (143 lb 1.6 oz)   11/12/19 64.9 kg (143 lb)   07/11/19 61.9 kg (136 lb 6.4 oz)       PHYSICAL EXAMINATION:  GENERAL:  The patient appears in good general condition, not in acute distress.  SKIN: Warm and dry. No skin rashes. No ecchymosis.  HEAD:  Normocephalic.  EYES:  No Jaundice. No Pallor. Pupils equal. EOMI.  NECK:  Supple with Good ROM. No Thyromegaly. No Masses.  LYMPHATICS:  No cervical or supraclavicular lymphadenopathy.  CHEST: Normal respiratory effort. Lungs clear to auscultation.   CARDIAC:  Normal S1 & S2.  Grade 2 systolic murmur. No edema.  ABDOMEN:  Soft. No tenderness. No Hepatomegaly. No Splenomegaly. No masses.  NEUROLOGICAL:  No Focal neurological deficits.         DIAGNOSTIC DATA:     Results from last 7 days   Lab Units 11/26/19  1050   WBC 10*3/mm3 8.33   NEUTROS ABS 10*3/mm3 5.53   HEMOGLOBIN g/dL 14.0   HEMATOCRIT % 42.6 "   PLATELETS 10*3/mm3 216     Results from last 7 days   Lab Units 11/26/19  1050   SODIUM mmol/L 141   POTASSIUM mmol/L 4.6   CHLORIDE mmol/L 104   CO2 mmol/L 26.9   BUN mg/dL 16   CREATININE mg/dL 0.56*   CALCIUM mg/dL 9.2   ALBUMIN g/dL 4.30   BILIRUBIN mg/dL 0.5   ALK PHOS U/L 58   ALT (SGPT) U/L 16   AST (SGOT) U/L 22   GLUCOSE mg/dL 106       Lab Results   Component Value Date    FERRITIN 98.70 07/28/2016    FERRITIN 82.6 04/19/2016    IRON 116 07/28/2016    IRON 122 04/19/2016    TIBC 312 07/28/2016    TIBC 328 04/19/2016     Lab Results   Component Value Date    DRFRXUKZ62 865 06/11/2019         Lab Results   Component Value Date    CEA 3.14 06/11/2019    CEA 3.48 09/06/2017    CEA 3.51 01/20/2017    CEA 3.0 04/19/2016    CEA 6.0 01/30/2015     CT CHEST, ABDOMEN, AND PELVIS WITH IV CONTRAST     HISTORY: 84-year-old female with right hemicolectomy for colon cancer.  Evaluate for metastatic disease.     TECHNIQUE: Radiation dose reduction techniques were utilized, including  automated exposure control and exposure modulation based on body size.   3 mm images were obtained through the chest, abdomen, and pelvis after  the administration of IV contrast. Compared with previous CTs from  02/27/2018.     FINDINGS:  CHEST: There are no suspicious pulmonary opacities or nodules. There are  no pleural or pericardial effusions. There is no lymphadenopathy within  the chest.     ABDOMEN/PELVIS: There is no new or suspicious liver lesion. There is  cholelithiasis without evidence for cholecystitis. There is no acute  abnormality involving the spleen, pancreas, adrenals, and kidneys.  Several tiny renal cysts and a tiny nonobstructing left renal stone  appears stable. There is no acute bowel abnormality. There is no  suspicious nodularity or lymphadenopathy at the ileocolonic anastomosis.  The uterus is surgically absent. Left adnexa appears unremarkable. There  is no significant change in the 2.3 cm right ovarian cyst.  There is no  free fluid or lymphadenopathy.     IMPRESSION:  Stable examination. There is no convincing evidence for  metastatic disease and there is no new abnormality.     This report was finalized on 6/20/2019 4:34 PM by Dr. Daysi Andrade M.D.    Assessment/Plan   1.  Adenocarcinoma of the ascending colon, stage III  · Patient is status post right hemicolectomy on 2/2/2015.  · T4 a N1 a, stage III  · 1 out of 29 lymph nodes were positive.  · The circumferential margin was involved by invasive carcinoma.  · CT scans on 6/18/2019 showed no evidence of recurrence.  · Colonoscopy on 7/11/2019 showed no suspicious findings.  · Exam today shows no evidence of recurrence.  Recommended repeating CT scan in mid June 2028 which would be at the 5-year jordan.    2.  Fatigue.  She has evidence of macrocytosis.  B12 level was normal 6 months ago.    PLAN:    1.  Obtain CEA today.  2.  Repeat B12 level today and obtain folate level.  We will contact her with the results.  3.  Follow-up in June 2020.  We will obtain a follow-up CT scan of the chest abdomen pelvis CBC CMP CEA 1 week prior.  4.  We discussed that after the 5-year jordan, she would not need routine imaging studies.  Patient and the family prefer to have labs monitored every 6 months with physician follow-up visits once a year for continued close monitoring of her cancer.      Asiya Hinton MD  11/26/19

## 2019-11-27 ENCOUNTER — TELEPHONE (OUTPATIENT)
Dept: ONCOLOGY | Facility: CLINIC | Age: 84
End: 2019-11-27

## 2019-11-27 NOTE — TELEPHONE ENCOUNTER
Informed the patient that the tumor marker cea was normal . No vitamin B 12 or folate deficiency. Pt v/u.

## 2019-11-27 NOTE — TELEPHONE ENCOUNTER
Left message for the patient to call the office back to inform her about the labs.    ----- Message from Asiya Hinton MD sent at 11/27/2019  7:59 AM EST -----  Please inform the patient that her tumor marker CEA was normal.  No vitamin B12 or folate deficiency.    Thank you

## 2019-12-23 ENCOUNTER — HOSPITAL ENCOUNTER (OUTPATIENT)
Dept: MAMMOGRAPHY | Facility: HOSPITAL | Age: 84
Discharge: HOME | End: 2019-12-23
Admitting: FAMILY MEDICINE

## 2019-12-23 DIAGNOSIS — Z12.31 VISIT FOR SCREENING MAMMOGRAM: ICD-10-CM

## 2019-12-23 PROCEDURE — 77063 BREAST TOMOSYNTHESIS BI: CPT

## 2019-12-23 PROCEDURE — 77067 SCR MAMMO BI INCL CAD: CPT

## 2020-06-11 ENCOUNTER — OFFICE VISIT (OUTPATIENT)
Dept: INTERNAL MEDICINE | Facility: CLINIC | Age: 85
End: 2020-06-11

## 2020-06-11 VITALS
SYSTOLIC BLOOD PRESSURE: 154 MMHG | OXYGEN SATURATION: 98 % | HEART RATE: 88 BPM | BODY MASS INDEX: 24.79 KG/M2 | WEIGHT: 139.9 LBS | HEIGHT: 63 IN | DIASTOLIC BLOOD PRESSURE: 80 MMHG

## 2020-06-11 DIAGNOSIS — M19.90 ARTHRITIS: ICD-10-CM

## 2020-06-11 DIAGNOSIS — E78.5 HYPERLIPIDEMIA, UNSPECIFIED HYPERLIPIDEMIA TYPE: ICD-10-CM

## 2020-06-11 DIAGNOSIS — F41.9 ANXIETY: ICD-10-CM

## 2020-06-11 DIAGNOSIS — I10 ESSENTIAL HYPERTENSION: Primary | ICD-10-CM

## 2020-06-11 PROBLEM — Z00.00 WELLNESS EXAMINATION: Status: ACTIVE | Noted: 2020-06-11

## 2020-06-11 PROCEDURE — 99214 OFFICE O/P EST MOD 30 MIN: CPT | Performed by: FAMILY MEDICINE

## 2020-06-11 RX ORDER — FOLIC ACID 0.8 MG
1 TABLET ORAL
COMMUNITY
End: 2022-01-27

## 2020-06-11 RX ORDER — DIPHENHYDRAMINE HCL 25 MG
25 TABLET ORAL DAILY PRN
COMMUNITY
End: 2022-01-27

## 2020-06-11 RX ORDER — AMOXICILLIN 500 MG/1
CAPSULE ORAL
COMMUNITY
Start: 2020-03-05 | End: 2020-06-29

## 2020-06-11 NOTE — PROGRESS NOTES
Riri Damon is a 85 y.o. female.      Assessment/Plan   Problem List Items Addressed This Visit        Cardiovascular and Mediastinum    Hyperlipidemia    Essential hypertension - Primary       Musculoskeletal and Integument    Arthritis       Other    Anxiety    Overview     Family and society              Monitor blood pressure goals less than 140/90 continue meloxicam for arthritis occasional use of ibuprofen okay would not recommend daily use discussion of anxiety patient declines medication at this time and consultation with therapist.    Return in about 6 months (around 12/11/2020), or if symptoms worsen or fail to improve, for Recheck, Next scheduled follow up.      Chief Complaint   Patient presents with   • follow up to hypertension   • follow up to hyperlipidemia   • would like to discuss ibuprofen     Social History     Tobacco Use   • Smoking status: Never Smoker   • Smokeless tobacco: Never Used   Substance Use Topics   • Alcohol use: No     Frequency: Never     Binge frequency: Never   • Drug use: Yes     Types: Oxycodone     Comment: caffine use       History of Present Illness   Follow-up ongoing management of chronic medical problems hypertension hyperlipidemia arthritis,  She has no chest pain shortness of breath or abdominal pain she is followed by hematology oncology for colon cancer and labs are pending for Monday.  She has some stressors in her life including her 's memory loss in societal changes in general.  We discussed treatment for anxiety including medication and counseling and she declines and thinks that she can handle the anxiety herself.  The following portions of the patient's history were reviewed and updated as appropriate:PMHroutine: Social history , Allergies, Current Medications, Active Problem List and Health Maintenance    Review of Systems   Constitutional: Negative.    HENT: Negative.    Respiratory: Negative.    Cardiovascular: Negative.    Gastrointestinal:  "Negative.    Genitourinary: Negative.    Musculoskeletal: Negative.    Neurological: Negative.    Psychiatric/Behavioral: The patient is nervous/anxious.        Objective   Vitals:    06/11/20 1355   BP: 154/80   BP Location: Left arm   Patient Position: Sitting   Cuff Size: Adult   Pulse: 88   SpO2: 98%   Weight: 63.5 kg (139 lb 14.4 oz)   Height: 159.5 cm (62.8\")     Body mass index is 24.94 kg/m².  Physical Exam   Constitutional: She is oriented to person, place, and time. She appears well-developed and well-nourished. No distress.   HENT:   Head: Normocephalic and atraumatic.   Eyes: Conjunctivae are normal. No scleral icterus.   Cardiovascular: Normal rate and regular rhythm.   Pulmonary/Chest: Effort normal and breath sounds normal.   Musculoskeletal: She exhibits no edema.   Neurological: She is alert and oriented to person, place, and time.   Skin: She is not diaphoretic.   Psychiatric: She has a normal mood and affect. Her behavior is normal. Judgment and thought content normal.   Nursing note and vitals reviewed.    Reviewed Data:  No visits with results within 1 Month(s) from this visit.   Latest known visit with results is:   Office Visit on 11/26/2019   Component Date Value Ref Range Status   • Folate 11/26/2019 >20.00  4.78 - 24.20 ng/mL Final     "

## 2020-06-15 ENCOUNTER — LAB (OUTPATIENT)
Dept: LAB | Facility: HOSPITAL | Age: 85
End: 2020-06-15

## 2020-06-15 ENCOUNTER — HOSPITAL ENCOUNTER (OUTPATIENT)
Dept: PET IMAGING | Facility: HOSPITAL | Age: 85
Discharge: HOME | End: 2020-06-15
Admitting: INTERNAL MEDICINE

## 2020-06-15 DIAGNOSIS — R53.82 CHRONIC FATIGUE: ICD-10-CM

## 2020-06-15 DIAGNOSIS — C18.2 MALIGNANT NEOPLASM OF ASCENDING COLON (HCC): ICD-10-CM

## 2020-06-15 DIAGNOSIS — D75.89 MACROCYTOSIS: ICD-10-CM

## 2020-06-15 LAB
BASOPHILS # BLD AUTO: 0.05 10*3/MM3 (ref 0–0.2)
BASOPHILS NFR BLD AUTO: 0.7 % (ref 0–1.5)
CEA SERPL-MCNC: 3.61 NG/ML
CREAT BLDA-MCNC: 0.6 MG/DL (ref 0.6–1.3)
DEPRECATED RDW RBC AUTO: 43 FL (ref 37–54)
EOSINOPHIL # BLD AUTO: 0.16 10*3/MM3 (ref 0–0.4)
EOSINOPHIL NFR BLD AUTO: 2.3 % (ref 0.3–6.2)
ERYTHROCYTE [DISTWIDTH] IN BLOOD BY AUTOMATED COUNT: 12 % (ref 12.3–15.4)
FERRITIN SERPL-MCNC: 265.8 NG/ML (ref 13–150)
FOLATE SERPL-MCNC: 19.9 NG/ML (ref 4.78–24.2)
HCT VFR BLD AUTO: 45.6 % (ref 34–46.6)
HGB BLD-MCNC: 15.1 G/DL (ref 12–15.9)
IMM GRANULOCYTES # BLD AUTO: 0.01 10*3/MM3 (ref 0–0.05)
IMM GRANULOCYTES NFR BLD AUTO: 0.1 % (ref 0–0.5)
IRON 24H UR-MRATE: 122 MCG/DL (ref 37–145)
IRON SATN MFR SERPL: 40 % (ref 14–48)
LYMPHOCYTES # BLD AUTO: 1.43 10*3/MM3 (ref 0.7–3.1)
LYMPHOCYTES NFR BLD AUTO: 20.2 % (ref 19.6–45.3)
MCH RBC QN AUTO: 31.9 PG (ref 26.6–33)
MCHC RBC AUTO-ENTMCNC: 33.1 G/DL (ref 31.5–35.7)
MCV RBC AUTO: 96.4 FL (ref 79–97)
MONOCYTES # BLD AUTO: 0.75 10*3/MM3 (ref 0.1–0.9)
MONOCYTES NFR BLD AUTO: 10.6 % (ref 5–12)
NEUTROPHILS # BLD AUTO: 4.68 10*3/MM3 (ref 1.7–7)
NEUTROPHILS NFR BLD AUTO: 66.1 % (ref 42.7–76)
NRBC BLD AUTO-RTO: 0 /100 WBC (ref 0–0.2)
PLATELET # BLD AUTO: 219 10*3/MM3 (ref 140–450)
PMV BLD AUTO: 10.9 FL (ref 6–12)
RBC # BLD AUTO: 4.73 10*6/MM3 (ref 3.77–5.28)
TIBC SERPL-MCNC: 308 MCG/DL (ref 249–505)
TRANSFERRIN SERPL-MCNC: 220 MG/DL (ref 200–360)
VIT B12 BLD-MCNC: 794 PG/ML (ref 211–946)
WBC NRBC COR # BLD: 7.08 10*3/MM3 (ref 3.4–10.8)

## 2020-06-15 PROCEDURE — 85025 COMPLETE CBC W/AUTO DIFF WBC: CPT

## 2020-06-15 PROCEDURE — 82728 ASSAY OF FERRITIN: CPT

## 2020-06-15 PROCEDURE — 74177 CT ABD & PELVIS W/CONTRAST: CPT

## 2020-06-15 PROCEDURE — 82565 ASSAY OF CREATININE: CPT

## 2020-06-15 PROCEDURE — 71260 CT THORAX DX C+: CPT

## 2020-06-15 PROCEDURE — 82378 CARCINOEMBRYONIC ANTIGEN: CPT | Performed by: INTERNAL MEDICINE

## 2020-06-15 PROCEDURE — 82746 ASSAY OF FOLIC ACID SERUM: CPT | Performed by: INTERNAL MEDICINE

## 2020-06-15 PROCEDURE — 25010000002 IOPAMIDOL 61 % SOLUTION: Performed by: INTERNAL MEDICINE

## 2020-06-15 PROCEDURE — 0 DIATRIZOATE MEGLUMINE & SODIUM PER 1 ML: Performed by: INTERNAL MEDICINE

## 2020-06-15 PROCEDURE — 84466 ASSAY OF TRANSFERRIN: CPT

## 2020-06-15 PROCEDURE — 36415 COLL VENOUS BLD VENIPUNCTURE: CPT

## 2020-06-15 PROCEDURE — 83540 ASSAY OF IRON: CPT

## 2020-06-15 PROCEDURE — 82607 VITAMIN B-12: CPT | Performed by: INTERNAL MEDICINE

## 2020-06-15 RX ADMIN — DIATRIZOATE MEGLUMINE AND DIATRIZOATE SODIUM 30 ML: 660; 100 LIQUID ORAL; RECTAL at 10:34

## 2020-06-15 RX ADMIN — IOPAMIDOL 85 ML: 612 INJECTION, SOLUTION INTRAVENOUS at 10:40

## 2020-06-19 ENCOUNTER — OFFICE VISIT (OUTPATIENT)
Dept: ONCOLOGY | Facility: CLINIC | Age: 85
End: 2020-06-19

## 2020-06-19 ENCOUNTER — APPOINTMENT (OUTPATIENT)
Dept: LAB | Facility: HOSPITAL | Age: 85
End: 2020-06-19

## 2020-06-19 VITALS
DIASTOLIC BLOOD PRESSURE: 89 MMHG | WEIGHT: 139.6 LBS | OXYGEN SATURATION: 94 % | RESPIRATION RATE: 14 BRPM | SYSTOLIC BLOOD PRESSURE: 162 MMHG | TEMPERATURE: 98.2 F | BODY MASS INDEX: 24.73 KG/M2 | HEART RATE: 93 BPM | HEIGHT: 63 IN

## 2020-06-19 DIAGNOSIS — C18.2 MALIGNANT NEOPLASM OF ASCENDING COLON (HCC): Primary | ICD-10-CM

## 2020-06-19 DIAGNOSIS — N20.0 KIDNEY STONE ON LEFT SIDE: ICD-10-CM

## 2020-06-19 DIAGNOSIS — N83.201 RIGHT OVARIAN CYST: ICD-10-CM

## 2020-06-19 DIAGNOSIS — D75.89 MACROCYTOSIS: ICD-10-CM

## 2020-06-19 DIAGNOSIS — K80.20 GALL BLADDER STONES: ICD-10-CM

## 2020-06-19 PROCEDURE — 99214 OFFICE O/P EST MOD 30 MIN: CPT | Performed by: INTERNAL MEDICINE

## 2020-06-19 NOTE — PROGRESS NOTES
Subjective     CHIEF COMPLAINT:      Chief Complaint   Patient presents with   • Follow-up     no concerns       HISTORY OF PRESENT ILLNESS:     Riri Damon is a 85 y.o. female patient who returns today for follow up on her colon cancer.  She returns today for follow-up reporting no new symptoms.  No lower abdominal pain.  No change in the bowel movement or blood in the stool.    She reports rare episodes of epigastric pain, not related to eating.    Patient has no prior history of kidney stones.    REVIEW OF SYSTEMS:  Review of Systems   Constitutional: Negative for fatigue, fever and unexpected weight change.   HENT: Negative for nosebleeds and voice change.    Eyes: Negative for visual disturbance.   Respiratory: Negative for cough and shortness of breath.    Cardiovascular: Negative for chest pain and leg swelling.   Gastrointestinal: Negative for abdominal pain, blood in stool, constipation, diarrhea, nausea and vomiting.   Genitourinary: Negative for frequency and hematuria.   Musculoskeletal: Positive for joint swelling. Negative for back pain.   Skin: Negative for rash.   Neurological: Negative for dizziness and headaches.   Hematological: Negative for adenopathy. Bruises/bleeds easily.   Psychiatric/Behavioral: Negative for dysphoric mood. The patient is not nervous/anxious.      I reviewed and verified the CC and ROS obtained by the MA.     Past Medical History:   Diagnosis Date   • Anxiety    • Colon carcinoma (CMS/HCC) 2015    Stage IIIB, T4N1a; underwent radiation therapy and colon resection by Dr. Mcneil   • Deep vein thrombosis (CMS/HCC) 11/24/2017    right leg   • History of edema     LE   • History of rheumatic fever    • Hx of radiation therapy 2015    for adenocarcinoma of the colon Stage IIIB, T4N1a   • Hyperlipidemia    • Hypertension     MEDS PRN   • Infectious mononucleosis    • Infectious viral hepatitis    • Left anterior fascicular block    • Mitral regurgitation     8/2010: mild per  echo   • OA (osteoarthritis)    • Palpitations    • Pulmonary hypertension (CMS/HCC)     8/2010- mild per echo; 8/2012 - normal RVSP per echo   • Tricuspid regurgitation     8/2010: Mild to moderate per echo; 8/2012: Trace to mild per echo   • Venous insufficiency    • Ventricular septal defect     Per echocardiogram 2012   • Vitamin D deficiency        Past Surgical History:   Procedure Laterality Date   • APPENDECTOMY     • COLECTOMY PARTIAL / TOTAL  02/02/2015 2/2015 due to adenocarcinoma   • COLONOSCOPY      JAN 2015   • COLONOSCOPY N/A 5/25/2016    Procedure: COLONOSCOPY to ANASTAMOSIS AND TERMINAL ILEUM;  Surgeon: Yfn Mcneil MD;  Location[facility] ENDOSCOPY;  Service:    • COLONOSCOPY N/A 7/11/2019    Procedure: COLONOSCOPY to cecum:;  Surgeon: Yfn Mcneil MD;  Location: [facility] ENDOSCOPY;  Service: General   • HYSTERECTOMY     • INTRAOCULAR LENS EXCHANGE Bilateral    • JOINT MANIPULATION Right 1/9/2018    Procedure: MANIPULATION OF RT KNEE;  Surgeon: Andrea Caballero MD;  Location: [facility] MAIN OR;  Service:    • JOINT REPLACEMENT Right 11/09/2017   • KNEE SURGERY Left 2006    ARTHROSCOPY   • OOPHORECTOMY     • CT TOTAL KNEE ARTHROPLASTY Right 11/9/2017    Procedure: RIGHT TOTAL KNEE ARTHROPLASTY;  Surgeon: Andrea Caballero MD;  Location: [facility] MAIN OR;  Service: Orthopedics   • TONSILLECTOMY         Cancer-related family history includes Cancer (age of onset: 60) in her sister.  Social History     Tobacco Use   • Smoking status: Never Smoker   • Smokeless tobacco: Never Used   Substance Use Topics   • Alcohol use: No     Frequency: Never     Binge frequency: Never       MEDICATIONS:    Current Outpatient Medications:   •  amoxicillin (AMOXIL) 500 MG capsule, TAKE 4 CAPSULES BY MOUTH 1 HOUR PRIOR TO APPOINTMENT, Disp: , Rfl:   •  aspirin 81 MG EC tablet, Take 81 mg by mouth Every Other Day., Disp: , Rfl:   •  calcium carbonate-cholecalciferol 500-400 MG-UNIT tablet tablet, Take 1 tablet by  "mouth., Disp: , Rfl:   •  diphenhydrAMINE (BENADRYL) 25 MG tablet, Take 25 mg by mouth Daily As Needed for Allergies., Disp: , Rfl:   •  docusate sodium 100 MG capsule, Take 100 mg by mouth 2 (Two) Times a Day As Needed for Constipation., Disp: 40 capsule, Rfl: 1  •  Magnesium 500 MG capsule, Take 1 capsule by mouth Daily., Disp: , Rfl:   •  melatonin 1 MG tablet, Take 1 mg by mouth Every Night., Disp: , Rfl:   •  meloxicam (MOBIC) 7.5 MG tablet, Take 1 tablet by mouth Daily., Disp: 90 tablet, Rfl: 2  •  Multiple Vitamins-Minerals (MULTIVITAMIN ADULT PO), Take 1 tablet by mouth Daily. STOP 1 WEEK PRIOR TO SX, Disp: , Rfl:   •  simvastatin (ZOCOR) 40 MG tablet, Take 1 tablet by mouth Every Night., Disp: 90 tablet, Rfl: 3    ALLERGIES:  No Known Allergies      Objective   VITAL SIGNS:     Vitals:    06/19/20 1111   BP: 162/89   Pulse: 93   Resp: 14   Temp: 98.2 °F (36.8 °C)   TempSrc: Temporal   SpO2: 94%   Weight: 63.3 kg (139 lb 9.6 oz)   Height: 159.5 cm (62.8\")   PainSc: 0-No pain     Body mass index is 24.89 kg/m².     Wt Readings from Last 3 Encounters:   06/19/20 63.3 kg (139 lb 9.6 oz)   06/11/20 63.5 kg (139 lb 14.4 oz)   11/26/19 64.9 kg (143 lb 1.6 oz)       PHYSICAL EXAMINATION:  GENERAL:  The patient appears in good general condition, not in acute distress.  SKIN: No skin rashes. No ecchymosis.  HEAD:  Normocephalic.  EYES:  No Jaundice. No Pallor. Pupils equal. EOMI.  NECK:  Supple with Good ROM. No Thyromegaly. No Masses.  LYMPHATICS:  No cervical or supraclavicular lymphadenopathy.  CHEST: Normal respiratory effort. Lungs clear to auscultation.   CARDIAC:  Normal S1 & S2. No murmur. No edema.  ABDOMEN:  Soft.  Epigastric tenderness. No Hepatomegaly. No Splenomegaly. No masses.  Patient does not have Shah sign.  EXTREMITIES:  No clubbing. No deforming arthritis in the hands.   NEUROLOGICAL:  No Focal neurological deficits.       DIAGNOSTIC DATA:     Results from last 7 days   Lab Units 06/15/20  1035 "   WBC 10*3/mm3 7.08   NEUTROS ABS 10*3/mm3 4.68   HEMOGLOBIN g/dL 15.1   HEMATOCRIT % 45.6   PLATELETS 10*3/mm3 219     Results from last 7 days   Lab Units 06/15/20  1044 06/15/20  1035   CREATININE mg/dL 0.60  --    FERRITIN ng/mL  --  265.80*   IRON mcg/dL  --  122   TIBC mcg/dL  --  308     Component      Latest Ref Rng & Units 9/6/2017 6/11/2019 11/26/2019 6/15/2020   CEA      ng/mL 3.48 3.14 3.04 3.61     CT CHEST, ABDOMEN AND PELVIS WITH CONTRAST 6/15/2020:     HISTORY: 85-year-old female with prior right hemicolectomy for colon  cancer. For restaging     TECHNIQUE: Axial CT images of the chest abdomen and pelvis were obtained  following administration of intravenous and oral contrast. Coronal and  sagittal reconstructions were then obtained.     COMPARISON: 06/18/2019     FINDINGS:     CT CHEST: The thyroid gland is normal. No pleural or pericardial  effusion. No pathological mediastinal lymphadenopathy. The central  airways are patent without endobronchial lesion. No pulmonary nodules or  focal airspace disease is present. No pathological axillary  lymphadenopathy. Degenerative disc disease is seen in the spine.     CT ABDOMEN AND PELVIS:Liver demonstrates normal attenuation. Stable  low-density lesion seen within the right hepatic lobe on image 16. No  new hepatic lesion or intrahepatic biliary dilatation is seen. The  spleen, pancreas is normal. Cholelithiasis is present. Bilateral adrenal  glands are normal. Both kidneys are normal in size and attenuation.  Small cortical hypoattenuating lesions bilaterally are too small to  accurately characterize however favored to represent cysts. There is a 4  mm calculus seen within the midpole of the left kidney. The urinary  bladder is partially distended and normal. The uterus is not identified  and is surgically absent. There is a 2.4 cm right ovarian cyst which is  also been delineated previously. There are changes from prior right  hemicolectomy with an  ileocolonic anastomosis that appear unchanged. No  evidence of bowel obstruction. No pathological retroperitoneal  lymphadenopathy. Moderate calcified atherosclerotic plaque is seen  within the abdominal aorta and its branches. Degenerative disc disease  with vacuum disc phenomena.     IMPRESSION:  1. Stable exam. There is no new convincing evidence of metastatic  disease within the chest, abdomen and pelvis.  2. Cholelithiasis.  3. A 2.4 cm right ovarian cyst. This should be followed up annually with  pelvic sonogram.  4. Nonobstructing left renal calculus.     Radiation dose reduction techniques were utilized, including automated  exposure control and exposure modulation based on body size.     This report was finalized on 6/16/2020 1:32 PM by Dr. Samanta Benoit M.D    Assessment/Plan   1.  Adenocarcinoma of the ascending colon, stage III  · Patient is status post right hemicolectomy on 2/2/2015.  · T4 a N1 a, stage III  · 1 out of 29 lymph nodes were positive.  · The circumferential margin was involved by invasive carcinoma.  · CT scans on 6/18/2019 showed no evidence of recurrence.  · Colonoscopy on 7/11/2019 showed no suspicious findings.  · CT on 6/15/2020 showed no evidence of recurrence.  · CEA is at 3.61 and slightly increased from her baseline.    2.  Right ovarian cyst.  The radiologist reported this to be stable compared to previous scan.  He recommended annual follow-up with pelvic ultrasound.  The patient however does not have a gynecologist.    3.  Left kidney stone.  Patient is asymptomatic.  She has no prior history of kidney stones.    4.  Gallbladder stones.  Patient does not have history of gallbladder disease.  She reports rare epigastric pain though not related to eating.  It does not appear that the gallbladder stones are resulting in symptoms.    5.  Macrocytosis.  We obtained B12 and folate levels and there was no evidence of deficiency.  MCV improved today to 96.4.      PLAN:    1.  We  will continue to monitor.  I recommended repeat CT scan in 1 year.  This will be done instead of a pelvic ultrasound for follow-up on the patient's right ovarian cyst.  2.  We discussed today symptoms related to gallbladder disease and passing of kidney stones.  She will notify her PCP or go to ER if she develops symptoms related to kidney stones.  3.  Follow-up in 6 months with CBC CMP CEA.        Asiya Hinton MD  06/19/20

## 2020-06-29 ENCOUNTER — OFFICE VISIT (OUTPATIENT)
Dept: CARDIOLOGY | Facility: CLINIC | Age: 85
End: 2020-06-29

## 2020-06-29 VITALS
SYSTOLIC BLOOD PRESSURE: 160 MMHG | WEIGHT: 138.6 LBS | DIASTOLIC BLOOD PRESSURE: 80 MMHG | HEIGHT: 63 IN | HEART RATE: 77 BPM | BODY MASS INDEX: 24.56 KG/M2

## 2020-06-29 DIAGNOSIS — I10 ESSENTIAL HYPERTENSION: Primary | ICD-10-CM

## 2020-06-29 DIAGNOSIS — E78.2 MIXED HYPERLIPIDEMIA: ICD-10-CM

## 2020-06-29 DIAGNOSIS — Q21.0 VENTRICULAR SEPTAL DEFECT: ICD-10-CM

## 2020-06-29 DIAGNOSIS — I05.1 RHEUMATIC MITRAL REGURGITATION: ICD-10-CM

## 2020-06-29 DIAGNOSIS — R00.2 PALPITATIONS: ICD-10-CM

## 2020-06-29 DIAGNOSIS — I07.1 RHEUMATIC TRICUSPID VALVE REGURGITATION: ICD-10-CM

## 2020-06-29 DIAGNOSIS — I44.4 LEFT ANTERIOR FASCICULAR BLOCK: ICD-10-CM

## 2020-06-29 PROBLEM — I82.4Z1 DVT, LOWER EXTREMITY, DISTAL, ACUTE, RIGHT (HCC): Status: RESOLVED | Noted: 2017-12-12 | Resolved: 2020-06-29

## 2020-06-29 PROCEDURE — 99214 OFFICE O/P EST MOD 30 MIN: CPT | Performed by: NURSE PRACTITIONER

## 2020-06-29 PROCEDURE — 93000 ELECTROCARDIOGRAM COMPLETE: CPT | Performed by: NURSE PRACTITIONER

## 2020-06-29 NOTE — PROGRESS NOTES
Date of Office Visit: 2020  Encounter Provider: PADMINI Ayers  Place of Service: [facility] CARDIOLOGY  Patient Name: Riri Damon  :1934   Primary Cardiologist: Dr. Rowan Serrano    Chief Complaint   Patient presents with   • Follow-up   • Annual Exam   :     HPI: Riri Damon is a 85 y.o. female who presents today for annual cardiac follow-up.      She was diagnosed with rheumatic fever as a child and infectious mononucleosis. In , an echocardiogram completed with the following results: EF of 60%, trace to mild tricuspid insufficiency, and small perimembranous ventricular septal defect.  She also has a history of chronic lower extremity edema secondary to venous insufficiency and hypertension.     She has reported occasional palpitations in the past and declined Holter monitoring in the past.  In 2019, she followed up with Dr. Serrano in the office and was stable at that time.  She was noted to have a left anterior fascicular block on EKG.     She presents today for follow-up.  She reports occasional palpitations in which she feels a skipped heartbeat, but never sustained.  She reports lower extremity edema which is chronic and unchanged. She denies chest pain, shortness of air, PND, orthopnea, dizziness, or syncope.  She continues to experience mild chronic lower extremity edema.  Her blood pressure at home runs from 90/50s-120/70s.  She stopped her lisinopril because she felt that her blood pressure was too low.  Her blood pressure is elevated today.      I reviewed her blood work from 6/15/2020: CBC showed normal hemoglobin and hematocrit.  Creatinine normal.    Past Medical History:   Diagnosis Date   • Anxiety    • Colon carcinoma (CMS/HCC)     Stage IIIB, T4N1a; underwent radiation therapy and colon resection by Dr. Mcneil   • Deep vein thrombosis (CMS/HCC) 2017    right leg   • History of edema     LE   • History of  rheumatic fever    • Hx of radiation therapy 2015    for adenocarcinoma of the colon Stage IIIB, T4N1a   • Hyperlipidemia    • Hypertension     MEDS PRN   • Infectious mononucleosis    • Infectious viral hepatitis    • Left anterior fascicular block    • Mitral regurgitation     8/2010: mild per echo   • OA (osteoarthritis)    • Palpitations    • Pulmonary hypertension (CMS/HCC)     8/2010- mild per echo; 8/2012 - normal RVSP per echo   • Tricuspid regurgitation     8/2010: Mild to moderate per echo; 8/2012: Trace to mild per echo   • Venous insufficiency    • Ventricular septal defect     Per echocardiogram 2012   • Vitamin D deficiency        Past Surgical History:   Procedure Laterality Date   • APPENDECTOMY     • COLECTOMY PARTIAL / TOTAL  02/02/2015 2/2015 due to adenocarcinoma   • COLONOSCOPY      JAN 2015   • COLONOSCOPY N/A 5/25/2016    Procedure: COLONOSCOPY to ANASTAMOSIS AND TERMINAL ILEUM;  Surgeon: Yfn Mcneil MD;  Location: [facility] ENDOSCOPY;  Service:    • COLONOSCOPY N/A 7/11/2019    Procedure: COLONOSCOPY to cecum:;  Surgeon: Yfn Mcneil MD;  Location: [facility] ENDOSCOPY;  Service: General   • HYSTERECTOMY     • INTRAOCULAR LENS EXCHANGE Bilateral    • JOINT MANIPULATION Right 1/9/2018    Procedure: MANIPULATION OF RT KNEE;  Surgeon: Andrea Caballero MD;  Location: [facility] OR;  Service:    • JOINT REPLACEMENT Right 11/09/2017   • KNEE SURGERY Left 2006    ARTHROSCOPY   • OOPHORECTOMY     • [place] TOTAL KNEE ARTHROPLASTY Right 11/9/2017    Procedure: RIGHT TOTAL KNEE ARTHROPLASTY;  Surgeon: Andrea Caballero MD;  Location: [facility] OR;  Service: Orthopedics   • TONSILLECTOMY         Social History     Social History   • Marital status:      Spouse name: Marcus   • Number of children: 3   • Years of education: College     Occupational History   •  [employer]   •  Retired     Social History Main Topics   • Smoking status: Never Smoker   • Smokeless tobacco: Never  Used   • Alcohol use No   • Drug use: Yes     Types: Oxycodone      Comment: caffine use   • Sexual activity: Defer       Family History   Problem Relation Age of Onset   • Alzheimer's disease Mother    • Heart disease Mother    • Heart attack Father    • Heart disease Father    • Heart disease Other    • Cancer Sister 60        Colon   • Malig Hyperthermia Neg Hx        Review of Systems   Constitution: Negative for chills, diaphoresis, fever, malaise/fatigue, night sweats, weight gain and weight loss.   HENT: Negative for hearing loss, nosebleeds, sore throat and tinnitus.    Eyes: Negative for blurred vision, double vision, pain and visual disturbance.   Cardiovascular: Positive for leg swelling and palpitations. Negative for chest pain, claudication, cyanosis, dyspnea on exertion, irregular heartbeat, near-syncope, orthopnea, paroxysmal nocturnal dyspnea and syncope.   Respiratory: Negative for cough, hemoptysis, shortness of breath, snoring and wheezing.    Endocrine: Negative for cold intolerance, heat intolerance and polyuria.   Hematologic/Lymphatic: Negative for bleeding problem. Does not bruise/bleed easily.   Skin: Negative for color change, dry skin, flushing and itching.   Musculoskeletal: Positive for joint pain. Negative for falls, joint swelling, muscle cramps, muscle weakness and myalgias.   Gastrointestinal: Negative for abdominal pain, constipation, heartburn, melena, nausea and vomiting.   Genitourinary: Negative for dysuria and hematuria.   Neurological: Negative for excessive daytime sleepiness, dizziness, light-headedness, loss of balance, numbness, paresthesias, seizures and vertigo.   Psychiatric/Behavioral: Negative for altered mental status, depression, memory loss and substance abuse. The patient does not have insomnia and is not nervous/anxious.    Allergic/Immunologic: Negative for environmental allergies.       No Known Allergies      Current Outpatient Medications:   •  aspirin 81 MG  "EC tablet, Take 81 mg by mouth Every Other Day., Disp: , Rfl:   •  calcium carbonate-cholecalciferol 500-400 MG-UNIT tablet tablet, Take 1 tablet by mouth., Disp: , Rfl:   •  diphenhydrAMINE (BENADRYL) 25 MG tablet, Take 25 mg by mouth Daily As Needed for Allergies., Disp: , Rfl:   •  docusate sodium 100 MG capsule, Take 100 mg by mouth 2 (Two) Times a Day As Needed for Constipation., Disp: 40 capsule, Rfl: 1  •  Magnesium 500 MG capsule, Take 1 capsule by mouth Daily., Disp: , Rfl:   •  melatonin 1 MG tablet, Take 1 mg by mouth Every Night., Disp: , Rfl:   •  meloxicam (MOBIC) 7.5 MG tablet, Take 1 tablet by mouth Daily., Disp: 90 tablet, Rfl: 2  •  Multiple Vitamins-Minerals (MULTIVITAMIN ADULT PO), Take 1 tablet by mouth Daily. STOP 1 WEEK PRIOR TO SX, Disp: , Rfl:   •  simvastatin (ZOCOR) 40 MG tablet, Take 1 tablet by mouth Every Night., Disp: 90 tablet, Rfl: 3      Objective:     Vitals:    06/29/20 1505 06/29/20 1506   BP: 160/90 160/80   BP Location: Left arm Right arm   Pulse: 77    Weight: 62.9 kg (138 lb 9.6 oz)    Height: 160 cm (63\")      Body mass index is 24.55 kg/m².    PHYSICAL EXAM:    Vitals Reviewed.   General Appearance: No acute distress, well developed and well nourished.   Eyes: Conjunctiva and lids: No erythema, swelling, or discharge. Sclera non-icteric.   HENT: Atraumatic, normocephalic. External eyes, ears, and nose normal. No hearing loss noted. Mucous membranes normal. Lips not cyanotic. Neck supple with no tenderness.  Respiratory: No signs of respiratory distress. Respiration rhythm and depth normal.   Clear to auscultation. No rales, crackles, rhonchi, or wheezing auscultated.   Cardiovascular:  Jugular Venous Pressure: Normal  Heart Rate and Rhythm: Normal, Heart Sounds: Normal S1 and S2. No S3 or S4 noted.  Murmurs: No murmurs noted. No rubs, thrills, or gallops.   Arterial Pulses: Carotid pulses normal. No carotid bruit noted.   Lower Extremities: Trace lower extremity edema " noted.  Gastrointestinal:  Abdomen soft, non-distended, non-tender. Normal bowel sounds. No hepatomegaly.   Musculoskeletal: Normal movement of extremities  Skin and Nails: General appearance normal. No pallor, cyanosis, diaphoresis. Skin temperature normal. No clubbing of fingernails.   Psychiatric: Patient alert and oriented to person, place, and time. Speech and behavior appropriate. Normal mood and affect.       ECG 12 Lead  Date/Time: 6/29/2020 3:13 PM  Performed by: Sue Marin APRN  Authorized by: Sue Marin APRN   Comparison: compared with previous ECG from 6/19/2019  Similar to previous ECG  Rhythm: sinus rhythm  Rate: normal  BPM: 77  Conduction: left anterior fascicular block  ST Segments: ST segments normal  T Waves: T waves normal  QRS axis: normal  Other findings: poor R wave progression    Clinical impression: abnormal EKG              Assessment:       Diagnosis Plan   1. Essential hypertension     2. Left anterior fascicular block     3. Rheumatic tricuspid valve regurgitation     4. Rheumatic mitral regurgitation     5. Ventricular septal defect     6. Palpitations     7. Mixed hyperlipidemia            Plan:       1.  Hypertension: Blood pressure is elevated today. She says her BP runs low at home and she discontinued her lisinopril on her own. I have recommended that she her BP machine double checked for accuracy. She wants to have this done at her PCP office.      2.  Left Anterior Fascicular Block: Chronic and unchanged.    3/4.  Tricuspid and Mitral Valve Regurgitation: Rheumatic fever as a child. Her last echocardiogram in August 2012 revealed trace to mild tricuspid insufficiency and mitral insufficiency was not noted at that time.    5.  Ventricular Septal Defect: Noted on echocardiogram and remains on aspirin 81 mg daily.    6.  Palpitations: On a very rare occasion she'll notice a brief palpitation which she feels her heartbeat skipping. I offered her a monitor and she  declined once again. We discussed starting low dose beta blocker and she declined. If her palpitations happen more often or she changes her mind, I asked her to contact the office.     7. Hyperlipidemia: Remains on simvastatin.     8.  I recommended follow-up with Dr. Roawn Serrano in one year, unless otherwise needed sooner.    As always, it has been a pleasure to participate in your patient's care.      Sincerely,         PADMINI Hansen

## 2020-07-08 DIAGNOSIS — M19.90 ARTHRITIS: ICD-10-CM

## 2020-07-08 RX ORDER — MELOXICAM 7.5 MG/1
TABLET ORAL
Qty: 90 TABLET | Refills: 2 | Status: SHIPPED | OUTPATIENT
Start: 2020-07-08 | End: 2020-12-11 | Stop reason: SDUPTHER

## 2020-10-06 ENCOUNTER — CLINICAL SUPPORT (OUTPATIENT)
Dept: INTERNAL MEDICINE | Facility: CLINIC | Age: 85
End: 2020-10-06

## 2020-10-06 DIAGNOSIS — Z23 NEED FOR INFLUENZA VACCINATION: Primary | ICD-10-CM

## 2020-10-06 PROCEDURE — G0008 ADMIN INFLUENZA VIRUS VAC: HCPCS | Performed by: FAMILY MEDICINE

## 2020-10-06 PROCEDURE — 90694 VACC AIIV4 NO PRSRV 0.5ML IM: CPT | Performed by: FAMILY MEDICINE

## 2020-10-23 DIAGNOSIS — E78.5 HYPERLIPIDEMIA, UNSPECIFIED HYPERLIPIDEMIA TYPE: ICD-10-CM

## 2020-10-26 RX ORDER — LOSARTAN POTASSIUM 25 MG/1
TABLET ORAL
Qty: 90 TABLET | Refills: 3 | Status: SHIPPED | OUTPATIENT
Start: 2020-10-26 | End: 2021-06-11 | Stop reason: SDUPTHER

## 2020-10-26 RX ORDER — SIMVASTATIN 40 MG
40 TABLET ORAL NIGHTLY
Qty: 90 TABLET | Refills: 3 | Status: SHIPPED | OUTPATIENT
Start: 2020-10-26 | End: 2021-06-11 | Stop reason: SDUPTHER

## 2020-11-10 ENCOUNTER — TRANSCRIBE ORDERS (OUTPATIENT)
Dept: ADMINISTRATIVE | Facility: HOSPITAL | Age: 85
End: 2020-11-10

## 2020-11-10 DIAGNOSIS — Z12.31 VISIT FOR SCREENING MAMMOGRAM: Primary | ICD-10-CM

## 2020-12-10 ENCOUNTER — OFFICE VISIT (OUTPATIENT)
Dept: ONCOLOGY | Facility: CLINIC | Age: 85
End: 2020-12-10

## 2020-12-10 ENCOUNTER — LAB (OUTPATIENT)
Dept: LAB | Facility: HOSPITAL | Age: 85
End: 2020-12-10

## 2020-12-10 VITALS
OXYGEN SATURATION: 95 % | WEIGHT: 142 LBS | TEMPERATURE: 98.2 F | BODY MASS INDEX: 25.16 KG/M2 | HEART RATE: 79 BPM | DIASTOLIC BLOOD PRESSURE: 90 MMHG | RESPIRATION RATE: 16 BRPM | HEIGHT: 63 IN | SYSTOLIC BLOOD PRESSURE: 173 MMHG

## 2020-12-10 DIAGNOSIS — D75.89 MACROCYTOSIS: ICD-10-CM

## 2020-12-10 DIAGNOSIS — N83.201 RIGHT OVARIAN CYST: ICD-10-CM

## 2020-12-10 DIAGNOSIS — C18.2 MALIGNANT NEOPLASM OF ASCENDING COLON (HCC): Primary | ICD-10-CM

## 2020-12-10 DIAGNOSIS — C18.2 MALIGNANT NEOPLASM OF ASCENDING COLON (HCC): ICD-10-CM

## 2020-12-10 LAB
ALBUMIN SERPL-MCNC: 4.3 G/DL (ref 3.5–5.2)
ALBUMIN/GLOB SERPL: 1.7 G/DL (ref 1.1–2.4)
ALP SERPL-CCNC: 63 U/L (ref 38–116)
ALT SERPL W P-5'-P-CCNC: 17 U/L (ref 0–33)
ANION GAP SERPL CALCULATED.3IONS-SCNC: 8.6 MMOL/L (ref 5–15)
AST SERPL-CCNC: 22 U/L (ref 0–32)
BASOPHILS # BLD AUTO: 0.04 10*3/MM3 (ref 0–0.2)
BASOPHILS NFR BLD AUTO: 0.5 % (ref 0–1.5)
BILIRUB SERPL-MCNC: 0.3 MG/DL (ref 0.2–1.2)
BUN SERPL-MCNC: 17 MG/DL (ref 6–20)
BUN/CREAT SERPL: 31.5 (ref 7.3–30)
CALCIUM SPEC-SCNC: 9.5 MG/DL (ref 8.5–10.2)
CEA SERPL-MCNC: 3.04 NG/ML
CHLORIDE SERPL-SCNC: 104 MMOL/L (ref 98–107)
CO2 SERPL-SCNC: 27.4 MMOL/L (ref 22–29)
CREAT SERPL-MCNC: 0.54 MG/DL (ref 0.6–1.1)
DEPRECATED RDW RBC AUTO: 43.2 FL (ref 37–54)
EOSINOPHIL # BLD AUTO: 0.24 10*3/MM3 (ref 0–0.4)
EOSINOPHIL NFR BLD AUTO: 2.8 % (ref 0.3–6.2)
ERYTHROCYTE [DISTWIDTH] IN BLOOD BY AUTOMATED COUNT: 12.4 % (ref 12.3–15.4)
GFR SERPL CREATININE-BSD FRML MDRD: 107 ML/MIN/1.73
GLOBULIN UR ELPH-MCNC: 2.6 GM/DL (ref 1.8–3.5)
GLUCOSE SERPL-MCNC: 102 MG/DL (ref 74–124)
HCT VFR BLD AUTO: 42.4 % (ref 34–46.6)
HGB BLD-MCNC: 14.3 G/DL (ref 12–15.9)
IMM GRANULOCYTES # BLD AUTO: 0.01 10*3/MM3 (ref 0–0.05)
IMM GRANULOCYTES NFR BLD AUTO: 0.1 % (ref 0–0.5)
LYMPHOCYTES # BLD AUTO: 2.27 10*3/MM3 (ref 0.7–3.1)
LYMPHOCYTES NFR BLD AUTO: 26.7 % (ref 19.6–45.3)
MCH RBC QN AUTO: 31.9 PG (ref 26.6–33)
MCHC RBC AUTO-ENTMCNC: 33.7 G/DL (ref 31.5–35.7)
MCV RBC AUTO: 94.6 FL (ref 79–97)
MONOCYTES # BLD AUTO: 0.98 10*3/MM3 (ref 0.1–0.9)
MONOCYTES NFR BLD AUTO: 11.5 % (ref 5–12)
NEUTROPHILS NFR BLD AUTO: 4.96 10*3/MM3 (ref 1.7–7)
NEUTROPHILS NFR BLD AUTO: 58.4 % (ref 42.7–76)
NRBC BLD AUTO-RTO: 0 /100 WBC (ref 0–0.2)
PLATELET # BLD AUTO: 241 10*3/MM3 (ref 140–450)
PMV BLD AUTO: 10 FL (ref 6–12)
POTASSIUM SERPL-SCNC: 5.1 MMOL/L (ref 3.5–4.7)
PROT SERPL-MCNC: 6.9 G/DL (ref 6.3–8)
RBC # BLD AUTO: 4.48 10*6/MM3 (ref 3.77–5.28)
SODIUM SERPL-SCNC: 140 MMOL/L (ref 134–145)
WBC # BLD AUTO: 8.5 10*3/MM3 (ref 3.4–10.8)

## 2020-12-10 PROCEDURE — 36415 COLL VENOUS BLD VENIPUNCTURE: CPT

## 2020-12-10 PROCEDURE — 82378 CARCINOEMBRYONIC ANTIGEN: CPT | Performed by: INTERNAL MEDICINE

## 2020-12-10 PROCEDURE — 80053 COMPREHEN METABOLIC PANEL: CPT

## 2020-12-10 PROCEDURE — 85025 COMPLETE CBC W/AUTO DIFF WBC: CPT

## 2020-12-10 PROCEDURE — 80061 LIPID PANEL: CPT | Performed by: INTERNAL MEDICINE

## 2020-12-10 PROCEDURE — 99214 OFFICE O/P EST MOD 30 MIN: CPT | Performed by: INTERNAL MEDICINE

## 2020-12-10 NOTE — PROGRESS NOTES
Subjective     CHIEF COMPLAINT:      Chief Complaint   Patient presents with   • Follow-up     no concerns       HISTORY OF PRESENT ILLNESS:     Riri Damon is a 86 y.o. female patient who returns today for follow up on her colon cancer.  She returns today for follow-up reporting no new symptoms.  Her appetite is good.  Weight is stable.  She is not having nausea or vomiting.  She is not noticing blood in the stool.      REVIEW OF SYSTEMS:  Review of Systems   Constitutional: Negative for fatigue, fever and unexpected weight change.   HENT: Negative for nosebleeds and voice change.    Eyes: Negative for visual disturbance.   Respiratory: Negative for cough and shortness of breath.    Cardiovascular: Negative for chest pain and leg swelling.   Gastrointestinal: Negative for abdominal pain, blood in stool, constipation, diarrhea, nausea and vomiting.   Genitourinary: Negative for frequency and hematuria.   Musculoskeletal: Negative for back pain and joint swelling.   Skin: Negative for rash.   Neurological: Negative for dizziness and headaches.   Hematological: Negative for adenopathy. Does not bruise/bleed easily.   Psychiatric/Behavioral: Negative for dysphoric mood. The patient is not nervous/anxious.      I reviewed and verified the CC and ROS obtained by the MA.     Past Medical History:   Diagnosis Date   • Anxiety    • Colon carcinoma (CMS/HCC) 2015    Stage IIIB, T4N1a; underwent radiation therapy and colon resection by Dr. Mcneil   • Deep vein thrombosis (CMS/HCC) 11/24/2017    right leg   • History of edema     LE   • History of rheumatic fever    • Hx of radiation therapy 2015    for adenocarcinoma of the colon Stage IIIB, T4N1a   • Hyperlipidemia    • Hypertension     MEDS PRN   • Infectious mononucleosis    • Infectious viral hepatitis    • Left anterior fascicular block    • Mitral regurgitation     8/2010: mild per echo   • OA (osteoarthritis)    • Palpitations    • Pulmonary hypertension  (CMS/HCC)     8/2010- mild per echo; 8/2012 - normal RVSP per echo   • Tricuspid regurgitation     8/2010: Mild to moderate per echo; 8/2012: Trace to mild per echo   • Venous insufficiency    • Ventricular septal defect     Per echocardiogram 2012   • Vitamin D deficiency        Past Surgical History:   Procedure Laterality Date   • APPENDECTOMY     • COLECTOMY PARTIAL / TOTAL  02/02/2015 2/2015 due to adenocarcinoma   • COLONOSCOPY      JAN 2015   • COLONOSCOPY N/A 5/25/2016    Procedure: COLONOSCOPY to ANASTAMOSIS AND TERMINAL ILEUM;  Surgeon: Yfn Mcneil MD;  Location[facility] END[facility]COPY;  Service:    • COLONOSCOPY N/A 7/11/2019    Procedure: COLONOSCOPY to cecum:;  Surgeon: Yfn Mcneil MD;  Location: [facility] ENDOSCOPY;  Service: General   • HYSTERECTOMY     • INTRAOCULAR LENS EXCHANGE Bilateral    • JOINT MANIPULATION Right 1/9/2018    Procedure: MANIPULATION OF RT KNEE;  Surgeon: Andrea Caballero MD;  Location: [facility] MAIN OR;  Service:    • JOINT REPLACEMENT Right 11/09/2017   • KNEE SURGERY Left 2006    ARTHROSCOPY   • OOPHORECTOMY     • NM TOTAL KNEE ARTHROPLASTY Right 11/9/2017    Procedure: RIGHT TOTAL KNEE ARTHROPLASTY;  Surgeon: Andrea Caballero MD;  Location: [facility] MAIN OR;  Service: Orthopedics   • TONSILLECTOMY         Cancer-related family history includes Cancer (age of onset: 60) in her sister.  Social History     Tobacco Use   • Smoking status: Never Smoker   • Smokeless tobacco: Never Used   Substance Use Topics   • Alcohol use: No     Frequency: Never     Binge frequency: Never     Comment: Caffeine use: 3-4 cups daily       MEDICATIONS:    Current Outpatient Medications:   •  aspirin 81 MG EC tablet, Take 81 mg by mouth Every Other Day., Disp: , Rfl:   •  calcium carbonate-cholecalciferol 500-400 MG-UNIT tablet tablet, Take 1 tablet by mouth., Disp: , Rfl:   •  diphenhydrAMINE (BENADRYL) 25 MG tablet, Take 25 mg by mouth Daily As Needed for Allergies., Disp: , Rfl:   •  docusate  "sodium 100 MG capsule, Take 100 mg by mouth 2 (Two) Times a Day As Needed for Constipation., Disp: 40 capsule, Rfl: 1  •  losartan (COZAAR) 25 MG tablet, TAKE 1 TABLET EVERY DAY, Disp: 90 tablet, Rfl: 3  •  Magnesium 500 MG capsule, Take 1 capsule by mouth Daily., Disp: , Rfl:   •  melatonin 1 MG tablet, Take 1 mg by mouth Every Night., Disp: , Rfl:   •  meloxicam (MOBIC) 7.5 MG tablet, TAKE 1 TABLET EVERY DAY, Disp: 90 tablet, Rfl: 2  •  Multiple Vitamins-Minerals (MULTIVITAMIN ADULT PO), Take 1 tablet by mouth Daily. STOP 1 WEEK PRIOR TO SX, Disp: , Rfl:   •  simvastatin (ZOCOR) 40 MG tablet, TAKE 1 TABLET BY MOUTH EVERY NIGHT., Disp: 90 tablet, Rfl: 3    ALLERGIES:  No Known Allergies      Objective   VITAL SIGNS:     Vitals:    12/10/20 1122   BP: 173/90   Pulse: 79   Resp: 16   Temp: 98.2 °F (36.8 °C)   TempSrc: Temporal   SpO2: 95%   Weight: 64.4 kg (142 lb)   Height: 159.5 cm (62.8\")   PainSc: 0-No pain     Body mass index is 25.32 kg/m².     Wt Readings from Last 3 Encounters:   12/10/20 64.4 kg (142 lb)   06/29/20 62.9 kg (138 lb 9.6 oz)   06/19/20 63.3 kg (139 lb 9.6 oz)       PHYSICAL EXAMINATION:  GENERAL:  The patient appears in fair general condition, not in acute distress.  SKIN: No skin rashes. No ecchymosis.  HEAD:  Normocephalic.  EYES:  No Jaundice. No Pallor. Pupils equal. EOMI.  NECK:  Supple with Good ROM. No Thyromegaly. No Masses.  LYMPHATICS:  No cervical or supraclavicular lymphadenopathy.  CHEST: Normal respiratory effort. Lungs clear to auscultation.   CARDIAC:  Normal S1 & S2. No murmur. No edema.  ABDOMEN:  Soft. No tenderness. No Hepatomegaly. No Splenomegaly. No masses.  EXTREMITIES:  No clubbing. No deforming arthritis in the hands.   NEUROLOGICAL:  No Focal neurological deficits.       DIAGNOSTIC DATA:     Results from last 7 days   Lab Units 12/10/20  1108   WBC 10*3/mm3 8.50   NEUTROS ABS 10*3/mm3 4.96   HEMOGLOBIN g/dL 14.3   HEMATOCRIT % 42.4   PLATELETS 10*3/mm3 241     Results " from last 7 days   Lab Units 12/10/20  1108   SODIUM mmol/L 140   POTASSIUM mmol/L 5.1*   CHLORIDE mmol/L 104   CO2 mmol/L 27.4   BUN mg/dL 17   CREATININE mg/dL 0.54*   CALCIUM mg/dL 9.5   ALBUMIN g/dL 4.30   BILIRUBIN mg/dL 0.3   ALK PHOS U/L 63   ALT (SGPT) U/L 17   AST (SGOT) U/L 22   GLUCOSE mg/dL 102         Lab Results   Component Value Date    CEA 3.04 12/10/2020    CEA 3.61 06/15/2020    CEA 3.04 11/26/2019    CEA 3.14 06/11/2019    CEA 3.48 09/06/2017       Assessment/Plan   1.  Adenocarcinoma of the ascending colon, stage III  · Patient is status post right hemicolectomy on 2/2/2015.  · T4 a N1 a, stage III  · 1 out of 29 lymph nodes were positive.  · The circumferential margin was involved by invasive carcinoma.  · CT scans on 6/18/2019 showed no evidence of recurrence.  · Colonoscopy on 7/11/2019 showed no suspicious findings.  · CT on 6/15/2020 showed no evidence of recurrence.  · CEA was 3.61 on 6/16/2020.  · CEA improved to 3.04 today.  · Patient has no clinical evidence of recurrence of the colon cancer.    2.  Right ovarian cyst.    · The radiologist reported this to be stable compared to previous scan.    · The radiologist recommended annual follow-up with pelvic ultrasound.  The patient however does not have a gynecologist.  I recommended repeat CT scan in June 2021.    3.  Macrocytosis.    · No evidence of vitamin B12 or folate deficiency.    · MCV improved and it is today at 94.6.      PLAN:    1.  We will see the patient in follow-up in 6 months with CT scan of the chest abdomen pelvis 1 week before her return visit.    2.  CBC CMP CEA will be obtained on the day of the CT scan.      Asiya Hinton MD  12/10/20

## 2020-12-11 ENCOUNTER — OFFICE VISIT (OUTPATIENT)
Dept: INTERNAL MEDICINE | Facility: CLINIC | Age: 85
End: 2020-12-11

## 2020-12-11 VITALS
OXYGEN SATURATION: 98 % | WEIGHT: 146.3 LBS | HEIGHT: 63 IN | DIASTOLIC BLOOD PRESSURE: 74 MMHG | HEART RATE: 80 BPM | SYSTOLIC BLOOD PRESSURE: 170 MMHG | BODY MASS INDEX: 25.92 KG/M2

## 2020-12-11 DIAGNOSIS — E78.2 MIXED HYPERLIPIDEMIA: Primary | ICD-10-CM

## 2020-12-11 DIAGNOSIS — M19.90 ARTHRITIS: ICD-10-CM

## 2020-12-11 DIAGNOSIS — I10 ESSENTIAL HYPERTENSION: ICD-10-CM

## 2020-12-11 DIAGNOSIS — Z00.00 MEDICARE ANNUAL WELLNESS VISIT, SUBSEQUENT: ICD-10-CM

## 2020-12-11 LAB
CHOLEST SERPL-MCNC: 160 MG/DL (ref 0–200)
HDLC SERPL-MCNC: 60 MG/DL (ref 40–60)
LDLC SERPL CALC-MCNC: 81 MG/DL (ref 0–100)
LDLC/HDLC SERPL: 1.32 {RATIO}
TRIGL SERPL-MCNC: 104 MG/DL (ref 0–150)
VLDLC SERPL-MCNC: 19 MG/DL (ref 5–40)

## 2020-12-11 PROCEDURE — 96160 PT-FOCUSED HLTH RISK ASSMT: CPT | Performed by: FAMILY MEDICINE

## 2020-12-11 PROCEDURE — 1125F AMNT PAIN NOTED PAIN PRSNT: CPT | Performed by: FAMILY MEDICINE

## 2020-12-11 PROCEDURE — 1160F RVW MEDS BY RX/DR IN RCRD: CPT | Performed by: FAMILY MEDICINE

## 2020-12-11 PROCEDURE — 99214 OFFICE O/P EST MOD 30 MIN: CPT | Performed by: FAMILY MEDICINE

## 2020-12-11 PROCEDURE — 1170F FXNL STATUS ASSESSED: CPT | Performed by: FAMILY MEDICINE

## 2020-12-11 PROCEDURE — G0439 PPPS, SUBSEQ VISIT: HCPCS | Performed by: FAMILY MEDICINE

## 2020-12-11 RX ORDER — MELOXICAM 7.5 MG/1
7.5 TABLET ORAL DAILY
Qty: 90 TABLET | Refills: 2 | Status: SHIPPED | OUTPATIENT
Start: 2020-12-11 | End: 2021-07-16

## 2020-12-11 NOTE — PROGRESS NOTES
The ABCs of the Annual Wellness Visit  Subsequent Medicare Wellness Visit    Chief Complaint   Patient presents with   • follow up hypertension   • follow up hyperlipidemia   • Medicare Wellness-subsequent       Subjective   History of Present Illness:  Riri Damon is a 86 y.o. female who presents for a Subsequent Medicare Wellness Visit.  Follow-up chronic disease management hyperlipidemia hypertension arthritis blood pressure is well controlled no chest pain shortness of breath or increased fatigue no unwanted side effects of statin therapy she has had some achiness with arthritis more in the morning but gradually subsides over the course of the day she does not really want to change the dosing of her medication or the quantity but I discussed with her that she could maybe take them meloxicam with the evening meal instead of in the morning    HEALTH RISK ASSESSMENT    Recent Hospitalizations:  No hospitalization(s) within the last year.    Current Medical Providers:  Patient Care Team:  Phoenix Velazquez MD as PCP - General  Asiya Hinton MD as Consulting Physician (Hematology and Oncology)  Rowan Serrano MD as Consulting Physician (Cardiology)    Smoking Status:  Social History     Tobacco Use   Smoking Status Never Smoker   Smokeless Tobacco Never Used       Alcohol Consumption:  Social History     Substance and Sexual Activity   Alcohol Use No   • Frequency: Never   • Binge frequency: Never    Comment: Caffeine use: 3-4 cups daily       Depression Screen:   PHQ-2/PHQ-9 Depression Screening 12/11/2020   Little interest or pleasure in doing things 0   Feeling down, depressed, or hopeless 0   Trouble falling or staying asleep, or sleeping too much -   Feeling tired or having little energy -   Poor appetite or overeating -   Feeling bad about yourself - or that you are a failure or have let yourself or your family down -   Trouble concentrating on things, such as reading the newspaper or watching  television -   Moving or speaking so slowly that other people could have noticed. Or the opposite - being so fidgety or restless that you have been moving around a lot more than usual -   Thoughts that you would be better off dead, or of hurting yourself in some way -   Total Score 0       Fall Risk Screen:  EBONIE Fall Risk Assessment was completed, and patient is at LOW risk for falls.Assessment completed on:12/11/2020    Health Habits and Functional and Cognitive Screening:  Functional & Cognitive Status 12/11/2020   Do you have difficulty preparing food and eating? No   Do you have difficulty bathing yourself, getting dressed or grooming yourself? No   Do you have difficulty using the toilet? No   Do you have difficulty moving around from place to place? No   Do you have trouble with steps or getting out of a bed or a chair? Yes   Current Diet Well Balanced Diet   Dental Exam Up to date   Eye Exam Up to date   Exercise (times per week) 7 times per week   Current Exercise Activities Include Walking   Do you need help using the phone?  No   Are you deaf or do you have serious difficulty hearing?  No   Do you need help with transportation? No   Do you need help shopping? No   Do you need help preparing meals?  No   Do you need help with housework?  No   Do you need help with laundry? No   Do you need help taking your medications? No   Do you need help managing money? No   Do you ever drive or ride in a car without wearing a seat belt? No   Have you felt unusual stress, anger or loneliness in the last month? Yes   Who do you live with? Spouse   If you need help, do you have trouble finding someone available to you? No   Have you been bothered in the last four weeks by sexual problems? No   Do you have difficulty concentrating, remembering or making decisions? No         Does the patient have evidence of cognitive impairment? No    Asprin use counseling:Taking ASA appropriately as indicated    Age-appropriate  Screening Schedule:  Refer to the list below for future screening recommendations based on patient's age, sex and/or medical conditions. Orders for these recommended tests are listed in the plan section. The patient has been provided with a written plan.    Health Maintenance   Topic Date Due   • ZOSTER VACCINE (2 of 2) 02/26/2010   • LIPID PANEL  11/13/2020   • DXA SCAN  11/13/2021   • MAMMOGRAM  12/23/2021   • TDAP/TD VACCINES (2 - Td) 10/29/2024   • INFLUENZA VACCINE  Completed          The following portions of the patient's history were reviewed and updated as appropriate: allergies, current medications, past family history, past medical history, past social history, past surgical history and problem list.    Outpatient Medications Prior to Visit   Medication Sig Dispense Refill   • aspirin 81 MG EC tablet Take 81 mg by mouth Every Other Day.     • calcium carbonate-cholecalciferol 500-400 MG-UNIT tablet tablet Take 1 tablet by mouth.     • diphenhydrAMINE (BENADRYL) 25 MG tablet Take 25 mg by mouth Daily As Needed for Allergies.     • docusate sodium 100 MG capsule Take 100 mg by mouth 2 (Two) Times a Day As Needed for Constipation. 40 capsule 1   • losartan (COZAAR) 25 MG tablet TAKE 1 TABLET EVERY DAY 90 tablet 3   • Magnesium 500 MG capsule Take 1 capsule by mouth Daily.     • melatonin 1 MG tablet Take 1 mg by mouth Every Night.     • Multiple Vitamins-Minerals (MULTIVITAMIN ADULT PO) Take 1 tablet by mouth Daily. STOP 1 WEEK PRIOR TO SX     • simvastatin (ZOCOR) 40 MG tablet TAKE 1 TABLET BY MOUTH EVERY NIGHT. 90 tablet 3   • meloxicam (MOBIC) 7.5 MG tablet TAKE 1 TABLET EVERY DAY 90 tablet 2     No facility-administered medications prior to visit.        Patient Active Problem List   Diagnosis   • Hyperlipidemia   • Low back pain   • Mitral valve insufficiency   • Palpitations   • Knee pain   • Tricuspid valve insufficiency   • Arthritis   • Medicare annual wellness visit, subsequent   • Screening for  "osteoporosis   • Orthostatic lightheadedness   • Essential hypertension   • OA (osteoarthritis) of knee   • Ventricular septal defect   • Left anterior fascicular block   • Malignant neoplasm of ascending colon (CMS/HCC)   • Family history of colon cancer   • Osteopenia of left hip   • Acute pain of left shoulder   • Wellness examination   • Anxiety       Advanced Care Planning:  ACP discussion was held with the patient during this visit. Patient has an advance directive in EMR which is still valid.  Patient has an advance directive (not in EMR), copy requested.    Review of Systems   Constitutional: Negative.    HENT: Negative.    Cardiovascular: Negative.    Gastrointestinal: Negative.    Neurological: Negative.        Compared to one year ago, the patient feels her physical health is the same.  Compared to one year ago, the patient feels her mental health is the same.    Reviewed chart for potential of high risk medication in the elderly: yes  Reviewed chart for potential of harmful drug interactions in the elderly:yes    Objective         Vitals:    12/11/20 1239   BP: 170/74   BP Location: Left arm   Patient Position: Sitting   Cuff Size: Adult   Pulse: 80   SpO2: 98%   Weight: 66.4 kg (146 lb 4.8 oz)   Height: 159.5 cm (62.8\")   PainSc:   2       Body mass index is 26.08 kg/m².  Discussed the patient's BMI with her. The BMI is in the acceptable range.    Physical Exam  Vitals signs and nursing note reviewed.   Constitutional:       Appearance: Normal appearance.   HENT:      Right Ear: Tympanic membrane, ear canal and external ear normal.      Left Ear: Tympanic membrane, ear canal and external ear normal.   Eyes:      General: No scleral icterus.  Neck:      Vascular: No carotid bruit.   Cardiovascular:      Rate and Rhythm: Normal rate and regular rhythm.      Pulses: Normal pulses.      Heart sounds: Normal heart sounds.   Pulmonary:      Effort: Pulmonary effort is normal.      Breath sounds: Normal breath " sounds.   Skin:     General: Skin is warm and dry.   Neurological:      General: No focal deficit present.      Mental Status: She is alert and oriented to person, place, and time.   Psychiatric:         Mood and Affect: Mood normal.         Behavior: Behavior normal.         Thought Content: Thought content normal.         Judgment: Judgment normal.               Assessment/Plan   Medicare Risks and Personalized Health Plan  CMS Preventative Services Quick Reference  Advance Directive Discussion  Colon Cancer Screening    The above risks/problems have been discussed with the patient.  Pertinent information has been shared with the patient in the After Visit Summary.  Follow up plans and orders are seen below in the Assessment/Plan Section.    Diagnoses and all orders for this visit:    1. Mixed hyperlipidemia (Primary)  -     Cancel: Lipid Panel  -     Lipid Panel    2. Essential hypertension    3. Arthritis  -     meloxicam (MOBIC) 7.5 MG tablet; Take 1 tablet by mouth Daily.  Dispense: 90 tablet; Refill: 2    4. Medicare annual wellness visit, subsequent      Follow Up:  Return in about 6 months (around 6/11/2021), or if symptoms worsen or fail to improve, for Recheck.     An After Visit Summary and PPPS were given to the patient.     Hypertension continue losartan  Hyperlipidemia continue simvastatin  Arthritis continue meloxicam    Ongoing management of chronic medical problems

## 2021-01-19 ENCOUNTER — HOSPITAL ENCOUNTER (OUTPATIENT)
Dept: MAMMOGRAPHY | Facility: HOSPITAL | Age: 86
Discharge: HOME | End: 2021-01-19
Admitting: FAMILY MEDICINE

## 2021-01-19 DIAGNOSIS — Z12.31 VISIT FOR SCREENING MAMMOGRAM: ICD-10-CM

## 2021-01-19 PROCEDURE — 77067 SCR MAMMO BI INCL CAD: CPT

## 2021-01-19 PROCEDURE — 77063 BREAST TOMOSYNTHESIS BI: CPT

## 2021-02-11 ENCOUNTER — APPOINTMENT (OUTPATIENT)
Dept: VACCINE CLINIC | Facility: HOSPITAL | Age: 86
End: 2021-02-11

## 2021-02-17 ENCOUNTER — APPOINTMENT (OUTPATIENT)
Dept: VACCINE CLINIC | Facility: HOSPITAL | Age: 86
End: 2021-02-17

## 2021-02-24 ENCOUNTER — IMMUNIZATION (OUTPATIENT)
Dept: VACCINE CLINIC | Facility: HOSPITAL | Age: 86
End: 2021-02-24

## 2021-02-24 PROCEDURE — 91300 HC SARSCOV02 VAC 30MCG/0.3ML IM: CPT | Performed by: INTERNAL MEDICINE

## 2021-02-24 PROCEDURE — 0001A: CPT | Performed by: INTERNAL MEDICINE

## 2021-03-17 ENCOUNTER — IMMUNIZATION (OUTPATIENT)
Dept: VACCINE CLINIC | Facility: HOSPITAL | Age: 86
End: 2021-03-17

## 2021-03-17 PROCEDURE — 0002A: CPT | Performed by: INTERNAL MEDICINE

## 2021-03-17 PROCEDURE — 91300 HC SARSCOV02 VAC 30MCG/0.3ML IM: CPT | Performed by: INTERNAL MEDICINE

## 2021-05-20 ENCOUNTER — LAB (OUTPATIENT)
Dept: LAB | Facility: HOSPITAL | Age: 86
End: 2021-05-20

## 2021-05-20 ENCOUNTER — HOSPITAL ENCOUNTER (OUTPATIENT)
Dept: PET IMAGING | Facility: HOSPITAL | Age: 86
Discharge: HOME | End: 2021-05-20
Admitting: INTERNAL MEDICINE

## 2021-05-20 DIAGNOSIS — N83.201 RIGHT OVARIAN CYST: ICD-10-CM

## 2021-05-20 DIAGNOSIS — C18.2 MALIGNANT NEOPLASM OF ASCENDING COLON (HCC): ICD-10-CM

## 2021-05-20 LAB
ALBUMIN SERPL-MCNC: 4.5 G/DL (ref 3.5–5.2)
ALBUMIN/GLOB SERPL: 1.6 G/DL (ref 1.1–2.4)
ALP SERPL-CCNC: 75 U/L (ref 38–116)
ALT SERPL W P-5'-P-CCNC: 16 U/L (ref 0–33)
ANION GAP SERPL CALCULATED.3IONS-SCNC: 9.5 MMOL/L (ref 5–15)
AST SERPL-CCNC: 26 U/L (ref 0–32)
BASOPHILS # BLD AUTO: 0.05 10*3/MM3 (ref 0–0.2)
BASOPHILS NFR BLD AUTO: 0.7 % (ref 0–1.5)
BILIRUB SERPL-MCNC: 0.6 MG/DL (ref 0.2–1.2)
BUN SERPL-MCNC: 10 MG/DL (ref 6–20)
BUN/CREAT SERPL: 19.6 (ref 7.3–30)
CALCIUM SPEC-SCNC: 9.5 MG/DL (ref 8.5–10.2)
CEA SERPL-MCNC: 3.64 NG/ML
CHLORIDE SERPL-SCNC: 103 MMOL/L (ref 98–107)
CO2 SERPL-SCNC: 27.5 MMOL/L (ref 22–29)
CREAT BLDA-MCNC: 0.5 MG/DL (ref 0.6–1.3)
CREAT SERPL-MCNC: 0.51 MG/DL (ref 0.6–1.1)
DEPRECATED RDW RBC AUTO: 43.9 FL (ref 37–54)
EOSINOPHIL # BLD AUTO: 0.24 10*3/MM3 (ref 0–0.4)
EOSINOPHIL NFR BLD AUTO: 3.6 % (ref 0.3–6.2)
ERYTHROCYTE [DISTWIDTH] IN BLOOD BY AUTOMATED COUNT: 12.5 % (ref 12.3–15.4)
GFR SERPL CREATININE-BSD FRML MDRD: 114 ML/MIN/1.73
GLOBULIN UR ELPH-MCNC: 2.9 GM/DL (ref 1.8–3.5)
GLUCOSE SERPL-MCNC: 92 MG/DL (ref 74–124)
HCT VFR BLD AUTO: 44.1 % (ref 34–46.6)
HGB BLD-MCNC: 14.9 G/DL (ref 12–15.9)
IMM GRANULOCYTES # BLD AUTO: 0.01 10*3/MM3 (ref 0–0.05)
IMM GRANULOCYTES NFR BLD AUTO: 0.1 % (ref 0–0.5)
LYMPHOCYTES # BLD AUTO: 1.63 10*3/MM3 (ref 0.7–3.1)
LYMPHOCYTES NFR BLD AUTO: 24.2 % (ref 19.6–45.3)
MCH RBC QN AUTO: 32.3 PG (ref 26.6–33)
MCHC RBC AUTO-ENTMCNC: 33.8 G/DL (ref 31.5–35.7)
MCV RBC AUTO: 95.7 FL (ref 79–97)
MONOCYTES # BLD AUTO: 0.72 10*3/MM3 (ref 0.1–0.9)
MONOCYTES NFR BLD AUTO: 10.7 % (ref 5–12)
NEUTROPHILS NFR BLD AUTO: 4.09 10*3/MM3 (ref 1.7–7)
NEUTROPHILS NFR BLD AUTO: 60.7 % (ref 42.7–76)
NRBC BLD AUTO-RTO: 0 /100 WBC (ref 0–0.2)
PLATELET # BLD AUTO: 206 10*3/MM3 (ref 140–450)
PMV BLD AUTO: 11.9 FL (ref 6–12)
POTASSIUM SERPL-SCNC: 4.5 MMOL/L (ref 3.5–4.7)
PROT SERPL-MCNC: 7.4 G/DL (ref 6.3–8)
RBC # BLD AUTO: 4.61 10*6/MM3 (ref 3.77–5.28)
SODIUM SERPL-SCNC: 140 MMOL/L (ref 134–145)
WBC # BLD AUTO: 6.74 10*3/MM3 (ref 3.4–10.8)

## 2021-05-20 PROCEDURE — 71260 CT THORAX DX C+: CPT

## 2021-05-20 PROCEDURE — 82565 ASSAY OF CREATININE: CPT

## 2021-05-20 PROCEDURE — 80053 COMPREHEN METABOLIC PANEL: CPT

## 2021-05-20 PROCEDURE — 85025 COMPLETE CBC W/AUTO DIFF WBC: CPT

## 2021-05-20 PROCEDURE — 82378 CARCINOEMBRYONIC ANTIGEN: CPT | Performed by: INTERNAL MEDICINE

## 2021-05-20 PROCEDURE — 36415 COLL VENOUS BLD VENIPUNCTURE: CPT

## 2021-05-20 PROCEDURE — 0 DIATRIZOATE MEGLUMINE & SODIUM PER 1 ML: Performed by: INTERNAL MEDICINE

## 2021-05-20 PROCEDURE — 25010000002 IOPAMIDOL 61 % SOLUTION: Performed by: INTERNAL MEDICINE

## 2021-05-20 PROCEDURE — 74177 CT ABD & PELVIS W/CONTRAST: CPT

## 2021-05-20 RX ADMIN — DIATRIZOATE MEGLUMINE AND DIATRIZOATE SODIUM 30 ML: 660; 100 LIQUID ORAL; RECTAL at 09:50

## 2021-05-20 RX ADMIN — IOPAMIDOL 85 ML: 612 INJECTION, SOLUTION INTRAVENOUS at 10:30

## 2021-05-27 ENCOUNTER — OFFICE VISIT (OUTPATIENT)
Dept: ONCOLOGY | Facility: CLINIC | Age: 86
End: 2021-05-27

## 2021-05-27 ENCOUNTER — APPOINTMENT (OUTPATIENT)
Dept: LAB | Facility: HOSPITAL | Age: 86
End: 2021-05-27

## 2021-05-27 VITALS
OXYGEN SATURATION: 95 % | HEIGHT: 63 IN | BODY MASS INDEX: 24.79 KG/M2 | DIASTOLIC BLOOD PRESSURE: 76 MMHG | SYSTOLIC BLOOD PRESSURE: 150 MMHG | WEIGHT: 139.9 LBS | TEMPERATURE: 97.8 F | RESPIRATION RATE: 16 BRPM | HEART RATE: 92 BPM

## 2021-05-27 DIAGNOSIS — N83.201 RIGHT OVARIAN CYST: ICD-10-CM

## 2021-05-27 DIAGNOSIS — C18.2 MALIGNANT NEOPLASM OF ASCENDING COLON (HCC): Primary | ICD-10-CM

## 2021-05-27 PROCEDURE — 99214 OFFICE O/P EST MOD 30 MIN: CPT | Performed by: INTERNAL MEDICINE

## 2021-05-27 NOTE — PROGRESS NOTES
"Subjective     CHIEF COMPLAINT:      Chief Complaint   Patient presents with   • Follow-up     discuss CT Scan       HISTORY OF PRESENT ILLNESS:     Riri Damon is a 86 y.o. female patient who returns today for follow up on her colon cancer.  She returns today for follow-up reporting some fullness in the lower abdomen.  She reports occasional discomfort in the right lower quadrant.  This occasionally develops when she stretches.  No persistent pain.  No change in her bowel habits.  No blood in the stool.      ROS:  Pertinent ROS is in the HPI.     Past medical, surgical, social and family history were reviewed.     MEDICATIONS:    Current Outpatient Medications:   •  aspirin 81 MG EC tablet, Take 81 mg by mouth Every Other Day., Disp: , Rfl:   •  calcium carbonate-cholecalciferol 500-400 MG-UNIT tablet tablet, Take 1 tablet by mouth., Disp: , Rfl:   •  diphenhydrAMINE (BENADRYL) 25 MG tablet, Take 25 mg by mouth Daily As Needed for Allergies., Disp: , Rfl:   •  docusate sodium 100 MG capsule, Take 100 mg by mouth 2 (Two) Times a Day As Needed for Constipation., Disp: 40 capsule, Rfl: 1  •  losartan (COZAAR) 25 MG tablet, TAKE 1 TABLET EVERY DAY, Disp: 90 tablet, Rfl: 3  •  Magnesium 500 MG capsule, Take 1 capsule by mouth. Takes 2-3 times a week, Disp: , Rfl:   •  melatonin 1 MG tablet, Take 1 mg by mouth Every Night., Disp: , Rfl:   •  meloxicam (MOBIC) 7.5 MG tablet, Take 1 tablet by mouth Daily., Disp: 90 tablet, Rfl: 2  •  Multiple Vitamins-Minerals (MULTIVITAMIN ADULT PO), Take 1 tablet by mouth Daily. STOP 1 WEEK PRIOR TO SX, Disp: , Rfl:   •  simvastatin (ZOCOR) 40 MG tablet, TAKE 1 TABLET BY MOUTH EVERY NIGHT., Disp: 90 tablet, Rfl: 3    Objective   VITAL SIGNS:     Vitals:    05/27/21 1021   BP: 150/76   Pulse: 92   Resp: 16   Temp: 97.8 °F (36.6 °C)   TempSrc: Temporal   SpO2: 95%   Weight: 63.5 kg (139 lb 14.4 oz)   Height: 159.5 cm (62.8\")   PainSc: 0-No pain     Body mass index is 24.94 kg/m². "     Wt Readings from Last 5 Encounters:   05/27/21 63.5 kg (139 lb 14.4 oz)   12/11/20 66.4 kg (146 lb 4.8 oz)   12/10/20 64.4 kg (142 lb)   06/29/20 62.9 kg (138 lb 9.6 oz)   06/19/20 63.3 kg (139 lb 9.6 oz)       PHYSICAL EXAMINATION:   GENERAL: The patient appears in good general condition, not in acute distress.   SKIN: No ecchymosis.  EYES: No jaundice.  LYMPHATICS: No cervical lymphadenopathy.  CHEST: Normal respiratory effort.  Lungs clear bilaterally.  No added sounds.  CVS: Normal S1-S2.  No murmurs.  ABDOMEN: Soft. No tenderness. No Hepatomegaly. No Splenomegaly. No masses.  No masses on careful examination of the right lower quadrant.      DIAGNOSTIC DATA:     Component      Latest Ref Rng & Units 5/20/2021   WBC      3.40 - 10.80 10*3/mm3 6.74   RBC      3.77 - 5.28 10*6/mm3 4.61   Hemoglobin      12.0 - 15.9 g/dL 14.9   Hematocrit      34.0 - 46.6 % 44.1   MCV      79.0 - 97.0 fL 95.7   MCH      26.6 - 33.0 pg 32.3   MCHC      31.5 - 35.7 g/dL 33.8   RDW      12.3 - 15.4 % 12.5   RDW-SD      37.0 - 54.0 fl 43.9   MPV      6.0 - 12.0 fL 11.9   Platelets      140 - 450 10*3/mm3 206   Neutrophil Rel %      42.7 - 76.0 % 60.7   Lymphocyte Rel %      19.6 - 45.3 % 24.2   Monocyte Rel %      5.0 - 12.0 % 10.7   Eosinophil Rel %      0.3 - 6.2 % 3.6   Basophil Rel %      0.0 - 1.5 % 0.7   Immature Granulocyte Rel %      0.0 - 0.5 % 0.1   Neutrophils Absolute      1.70 - 7.00 10*3/mm3 4.09   Lymphocytes Absolute      0.70 - 3.10 10*3/mm3 1.63   Monocytes Absolute      0.10 - 0.90 10*3/mm3 0.72   Eosinophils Absolute      0.00 - 0.40 10*3/mm3 0.24   Basophils Absolute      0.00 - 0.20 10*3/mm3 0.05   Immature Grans, Absolute      0.00 - 0.05 10*3/mm3 0.01      Component      Latest Ref Rng & Units 5/20/2021   Glucose      74 - 124 mg/dL 92   BUN      6 - 20 mg/dL 10   Creatinine      0.60 - 1.10 mg/dL 0.51 (L)   Sodium      134 - 145 mmol/L 140   Potassium      3.5 - 4.7 mmol/L 4.5   Chloride      98 - 107  mmol/L 103   CO2      22.0 - 29.0 mmol/L 27.5   Calcium      8.5 - 10.2 mg/dL 9.5   Total Protein      6.3 - 8.0 g/dL 7.4   Albumin      3.50 - 5.20 g/dL 4.50   ALT (SGPT)      0 - 33 U/L 16   AST (SGOT)      0 - 32 U/L 26   Alkaline Phosphatase      38 - 116 U/L 75   Total Bilirubin      0.2 - 1.2 mg/dL 0.6   eGFR Non African Am      >60 mL/min/1.73 114   Globulin      1.8 - 3.5 gm/dL 2.9     Lab Results   Component Value Date    CEA 3.64 05/20/2021    CEA 3.04 12/10/2020    CEA 3.61 06/15/2020    CEA 3.04 11/26/2019    CEA 3.14 06/11/2019      CT chest abdomen pelvis on 5/20/2021:  1. Status post right hemicolectomy. Mild wall thickening at the level of  anastomosis favored to be secondary to underdistention. Please make sure  patient is up-to-date with colonoscopy. No convincing evidence of  metastatic disease within the chest, abdomen and pelvis.  2. 2.7 cm right ovarian ultrasound for which follow-up with pelvic  sonogram is again recommended.    I reviewed the CT scan with the patient today.  The right ovarian cystic lesion measured 2.7 cm.  There is thickening of the wall of the colon at the area of anastomosis.    Assessment/Plan   1.  Adenocarcinoma of the ascending colon, stage III  · Patient is status post right hemicolectomy on 2/2/2015.  · T4 a N1 a, stage III  · 1 out of 29 lymph nodes were positive.  · The circumferential margin was involved by invasive carcinoma.  · CT scans on 6/18/2019 showed no evidence of recurrence.  · Colonoscopy on 7/11/2019 showed no suspicious findings.  · CT on 6/15/2020 showed no evidence of recurrence.  · CEA was 3.61 on 6/16/2020.  · CEA improved to 3.04 on 12/10/2020.  · CEA was 3.65 on 5/20/2021.  · CT chest abdomen pelvis on 5/20/2021 revealed mild wall thickening at the level of the anastomosis?  Underdistention.  · Patient reports infrequent episodes of pain in the right lower quadrant of the abdomen when she stretches.  No persistent pain.  No palpable masses or  tenderness on exam today.  · I recommended repeating CT scan of abdomen pelvis in 3 months.  If there is any change in the area on the follow-up scan, we would recommend repeat colonoscopy.    2.  Right ovarian cyst.    · The cyst measured 2.4 cm on 6/15/2020.  · The cyst increased to 2.7 cm on 5/20/2021.      PLAN:    1.  I will obtain a pelvic ultrasound.  2.  I would refer the patient to gynecology.  3.  I will see the patient follow-up in 3 months.  1 week before her return visit, I will obtain a CT scan of the abdomen pelvis.  CBC CMP CEA will be repeated.        Asiya Hinton MD  05/27/21

## 2021-06-03 DIAGNOSIS — N83.201 RIGHT OVARIAN CYST: Primary | ICD-10-CM

## 2021-06-07 ENCOUNTER — HOSPITAL ENCOUNTER (OUTPATIENT)
Dept: ULTRASOUND IMAGING | Facility: HOSPITAL | Age: 86
Discharge: HOME | End: 2021-06-07
Admitting: INTERNAL MEDICINE

## 2021-06-07 DIAGNOSIS — N83.201 RIGHT OVARIAN CYST: ICD-10-CM

## 2021-06-07 PROCEDURE — 76857 US EXAM PELVIC LIMITED: CPT

## 2021-06-07 PROCEDURE — 76830 TRANSVAGINAL US NON-OB: CPT

## 2021-06-10 ENCOUNTER — OFFICE VISIT (OUTPATIENT)
Dept: OBSTETRICS AND GYNECOLOGY | Age: 86
End: 2021-06-10

## 2021-06-10 VITALS
WEIGHT: 137 LBS | SYSTOLIC BLOOD PRESSURE: 108 MMHG | DIASTOLIC BLOOD PRESSURE: 72 MMHG | HEIGHT: 66 IN | BODY MASS INDEX: 22.02 KG/M2

## 2021-06-10 DIAGNOSIS — N83.201 CYST OF RIGHT OVARY: ICD-10-CM

## 2021-06-10 DIAGNOSIS — R14.0 BLOATING: ICD-10-CM

## 2021-06-10 DIAGNOSIS — R19.09 OTHER INTRA-ABDOMINAL AND PELVIC SWELLING, MASS AND LUMP: Primary | ICD-10-CM

## 2021-06-10 PROCEDURE — 99204 OFFICE O/P NEW MOD 45 MIN: CPT | Performed by: OBSTETRICS & GYNECOLOGY

## 2021-06-10 NOTE — PROGRESS NOTES
"  Chief complaint-increased size of right ovarian cyst    Patient goes by  Estefany     History of present illness- Patient is a 86 y.o.  who was sent to me due to right ovarian cyst.  The patient has had right ovarian cyst noted on CT scan for many years.  Her 2015 CT scan notes stable right ovarian cyst.  Her first ultrasound was done just this week.  Ultrasound was recommended in 2015.  Follow-up CT scans have noted stability of the right ovarian cyst.    Patient underwent right hemicolectomy in 2015 at age 80.  She did have a DVT afterwards.    Patient complains of pain in the right lower quadrant.  She describes it as dull and worsened with bending over.  She states it occurs about every couple of days and last for as long as 30 minutes.  She is also felt some bloating sensations.  She previously had a hysterectomy for heavy menstrual bleeding in her 40s.  Had no vaginal bleeding.  There is no family history of ovarian cancer.    Recent ultrasound done this week showed right adnexal cyst with a thin septation.  Cyst measured 3.7 cm which is larger than the measurement from CT scan.  The radiologist had recommended repeat ultrasound in 6 months and the cyst was described as benign in appearance.    Patient reports she is up-to-date with her mammograms.    Reports that an area of thickening at her hemicolectomy site is being monitored.    /72   Ht 166.4 cm (65.5\")   Wt 62.1 kg (137 lb)   Breastfeeding No   BMI 22.45 kg/m²   Physical Exam  Constitutional:       General: She is not in acute distress.     Appearance: Normal appearance. She is normal weight. She is not ill-appearing.   Genitourinary:      Genitourinary Comments: External genitalia appear normal.  Sterile speculum exam reveals normal vagina without discharge.  Vaginal cuff is intact.  On bimanual exam there are no masses palpated.  Patient is nontender.  Rectal exam is normal without masses and is Hemoccult negative.   HENT:      Head: " Normocephalic.   Pulmonary:      Effort: Pulmonary effort is normal.      Breath sounds: Normal breath sounds.   Abdominal:      General: Abdomen is flat. There is no distension.      Palpations: Abdomen is soft.      Tenderness: There is no abdominal tenderness.   Neurological:      Mental Status: She is alert.   Psychiatric:         Mood and Affect: Mood normal.         Thought Content: Thought content normal.         Judgment: Judgment normal.         Pelvic ultrasound done at [facility], [place], operated by [facility] this week reveals 3.7 cm elongated cystic area in the right adnexa with a thin septation.  There is no free fluid in the pelvis.  Uterus is surgically absent.  Left ovary is not visualized.  Right ovary is not visualized either but it is assumed that the cyst is coming off of a small right ovary.    Diagnoses and all orders for this visit:    1. Other intra-abdominal and pelvic swelling, mass and lump (Primary)  -         2. Cyst of right ovary    3. Bloating    Other orders  -     Cancel:     I had a long discussion with the patient.  We discussed the differences between CT scans and ultrasounds.  Ideally adnexal masses and ovarian cyst are visualized with ultrasound.  Since this is the patient's first ultrasound we only have that one to go by.  Prior CT scans had shown a stable cyst and as we typically see often on the ultrasound the cyst may appear larger due to better clarity in the picture.  I talked with the patient that I cannot rule out that the cyst is enlarged but it also very likely is the same says she has had for many years.  The radiologist thought the area had benign features.  I did review the images personally myself and see a thin septation.  I discussed with the patient that the only way to truly rule out pathology in tissue is to remove the ovary which would involve abdominal surgery.  Patient also has increased risk of adhesive disease due to her prior surgery.  We discussed the option of referral to  GYN oncology to consider removal of the ovary versus repeating the ultrasound in 6 weeks and checking CA-125.  Patient wishes to repeat the ultrasound in 6 weeks and we will look for any changes in size.  I will call the patient with her Ca1 25 results.  We discussed the consideration of surgery but at this time patient does not want to move ahead with that.

## 2021-06-11 ENCOUNTER — OFFICE VISIT (OUTPATIENT)
Dept: INTERNAL MEDICINE | Facility: CLINIC | Age: 86
End: 2021-06-11

## 2021-06-11 VITALS
DIASTOLIC BLOOD PRESSURE: 70 MMHG | OXYGEN SATURATION: 96 % | HEART RATE: 88 BPM | BODY MASS INDEX: 22.34 KG/M2 | WEIGHT: 139 LBS | HEIGHT: 66 IN | SYSTOLIC BLOOD PRESSURE: 130 MMHG

## 2021-06-11 DIAGNOSIS — M85.852 OSTEOPENIA OF LEFT HIP: ICD-10-CM

## 2021-06-11 DIAGNOSIS — E78.5 HYPERLIPIDEMIA, UNSPECIFIED HYPERLIPIDEMIA TYPE: ICD-10-CM

## 2021-06-11 DIAGNOSIS — F51.01 PRIMARY INSOMNIA: ICD-10-CM

## 2021-06-11 DIAGNOSIS — I10 ESSENTIAL HYPERTENSION: ICD-10-CM

## 2021-06-11 DIAGNOSIS — E78.2 MIXED HYPERLIPIDEMIA: Primary | ICD-10-CM

## 2021-06-11 LAB — CANCER AG125 SERPL-ACNC: 14.6 U/ML (ref 0–38.1)

## 2021-06-11 PROCEDURE — 99214 OFFICE O/P EST MOD 30 MIN: CPT | Performed by: FAMILY MEDICINE

## 2021-06-11 RX ORDER — SIMVASTATIN 40 MG
40 TABLET ORAL NIGHTLY
Qty: 90 TABLET | Refills: 3 | Status: SHIPPED | OUTPATIENT
Start: 2021-06-11 | End: 2022-05-03

## 2021-06-11 RX ORDER — LOSARTAN POTASSIUM 25 MG/1
25 TABLET ORAL DAILY
Qty: 90 TABLET | Refills: 3 | Status: SHIPPED | OUTPATIENT
Start: 2021-06-11 | End: 2021-12-13

## 2021-06-11 NOTE — PROGRESS NOTES
"Chief Complaint   hyperlipidemia hypertension  Subjective          Riri MORGAN Damon presents to [facility]  History of Present Illness  Patient follows up for ongoing management of hypertension hyperlipidemia insomnia osteopenia she stopped taking calcium plus vitamin D that was upsetting her stomach she is blood pressure is well controlled she has no unwanted side effects of medication she does know the benefit of meloxicam and instructed her to stop and see if she has any worsening pain she has had some slightly elevated blood pressure readings could be due to the meloxicam no chest pain shortness of breath or increased fatigue arthritis  Objective   Vital Signs:   /70   Pulse 88   Ht 166.4 cm (65.5\")   Wt 63 kg (139 lb)   SpO2 96%   BMI 22.78 kg/m²     Physical Exam  Vitals and nursing note reviewed.   Constitutional:       Appearance: Normal appearance. She is well-developed. She is not diaphoretic.   HENT:      Head: Normocephalic and atraumatic.      Right Ear: Tympanic membrane, ear canal and external ear normal.      Left Ear: Tympanic membrane, ear canal and external ear normal.   Eyes:      General: Lids are normal. No scleral icterus.     Extraocular Movements: Extraocular movements intact.      Conjunctiva/sclera: Conjunctivae normal.   Neck:      Thyroid: No thyroid mass or thyromegaly.      Vascular: No carotid bruit or JVD.   Cardiovascular:      Rate and Rhythm: Normal rate and regular rhythm.      Pulses: Normal pulses.           Radial pulses are 2+ on the right side and 2+ on the left side.      Heart sounds: Normal heart sounds. No murmur heard.     Pulmonary:      Effort: Pulmonary effort is normal. No respiratory distress.      Breath sounds: Normal breath sounds.   Abdominal:      Palpations: Abdomen is soft.   Musculoskeletal:      Cervical back: Normal range of motion.      Right lower leg: No edema.      Left lower leg: No edema.   Skin:     " General: Skin is warm and dry.      Coloration: Skin is not pale.      Findings: No erythema or rash.   Neurological:      General: No focal deficit present.      Mental Status: She is alert and oriented to person, place, and time.      Sensory: No sensory deficit.      Deep Tendon Reflexes: Reflexes are normal and symmetric.   Psychiatric:         Mood and Affect: Mood normal.         Behavior: Behavior normal. Behavior is cooperative.         Thought Content: Thought content normal.         Judgment: Judgment normal.        Result Review :     Common labs    Common Labsle 12/10/20 12/10/20 12/10/20 5/20/21 5/20/21 5/20/21    1108 1108 1108 1030 1030 1031   Glucose  102   92    BUN  17   10    Creatinine  0.54 (A)   0.51 (A) 0.50 (A)   eGFR Non African Am  107   114    Sodium  140   140    Potassium  5.1 (A)   4.5    Chloride  104   103    Calcium  9.5   9.5    Albumin  4.30   4.50    Total Bilirubin  0.3   0.6    Alkaline Phosphatase  63   75    AST (SGOT)  22   26    ALT (SGPT)  17   16    WBC 8.50   6.74     Hemoglobin 14.3   14.9     Hematocrit 42.4   44.1     Platelets 241   206     Total Cholesterol   160      Triglycerides   104      HDL Cholesterol   60      LDL Cholesterol    81      (A) Abnormal value       Comments are available for some flowsheets but are not being displayed.                     Assessment and Plan    Diagnoses and all orders for this visit:    1. Mixed hyperlipidemia (Primary)    2. Essential hypertension    3. Osteopenia of left hip    4. Hyperlipidemia, unspecified hyperlipidemia type  -     simvastatin (ZOCOR) 40 MG tablet; Take 1 tablet by mouth Every Night.  Dispense: 90 tablet; Refill: 3    5. Primary insomnia    Other orders  -     losartan (COZAAR) 25 MG tablet; Take 1 tablet by mouth Daily.  Dispense: 90 tablet; Refill: 3    Hypertension continue losartan  Hyperlipidemia continue simvastatin  Insomnia continue melatonin  Osteopenia recommend weightbearing exercises follow-up  otherwise as needed or    Follow Up   Return in about 6 months (around 12/11/2021), or if symptoms worsen or fail to improve.  Patient was given instructions and counseling regarding her condition or for health maintenance advice. Please see specific information pulled into the AVS if appropriate.

## 2021-07-15 DIAGNOSIS — M19.90 ARTHRITIS: ICD-10-CM

## 2021-07-16 RX ORDER — MELOXICAM 7.5 MG/1
TABLET ORAL
Qty: 90 TABLET | Refills: 1 | Status: SHIPPED | OUTPATIENT
Start: 2021-07-16 | End: 2022-02-02

## 2021-07-20 ENCOUNTER — OFFICE VISIT (OUTPATIENT)
Dept: OBSTETRICS AND GYNECOLOGY | Age: 86
End: 2021-07-20

## 2021-07-20 VITALS
BODY MASS INDEX: 24.1 KG/M2 | WEIGHT: 136 LBS | HEIGHT: 63 IN | SYSTOLIC BLOOD PRESSURE: 132 MMHG | DIASTOLIC BLOOD PRESSURE: 80 MMHG

## 2021-07-20 DIAGNOSIS — N83.201 CYST OF RIGHT OVARY: Primary | ICD-10-CM

## 2021-07-20 PROCEDURE — 99213 OFFICE O/P EST LOW 20 MIN: CPT | Performed by: OBSTETRICS & GYNECOLOGY

## 2021-07-20 NOTE — PROGRESS NOTES
"  Chief [id number] of cyst in the right adnexa    History of present illness- Patient is a 86 y.o.  who was last seen due to cyst seen in the right adnexa.  Patient has been having CT scans for many years and the cyst has been seen.  Ultrasound was recommended and there was some concern that the cyst was larger.  Cyst measured 3.7 cm in length with apparent septation.  I reviewed the ultrasound with the patient and we did a CA-125 which is normal.  Patient is here today for repeat ultrasound.    Review of Systems   Constitutional: Negative for fatigue.   Respiratory: Negative for shortness of breath.    Gastrointestinal: Negative for abdominal pain.   Genitourinary: Negative for dysuria.   Neurological: Negative for headaches.   Psychiatric/Behavioral: Negative for dysphoric mood.     /80   Ht 158.8 cm (62.5\")   Wt 61.7 kg (136 lb)   Breastfeeding No   BMI 24.48 kg/m²   Physical Exam  Constitutional:       Appearance: Normal appearance. She is not ill-appearing.   Neurological:      Mental Status: She is alert.   Psychiatric:         Mood and Affect: Mood normal.         Thought Content: Thought content normal.         Judgment: Judgment normal.     CA-125 was normal.    Pelvic ultrasound shows fluid-filled cyst with a thin septation versus 2 adjacent follicles.  Longest dimension is 3.4 cm.  Shortest dimension is 1.9 cm.  In appearance it appears unchanged from ultrasound done at [facility] which I compared to.  The left ovary appears normal and there is no free fluid in the pelvis.    Diagnoses and all orders for this visit:    1. Cyst of right ovary (Primary)    The cyst appears stable to slightly smaller.  We discussed slight differences of millimeters with measuring cyst.  Patient Ca1 25 was normal.  We did discuss that definitively at cyst cannot be ruled benign and less it is removed.  Patient does not want surgery.  We will recheck in 6 months to is assure stability.  Patient will call " with any concerns or pain

## 2021-08-11 ENCOUNTER — LAB (OUTPATIENT)
Dept: LAB | Facility: HOSPITAL | Age: 86
End: 2021-08-11

## 2021-08-11 ENCOUNTER — HOSPITAL ENCOUNTER (OUTPATIENT)
Dept: PET IMAGING | Facility: HOSPITAL | Age: 86
Discharge: HOME | End: 2021-08-11
Admitting: INTERNAL MEDICINE

## 2021-08-11 DIAGNOSIS — C18.2 MALIGNANT NEOPLASM OF ASCENDING COLON (HCC): ICD-10-CM

## 2021-08-11 LAB
ALBUMIN SERPL-MCNC: 4.5 G/DL (ref 3.5–5.2)
ALBUMIN/GLOB SERPL: 1.8 G/DL (ref 1.1–2.4)
ALP SERPL-CCNC: 71 U/L (ref 38–116)
ALT SERPL W P-5'-P-CCNC: 12 U/L (ref 0–33)
ANION GAP SERPL CALCULATED.3IONS-SCNC: 11.5 MMOL/L (ref 5–15)
AST SERPL-CCNC: 23 U/L (ref 0–32)
BASOPHILS # BLD AUTO: 0.05 10*3/MM3 (ref 0–0.2)
BASOPHILS NFR BLD AUTO: 0.7 % (ref 0–1.5)
BILIRUB SERPL-MCNC: 0.6 MG/DL (ref 0.2–1.2)
BUN SERPL-MCNC: 10 MG/DL (ref 6–20)
BUN/CREAT SERPL: 17.5 (ref 7.3–30)
CALCIUM SPEC-SCNC: 9.5 MG/DL (ref 8.5–10.2)
CEA SERPL-MCNC: 3.18 NG/ML
CHLORIDE SERPL-SCNC: 102 MMOL/L (ref 98–107)
CO2 SERPL-SCNC: 27.5 MMOL/L (ref 22–29)
CREAT BLDA-MCNC: 0.6 MG/DL (ref 0.6–1.3)
CREAT SERPL-MCNC: 0.57 MG/DL (ref 0.6–1.1)
DEPRECATED RDW RBC AUTO: 42.4 FL (ref 37–54)
EOSINOPHIL # BLD AUTO: 0.21 10*3/MM3 (ref 0–0.4)
EOSINOPHIL NFR BLD AUTO: 3 % (ref 0.3–6.2)
ERYTHROCYTE [DISTWIDTH] IN BLOOD BY AUTOMATED COUNT: 12.2 % (ref 12.3–15.4)
GFR SERPL CREATININE-BSD FRML MDRD: 101 ML/MIN/1.73
GLOBULIN UR ELPH-MCNC: 2.5 GM/DL (ref 1.8–3.5)
GLUCOSE SERPL-MCNC: 85 MG/DL (ref 74–124)
HCT VFR BLD AUTO: 44.1 % (ref 34–46.6)
HGB BLD-MCNC: 15.1 G/DL (ref 12–15.9)
IMM GRANULOCYTES # BLD AUTO: 0.02 10*3/MM3 (ref 0–0.05)
IMM GRANULOCYTES NFR BLD AUTO: 0.3 % (ref 0–0.5)
LYMPHOCYTES # BLD AUTO: 1.62 10*3/MM3 (ref 0.7–3.1)
LYMPHOCYTES NFR BLD AUTO: 23.5 % (ref 19.6–45.3)
MCH RBC QN AUTO: 31.9 PG (ref 26.6–33)
MCHC RBC AUTO-ENTMCNC: 34.2 G/DL (ref 31.5–35.7)
MCV RBC AUTO: 93.2 FL (ref 79–97)
MONOCYTES # BLD AUTO: 0.83 10*3/MM3 (ref 0.1–0.9)
MONOCYTES NFR BLD AUTO: 12 % (ref 5–12)
NEUTROPHILS NFR BLD AUTO: 4.17 10*3/MM3 (ref 1.7–7)
NEUTROPHILS NFR BLD AUTO: 60.5 % (ref 42.7–76)
NRBC BLD AUTO-RTO: 0 /100 WBC (ref 0–0.2)
PLATELET # BLD AUTO: 244 10*3/MM3 (ref 140–450)
PMV BLD AUTO: 10.9 FL (ref 6–12)
POTASSIUM SERPL-SCNC: 3.8 MMOL/L (ref 3.5–4.7)
PROT SERPL-MCNC: 7 G/DL (ref 6.3–8)
RBC # BLD AUTO: 4.73 10*6/MM3 (ref 3.77–5.28)
SODIUM SERPL-SCNC: 141 MMOL/L (ref 134–145)
WBC # BLD AUTO: 6.9 10*3/MM3 (ref 3.4–10.8)

## 2021-08-11 PROCEDURE — 85025 COMPLETE CBC W/AUTO DIFF WBC: CPT

## 2021-08-11 PROCEDURE — 82378 CARCINOEMBRYONIC ANTIGEN: CPT | Performed by: INTERNAL MEDICINE

## 2021-08-11 PROCEDURE — 82565 ASSAY OF CREATININE: CPT

## 2021-08-11 PROCEDURE — 80053 COMPREHEN METABOLIC PANEL: CPT

## 2021-08-11 PROCEDURE — 74177 CT ABD & PELVIS W/CONTRAST: CPT

## 2021-08-11 PROCEDURE — 25010000002 IOPAMIDOL 61 % SOLUTION: Performed by: INTERNAL MEDICINE

## 2021-08-11 PROCEDURE — 36415 COLL VENOUS BLD VENIPUNCTURE: CPT

## 2021-08-11 PROCEDURE — 0 DIATRIZOATE MEGLUMINE & SODIUM PER 1 ML: Performed by: INTERNAL MEDICINE

## 2021-08-11 RX ADMIN — IOPAMIDOL 85 ML: 612 INJECTION, SOLUTION INTRAVENOUS at 10:37

## 2021-08-11 RX ADMIN — DIATRIZOATE MEGLUMINE AND DIATRIZOATE SODIUM 30 ML: 660; 100 LIQUID ORAL; RECTAL at 09:53

## 2021-08-19 ENCOUNTER — APPOINTMENT (OUTPATIENT)
Dept: LAB | Facility: HOSPITAL | Age: 86
End: 2021-08-19

## 2021-08-19 ENCOUNTER — OFFICE VISIT (OUTPATIENT)
Dept: ONCOLOGY | Facility: CLINIC | Age: 86
End: 2021-08-19

## 2021-08-19 VITALS
HEIGHT: 63 IN | DIASTOLIC BLOOD PRESSURE: 85 MMHG | WEIGHT: 138.3 LBS | OXYGEN SATURATION: 96 % | HEART RATE: 82 BPM | TEMPERATURE: 96.8 F | RESPIRATION RATE: 16 BRPM | SYSTOLIC BLOOD PRESSURE: 173 MMHG | BODY MASS INDEX: 24.5 KG/M2

## 2021-08-19 DIAGNOSIS — C18.2 MALIGNANT NEOPLASM OF ASCENDING COLON (HCC): Primary | ICD-10-CM

## 2021-08-19 DIAGNOSIS — K63.9 THICKENED SMALL BOWEL: ICD-10-CM

## 2021-08-19 DIAGNOSIS — N83.201 RIGHT OVARIAN CYST: ICD-10-CM

## 2021-08-19 PROCEDURE — 99214 OFFICE O/P EST MOD 30 MIN: CPT | Performed by: INTERNAL MEDICINE

## 2021-08-19 NOTE — PROGRESS NOTES
"Subjective     CHIEF COMPLAINT:      Chief Complaint   Patient presents with   • Follow-up     discuss CT Scan       HISTORY OF PRESENT ILLNESS:     Riri Damon is a 86 y.o. female patient who returns today for follow up on her colon cancer.  She returns today for follow-up reporting no new symptoms.  She denies having abdominal pain.  She reports episodes of diarrhea/soft bowel movements.  She is not noticing blood in the stool.  Her appetite is good.  Her weight is stable.      ROS:  Pertinent ROS is in the HPI.     Past medical, surgical, social and family history were reviewed.     MEDICATIONS:    Current Outpatient Medications:   •  aspirin 81 MG EC tablet, Take 81 mg by mouth Every Other Day., Disp: , Rfl:   •  calcium carbonate-cholecalciferol 500-400 MG-UNIT tablet tablet, Take 1 tablet by mouth., Disp: , Rfl:   •  diphenhydrAMINE (BENADRYL) 25 MG tablet, Take 25 mg by mouth Daily As Needed for Allergies., Disp: , Rfl:   •  docusate sodium 100 MG capsule, Take 100 mg by mouth 2 (Two) Times a Day As Needed for Constipation., Disp: 40 capsule, Rfl: 1  •  losartan (COZAAR) 25 MG tablet, Take 1 tablet by mouth Daily., Disp: 90 tablet, Rfl: 3  •  Magnesium 500 MG capsule, Take 1 capsule by mouth. Takes 2-3 times a week, Disp: , Rfl:   •  melatonin 1 MG tablet, Take 1 mg by mouth Every Night., Disp: , Rfl:   •  meloxicam (MOBIC) 7.5 MG tablet, TAKE 1 TABLET EVERY DAY, Disp: 90 tablet, Rfl: 1  •  Multiple Vitamins-Minerals (MULTIVITAMIN ADULT PO), Take 1 tablet by mouth Daily. STOP 1 WEEK PRIOR TO SX, Disp: , Rfl:   •  simvastatin (ZOCOR) 40 MG tablet, Take 1 tablet by mouth Every Night., Disp: 90 tablet, Rfl: 3    Objective   VITAL SIGNS:     Vitals:    08/19/21 1429   BP: 173/85   Pulse: 82   Resp: 16   Temp: 96.8 °F (36 °C)   TempSrc: Temporal   SpO2: 96%   Weight: 62.7 kg (138 lb 4.8 oz)   Height: 159.5 cm (62.8\")   PainSc: 0-No pain     Body mass index is 24.66 kg/m².     Wt Readings from Last 5 " Encounters:   08/19/21 62.7 kg (138 lb 4.8 oz)   07/20/21 61.7 kg (136 lb)   06/11/21 63 kg (139 lb)   06/10/21 62.1 kg (137 lb)   05/27/21 63.5 kg (139 lb 14.4 oz)       PHYSICAL EXAMINATION:   GENERAL: The patient appears in good general condition, not in acute distress.   SKIN: No ecchymosis.  EYES: No jaundice.  LYMPHATICS: No cervical lymphadenopathy.  CHEST: Normal respiratory effort.  Lungs clear bilaterally.  No added sounds.  CVS: Normal S1-S2.  No murmurs.  ABDOMEN: Soft.  Bowel sounds are normal.  No tenderness. No Hepatomegaly. No Splenomegaly. No masses.      DIAGNOSTIC DATA:     Component      Latest Ref Rng & Units 8/11/2021   WBC      3.40 - 10.80 10*3/mm3 6.90   RBC      3.77 - 5.28 10*6/mm3 4.73   Hemoglobin      12.0 - 15.9 g/dL 15.1   Hematocrit      34.0 - 46.6 % 44.1   MCV      79.0 - 97.0 fL 93.2   MCH      26.6 - 33.0 pg 31.9   MCHC      31.5 - 35.7 g/dL 34.2   RDW      12.3 - 15.4 % 12.2 (L)   RDW-SD      37.0 - 54.0 fl 42.4   MPV      6.0 - 12.0 fL 10.9   Platelets      140 - 450 10*3/mm3 244   Neutrophil Rel %      42.7 - 76.0 % 60.5   Lymphocyte Rel %      19.6 - 45.3 % 23.5   Monocyte Rel %      5.0 - 12.0 % 12.0   Eosinophil Rel %      0.3 - 6.2 % 3.0   Basophil Rel %      0.0 - 1.5 % 0.7   Immature Granulocyte Rel %      0.0 - 0.5 % 0.3   Neutrophils Absolute      1.70 - 7.00 10*3/mm3 4.17   Lymphocytes Absolute      0.70 - 3.10 10*3/mm3 1.62   Monocytes Absolute      0.10 - 0.90 10*3/mm3 0.83   Eosinophils Absolute      0.00 - 0.40 10*3/mm3 0.21   Basophils Absolute      0.00 - 0.20 10*3/mm3 0.05   Immature Grans, Absolute      0.00 - 0.05 10*3/mm3 0.02     Component      Latest Ref Rng & Units 8/11/2021   Glucose      74 - 124 mg/dL 85   BUN      6 - 20 mg/dL 10   Creatinine      0.60 - 1.10 mg/dL 0.57 (L)   Sodium      134 - 145 mmol/L 141   Potassium      3.5 - 4.7 mmol/L 3.8   Chloride      98 - 107 mmol/L 102   CO2      22.0 - 29.0 mmol/L 27.5   Calcium      8.5 - 10.2 mg/dL 9.5    Total Protein      6.3 - 8.0 g/dL 7.0   Albumin      3.50 - 5.20 g/dL 4.50   ALT (SGPT)      0 - 33 U/L 12   AST (SGOT)      0 - 32 U/L 23   Alkaline Phosphatase      38 - 116 U/L 71   Total Bilirubin      0.2 - 1.2 mg/dL 0.6   eGFR Non African Am      >60 mL/min/1.73 101   Globulin      1.8 - 3.5 gm/dL 2.5   A/G Ratio      1.1 - 2.4 g/dL 1.8   BUN/Creatinine Ratio      7.3 - 30.0 17.5   Anion Gap      5.0 - 15.0 mmol/L 11.5     Lab Results   Component Value Date    CEA 3.18 08/11/2021    CEA 3.64 05/20/2021    CEA 3.04 12/10/2020    CEA 3.61 06/15/2020    CEA 3.04 11/26/2019      CT abdomen pelvis on 8/11/2021:  Mild circumferential wall thickening and focal luminal  narrowing of small bowel at the level of the ileocolonic anastomosis  that is favored to be due to a focal small bowel contraction. There is  no dilatation of proximal small bowel. If there is high clinical  suspicion of recurrent neoplasm further evaluation with colonoscopy may  be warranted. Otherwise unremarkable CT scan of the abdomen and pelvis.  There is no evidence of metastatic disease.    Ultrasound on 7/20/2021:  1.  Uterus:  Surgically absent   2.  Endometrium: Surgically absent  3:  Myometrium: Surgically absent   4.  Ovaries Left: Normal/unremarkable  , Right:  Septated cyst versus 2 adjacent follicles.  Right ovary measures 3.6 x 3.4 x 2.0 cm.  Septated cyst versus 2 adjacent follicles measures 3.4 x 1.9 cm.  No solid components or nodularity.  No free fluid in the pelvis.      Assessment/Plan   1.  Adenocarcinoma of the ascending colon, stage III  · Patient is status post right hemicolectomy on 2/2/2015.  · T4 a N1 a, stage III  · 1 out of 29 lymph nodes were positive.  · The circumferential margin was involved by invasive carcinoma.  · CT scans on 6/18/2019 showed no evidence of recurrence.  · Colonoscopy on 7/11/2019 showed no suspicious findings.  · CT on 6/15/2020 showed no evidence of recurrence.  · CEA was 3.61 on  6/16/2020.  · CEA improved to 3.04 on 12/10/2020.  · CEA was 3.65 on 5/20/2021.  · CT chest abdomen pelvis on 5/20/2021 revealed mild wall thickening at the level of the anastomosis?  Underdistention.  · Patient reported episodes of pain in the right lower quadrant.    · CT of the abdomen pelvis on 8/11/2021 revealed circumferential wall thickening and luminal narrowing of the small bowel at the ileocolonic anastomosis.  · Since this has been a persistent finding since the CT from 5/20/2021, I recommended evaluation by Dr. Mcneil to see if she would benefit from a colonoscopy.  · Exam today is unremarkable.  CEA was 3.18 on 8/11/2021.      2.  Right ovarian cyst.    · The cyst measured 2.4 cm on 6/15/2020.  · The cyst increased to 2.7 cm on 5/20/2021.  · Ultrasound on 7/20/2021 did not show a solid component in the cyst.      PLAN:    1.  I will refer the patient to see Dr. Mcneil for his evaluation of the area of wall thickening of the distal small bowel.   2.  I will see the patient in follow-up in 3 months with CBC CMP CEA.         Asiya Hinton MD  08/19/21

## 2021-09-09 ENCOUNTER — OFFICE VISIT (OUTPATIENT)
Dept: CARDIOLOGY | Facility: CLINIC | Age: 86
End: 2021-09-09

## 2021-09-09 VITALS
BODY MASS INDEX: 24.63 KG/M2 | HEIGHT: 63 IN | SYSTOLIC BLOOD PRESSURE: 134 MMHG | DIASTOLIC BLOOD PRESSURE: 70 MMHG | HEART RATE: 81 BPM | WEIGHT: 139 LBS

## 2021-09-09 DIAGNOSIS — I10 ESSENTIAL HYPERTENSION: Primary | ICD-10-CM

## 2021-09-09 DIAGNOSIS — I34.0 NONRHEUMATIC MITRAL VALVE REGURGITATION: ICD-10-CM

## 2021-09-09 DIAGNOSIS — I44.4 LEFT ANTERIOR FASCICULAR BLOCK: ICD-10-CM

## 2021-09-09 DIAGNOSIS — E78.2 MIXED HYPERLIPIDEMIA: ICD-10-CM

## 2021-09-09 PROCEDURE — 93000 ELECTROCARDIOGRAM COMPLETE: CPT | Performed by: INTERNAL MEDICINE

## 2021-09-09 PROCEDURE — 99214 OFFICE O/P EST MOD 30 MIN: CPT | Performed by: INTERNAL MEDICINE

## 2021-09-09 NOTE — PROGRESS NOTES
Date of Office Visit: 2021  Encounter Provider: Rowan Serrano MD  Place of Service: [facility] CARDIOLOGY  Patient Name: Riri Damon  :1934      Patient ID:  Riri Damon is a 86 y.o. female is here for  followup for hypertension.         History of Present Illness    She had rheumatic fever as a kid and infectious mononucleosis as a teenager.  She said she never had really any cardiac sequelae from it. She has  hyperlipidemia and osteoporosis. She has lower extremity swelling due to  venous insufficiency, and she has got some hypertension. Her blood pressure  has been fairly well controlled with medications.     She had an echocardiogram done on 2010, showing an ejection  fraction of 59%, mild systolic anterior motion of the anterior mitral  leaflet, cordal structures but no LVOT obstruction, and mild mitral  insufficiency. She had mild to moderate tricuspid insufficiency and mild  pulmonary hypertension. She had a dual-isotope nuclear perfusion study done  for chest pain on 2010, which showed no ischemia.     She had an echocardiogram performed on 08/15/2012 which showed an ejection fraction of  60%, normal diastolic function, normal RVSP, trace to mild tricuspid insufficiency, small  perimembranous ventricular septal defect.       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 She underwent radiation therapy and is now on Xeloda twice daily. She had  resection done by Dr. Mcneil; a laparoscopic right colectomy on 2015.      Labs on 21 show normal CMP and CBC.  Lipids were done 12/10/2020 showing total cholesterol 160, HDL 60, LDL 81, triglycerides 104.  She had a CT of the abdomen and pelvis done 2021 for follow-up from colon cancer with right hemicolectomy.  There is no evidence of metastatic disease but there was colonic thickening at the anastomosis.  A repeat CT of  the abdomen pelvis on 8/11/2021 shows a thickening there at the anastomosis again concerning for recurrent cancer.  She will be seeing Dr. Brizuela in the next week.  She has no evidence of colonic obstruction or distention.  She is very anxious about her  and his balance issues.  She does not take losartan every day, only for blood pressure is high.  Her blood pressures well controlled here.  She does not feel her heart racing or skipping and she had no dizziness, syncope or falls.  She has no exertional chest tightness or pressure no orthopnea or PND.    Past Medical History:   Diagnosis Date   • Anxiety    • Colon carcinoma (CMS/HCC) 2015    Stage IIIB, T4N1a; underwent radiation therapy and colon resection by Dr. Mcneil   • Deep vein thrombosis (CMS/HCC) 11/24/2017    right leg   • History of edema     LE   • History of rheumatic fever    • Hx of radiation therapy 2015    for adenocarcinoma of the colon Stage IIIB, T4N1a   • Hyperlipidemia    • Hypertension     MEDS PRN   • Infectious mononucleosis    • Infectious viral hepatitis    • Left anterior fascicular block    • Mitral regurgitation     8/2010: mild per echo   • OA (osteoarthritis)    • Palpitations    • Pulmonary hypertension (CMS/HCC)     8/2010- mild per echo; 8/2012 - normal RVSP per echo   • Tricuspid regurgitation     8/2010: Mild to moderate per echo; 8/2012: Trace to mild per echo   • Venous insufficiency    • Ventricular septal defect     Per echocardiogram 2012   • Vitamin D deficiency          Past Surgical History:   Procedure Laterality Date   • APPENDECTOMY     • COLECTOMY PARTIAL / TOTAL  02/02/2015 2/2015 due to adenocarcinoma   • COLONOSCOPY      JAN 2015   • COLONOSCOPY N/A 5/25/2016    Procedure: COLONOSCOPY to ANASTAMOSIS AND TERMINAL ILEUM;  Surgeon: Yfn Mcneil MD;  Location: [facility] ENDOSCOPY;  Service:    • COLONOSCOPY N/A 7/11/2019    Procedure: COLONOSCOPY to cecum:;  Surgeon: Yfn Mcneil MD;  Location:   [facility];  Service: General   • HYSTERECTOMY     • INTRAOCULAR LENS EXCHANGE Bilateral    • JOINT MANIPULATION Right 1/9/2018    Procedure: MANIPULATION OF RT KNEE;  Surgeon: Andrea Caballero MD;  Location: [facility] OR;  Service:    • JOINT REPLACEMENT Right 11/09/2017   • KNEE SURGERY Left 2006    ARTHROSCOPY   • MT TOTAL KNEE ARTHROPLASTY Right 11/9/2017    Procedure: RIGHT TOTAL KNEE ARTHROPLASTY;  Surgeon: Andrea Caballero MD;  Location: [facility] MAIN OR;  Service: Orthopedics   • TONSILLECTOMY         Current Outpatient Medications on File Prior to Visit   Medication Sig Dispense Refill   • aspirin 81 MG EC tablet Take 81 mg by mouth Every Other Day.     • calcium carbonate-cholecalciferol 500-400 MG-UNIT tablet tablet Take 1 tablet by mouth.     • diphenhydrAMINE (BENADRYL) 25 MG tablet Take 25 mg by mouth Daily As Needed for Allergies.     • docusate sodium 100 MG capsule Take 100 mg by mouth 2 (Two) Times a Day As Needed for Constipation. 40 capsule 1   • losartan (COZAAR) 25 MG tablet Take 1 tablet by mouth Daily. 90 tablet 3   • Magnesium 500 MG capsule Take 1 capsule by mouth. Takes 2-3 times a week     • melatonin 1 MG tablet Take 1 mg by mouth Every Night.     • meloxicam (MOBIC) 7.5 MG tablet TAKE 1 TABLET EVERY DAY 90 tablet 1   • Multiple Vitamins-Minerals (MULTIVITAMIN ADULT PO) Take 1 tablet by mouth Daily. STOP 1 WEEK PRIOR TO SX     • simvastatin (ZOCOR) 40 MG tablet Take 1 tablet by mouth Every Night. 90 tablet 3     No current facility-administered medications on file prior to visit.       Social History     Socioeconomic History   • Marital status:      Spouse name: Marcus   • Number of children: 3   • Years of education: College   • Highest education level: Not on file   Tobacco Use   • Smoking status: Never Smoker   • Smokeless tobacco: Never Used   Substance and Sexual Activity   • Alcohol use: No     Comment: Caffeine use: 3-4 cups half and half daily   • Drug use: Yes      "Types: Oxycodone     Comment: caffine use   • Sexual activity: Defer           ROS    Procedures    ECG 12 Lead    Date/Time: 9/9/2021 1:09 PM  Performed by: Rowan Serrano MD  Authorized by: Rowan Serrano MD   Comparison: compared with previous ECG   Similar to previous ECG  Rhythm: sinus rhythm  Conduction: right bundle branch block and left anterior fascicular block  Other findings: left ventricular hypertrophy    Clinical impression: abnormal EKG                Objective:      Vitals:    09/09/21 1254   BP: 134/70   BP Location: Left arm   Pulse: 81   Weight: 63 kg (139 lb)   Height: 158.8 cm (62.5\")     Body mass index is 25.02 kg/m².    Vitals reviewed.   Constitutional:       General: Not in acute distress.     Appearance: Well-developed. Not diaphoretic.   Eyes:      General: No scleral icterus.     Conjunctiva/sclera: Conjunctivae normal.   HENT:      Head: Normocephalic and atraumatic.   Neck:      Thyroid: No thyromegaly.      Vascular: No carotid bruit or JVD.      Lymphadenopathy: No cervical adenopathy.   Pulmonary:      Effort: Pulmonary effort is normal. No respiratory distress.      Breath sounds: Normal breath sounds. No wheezing. No rhonchi. No rales.   Chest:      Chest wall: Not tender to palpatation.   Cardiovascular:      Normal rate. Regular rhythm.      Murmurs: There is no murmur.      No gallop.   Pulses:     Intact distal pulses.   Edema:     Peripheral edema absent.   Abdominal:      General: Bowel sounds are normal. There is no distension or abdominal bruit.      Palpations: Abdomen is soft. There is no abdominal mass.      Tenderness: There is no abdominal tenderness.   Musculoskeletal:         General: No deformity.      Extremities: No clubbing present.     Cervical back: Neck supple. Skin:     General: Skin is warm and dry. There is no cyanosis.      Coloration: Skin is not pale.      Findings: No rash.   Neurological:      Mental Status: Alert and oriented to person, " place, and time.      Cranial Nerves: No cranial nerve deficit.   Psychiatric:         Judgment: Judgment normal.         Lab Review:       Assessment:      Diagnosis Plan   1. Essential hypertension     2. Mixed hyperlipidemia     3. Left anterior fascicular block     4. Nonrheumatic mitral valve regurgitation             1. Hypertension; well controlled.   2. Hyperlipidemia, well controlled on Lipitor.   3. Lower extremity edema due to venous insufficiency.   4. Osteoporosis.   5. Palpitations, stable.   6. trace tricuspid insufficiency and mitral insufficiency.   7. Left anterior fascicular block on ECG.   8. Small membranous VSD      Plan:       See back in 1 year, no medication changes, overall doing well.  Advised that she consider grab bars in her bathroom for the toilet and shower as well as a shower chair for Marcus because his balance is poor.

## 2021-09-13 ENCOUNTER — OFFICE VISIT (OUTPATIENT)
Dept: SURGERY | Facility: CLINIC | Age: 86
End: 2021-09-13

## 2021-09-13 VITALS — WEIGHT: 137 LBS | HEIGHT: 63 IN | BODY MASS INDEX: 24.27 KG/M2

## 2021-09-13 DIAGNOSIS — R93.5 ABNORMAL CT OF THE ABDOMEN: Primary | ICD-10-CM

## 2021-09-13 PROCEDURE — 99214 OFFICE O/P EST MOD 30 MIN: CPT | Performed by: SURGERY

## 2021-09-14 NOTE — PROGRESS NOTES
SUMMARY (A/P):    86-year-old lady with abnormality on CT scan consisting of circumferential thickening of the terminal ileum just proximal to ileocolic anastomosis.  Findings are not particularly suspicious and could also be related to the fact that she had postoperative radiation due to extent of tumor involvement.  She has no evidence of obstruction on CT scan and clinically has no symptoms.  This being the case, I have recommended further work-up with small bowel follow-through and further recommendations will follow acquisition of that study.      CC:    Abnormal CT scan    HPI:    86-year-old lady who underwent laparoscopic right colectomy on 2/2/2015 secondary to invasive moderately differentiated adenocarcinoma, T4bN1a tumor.  This was treated with postoperative radiation and chemotherapy.    ENDOSCOPY:   • Colonoscopy 7/11/2019: Normal postoperative anatomy    RADIOLOGY:   • CT abdomen pelvis 8/11/2021: Mild circumferential wall thickening and focal luminal narrowing of the small bowel at the level of the ileocolonic anastomosis that is favored to be due to a small bowel contraction.  No evidence of proximal obstruction.    LABS:    • CEA 8/11/2021 3.18  • CMP 8/11/2021: Normal  • CBC 8/11/2021: Normal    PREVIOUS ABDOMINAL SURGERY    • Laparoscopic right colectomy 2/2/2015    PMH:    • DVT 2017  • Colon cancer 2015  • Hypertension  • Hyperlipidemia  • Osteoarthritis    ALLERGIES:   • None    MEDICATIONS:   • Aspirin  • Calcium  • Benadryl  • Colace  • Cozaar  • Magnesium  • Melatonin  • Mobic  • Multivitamin  • Zocor    FAMILY HISTORY:    • Colorectal cancer: Negative    SOCIAL HISTORY:   • Denies tobacco use  • Denies alcohol use    PHYSICAL EXAM:   • Constitutional: Well-developed well-nourished, no acute distress  • Vital signs: Weight 137 pounds, height 62 inches, BMI 24.6  • Eyes: Conjunctiva normal, sclera nonicteric  • ENMT: Hearing grossly normal, oral mucosa moist  • Neck: Supple, no palpable  mass, trachea midline  • Respiratory: Clear to auscultation, normal inspiratory effort  • Cardiovascular: Regular rate, no murmur, no carotid bruit  • Gastrointestinal: Soft, nontender, no palpable mass, no hepatosplenomegaly, negative for hernia  • Lymphatics (palpable nodes):  cervical-negative, inguinal-negative  • Skin:  Warm, dry, no rash on visualized skin surfaces  • Musculoskeletal: Symmetric strength, normal gait  • Psychiatric: Alert and oriented ×3, normal affect     ROS:    No cough, chest pain, shortness of air.  All other systems reviewed and negative other than presenting complaints.    BEN GAYLE M.D.

## 2021-09-17 ENCOUNTER — HOSPITAL ENCOUNTER (OUTPATIENT)
Dept: GENERAL RADIOLOGY | Facility: HOSPITAL | Age: 86
Discharge: HOME | End: 2021-09-17
Admitting: SURGERY

## 2021-09-17 DIAGNOSIS — R93.5 ABNORMAL CT OF THE ABDOMEN: ICD-10-CM

## 2021-09-17 PROCEDURE — 74250 X-RAY XM SM INT 1CNTRST STD: CPT

## 2021-09-17 PROCEDURE — 63710000001 BARIUM SULFATE 96 % RECONSTITUTED SUSPENSION: Performed by: SURGERY

## 2021-09-17 PROCEDURE — A9270 NON-COVERED ITEM OR SERVICE: HCPCS | Performed by: SURGERY

## 2021-09-17 RX ADMIN — BARIUM SULFATE 183 ML: 960 POWDER, FOR SUSPENSION ORAL at 08:53

## 2021-09-17 NOTE — PROGRESS NOTES
Please let her know that her small bowel follow-through was normal and no further work-up is necessary

## 2021-09-20 ENCOUNTER — TELEPHONE (OUTPATIENT)
Dept: SURGERY | Facility: CLINIC | Age: 86
End: 2021-09-20

## 2021-09-20 NOTE — TELEPHONE ENCOUNTER
----- Message from Yfn Mcneil MD sent at 9/17/2021  9:31 AM EDT -----  Please let her know that her small bowel follow-through was normal and no further work-up is necessary

## 2021-10-19 ENCOUNTER — IMMUNIZATION (OUTPATIENT)
Dept: VACCINE CLINIC | Facility: HOSPITAL | Age: 86
End: 2021-10-19

## 2021-10-19 PROCEDURE — 91300 HC SARSCOV02 VAC 30MCG/0.3ML IM: CPT | Performed by: INTERNAL MEDICINE

## 2021-10-19 PROCEDURE — 0004A ADM SARSCOV2 30MCG/0.3ML BOOSTER: CPT | Performed by: INTERNAL MEDICINE

## 2021-11-12 ENCOUNTER — LAB (OUTPATIENT)
Dept: LAB | Facility: HOSPITAL | Age: 86
End: 2021-11-12

## 2021-11-12 ENCOUNTER — OFFICE VISIT (OUTPATIENT)
Dept: ONCOLOGY | Facility: CLINIC | Age: 86
End: 2021-11-12

## 2021-11-12 VITALS
OXYGEN SATURATION: 96 % | BODY MASS INDEX: 24.52 KG/M2 | HEIGHT: 63 IN | WEIGHT: 138.4 LBS | HEART RATE: 86 BPM | SYSTOLIC BLOOD PRESSURE: 158 MMHG | DIASTOLIC BLOOD PRESSURE: 84 MMHG | TEMPERATURE: 98 F | RESPIRATION RATE: 16 BRPM

## 2021-11-12 DIAGNOSIS — N83.201 RIGHT OVARIAN CYST: ICD-10-CM

## 2021-11-12 DIAGNOSIS — R10.13 EPIGASTRIC ABDOMINAL PAIN: ICD-10-CM

## 2021-11-12 DIAGNOSIS — K63.9 THICKENED SMALL BOWEL: ICD-10-CM

## 2021-11-12 DIAGNOSIS — C18.2 MALIGNANT NEOPLASM OF ASCENDING COLON (HCC): Primary | ICD-10-CM

## 2021-11-12 DIAGNOSIS — E87.5 HYPERKALEMIA: ICD-10-CM

## 2021-11-12 DIAGNOSIS — C18.2 MALIGNANT NEOPLASM OF ASCENDING COLON (HCC): ICD-10-CM

## 2021-11-12 LAB
ALBUMIN SERPL-MCNC: 4.3 G/DL (ref 3.5–5.2)
ALBUMIN/GLOB SERPL: 1.7 G/DL (ref 1.1–2.4)
ALP SERPL-CCNC: 78 U/L (ref 38–116)
ALT SERPL W P-5'-P-CCNC: 11 U/L (ref 0–33)
ANION GAP SERPL CALCULATED.3IONS-SCNC: 9.1 MMOL/L (ref 5–15)
AST SERPL-CCNC: 17 U/L (ref 0–32)
BASOPHILS # BLD AUTO: 0.05 10*3/MM3 (ref 0–0.2)
BASOPHILS NFR BLD AUTO: 0.5 % (ref 0–1.5)
BILIRUB SERPL-MCNC: 0.4 MG/DL (ref 0.2–1.2)
BUN SERPL-MCNC: 14 MG/DL (ref 6–20)
BUN/CREAT SERPL: 25.5 (ref 7.3–30)
CALCIUM SPEC-SCNC: 9.4 MG/DL (ref 8.5–10.2)
CEA SERPL-MCNC: 2.94 NG/ML
CHLORIDE SERPL-SCNC: 104 MMOL/L (ref 98–107)
CO2 SERPL-SCNC: 25.9 MMOL/L (ref 22–29)
CREAT SERPL-MCNC: 0.55 MG/DL (ref 0.6–1.1)
DEPRECATED RDW RBC AUTO: 43.2 FL (ref 37–54)
EOSINOPHIL # BLD AUTO: 0.22 10*3/MM3 (ref 0–0.4)
EOSINOPHIL NFR BLD AUTO: 2.4 % (ref 0.3–6.2)
ERYTHROCYTE [DISTWIDTH] IN BLOOD BY AUTOMATED COUNT: 12.5 % (ref 12.3–15.4)
GFR SERPL CREATININE-BSD FRML MDRD: 105 ML/MIN/1.73
GLOBULIN UR ELPH-MCNC: 2.6 GM/DL (ref 1.8–3.5)
GLUCOSE SERPL-MCNC: 108 MG/DL (ref 74–124)
HCT VFR BLD AUTO: 43.6 % (ref 34–46.6)
HGB BLD-MCNC: 14.5 G/DL (ref 12–15.9)
IMM GRANULOCYTES # BLD AUTO: 0.02 10*3/MM3 (ref 0–0.05)
IMM GRANULOCYTES NFR BLD AUTO: 0.2 % (ref 0–0.5)
LYMPHOCYTES # BLD AUTO: 2.18 10*3/MM3 (ref 0.7–3.1)
LYMPHOCYTES NFR BLD AUTO: 23.8 % (ref 19.6–45.3)
MCH RBC QN AUTO: 31.2 PG (ref 26.6–33)
MCHC RBC AUTO-ENTMCNC: 33.3 G/DL (ref 31.5–35.7)
MCV RBC AUTO: 93.8 FL (ref 79–97)
MONOCYTES # BLD AUTO: 1 10*3/MM3 (ref 0.1–0.9)
MONOCYTES NFR BLD AUTO: 10.9 % (ref 5–12)
NEUTROPHILS NFR BLD AUTO: 5.69 10*3/MM3 (ref 1.7–7)
NEUTROPHILS NFR BLD AUTO: 62.2 % (ref 42.7–76)
NRBC BLD AUTO-RTO: 0 /100 WBC (ref 0–0.2)
PLATELET # BLD AUTO: 252 10*3/MM3 (ref 140–450)
PMV BLD AUTO: 9.9 FL (ref 6–12)
POTASSIUM SERPL-SCNC: 5 MMOL/L (ref 3.5–4.7)
PROT SERPL-MCNC: 6.9 G/DL (ref 6.3–8)
RBC # BLD AUTO: 4.65 10*6/MM3 (ref 3.77–5.28)
SODIUM SERPL-SCNC: 139 MMOL/L (ref 134–145)
WBC # BLD AUTO: 9.16 10*3/MM3 (ref 3.4–10.8)

## 2021-11-12 PROCEDURE — 82378 CARCINOEMBRYONIC ANTIGEN: CPT | Performed by: INTERNAL MEDICINE

## 2021-11-12 PROCEDURE — 36415 COLL VENOUS BLD VENIPUNCTURE: CPT

## 2021-11-12 PROCEDURE — 80053 COMPREHEN METABOLIC PANEL: CPT

## 2021-11-12 PROCEDURE — 99214 OFFICE O/P EST MOD 30 MIN: CPT | Performed by: INTERNAL MEDICINE

## 2021-11-12 PROCEDURE — 85025 COMPLETE CBC W/AUTO DIFF WBC: CPT

## 2021-11-12 RX ORDER — A/SINGAPORE/GP1908/2015 IVR-180 (AN A/MICHIGAN/45/2015 (H1N1)PDM09-LIKE VIRUS, A/HONG KONG/4801/2014, NYMC X-263B (H3N2) (AN A/HONG KONG/4801/2014-LIKE VIRUS), AND B/BRISBANE/60/2008, WILD TYPE (A B/BRISBANE/60/2008-LIKE VIRUS) 15; 15; 15 UG/.5ML; UG/.5ML; UG/.5ML
INJECTION, SUSPENSION INTRAMUSCULAR
COMMUNITY
Start: 2021-10-27 | End: 2022-06-13

## 2021-11-12 NOTE — PROGRESS NOTES
"Subjective     CHIEF COMPLAINT:      Chief Complaint   Patient presents with   • Follow-up     no concerns       HISTORY OF PRESENT ILLNESS:     Riri Damon is a 87 y.o. female patient who returns today for follow up on her colon cancer.  She returns today for follow-up reporting intermittent upper abdominal discomfort.  It occasionally feels like a heartburn.  She is not having pain in the right lower quadrant of the abdomen.      ROS:  Pertinent ROS is in the HPI.     Past medical, surgical, social and family history were reviewed.     MEDICATIONS:    Current Outpatient Medications:   •  aspirin 81 MG EC tablet, Take 81 mg by mouth Every Other Day., Disp: , Rfl:   •  diphenhydrAMINE (BENADRYL) 25 MG tablet, Take 25 mg by mouth Daily As Needed for Allergies., Disp: , Rfl:   •  docusate sodium 100 MG capsule, Take 100 mg by mouth 2 (Two) Times a Day As Needed for Constipation., Disp: 40 capsule, Rfl: 1  •  Fluad Quadrivalent 0.5 ML prefilled syringe injection, , Disp: , Rfl:   •  Magnesium 500 MG capsule, Take 1 capsule by mouth. Takes 2-3 times a week, Disp: , Rfl:   •  melatonin 1 MG tablet, Take 1 mg by mouth Every Night., Disp: , Rfl:   •  meloxicam (MOBIC) 7.5 MG tablet, TAKE 1 TABLET EVERY DAY, Disp: 90 tablet, Rfl: 1  •  Multiple Vitamins-Minerals (MULTIVITAMIN ADULT PO), Take 1 tablet by mouth Daily., Disp: , Rfl:   •  simvastatin (ZOCOR) 40 MG tablet, Take 1 tablet by mouth Every Night., Disp: 90 tablet, Rfl: 3  •  losartan (COZAAR) 25 MG tablet, Take 1 tablet by mouth Daily., Disp: 90 tablet, Rfl: 3    Objective   VITAL SIGNS:     Vitals:    11/12/21 1113   BP: 158/84   Pulse: 86   Resp: 16   Temp: 98 °F (36.7 °C)   TempSrc: Temporal   SpO2: 96%   Weight: 62.8 kg (138 lb 6.4 oz)   Height: 159.5 cm (62.8\")   PainSc: 0-No pain     Body mass index is 24.68 kg/m².     Wt Readings from Last 5 Encounters:   11/12/21 62.8 kg (138 lb 6.4 oz)   09/13/21 62.1 kg (137 lb)   09/09/21 63 kg (139 lb)   08/19/21 " 62.7 kg (138 lb 4.8 oz)   07/20/21 61.7 kg (136 lb)       PHYSICAL EXAMINATION:   GENERAL: The patient appears in good general condition, not in acute distress.   SKIN: No ecchymosis.  EYES: No jaundice.  LYMPHATICS: No cervical lymphadenopathy.  CHEST: Normal respiratory effort.  Lungs clear bilaterally.  No added sounds.  CVS: No Normal S1-S2.  No murmurs  ABDOMEN: Soft.  Mild epigastric tenderness. No Hepatomegaly. No Splenomegaly. No masses.    DIAGNOSTIC DATA:     Results from last 7 days   Lab Units 11/12/21  1042   WBC 10*3/mm3 9.16   NEUTROS ABS 10*3/mm3 5.69   HEMOGLOBIN g/dL 14.5   HEMATOCRIT % 43.6   PLATELETS 10*3/mm3 252     Results from last 7 days   Lab Units 11/12/21  1042   SODIUM mmol/L 139   POTASSIUM mmol/L 5.0*   CHLORIDE mmol/L 104   CO2 mmol/L 25.9   BUN mg/dL 14   CREATININE mg/dL 0.55*   CALCIUM mg/dL 9.4   ALBUMIN g/dL 4.30   BILIRUBIN mg/dL 0.4   ALK PHOS U/L 78   ALT (SGPT) U/L 11   AST (SGOT) U/L 17   GLUCOSE mg/dL 108     Lab Results   Component Value Date    CEA 2.94 11/12/2021    CEA 3.18 08/11/2021    CEA 3.64 05/20/2021    CEA 3.04 12/10/2020    CEA 3.61 06/15/2020        Small bowel follow-through on 9/17/2021:  The terminal ileum is normal in appearance. No wall  thickening or mass is demonstrated.    Assessment/Plan   *Adenocarcinoma of the ascending colon, stage III  · Patient is status post right hemicolectomy on 2/2/2015.  · T4 a N1 a, stage III  · 1 out of 29 lymph nodes were positive.  · The circumferential margin was involved by invasive carcinoma.  · CT scans on 6/18/2019 showed no evidence of recurrence.  · Colonoscopy on 7/11/2019 showed no suspicious findings.  · CT on 6/15/2020 showed no evidence of recurrence.  · CT chest abdomen pelvis on 5/20/2021 revealed mild wall thickening at the level of the anastomosis?  Underdistention.  · CEA was 3.18 on 8/11/2021.  · CT of the abdomen pelvis on 8/11/2021 revealed circumferential wall thickening and luminal narrowing of the  small bowel at the ileocolonic anastomosis.  · She was evaluated by her surgeon.  She had small bowel follow-through on 9/17/2021 that showed no wall thickening or a mass.  · CEA is normal today at 2.94  · I reassured the patient about the findings.  I recommended repeating CT scan in August 2022.  If there is no evidence of recurrence, she will not need additional routine follow-up scans.    *Epigastric abdominal pain.  · This is likely secondary to gastritis versus reflux  · Patient is on NSAID which may be contributing.  · I recommended using as needed antacid like Tums or Maalox.    *Right ovarian cyst.    · The cyst measured 2.4 cm on 6/15/2020.  · The cyst increased to 2.7 cm on 5/20/2021.  · Ultrasound on 7/20/2021 did not show a solid component in the cyst.  · This will be reevaluated on her follow-up CT scan.    *Hyperkalemia.  · Potassium level is 5.0 today.      PLAN:    1.  We will obtain CT scan of the chest abdomen pelvis in August 2022.   2.  I will see her in follow-up 1 week after the scans.  CBC CMP CEA will be obtained.   3.  I recommended taking an antacid as needed.  If symptoms persist, may need to come off meloxicam.  4.  Reduce intake of potassium-rich food.      Asiya Hinton MD  11/12/21

## 2021-11-15 ENCOUNTER — TELEPHONE (OUTPATIENT)
Dept: ONCOLOGY | Facility: CLINIC | Age: 86
End: 2021-11-15

## 2021-11-15 NOTE — TELEPHONE ENCOUNTER
----- Message from Asiya Hinton MD sent at 11/12/2021  4:06 PM EST -----  Please inform the patient that her potassium was mildly elevated and ask her to reduce intake of potassium rich food. Meloxicam may be contributing (may also be contributing to the intermittent acid reflux and upper abdominal pain).    Thank you

## 2021-12-13 ENCOUNTER — OFFICE VISIT (OUTPATIENT)
Dept: INTERNAL MEDICINE | Facility: CLINIC | Age: 86
End: 2021-12-13

## 2021-12-13 VITALS
HEIGHT: 62 IN | WEIGHT: 136.8 LBS | BODY MASS INDEX: 25.17 KG/M2 | DIASTOLIC BLOOD PRESSURE: 70 MMHG | SYSTOLIC BLOOD PRESSURE: 128 MMHG | RESPIRATION RATE: 18 BRPM | OXYGEN SATURATION: 96 % | HEART RATE: 89 BPM

## 2021-12-13 DIAGNOSIS — Z00.00 MEDICARE ANNUAL WELLNESS VISIT, SUBSEQUENT: Primary | ICD-10-CM

## 2021-12-13 PROCEDURE — 96160 PT-FOCUSED HLTH RISK ASSMT: CPT | Performed by: FAMILY MEDICINE

## 2021-12-13 PROCEDURE — 1160F RVW MEDS BY RX/DR IN RCRD: CPT | Performed by: FAMILY MEDICINE

## 2021-12-13 PROCEDURE — G0439 PPPS, SUBSEQ VISIT: HCPCS | Performed by: FAMILY MEDICINE

## 2021-12-13 PROCEDURE — 1170F FXNL STATUS ASSESSED: CPT | Performed by: FAMILY MEDICINE

## 2021-12-13 PROCEDURE — 1126F AMNT PAIN NOTED NONE PRSNT: CPT | Performed by: FAMILY MEDICINE

## 2021-12-13 NOTE — PROGRESS NOTES
The ABCs of the Annual Wellness Visit  Subsequent Medicare Wellness Visit    Chief Complaint   Patient presents with   • Medicare Wellness-subsequent      Subjective    History of Present Illness:  Riri Damon is a 87 y.o. female who presents for a Subsequent Medicare Wellness Visit.    The following portions of the patient's history were reviewed and   updated as appropriate: allergies, current medications, past family history, past medical history, past social history, past surgical history and problem list.     Compared to one year ago, the patient feels her physical   health is the same.    Compared to one year ago, the patient feels her mental   health is the same.    Recent Hospitalizations:  She was not admitted to the hospital during the last year.       Current Medical Providers:  Patient Care Team:  Phoenix Velazquez MD as PCP - General  Yfn Mcneil MD as Referring Physician (General Surgery)  Asiya Hinton MD as Consulting Physician (Hematology and Oncology)  Rowan Serrano MD as Consulting Physician (Cardiology)    Outpatient Medications Prior to Visit   Medication Sig Dispense Refill   • aspirin 81 MG EC tablet Take 81 mg by mouth Every Other Day.     • diphenhydrAMINE (BENADRYL) 25 MG tablet Take 25 mg by mouth Daily As Needed for Allergies.     • docusate sodium 100 MG capsule Take 100 mg by mouth 2 (Two) Times a Day As Needed for Constipation. 40 capsule 1   • Fluad Quadrivalent 0.5 ML prefilled syringe injection      • Magnesium 500 MG capsule Take 1 capsule by mouth. Takes 2-3 times a week     • melatonin 1 MG tablet Take 1 mg by mouth Every Night.     • meloxicam (MOBIC) 7.5 MG tablet TAKE 1 TABLET EVERY DAY 90 tablet 1   • Multiple Vitamins-Minerals (MULTIVITAMIN ADULT PO) Take 1 tablet by mouth Daily.     • simvastatin (ZOCOR) 40 MG tablet Take 1 tablet by mouth Every Night. 90 tablet 3   • losartan (COZAAR) 25 MG tablet Take 1 tablet by mouth Daily. 90 tablet 3     No  "facility-administered medications prior to visit.       No opioid medication identified on active medication list. I have reviewed chart for other potential  high risk medication/s and harmful drug interactions in the elderly.          Aspirin is on active medication list. Aspirin use is indicated based on review of current medical condition/s. Pros and cons of this therapy have been discussed today. Benefits of this medication outweigh potential harm.  Patient has been encouraged to continue taking this medication.  .      Patient Active Problem List   Diagnosis   • Hyperlipidemia   • Low back pain   • Mitral valve insufficiency   • Palpitations   • Knee pain   • Tricuspid valve insufficiency   • Arthritis   • Medicare annual wellness visit, subsequent   • Screening for osteoporosis   • Orthostatic lightheadedness   • Essential hypertension   • OA (osteoarthritis) of knee   • Ventricular septal defect   • Left anterior fascicular block   • Malignant neoplasm of ascending colon (HCC)   • Family history of colon cancer   • Osteopenia of left hip   • Acute pain of left shoulder   • Wellness examination   • Anxiety   • Cyst of right ovary   • Primary insomnia     Advance Care Planning   Advance Directive is not on file.  ACP discussion was held with the patient during this visit. Patient has an advance directive (not in EMR), copy requested.  Matt Damon - alyson        Objective {Optional Links Labs  Result Review  Imaging  Med Tab  Media  Procedures :23}      Vitals:    12/13/21 1020   BP: 128/70   Pulse: 89   Resp: 18   SpO2: 96%   Weight: 62.1 kg (136 lb 12.8 oz)   Height: 157.5 cm (62\")   PainSc: 0-No pain     BMI Readings from Last 1 Encounters:   12/13/21 25.02 kg/m²   BMI is above normal parameters. Recommendations include: stable    Does the patient have evidence of cognitive impairment? No    Physical Exam            HEALTH RISK ASSESSMENT    Smoking Status:  Social History     Tobacco Use   Smoking " Status Never Smoker   Smokeless Tobacco Never Used     Alcohol Consumption:  Social History     Substance and Sexual Activity   Alcohol Use No    Comment: Caffeine use: 3-4 cups half and half daily     Fall Risk Screen:    EBONIE Fall Risk Assessment has not been completed.    Depression Screening:  PHQ-2/PHQ-9 Depression Screening 8/19/2021   Little interest or pleasure in doing things 0   Feeling down, depressed, or hopeless 0   Trouble falling or staying asleep, or sleeping too much 0   Feeling tired or having little energy 0   Poor appetite or overeating 0   Feeling bad about yourself - or that you are a failure or have let yourself or your family down 0   Trouble concentrating on things, such as reading the newspaper or watching television 0   Moving or speaking so slowly that other people could have noticed. Or the opposite - being so fidgety or restless that you have been moving around a lot more than usual 0   Thoughts that you would be better off dead, or of hurting yourself in some way 0   Total Score 0       Health Habits and Functional and Cognitive Screening:  Functional & Cognitive Status 12/13/2021   Do you have difficulty preparing food and eating? No   Do you have difficulty bathing yourself, getting dressed or grooming yourself? No   Do you have difficulty using the toilet? No   Do you have difficulty moving around from place to place? No   Do you have trouble with steps or getting out of a bed or a chair? No   Current Diet Well Balanced Diet   Dental Exam Up to date   Eye Exam Up to date   Exercise (times per week) 2 times per week   Current Exercises Include Walking;No Regular Exercise   Current Exercise Activities Include -   Do you need help using the phone?  No   Are you deaf or do you have serious difficulty hearing?  No   Do you need help with transportation? No   Do you need help shopping? No   Do you need help preparing meals?  No   Do you need help with housework?  No   Do you need help  with laundry? No   Do you need help taking your medications? No   Do you need help managing money? No   Do you ever drive or ride in a car without wearing a seat belt? No   Have you felt unusual stress, anger or loneliness in the last month? No   Who do you live with? Spouse   If you need help, do you have trouble finding someone available to you? No   Have you been bothered in the last four weeks by sexual problems? No   Do you have difficulty concentrating, remembering or making decisions? No       Age-appropriate Screening Schedule:  Refer to the list below for future screening recommendations based on patient's age, sex and/or medical conditions. Orders for these recommended tests are listed in the plan section. The patient has been provided with a written plan.    Health Maintenance   Topic Date Due   • ZOSTER VACCINE (2 of 2) 02/26/2010   • DXA SCAN  11/13/2021   • LIPID PANEL  12/10/2021   • MAMMOGRAM  01/19/2023   • TDAP/TD VACCINES (2 - Td or Tdap) 10/29/2024   • INFLUENZA VACCINE  Completed              Assessment/Plan     CMS Preventative Services Quick Reference  Risk Factors Identified During Encounter  None Identified  The above risks/problems have been discussed with the patient.  Follow up actions/plans if indicated are seen below in the Assessment/Plan Section.  Pertinent information has been shared with the patient in the After Visit Summary.    Diagnoses and all orders for this visit:    1. Medicare annual wellness visit, subsequent (Primary)        Follow Up:   Return in about 6 months (around 6/13/2022), or if symptoms worsen or fail to improve, for Recheck.     An After Visit Summary and PPPS were given to the patient.  Ongoing  management of chronic medical problems.

## 2022-01-27 ENCOUNTER — OFFICE VISIT (OUTPATIENT)
Dept: OBSTETRICS AND GYNECOLOGY | Age: 87
End: 2022-01-27

## 2022-01-27 VITALS
DIASTOLIC BLOOD PRESSURE: 68 MMHG | BODY MASS INDEX: 25.4 KG/M2 | WEIGHT: 138 LBS | HEIGHT: 62 IN | SYSTOLIC BLOOD PRESSURE: 108 MMHG

## 2022-01-27 DIAGNOSIS — N83.201 CYST OF RIGHT OVARY: Primary | ICD-10-CM

## 2022-01-27 PROCEDURE — 99212 OFFICE O/P EST SF 10 MIN: CPT | Performed by: OBSTETRICS & GYNECOLOGY

## 2022-01-27 RX ORDER — LOSARTAN POTASSIUM 25 MG/1
TABLET ORAL
COMMUNITY
Start: 2021-12-14 | End: 2022-05-03

## 2022-01-27 NOTE — PROGRESS NOTES
"  Chief [id number] of right ovarian cyst    History of present illness- Patient is a 87 y.o.  who who is here for follow-up of a right ovarian cyst.  Patient previously has had ultrasounds and CT scans.  The cyst has a possible septation versus 2 adjacent cyst.  The area measures 3.6 x 2.0 x 3.6 on previous scans.  Patient is not having any pelvic pain.  She does have a history of colon cancer.        /68   Ht 157.5 cm (62\")   Wt 62.6 kg (138 lb)   Breastfeeding No   BMI 25.24 kg/m²   OBGyn Exam    Pelvic ultrasound shows surgically absent uterus.  The right ovary has a cyst with a single septation versus 2 adjacent cyst the area measures 3.7 x 1.9 x 3.6 cm.  It appears to be water-filled.  No free fluid in the pelvis.  The left ovary is not seen.    Diagnoses and all orders for this visit:    1. Cyst of right ovary (Primary)    Discussed that the cyst appears stable in size and appearance.  I compared it to previous ultrasounds.  We discussed that this is suggestive but not definitive for benign etiology.  We discussed the only way to definitively know would be to have surgery.  Patient does not desire that.  She will call back if she has any pain or concerns.  "

## 2022-02-02 DIAGNOSIS — M19.90 ARTHRITIS: ICD-10-CM

## 2022-02-02 RX ORDER — MELOXICAM 7.5 MG/1
TABLET ORAL
Qty: 90 TABLET | Refills: 1 | Status: SHIPPED | OUTPATIENT
Start: 2022-02-02 | End: 2022-07-06 | Stop reason: SDUPTHER

## 2022-02-07 ENCOUNTER — TRANSCRIBE ORDERS (OUTPATIENT)
Dept: INTERNAL MEDICINE | Facility: CLINIC | Age: 87
End: 2022-02-07

## 2022-02-07 DIAGNOSIS — Z12.31 VISIT FOR SCREENING MAMMOGRAM: Primary | ICD-10-CM

## 2022-02-11 ENCOUNTER — HOSPITAL ENCOUNTER (OUTPATIENT)
Dept: MAMMOGRAPHY | Facility: HOSPITAL | Age: 87
Discharge: HOME | End: 2022-02-11
Admitting: FAMILY MEDICINE

## 2022-02-11 DIAGNOSIS — Z12.31 VISIT FOR SCREENING MAMMOGRAM: ICD-10-CM

## 2022-02-11 PROCEDURE — 77063 BREAST TOMOSYNTHESIS BI: CPT

## 2022-02-11 PROCEDURE — 77067 SCR MAMMO BI INCL CAD: CPT

## 2022-05-02 DIAGNOSIS — E78.5 HYPERLIPIDEMIA, UNSPECIFIED HYPERLIPIDEMIA TYPE: ICD-10-CM

## 2022-05-03 RX ORDER — SIMVASTATIN 40 MG
TABLET ORAL
Qty: 90 TABLET | Refills: 0 | Status: SHIPPED | OUTPATIENT
Start: 2022-05-03 | End: 2022-10-18

## 2022-05-03 RX ORDER — LOSARTAN POTASSIUM 25 MG/1
TABLET ORAL
Qty: 90 TABLET | Refills: 0 | Status: SHIPPED | OUTPATIENT
Start: 2022-05-03 | End: 2022-11-09

## 2022-06-13 ENCOUNTER — OFFICE VISIT (OUTPATIENT)
Dept: INTERNAL MEDICINE | Facility: CLINIC | Age: 87
End: 2022-06-13

## 2022-06-13 ENCOUNTER — LAB (OUTPATIENT)
Dept: LAB | Facility: HOSPITAL | Age: 87
End: 2022-06-13

## 2022-06-13 VITALS
SYSTOLIC BLOOD PRESSURE: 148 MMHG | HEIGHT: 62 IN | WEIGHT: 137.8 LBS | HEART RATE: 93 BPM | DIASTOLIC BLOOD PRESSURE: 80 MMHG | BODY MASS INDEX: 25.36 KG/M2 | OXYGEN SATURATION: 96 %

## 2022-06-13 DIAGNOSIS — E78.2 MIXED HYPERLIPIDEMIA: Primary | ICD-10-CM

## 2022-06-13 DIAGNOSIS — M19.90 ARTHRITIS: ICD-10-CM

## 2022-06-13 DIAGNOSIS — I10 ESSENTIAL HYPERTENSION: ICD-10-CM

## 2022-06-13 DIAGNOSIS — F51.01 PRIMARY INSOMNIA: ICD-10-CM

## 2022-06-13 LAB
ALBUMIN SERPL-MCNC: 4.3 G/DL (ref 3.5–5.2)
ALBUMIN/GLOB SERPL: 1.7 G/DL
ALP SERPL-CCNC: 62 U/L (ref 39–117)
ALT SERPL W P-5'-P-CCNC: 16 U/L (ref 1–33)
ANION GAP SERPL CALCULATED.3IONS-SCNC: 10 MMOL/L (ref 5–15)
AST SERPL-CCNC: 21 U/L (ref 1–32)
BILIRUB SERPL-MCNC: 0.5 MG/DL (ref 0–1.2)
BUN SERPL-MCNC: 11 MG/DL (ref 8–23)
BUN/CREAT SERPL: 19 (ref 7–25)
CALCIUM SPEC-SCNC: 9.1 MG/DL (ref 8.6–10.5)
CHLORIDE SERPL-SCNC: 105 MMOL/L (ref 98–107)
CHOLEST SERPL-MCNC: 186 MG/DL (ref 0–200)
CO2 SERPL-SCNC: 24 MMOL/L (ref 22–29)
CREAT SERPL-MCNC: 0.58 MG/DL (ref 0.57–1)
EGFRCR SERPLBLD CKD-EPI 2021: 87.7 ML/MIN/1.73
GLOBULIN UR ELPH-MCNC: 2.5 GM/DL
GLUCOSE SERPL-MCNC: 104 MG/DL (ref 65–99)
HDLC SERPL-MCNC: 53 MG/DL (ref 40–60)
LDLC SERPL CALC-MCNC: 120 MG/DL (ref 0–100)
LDLC/HDLC SERPL: 2.25 {RATIO}
POTASSIUM SERPL-SCNC: 4.4 MMOL/L (ref 3.5–5.2)
PROT SERPL-MCNC: 6.8 G/DL (ref 6–8.5)
SODIUM SERPL-SCNC: 139 MMOL/L (ref 136–145)
TRIGL SERPL-MCNC: 70 MG/DL (ref 0–150)
VLDLC SERPL-MCNC: 13 MG/DL (ref 5–40)

## 2022-06-13 PROCEDURE — 99214 OFFICE O/P EST MOD 30 MIN: CPT | Performed by: FAMILY MEDICINE

## 2022-06-13 PROCEDURE — 36415 COLL VENOUS BLD VENIPUNCTURE: CPT | Performed by: FAMILY MEDICINE

## 2022-06-13 PROCEDURE — 80061 LIPID PANEL: CPT | Performed by: FAMILY MEDICINE

## 2022-06-13 PROCEDURE — 80053 COMPREHEN METABOLIC PANEL: CPT | Performed by: FAMILY MEDICINE

## 2022-06-13 NOTE — PROGRESS NOTES
"Chief Complaint  follow up to hyperlipidemia and follow up to hypertension    Subjective        Riri Damon presents to [facility]  History of Present Illness  Follow-up appoint for ongoing management of chronic problems hyperlipidemia hypertension arthritis insomnia  She is having intermittent benefit from melatonin for sleep she does not want to use any other medications  Her arthritis is controlled with meloxicam occasionally she does not take it regularly as she does not want to affect her  blood pressure.  She has no chest pain shortness of breath or increased fatigue no unwanted side effects of medications    Objective   Vital Signs:  /80 (BP Location: Right arm, Patient Position: Sitting, Cuff Size: Adult)   Pulse 93   Ht 157.5 cm (62\")   Wt 62.5 kg (137 lb 12.8 oz)   SpO2 96%   BMI 25.20 kg/m²   Estimated body mass index is 25.2 kg/m² as calculated from the following:    Height as of this encounter: 157.5 cm (62\").    Weight as of this encounter: 62.5 kg (137 lb 12.8 oz).          Physical Exam  Vitals and nursing note reviewed.   Constitutional:       Appearance: Normal appearance. She is well-developed. She is not diaphoretic.   HENT:      Head: Normocephalic and atraumatic.      Right Ear: Tympanic membrane, ear canal and external ear normal.      Left Ear: Tympanic membrane, ear canal and external ear normal.   Eyes:      General: Lids are normal. No scleral icterus.     Extraocular Movements: Extraocular movements intact.      Conjunctiva/sclera: Conjunctivae normal.   Neck:      Thyroid: No thyroid mass or thyromegaly.      Vascular: No carotid bruit or JVD.   Cardiovascular:      Rate and Rhythm: Normal rate and regular rhythm.      Pulses: Normal pulses.           Radial pulses are 2+ on the right side and 2+ on the left side.      Heart sounds: Normal heart sounds. No murmur heard.  Pulmonary:      Effort: Pulmonary effort is normal. No respiratory " distress.      Breath sounds: Normal breath sounds.   Abdominal:      Palpations: Abdomen is soft.   Musculoskeletal:      Cervical back: Normal range of motion.      Right lower leg: No edema.      Left lower leg: No edema.   Skin:     General: Skin is warm and dry.      Coloration: Skin is not pale.      Findings: No erythema or rash.   Neurological:      General: No focal deficit present.      Mental Status: She is alert and oriented to person, place, and time.      Sensory: No sensory deficit.      Deep Tendon Reflexes: Reflexes are normal and symmetric.   Psychiatric:         Mood and Affect: Mood normal.         Behavior: Behavior normal. Behavior is cooperative.         Thought Content: Thought content normal.         Judgment: Judgment normal.        Result Review :    Common labs    Common Labsle 8/11/21 8/11/21 8/11/21 11/12/21 11/12/21    1030 1030 1032 1042 1042   Glucose  85   108   BUN  10   14   Creatinine  0.57 (A) 0.60  0.55 (A)   eGFR Non African Am  101   105   Sodium  141   139   Potassium  3.8   5.0 (A)   Chloride  102   104   Calcium  9.5   9.4   Albumin  4.50   4.30   Total Bilirubin  0.6   0.4   Alkaline Phosphatase  71   78   AST (SGOT)  23   17   ALT (SGPT)  12   11   WBC 6.90   9.16    Hemoglobin 15.1   14.5    Hematocrit 44.1   43.6    Platelets 244   252    (A) Abnormal value       Comments are available for some flowsheets but are not being displayed.                     Assessment and Plan   Diagnoses and all orders for this visit:    1. Mixed hyperlipidemia (Primary)  -     Lipid Panel    2. Essential hypertension  -     Comprehensive Metabolic Panel    3. Primary insomnia    4. Arthritis    Follow-up results of blood work for further monitoring of hypertension hyperlipidemia  Tinea present dosing of meloxicam intermittently for arthritis pain  Melatonin for insomnia may increase dose to 2 mg         Follow Up   Return in about 6 months (around 12/13/2022), or if symptoms worsen or  fail to improve, for Recheck.  Patient was given instructions and counseling regarding her condition or for health maintenance advice. Please see specific information pulled into the AVS if appropriate.

## 2022-07-06 DIAGNOSIS — M19.90 ARTHRITIS: ICD-10-CM

## 2022-07-06 NOTE — TELEPHONE ENCOUNTER
Caller: Riri Damon    Relationship: Self    Best call back number: [phone]      Requested Prescriptions:   Requested Prescriptions     Pending Prescriptions Disp Refills   • meloxicam (MOBIC) 7.5 MG tablet 90 tablet 1     Sig: Take 1 tablet by mouth Daily.        Pharmacy where request should be sent: [facility] ([facility]) - [facility] [address] - [phone]  - [phone] FX         Does the patient have less than a 3 day supply:  [] Yes  [x] No    Ryann Joaquin, RACHEL   07/06/22 10:07 EDT

## 2022-07-07 RX ORDER — MELOXICAM 7.5 MG/1
7.5 TABLET ORAL DAILY
Qty: 90 TABLET | Refills: 1 | Status: SHIPPED | OUTPATIENT
Start: 2022-07-07 | End: 2022-11-28

## 2022-08-18 ENCOUNTER — HOSPITAL ENCOUNTER (OUTPATIENT)
Dept: CT IMAGING | Facility: HOSPITAL | Age: 87
Discharge: HOME | End: 2022-08-18
Admitting: INTERNAL MEDICINE

## 2022-08-18 DIAGNOSIS — C18.2 MALIGNANT NEOPLASM OF ASCENDING COLON: ICD-10-CM

## 2022-08-18 PROCEDURE — 25010000002 IOPAMIDOL 61 % SOLUTION: Performed by: INTERNAL MEDICINE

## 2022-08-18 PROCEDURE — 74177 CT ABD & PELVIS W/CONTRAST: CPT

## 2022-08-18 PROCEDURE — 0 DIATRIZOATE MEGLUMINE & SODIUM PER 1 ML: Performed by: INTERNAL MEDICINE

## 2022-08-18 PROCEDURE — 82565 ASSAY OF CREATININE: CPT

## 2022-08-18 PROCEDURE — 71260 CT THORAX DX C+: CPT

## 2022-08-18 RX ADMIN — IOPAMIDOL 85 ML: 612 INJECTION, SOLUTION INTRAVENOUS at 10:10

## 2022-08-18 RX ADMIN — DIATRIZOATE MEGLUMINE AND DIATRIZOATE SODIUM 30 ML: 660; 100 LIQUID ORAL; RECTAL at 09:00

## 2022-08-22 ENCOUNTER — OFFICE VISIT (OUTPATIENT)
Dept: ONCOLOGY | Facility: CLINIC | Age: 87
End: 2022-08-22

## 2022-08-22 ENCOUNTER — LAB (OUTPATIENT)
Dept: LAB | Facility: HOSPITAL | Age: 87
End: 2022-08-22

## 2022-08-22 VITALS
RESPIRATION RATE: 16 BRPM | SYSTOLIC BLOOD PRESSURE: 134 MMHG | TEMPERATURE: 97.3 F | DIASTOLIC BLOOD PRESSURE: 83 MMHG | HEIGHT: 62 IN | HEART RATE: 87 BPM | WEIGHT: 136.9 LBS | BODY MASS INDEX: 25.19 KG/M2 | OXYGEN SATURATION: 95 %

## 2022-08-22 DIAGNOSIS — C18.2 MALIGNANT NEOPLASM OF ASCENDING COLON: Primary | ICD-10-CM

## 2022-08-22 DIAGNOSIS — E87.5 HYPERKALEMIA: ICD-10-CM

## 2022-08-22 DIAGNOSIS — N83.201 RIGHT OVARIAN CYST: ICD-10-CM

## 2022-08-22 DIAGNOSIS — C18.2 MALIGNANT NEOPLASM OF ASCENDING COLON: ICD-10-CM

## 2022-08-22 LAB
ALBUMIN SERPL-MCNC: 4.6 G/DL (ref 3.5–5.2)
ALBUMIN/GLOB SERPL: 1.8 G/DL (ref 1.1–2.4)
ALP SERPL-CCNC: 69 U/L (ref 38–116)
ALT SERPL W P-5'-P-CCNC: 20 U/L (ref 0–33)
ANION GAP SERPL CALCULATED.3IONS-SCNC: 13.7 MMOL/L (ref 5–15)
AST SERPL-CCNC: 25 U/L (ref 0–32)
BASOPHILS # BLD AUTO: 0.04 10*3/MM3 (ref 0–0.2)
BASOPHILS NFR BLD AUTO: 0.5 % (ref 0–1.5)
BILIRUB SERPL-MCNC: 0.4 MG/DL (ref 0.2–1.2)
BUN SERPL-MCNC: 12 MG/DL (ref 6–20)
BUN/CREAT SERPL: 19.7 (ref 7.3–30)
CALCIUM SPEC-SCNC: 9.6 MG/DL (ref 8.5–10.2)
CEA SERPL-MCNC: 2.85 NG/ML
CHLORIDE SERPL-SCNC: 105 MMOL/L (ref 98–107)
CO2 SERPL-SCNC: 23.3 MMOL/L (ref 22–29)
CREAT SERPL-MCNC: 0.61 MG/DL (ref 0.6–1.1)
DEPRECATED RDW RBC AUTO: 44.2 FL (ref 37–54)
EGFRCR SERPLBLD CKD-EPI 2021: 86.7 ML/MIN/1.73
EOSINOPHIL # BLD AUTO: 0.25 10*3/MM3 (ref 0–0.4)
EOSINOPHIL NFR BLD AUTO: 3.3 % (ref 0.3–6.2)
ERYTHROCYTE [DISTWIDTH] IN BLOOD BY AUTOMATED COUNT: 12.6 % (ref 12.3–15.4)
GLOBULIN UR ELPH-MCNC: 2.5 GM/DL (ref 1.8–3.5)
GLUCOSE SERPL-MCNC: 144 MG/DL (ref 74–124)
HCT VFR BLD AUTO: 44.3 % (ref 34–46.6)
HGB BLD-MCNC: 14.9 G/DL (ref 12–15.9)
IMM GRANULOCYTES # BLD AUTO: 0.02 10*3/MM3 (ref 0–0.05)
IMM GRANULOCYTES NFR BLD AUTO: 0.3 % (ref 0–0.5)
LYMPHOCYTES # BLD AUTO: 1.79 10*3/MM3 (ref 0.7–3.1)
LYMPHOCYTES NFR BLD AUTO: 24 % (ref 19.6–45.3)
MCH RBC QN AUTO: 32 PG (ref 26.6–33)
MCHC RBC AUTO-ENTMCNC: 33.6 G/DL (ref 31.5–35.7)
MCV RBC AUTO: 95.1 FL (ref 79–97)
MONOCYTES # BLD AUTO: 0.7 10*3/MM3 (ref 0.1–0.9)
MONOCYTES NFR BLD AUTO: 9.4 % (ref 5–12)
NEUTROPHILS NFR BLD AUTO: 4.67 10*3/MM3 (ref 1.7–7)
NEUTROPHILS NFR BLD AUTO: 62.5 % (ref 42.7–76)
NRBC BLD AUTO-RTO: 0 /100 WBC (ref 0–0.2)
PLATELET # BLD AUTO: 240 10*3/MM3 (ref 140–450)
PMV BLD AUTO: 10 FL (ref 6–12)
POTASSIUM SERPL-SCNC: 4.4 MMOL/L (ref 3.5–4.7)
PROT SERPL-MCNC: 7.1 G/DL (ref 6.3–8)
RBC # BLD AUTO: 4.66 10*6/MM3 (ref 3.77–5.28)
SODIUM SERPL-SCNC: 142 MMOL/L (ref 134–145)
WBC NRBC COR # BLD: 7.47 10*3/MM3 (ref 3.4–10.8)

## 2022-08-22 PROCEDURE — 85025 COMPLETE CBC W/AUTO DIFF WBC: CPT

## 2022-08-22 PROCEDURE — 99214 OFFICE O/P EST MOD 30 MIN: CPT | Performed by: INTERNAL MEDICINE

## 2022-08-22 PROCEDURE — 80053 COMPREHEN METABOLIC PANEL: CPT

## 2022-08-22 PROCEDURE — 36415 COLL VENOUS BLD VENIPUNCTURE: CPT

## 2022-08-22 PROCEDURE — 82378 CARCINOEMBRYONIC ANTIGEN: CPT | Performed by: INTERNAL MEDICINE

## 2022-08-22 NOTE — PROGRESS NOTES
"Subjective     CHIEF COMPLAINT:      Chief Complaint   Patient presents with   • Follow-up       HISTORY OF PRESENT ILLNESS:     Riri Damon is a 87 y.o. female patient who returns today for follow up on her colon cancer.  She returns today for follow-up reporting that she is feeling good.  She denies having abdominal pain.  No problem with constipation.      ROS:  Pertinent ROS is in the HPI.     Past medical, surgical, social and family history were reviewed.     MEDICATIONS:    Current Outpatient Medications:   •  aspirin 81 MG EC tablet, Take 81 mg by mouth Every Other Day., Disp: , Rfl:   •  docusate sodium 100 MG capsule, Take 100 mg by mouth 2 (Two) Times a Day As Needed for Constipation., Disp: 40 capsule, Rfl: 1  •  losartan (COZAAR) 25 MG tablet, TAKE 1 TABLET EVERY DAY, Disp: 90 tablet, Rfl: 0  •  melatonin 1 MG tablet, Take 1 mg by mouth Every Night., Disp: , Rfl:   •  meloxicam (MOBIC) 7.5 MG tablet, Take 1 tablet by mouth Daily., Disp: 90 tablet, Rfl: 1  •  Multiple Vitamins-Minerals (MULTIVITAMIN ADULT PO), Take 1 tablet by mouth Daily., Disp: , Rfl:   •  simvastatin (ZOCOR) 40 MG tablet, TAKE 1 TABLET EVERY NIGHT, Disp: 90 tablet, Rfl: 0  Objective     VITAL SIGNS:     Vitals:    08/22/22 1013   BP: 134/83   Pulse: 87   Resp: 16   Temp: 97.3 °F (36.3 °C)   TempSrc: Temporal   SpO2: 95%   Weight: 62.1 kg (136 lb 14.4 oz)   Height: 157.5 cm (62.01\")   PainSc: 0-No pain     Body mass index is 25.03 kg/m².     Wt Readings from Last 5 Encounters:   08/22/22 62.1 kg (136 lb 14.4 oz)   06/13/22 62.5 kg (137 lb 12.8 oz)   01/27/22 62.6 kg (138 lb)   12/13/21 62.1 kg (136 lb 12.8 oz)   11/12/21 62.8 kg (138 lb 6.4 oz)     PHYSICAL EXAMINATION:   GENERAL: The patient appears in good general condition, not in acute distress.   SKIN: No ecchymosis.  EYES: No jaundice. No pallor.  LYMPHATICS: No cervical lymphadenopathy.  CHEST: Normal respiratory effort.  Lungs clear bilaterally.  No added sounds.  CVS: " Normal S1-S2.  No murmurs.  ABDOMEN: Soft. No tenderness. No Hepatomegaly. No Splenomegaly. No masses.    DIAGNOSTIC DATA:     Results from last 7 days   Lab Units 08/22/22  0958   WBC 10*3/mm3 7.47   NEUTROS ABS 10*3/mm3 4.67   HEMOGLOBIN g/dL 14.9   HEMATOCRIT % 44.3   PLATELETS 10*3/mm3 240     Results from last 7 days   Lab Units 08/22/22  0958   SODIUM mmol/L 142   POTASSIUM mmol/L 4.4   CHLORIDE mmol/L 105   CO2 mmol/L 23.3   BUN mg/dL 12   CREATININE mg/dL 0.61   CALCIUM mg/dL 9.6   ALBUMIN g/dL 4.60   BILIRUBIN mg/dL 0.4   ALK PHOS U/L 69   ALT (SGPT) U/L 20   AST (SGOT) U/L 25   GLUCOSE mg/dL 144*     Lab Results   Component Value Date    CEA 2.85 08/22/2022    CEA 2.94 11/12/2021    CEA 3.18 08/11/2021    CEA 3.64 05/20/2021    CEA 3.04 12/10/2020      CT chest abdomen pelvis on 8/18/2022:  1. No evidence for recurrent or metastatic disease or significant  change.  2. Postoperative changes of right hemicolectomy. Moderate stool  throughout the colon.  3. 2.7 cm bilobed right adnexal cyst without CT evidence for change.  4. Cholelithiasis.  5. 3 mm nonobstructing left renal stone.  -There is a 2.7 cm right ovarian cyst that appears bilobed and is without  change when compared to prior exam 05/20/2021.   -A 9 mm mediastinal lymph node adjacent to the trachea is without a change.    Assessment & Plan    *Adenocarcinoma of the ascending colon, stage III  · Patient is status post right hemicolectomy on 2/2/2015.  · T4 a N1 a, stage III  · 1 out of 29 lymph nodes were positive.  · The circumferential margin was involved by invasive carcinoma.  · CT scans on 6/18/2019 showed no evidence of recurrence.  · Colonoscopy on 7/11/2019 showed no suspicious findings.  · CT on 6/15/2020 showed no evidence of recurrence.  · CT chest abdomen pelvis on 5/20/2021 revealed mild wall thickening at the level of the anastomosis?  Underdistention.  · CT of the abdomen pelvis on 8/11/2021 revealed circumferential wall thickening  and luminal narrowing of the small bowel at the ileocolonic anastomosis.  · She was evaluated by her surgeon.  She had small bowel follow-through on 9/17/2021 that showed no wall thickening or a mass.  · CT on 8/18/2022 revealed no abnormality in the colon.  · The radiologist reported a 9 mm mediastinal lymph node to the left of the trachea without a change.  · CEA is normal at 2.85 today.  · There is no evidence of recurrence of the colon cancer, now 7 years since diagnosis.  · I reassured the patient about the results.  · I did not recommend additional follow-up CT scans unless she has new symptoms or there are new lab abnormalities.  · Patient agreed with plan.    *Right ovarian cyst.    · The cyst measured 2.4 cm on 6/15/2020.  · The cyst increased to 2.7 cm on 5/20/2021.  · Ultrasound on 7/20/2021 did not show a solid component in the cyst.  · Patient was evaluated by gynecology.    · The cyst was not considered to be suspicious.    *Hyperkalemia.  · Potassium was 5.0 on 11/12/2021.    · She was advised to reduce intake of potassium rich food.    · Potassium is 4.4 today.    PLAN:    1.  I did not recommend additional routine follow-up CT scans.   2.  We will see her in follow-up in 1 year with CBC CMP and CEA.         Asiya Hinton MD  08/22/22

## 2022-09-14 ENCOUNTER — OFFICE VISIT (OUTPATIENT)
Dept: CARDIOLOGY | Facility: CLINIC | Age: 87
End: 2022-09-14

## 2022-09-14 VITALS
HEART RATE: 92 BPM | BODY MASS INDEX: 25.21 KG/M2 | OXYGEN SATURATION: 95 % | DIASTOLIC BLOOD PRESSURE: 74 MMHG | SYSTOLIC BLOOD PRESSURE: 132 MMHG | WEIGHT: 137 LBS | HEIGHT: 62 IN

## 2022-09-14 DIAGNOSIS — E78.2 MIXED HYPERLIPIDEMIA: ICD-10-CM

## 2022-09-14 DIAGNOSIS — I44.4 LEFT ANTERIOR FASCICULAR BLOCK: ICD-10-CM

## 2022-09-14 DIAGNOSIS — I10 ESSENTIAL HYPERTENSION: Primary | ICD-10-CM

## 2022-09-14 DIAGNOSIS — I07.1 RHEUMATIC TRICUSPID VALVE REGURGITATION: ICD-10-CM

## 2022-09-14 DIAGNOSIS — I34.0 NONRHEUMATIC MITRAL VALVE REGURGITATION: ICD-10-CM

## 2022-09-14 PROCEDURE — 93000 ELECTROCARDIOGRAM COMPLETE: CPT | Performed by: INTERNAL MEDICINE

## 2022-09-14 PROCEDURE — 99213 OFFICE O/P EST LOW 20 MIN: CPT | Performed by: INTERNAL MEDICINE

## 2022-09-14 NOTE — PROGRESS NOTES
Date of Office Visit: 2022  Encounter Provider: Rowan Serrano MD  Place of Service: [facility]  Patient Name: Riri Damon  :1934      Patient ID:  Riri Damon is a 87 y.o. female is here for  followup for hypertension hyperlipidemia        History of Present Illness    She had rheumatic fever as a kid and infectious mononucleosis as a teenager.  She said she never had really any cardiac sequelae from it. She has  hyperlipidemia and osteoporosis. She has lower extremity swelling due to  venous insufficiency, and she has got some hypertension. Her blood pressure  has been fairly well controlled with medications.     She had an echocardiogram done on 2010, showing an ejection  fraction of 59%, mild systolic anterior motion of the anterior mitral  leaflet, cordal structures but no LVOT obstruction, and mild mitral  insufficiency. She had mild to moderate tricuspid insufficiency and mild  pulmonary hypertension. She had a dual-isotope nuclear perfusion study done  for chest pain on 2010, which showed no ischemia.     She had an echocardiogram performed on 08/15/2012 which showed an ejection fraction of  60%, normal diastolic function, normal RVSP, trace to mild tricuspid insufficiency, small  perimembranous ventricular septal defect.       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 She underwent radiation therapy and is now on Xeloda twice daily. She had  resection done by Dr. Mcneil; a laparoscopic right colectomy on 2015.      She had a CT of the abdomen and pelvis done 2021 for follow-up from colon cancer with right hemicolectomy.  There is no evidence of metastatic disease but there was colonic thickening at the anastomosis.  A repeat CT of the abdomen pelvis on 2021 shows a thickening there at the anastomosis again concerning for recurrent cancer.  She saw  Dr. Aquino  and the work-up after that visit showed that there was no recurrent cancer.  She saw him follow-up August 2022.  Labs in 8/22/2022 show glucose 144, otherwise normal CMP and normal CBC.  Lipids in 6/13/2022 showed total cholesterol 186, HDL 53, , VLDL 13.      Has no chest pain or pressure.  She has no difficulty breathing.  She has occasional palpitations but no falls or dizziness.  Her energy level is good.  Her appetite is good.  She is taking her medications as directed.  She has no orthopnea or PND.    Past Medical History:   Diagnosis Date   • Anxiety    • Colon carcinoma (HCC) 2015    Stage IIIB, T4N1a; underwent radiation therapy and colon resection by Dr. Mcneil   • Deep vein thrombosis (HCC) 11/24/2017    right leg   • History of edema     LE   • History of rheumatic fever    • Hx of radiation therapy 2015    for adenocarcinoma of the colon Stage IIIB, T4N1a   • Hyperlipidemia    • Hypertension     MEDS PRN   • Infectious mononucleosis    • Infectious viral hepatitis    • Left anterior fascicular block    • Mitral regurgitation     8/2010: mild per echo   • OA (osteoarthritis)    • Palpitations    • Pulmonary hypertension (HCC)     8/2010- mild per echo; 8/2012 - normal RVSP per echo   • Tricuspid regurgitation     8/2010: Mild to moderate per echo; 8/2012: Trace to mild per echo   • Venous insufficiency    • Ventricular septal defect     Per echocardiogram 2012   • Vitamin D deficiency          Past Surgical History:   Procedure Laterality Date   • APPENDECTOMY     • COLECTOMY PARTIAL / TOTAL  02/02/2015 2/2015 due to adenocarcinoma   • COLONOSCOPY      JAN 2015   • COLONOSCOPY N/A 5/25/2016    Procedure: COLONOSCOPY to ANASTAMOSIS AND TERMINAL ILEUM;  Surgeon: Yfn Mcneil MD;  Location: [facility];  Service:    • COLONOSCOPY N/A 7/11/2019    Procedure: COLONOSCOPY to cecum:;  Surgeon: Yfn Mcneil MD;  Location: [facility] ENDOSCOPY;  Service: General   • HYSTERECTOMY      • INTRAOCULAR LENS EXCHANGE Bilateral    • JOINT MANIPULATION Right 1/9/2018    Procedure: MANIPULATION OF RT KNEE;  Surgeon: Andrea Caballero MD;  Location: [facility];  Service:    • JOINT REPLACEMENT Right 11/09/2017   • KNEE SURGERY Left 2006    ARTHROSCOPY   • NM TOTAL KNEE ARTHROPLASTY Right 11/9/2017    Procedure: RIGHT TOTAL KNEE ARTHROPLASTY;  Surgeon: Andrea Caballero MD;  Location: [facility];  Service: Orthopedics   • TONSILLECTOMY         Current Outpatient Medications on File Prior to Visit   Medication Sig Dispense Refill   • aspirin 81 MG EC tablet Take 81 mg by mouth Every Other Day.     • docusate sodium 100 MG capsule Take 100 mg by mouth 2 (Two) Times a Day As Needed for Constipation. 40 capsule 1   • losartan (COZAAR) 25 MG tablet TAKE 1 TABLET EVERY DAY (Patient taking differently: Takes as needed not daily) 90 tablet 0   • melatonin 1 MG tablet Take 1 mg by mouth Every Night.     • meloxicam (MOBIC) 7.5 MG tablet Take 1 tablet by mouth Daily. 90 tablet 1   • Multiple Vitamins-Minerals (MULTIVITAMIN ADULT PO) Take 1 tablet by mouth Daily.     • simvastatin (ZOCOR) 40 MG tablet TAKE 1 TABLET EVERY NIGHT 90 tablet 0     No current facility-administered medications on file prior to visit.       Social History     Socioeconomic History   • Marital status:      Spouse name: Marcus   • Number of children: 3   • Years of education: College   Tobacco Use   • Smoking status: Never Smoker   • Smokeless tobacco: Never Used   Vaping Use   • Vaping Use: Never used   Substance and Sexual Activity   • Alcohol use: No     Comment: Caffeine use: 3-4 cups half and half daily   • Drug use: Yes     Types: Oxycodone     Comment: caffine use   • Sexual activity: Defer           ROS    Procedures    ECG 12 Lead    Date/Time: 9/14/2022 1:34 PM  Performed by: Rowan Serrano MD  Authorized by: Rowan Serrano MD   Comparison: compared with previous ECG   Similar to previous ECG  Rhythm:  "sinus rhythm  Ectopy: unifocal PVCs  Conduction: left anterior fascicular block  Other findings: poor R wave progression    Clinical impression: abnormal EKG                Objective:      Vitals:    09/14/22 1255   BP: 132/74   Pulse: 92   SpO2: 95%   Weight: 62.1 kg (137 lb)   Height: 157.5 cm (62\")     Body mass index is 25.06 kg/m².    Vitals reviewed.   Constitutional:       General: Not in acute distress.     Appearance: Well-developed. Not diaphoretic.   Eyes:      General: No scleral icterus.     Conjunctiva/sclera: Conjunctivae normal.   HENT:      Head: Normocephalic and atraumatic.   Neck:      Thyroid: No thyromegaly.      Vascular: No carotid bruit or JVD.      Lymphadenopathy: No cervical adenopathy.   Pulmonary:      Effort: Pulmonary effort is normal. No respiratory distress.      Breath sounds: Normal breath sounds. No wheezing. No rhonchi. No rales.   Chest:      Chest wall: Not tender to palpatation.   Cardiovascular:      Normal rate. Regular rhythm.      Murmurs: There is no murmur.      No gallop.   Pulses:     Intact distal pulses.   Edema:     Peripheral edema absent.   Abdominal:      General: Bowel sounds are normal. There is no distension or abdominal bruit.      Palpations: Abdomen is soft. There is no abdominal mass.      Tenderness: There is no abdominal tenderness.   Musculoskeletal:         General: No deformity.      Extremities: No clubbing present.     Cervical back: Neck supple. Skin:     General: Skin is warm and dry. There is no cyanosis.      Coloration: Skin is not pale.      Findings: No rash.   Neurological:      Mental Status: Alert and oriented to person, place, and time.      Cranial Nerves: No cranial nerve deficit.   Psychiatric:         Judgment: Judgment normal.         Lab Review:       Assessment:      Diagnosis Plan   1. Essential hypertension     2. Mixed hyperlipidemia     3. Left anterior fascicular block     4. Nonrheumatic mitral valve regurgitation     5. " Rheumatic tricuspid valve regurgitation       1. Hypertension; well controlled.   2. Hyperlipidemia, well controlled on Lipitor.   3. Lower extremity edema due to venous insufficiency.   4. Osteoporosis.   5. Palpitations, stable.   6. trace tricuspid insufficiency and mitral insufficiency.   7. Left anterior fascicular block on ECG.   8. Small membranous VSD        Plan:       See back in 6 months, no changes, overall doing well.

## 2022-10-03 LAB — CREAT BLDA-MCNC: 0.5 MG/DL (ref 0.6–1.3)

## 2022-10-18 ENCOUNTER — CLINICAL SUPPORT (OUTPATIENT)
Dept: INTERNAL MEDICINE | Facility: CLINIC | Age: 87
End: 2022-10-18

## 2022-10-18 DIAGNOSIS — E78.5 HYPERLIPIDEMIA, UNSPECIFIED HYPERLIPIDEMIA TYPE: ICD-10-CM

## 2022-10-18 DIAGNOSIS — Z23 NEED FOR INFLUENZA VACCINATION: Primary | ICD-10-CM

## 2022-10-18 PROCEDURE — G0008 ADMIN INFLUENZA VIRUS VAC: HCPCS | Performed by: FAMILY MEDICINE

## 2022-10-18 PROCEDURE — 90662 IIV NO PRSV INCREASED AG IM: CPT | Performed by: FAMILY MEDICINE

## 2022-10-18 RX ORDER — SIMVASTATIN 40 MG
TABLET ORAL
Qty: 90 TABLET | Refills: 0 | Status: SHIPPED | OUTPATIENT
Start: 2022-10-18 | End: 2022-12-13 | Stop reason: SDUPTHER

## 2022-11-09 RX ORDER — LOSARTAN POTASSIUM 25 MG/1
TABLET ORAL
Qty: 90 TABLET | Refills: 0 | Status: SHIPPED | OUTPATIENT
Start: 2022-11-09 | End: 2022-12-13 | Stop reason: SDUPTHER

## 2022-11-25 DIAGNOSIS — M19.90 ARTHRITIS: ICD-10-CM

## 2022-11-28 RX ORDER — MELOXICAM 7.5 MG/1
TABLET ORAL
Qty: 90 TABLET | Refills: 1 | Status: SHIPPED | OUTPATIENT
Start: 2022-11-28

## 2022-12-13 ENCOUNTER — OFFICE VISIT (OUTPATIENT)
Dept: INTERNAL MEDICINE | Facility: CLINIC | Age: 87
End: 2022-12-13

## 2022-12-13 VITALS
HEIGHT: 62 IN | OXYGEN SATURATION: 98 % | DIASTOLIC BLOOD PRESSURE: 76 MMHG | WEIGHT: 138.1 LBS | BODY MASS INDEX: 25.41 KG/M2 | SYSTOLIC BLOOD PRESSURE: 120 MMHG | HEART RATE: 91 BPM

## 2022-12-13 DIAGNOSIS — Z00.00 MEDICARE ANNUAL WELLNESS VISIT, SUBSEQUENT: ICD-10-CM

## 2022-12-13 DIAGNOSIS — E78.2 MIXED HYPERLIPIDEMIA: ICD-10-CM

## 2022-12-13 DIAGNOSIS — M85.852 OSTEOPENIA OF LEFT HIP: ICD-10-CM

## 2022-12-13 DIAGNOSIS — E78.5 HYPERLIPIDEMIA, UNSPECIFIED HYPERLIPIDEMIA TYPE: ICD-10-CM

## 2022-12-13 DIAGNOSIS — Z00.00 WELLNESS EXAMINATION: ICD-10-CM

## 2022-12-13 DIAGNOSIS — I10 ESSENTIAL HYPERTENSION: Primary | ICD-10-CM

## 2022-12-13 DIAGNOSIS — M19.90 ARTHRITIS: ICD-10-CM

## 2022-12-13 PROCEDURE — 1159F MED LIST DOCD IN RCRD: CPT | Performed by: FAMILY MEDICINE

## 2022-12-13 PROCEDURE — 1170F FXNL STATUS ASSESSED: CPT | Performed by: FAMILY MEDICINE

## 2022-12-13 PROCEDURE — 96160 PT-FOCUSED HLTH RISK ASSMT: CPT | Performed by: FAMILY MEDICINE

## 2022-12-13 PROCEDURE — 99397 PER PM REEVAL EST PAT 65+ YR: CPT | Performed by: FAMILY MEDICINE

## 2022-12-13 PROCEDURE — G0439 PPPS, SUBSEQ VISIT: HCPCS | Performed by: FAMILY MEDICINE

## 2022-12-13 PROCEDURE — 1125F AMNT PAIN NOTED PAIN PRSNT: CPT | Performed by: FAMILY MEDICINE

## 2022-12-13 RX ORDER — LOSARTAN POTASSIUM 25 MG/1
25 TABLET ORAL DAILY
Qty: 90 TABLET | Refills: 2 | Status: SHIPPED | OUTPATIENT
Start: 2022-12-13

## 2022-12-13 RX ORDER — SIMVASTATIN 40 MG
40 TABLET ORAL NIGHTLY
Qty: 90 TABLET | Refills: 2 | Status: SHIPPED | OUTPATIENT
Start: 2022-12-13

## 2022-12-13 NOTE — PROGRESS NOTES
Subjective   Riri Damon is a 88 y.o. female.     Chief Complaint   Patient presents with   • follow up to hyperlipidemia   • follow up to hypertension   • Medicare Wellness-subsequent     Health maintenance    History of Present Illness   Riri Damon 88 y.o. female who presents for an Annual Wellness Visit.  she has a history of   Patient Active Problem List   Diagnosis   • Hyperlipidemia   • Low back pain   • Mitral valve insufficiency   • Palpitations   • Knee pain   • Tricuspid valve insufficiency   • Arthritis   • Medicare annual wellness visit, subsequent   • Screening for osteoporosis   • Orthostatic lightheadedness   • Essential hypertension   • OA (osteoarthritis) of knee   • Ventricular septal defect   • Left anterior fascicular block   • Malignant neoplasm of ascending colon (HCC)   • Family history of colon cancer   • Osteopenia of left hip   • Acute pain of left shoulder   • Wellness examination   • Anxiety   • Cyst of right ovary   • Primary insomnia   .  she has been feeling fairly well.   I  reviewed health maintenance with her as part of my preventative care plan.  Recommended regular dental and eye exams  The following portions of the patient's history were reviewed and updated as appropriate: allergies, current medications, past family history, past medical history, past social history, past surgical history and problem list.    Review of Systems   Constitutional: Negative for appetite change, fever and unexpected weight change.   HENT: Negative for ear pain, facial swelling and sore throat.    Eyes: Negative for pain and visual disturbance.   Respiratory: Negative for chest tightness, shortness of breath and wheezing.    Cardiovascular: Negative for chest pain and palpitations.   Gastrointestinal: Negative for abdominal pain and blood in stool.   Endocrine: Negative.    Genitourinary: Negative for difficulty urinating and hematuria.   Musculoskeletal: Negative for joint swelling.    Neurological: Negative for tremors, seizures and syncope.   Hematological: Negative for adenopathy.   Psychiatric/Behavioral: Positive for sleep disturbance.       Objective   Physical Exam  Vitals and nursing note reviewed.   Constitutional:       Appearance: Normal appearance. She is well-developed. She is not diaphoretic.   HENT:      Head: Normocephalic and atraumatic.      Right Ear: Tympanic membrane, ear canal and external ear normal.      Left Ear: Tympanic membrane, ear canal and external ear normal.   Eyes:      General: Lids are normal. No scleral icterus.     Extraocular Movements: Extraocular movements intact.      Conjunctiva/sclera: Conjunctivae normal.   Neck:      Thyroid: No thyroid mass or thyromegaly.      Vascular: No carotid bruit or JVD.   Cardiovascular:      Rate and Rhythm: Normal rate and regular rhythm.      Pulses: Normal pulses.           Radial pulses are 2+ on the right side and 2+ on the left side.      Heart sounds: Normal heart sounds. No murmur heard.  Pulmonary:      Effort: Pulmonary effort is normal. No respiratory distress.      Breath sounds: Normal breath sounds.   Abdominal:      Palpations: Abdomen is soft.   Musculoskeletal:      Cervical back: Normal range of motion.      Right lower leg: No edema.      Left lower leg: No edema.   Skin:     General: Skin is warm and dry.      Coloration: Skin is not pale.      Findings: No erythema or rash.   Neurological:      General: No focal deficit present.      Mental Status: She is alert and oriented to person, place, and time.      Sensory: No sensory deficit.      Deep Tendon Reflexes: Reflexes are normal and symmetric.   Psychiatric:         Mood and Affect: Mood normal.         Behavior: Behavior normal. Behavior is cooperative.         Thought Content: Thought content normal.         Judgment: Judgment normal.         Assessment & Plan   Diagnoses and all orders for this visit:    1. Essential hypertension (Primary)  -      Comprehensive Metabolic Panel    2. Medicare annual wellness visit, subsequent    3. Wellness examination    4. Osteopenia of left hip  -     DEXA Bone Density Axial; Future    5. Mixed hyperlipidemia  -     Lipid Panel    6. Arthritis    7. Hyperlipidemia, unspecified hyperlipidemia type  -     simvastatin (ZOCOR) 40 MG tablet; Take 1 tablet by mouth Every Night.  Dispense: 90 tablet; Refill: 2    Other orders  -     losartan (COZAAR) 25 MG tablet; Take 1 tablet by mouth Daily.  Dispense: 90 tablet; Refill: 2      Continue healthy lifestyle with calorie appropriate and regular physical activity  Education provided regarding prevention of serious illness with immunizations and patient is current  Continue routine follow-up for chronic medical problems otherwise preventatively annually

## 2022-12-13 NOTE — PROGRESS NOTES
The ABCs of the Annual Wellness Visit  Subsequent Medicare Wellness Visit    Chief Complaint   Patient presents with   • follow up to hyperlipidemia   • follow up to hypertension   • Medicare Wellness-subsequent      Subjective    History of Present Illness:  Riri Damon is a 88 y.o. female who presents for a Subsequent Medicare Wellness Visit.    The following portions of the patient's history were reviewed and   updated as appropriate: allergies, current medications, past family history, past medical history, past social history, past surgical history and problem list.     Compared to one year ago, the patient feels her physical   health is the same.    Compared to one year ago, the patient feels her mental   health is worse.   stress  Recent Hospitalizations:  She was not admitted to the hospital during the last year.       Current Medical Providers:  Patient Care Team:  Phoenix Velazquez MD as PCP - Yfn Desai MD as Referring Physician (General Surgery)  Asiya Hinton MD as Consulting Physician (Hematology and Oncology)  Rowan Serrano MD as Consulting Physician (Cardiology)    Outpatient Medications Prior to Visit   Medication Sig Dispense Refill   • aspirin 81 MG EC tablet Take 81 mg by mouth Every Other Day.     • docusate sodium 100 MG capsule Take 100 mg by mouth 2 (Two) Times a Day As Needed for Constipation. 40 capsule 1   • melatonin 1 MG tablet Take 1 mg by mouth Every Night.     • meloxicam (MOBIC) 7.5 MG tablet TAKE 1 TABLET EVERY DAY 90 tablet 1   • Multiple Vitamins-Minerals (MULTIVITAMIN ADULT PO) Take 1 tablet by mouth Daily.     • losartan (COZAAR) 25 MG tablet TAKE 1 TABLET EVERY DAY 90 tablet 0   • simvastatin (ZOCOR) 40 MG tablet TAKE 1 TABLET EVERY NIGHT 90 tablet 0     No facility-administered medications prior to visit.       No opioid medication identified on active medication list. I have reviewed chart for other potential  high risk medication/s  "and harmful drug interactions in the elderly.          Aspirin is on active medication list. Aspirin use is indicated based on review of current medical condition/s. Pros and cons of this therapy have been discussed today. Benefits of this medication outweigh potential harm.  Patient has been encouraged to continue taking this medication.  .      Patient Active Problem List   Diagnosis   • Hyperlipidemia   • Low back pain   • Mitral valve insufficiency   • Palpitations   • Knee pain   • Tricuspid valve insufficiency   • Arthritis   • Medicare annual wellness visit, subsequent   • Screening for osteoporosis   • Orthostatic lightheadedness   • Essential hypertension   • OA (osteoarthritis) of knee   • Ventricular septal defect   • Left anterior fascicular block   • Malignant neoplasm of ascending colon (HCC)   • Family history of colon cancer   • Osteopenia of left hip   • Acute pain of left shoulder   • Wellness examination   • Anxiety   • Cyst of right ovary   • Primary insomnia     Advance Care Planning   Advance Directive is not on file.  ACP discussion was held with the patient during this visit. Patient has an advance directive (not in EMR), copy requested.          Objective       Vitals:    12/13/22 1039   BP: 120/76   BP Location: Right arm   Patient Position: Sitting   Cuff Size: Adult   Pulse: 91   SpO2: 98%   Weight: 62.6 kg (138 lb 1.6 oz)   Height: 157.5 cm (62\")   PainSc:   6     BMI Readings from Last 1 Encounters:   12/13/22 25.26 kg/m²   BMI is above normal parameters. Recommendations include: nutrition counseling    Does the patient have evidence of cognitive impairment? No    Physical Exam            HEALTH RISK ASSESSMENT    Smoking Status:  Social History     Tobacco Use   Smoking Status Never   Smokeless Tobacco Never     Alcohol Consumption:  Social History     Substance and Sexual Activity   Alcohol Use No    Comment: Caffeine use: 3-4 cups half and half daily     Fall Risk Screen:    EBONIE " Fall Risk Assessment was completed, and patient is at LOW risk for falls.Assessment completed on:12/13/2022    Depression Screening:  PHQ-2/PHQ-9 Depression Screening 12/13/2022   Retired PHQ-9 Total Score -   Retired Total Score -   Little Interest or Pleasure in Doing Things 0-->not at all   Feeling Down, Depressed or Hopeless 0-->not at all   PHQ-9: Brief Depression Severity Measure Score 0       Health Habits and Functional and Cognitive Screening:  Functional & Cognitive Status 12/13/2022   Do you have difficulty preparing food and eating? No   Do you have difficulty bathing yourself, getting dressed or grooming yourself? No   Do you have difficulty using the toilet? No   Do you have difficulty moving around from place to place? No   Do you have trouble with steps or getting out of a bed or a chair? No   Current Diet Well Balanced Diet   Dental Exam Up to date   Eye Exam Not up to date   Exercise (times per week) 7 times per week   Current Exercises Include Yard Work;Walking   Current Exercise Activities Include -   Do you need help using the phone?  No   Are you deaf or do you have serious difficulty hearing?  No   Do you need help with transportation? No   Do you need help shopping? No   Do you need help preparing meals?  No   Do you need help with housework?  No   Do you need help with laundry? No   Do you need help taking your medications? No   Do you need help managing money? No   Do you ever drive or ride in a car without wearing a seat belt? No   Have you felt unusual stress, anger or loneliness in the last month? Yes   Who do you live with? Spouse   If you need help, do you have trouble finding someone available to you? No   Have you been bothered in the last four weeks by sexual problems? No   Do you have difficulty concentrating, remembering or making decisions? No       Age-appropriate Screening Schedule:  Refer to the list below for future screening recommendations based on patient's age, sex and/or  medical conditions. Orders for these recommended tests are listed in the plan section. The patient has been provided with a written plan.    Health Maintenance   Topic Date Due   • ZOSTER VACCINE (2 of 2) 02/26/2010   • DXA SCAN  11/13/2021   • LIPID PANEL  06/13/2023   • MAMMOGRAM  02/11/2024   • TDAP/TD VACCINES (2 - Td or Tdap) 10/29/2024   • INFLUENZA VACCINE  Completed              Assessment & Plan     CMS Preventative Services Quick Reference  Risk Factors Identified During Encounter  Depression/Dysphoria: Provided counseling  The above risks/problems have been discussed with the patient.  Follow up actions/plans if indicated are seen below in the Assessment/Plan Section.  Pertinent information has been shared with the patient in the After Visit Summary.    Diagnoses and all orders for this visit:    1. Essential hypertension (Primary)  -     Comprehensive Metabolic Panel    2. Medicare annual wellness visit, subsequent    3. Wellness examination    4. Osteopenia of left hip  -     DEXA Bone Density Axial; Future    5. Mixed hyperlipidemia  -     Lipid Panel    6. Arthritis    7. Hyperlipidemia, unspecified hyperlipidemia type  -     simvastatin (ZOCOR) 40 MG tablet; Take 1 tablet by mouth Every Night.  Dispense: 90 tablet; Refill: 2    Other orders  -     losartan (COZAAR) 25 MG tablet; Take 1 tablet by mouth Daily.  Dispense: 90 tablet; Refill: 2        Follow Up:   Return in about 4 months (around 4/3/2023), or if symptoms worsen or fail to improve, for Recheck.     An After Visit Summary and PPPS were given to the patient.  Ongoing  management of chronic medical problems.

## 2022-12-13 NOTE — PROGRESS NOTES
"Chief Complaint  follow up to hyperlipidemia, follow up to hypertension, and Medicare Wellness-subsequent    Subjective        Riri Damon presents to [facility]  History of Present Illness  Appointment to discuss stress related to her 's unwillingness to do much during the day he will have quite a bit of sleeping through the day he does not go for walks he does not actively involved in day-to-day activities and has caused more anxiety and depression in her routine.  Has never taken any antidepressant or anxiety medication she does get regular sleep  Objective   Vital Signs:  /76 (BP Location: Right arm, Patient Position: Sitting, Cuff Size: Adult)   Pulse 91   Ht 157.5 cm (62\")   Wt 62.6 kg (138 lb 1.6 oz)   SpO2 98%   BMI 25.26 kg/m²   Estimated body mass index is 25.26 kg/m² as calculated from the following:    Height as of this encounter: 157.5 cm (62\").    Weight as of this encounter: 62.6 kg (138 lb 1.6 oz).          Physical Exam  Vitals and nursing note reviewed.   Constitutional:       Appearance: Normal appearance.   Cardiovascular:      Rate and Rhythm: Normal rate and regular rhythm.      Pulses: Normal pulses.      Heart sounds: Normal heart sounds.   Pulmonary:      Effort: Pulmonary effort is normal.      Breath sounds: Normal breath sounds.   Neurological:      Mental Status: She is alert.   Psychiatric:         Mood and Affect: Mood normal.         Behavior: Behavior normal.         Thought Content: Thought content normal.         Judgment: Judgment normal.        Result Review :                Assessment and Plan   Diagnoses and all orders for this visit:    1. Essential hypertension (Primary)  -     Comprehensive Metabolic Panel    2. Medicare annual wellness visit, subsequent    3. Wellness examination    4. Osteopenia of left hip  -     DEXA Bone Density Axial; Future    5. Mixed hyperlipidemia  -     Lipid Panel    6. Arthritis    7. " Hyperlipidemia, unspecified hyperlipidemia type  -     simvastatin (ZOCOR) 40 MG tablet; Take 1 tablet by mouth Every Night.  Dispense: 90 tablet; Refill: 2    Other orders  -     losartan (COZAAR) 25 MG tablet; Take 1 tablet by mouth Daily.  Dispense: 90 tablet; Refill: 2    Is monitoring of chronic medical problems as well as refills of medications  He is hesitant to take any type of anxiety or depressive medication at this time and she will let me know as things progress  They are thinking about changing living situation to assisted living facility in [place] area         Follow Up   Return in about 4 months (around 4/3/2023), or if symptoms worsen or fail to improve, for Recheck.  Patient was given instructions and counseling regarding her condition or for health maintenance advice. Please see specific information pulled into the AVS if appropriate.

## 2022-12-14 LAB
ALBUMIN SERPL-MCNC: 4.7 G/DL (ref 3.6–4.6)
ALBUMIN/GLOB SERPL: 2.2 {RATIO} (ref 1.2–2.2)
ALP SERPL-CCNC: 72 IU/L (ref 44–121)
ALT SERPL-CCNC: 16 IU/L (ref 0–32)
AST SERPL-CCNC: 24 IU/L (ref 0–40)
BILIRUB SERPL-MCNC: 0.7 MG/DL (ref 0–1.2)
BUN SERPL-MCNC: 13 MG/DL (ref 8–27)
BUN/CREAT SERPL: 20 (ref 12–28)
CALCIUM SERPL-MCNC: 9.5 MG/DL (ref 8.7–10.3)
CHLORIDE SERPL-SCNC: 104 MMOL/L (ref 96–106)
CHOLEST SERPL-MCNC: 173 MG/DL (ref 100–199)
CO2 SERPL-SCNC: 24 MMOL/L (ref 20–29)
CREAT SERPL-MCNC: 0.65 MG/DL (ref 0.57–1)
EGFRCR SERPLBLD CKD-EPI 2021: 85 ML/MIN/1.73
GLOBULIN SER CALC-MCNC: 2.1 G/DL (ref 1.5–4.5)
GLUCOSE SERPL-MCNC: 101 MG/DL (ref 70–99)
HDLC SERPL-MCNC: 58 MG/DL
LDLC SERPL CALC-MCNC: 103 MG/DL (ref 0–99)
POTASSIUM SERPL-SCNC: 4.8 MMOL/L (ref 3.5–5.2)
PROT SERPL-MCNC: 6.8 G/DL (ref 6–8.5)
SODIUM SERPL-SCNC: 142 MMOL/L (ref 134–144)
TRIGL SERPL-MCNC: 62 MG/DL (ref 0–149)
VLDLC SERPL CALC-MCNC: 12 MG/DL (ref 5–40)

## 2022-12-25 NOTE — PLAN OF CARE
Problem: Patient Care Overview (Adult)  Goal: Plan of Care Review  Outcome: Ongoing (interventions implemented as appropriate)    11/11/17 0527   Coping/Psychosocial Response Interventions   Plan Of Care Reviewed With patient   Patient Care Overview   Progress improving   Outcome Evaluation   Outcome Summary/Follow up Plan vitals stable. pt expressed pain. meds given per orders. plan is to discharge home with home health at some point. will continue to monitor.          Problem: Fall Risk (Adult)  Goal: Absence of Falls  Outcome: Ongoing (interventions implemented as appropriate)    Problem: Knee Replacement, Total (Adult)  Goal: Signs and Symptoms of Listed Potential Problems Will be Absent or Manageable (Knee Replacement, Total)  Outcome: Ongoing (interventions implemented as appropriate)       Unknown if ever smoked

## 2023-01-03 ENCOUNTER — TRANSCRIBE ORDERS (OUTPATIENT)
Dept: ADMINISTRATIVE | Facility: HOSPITAL | Age: 88
End: 2023-01-03
Payer: MEDICARE

## 2023-01-03 DIAGNOSIS — Z12.31 VISIT FOR SCREENING MAMMOGRAM: Primary | ICD-10-CM

## 2023-02-14 ENCOUNTER — HOSPITAL ENCOUNTER (OUTPATIENT)
Dept: MAMMOGRAPHY | Facility: HOSPITAL | Age: 88
Discharge: HOME | End: 2023-02-14
Admitting: FAMILY MEDICINE
Payer: MEDICARE

## 2023-02-14 DIAGNOSIS — Z12.31 VISIT FOR SCREENING MAMMOGRAM: ICD-10-CM

## 2023-02-14 PROCEDURE — 77063 BREAST TOMOSYNTHESIS BI: CPT

## 2023-02-14 PROCEDURE — 77067 SCR MAMMO BI INCL CAD: CPT

## 2023-03-23 ENCOUNTER — OFFICE VISIT (OUTPATIENT)
Dept: CARDIOLOGY | Facility: CLINIC | Age: 88
End: 2023-03-23
Payer: MEDICARE

## 2023-03-23 VITALS
BODY MASS INDEX: 25.95 KG/M2 | HEIGHT: 62 IN | SYSTOLIC BLOOD PRESSURE: 180 MMHG | HEART RATE: 84 BPM | WEIGHT: 141 LBS | DIASTOLIC BLOOD PRESSURE: 108 MMHG

## 2023-03-23 DIAGNOSIS — S69.92XA INJURY OF LEFT HAND, INITIAL ENCOUNTER: ICD-10-CM

## 2023-03-23 DIAGNOSIS — I07.1 RHEUMATIC TRICUSPID VALVE REGURGITATION: ICD-10-CM

## 2023-03-23 DIAGNOSIS — I10 ESSENTIAL HYPERTENSION: Primary | ICD-10-CM

## 2023-03-23 DIAGNOSIS — I44.4 LEFT ANTERIOR FASCICULAR BLOCK: ICD-10-CM

## 2023-03-23 DIAGNOSIS — R00.2 PALPITATIONS: ICD-10-CM

## 2023-03-23 DIAGNOSIS — I34.0 NONRHEUMATIC MITRAL VALVE REGURGITATION: ICD-10-CM

## 2023-03-23 PROCEDURE — 1160F RVW MEDS BY RX/DR IN RCRD: CPT | Performed by: INTERNAL MEDICINE

## 2023-03-23 PROCEDURE — 93000 ELECTROCARDIOGRAM COMPLETE: CPT | Performed by: INTERNAL MEDICINE

## 2023-03-23 PROCEDURE — 1159F MED LIST DOCD IN RCRD: CPT | Performed by: INTERNAL MEDICINE

## 2023-03-23 PROCEDURE — 99214 OFFICE O/P EST MOD 30 MIN: CPT | Performed by: INTERNAL MEDICINE

## 2023-03-23 NOTE — PROGRESS NOTES
Date of Office Visit: 2023  Encounter Provider: Rowan Serrano MD  Place of Service: [facility] CARDIOLOGY  Patient Name: Riri Damon  :1934      Patient ID:  Riri Damon is a 88 y.o. female is here for  followup for hypertension.        History of Present Illness       She had rheumatic fever as a kid and infectious mononucleosis as a teenager.  She said she never had really any cardiac sequelae from it. She has  hyperlipidemia and osteoporosis. She has lower extremity swelling due to  venous insufficiency, and she has got some hypertension. Her blood pressure  has been fairly well controlled with medications.     She had an echocardiogram done on 2010, showing an ejection  fraction of 59%, mild systolic anterior motion of the anterior mitral  leaflet, cordal structures but no LVOT obstruction, and mild mitral  insufficiency. She had mild to moderate tricuspid insufficiency and mild  pulmonary hypertension. She had a dual-isotope nuclear perfusion study done  for chest pain on 2010, which showed no ischemia.     She had an echocardiogram performed on 08/15/2012 which showed an ejection fraction of  60%, normal diastolic function, normal RVSP, trace to mild tricuspid insufficiency, small  perimembranous ventricular septal defect.       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 She underwent radiation therapy and is now on Xeloda twice daily. She had  resection done by Dr. Mcneil; a laparoscopic right colectomy on 2015.      She had a CT of the abdomen and pelvis done 2021 for follow-up from colon cancer with right hemicolectomy.  There is no evidence of metastatic disease but there was colonic thickening at the anastomosis.  A repeat CT of the abdomen pelvis on 2021 shows a thickening there at the anastomosis again concerning for recurrent cancer.  She saw Dr. Aquino   and the work-up after that visit showed that there was no recurrent cancer.  She saw him follow-up August 2022.      Labs on 12/13/2022 show glucose 101, otherwise normal CMP, total cholesterol 173, HDL 58, , triglycerides 62, VLDL 12.  Her blood pressure is very high today.    She fell down her basement stairs yesterday while trying to do laundry.  Their washer and dryer is downstairs.  Thankfully the only thing she injured was her left hand and she is right-hand dominant but she has a significant hematoma over her left thumb though she is still able to move her fingers and her thumb and oppose her thumb.  She has no chest pain or pressure.  She has no difficulty breathing.  Her blood pressure is high today.  She does not feel her heart racing or skipping.  She has not had an x-ray of her thumb nor she seen a physician for this.    Past Medical History:   Diagnosis Date   • Anxiety    • Colon carcinoma (HCC) 2015    Stage IIIB, T4N1a; underwent radiation therapy and colon resection by Dr. Mcneil   • Deep vein thrombosis (HCC) 11/24/2017    right leg   • History of edema     LE   • History of rheumatic fever    • Hx of radiation therapy 2015    for adenocarcinoma of the colon Stage IIIB, T4N1a   • Hyperlipidemia    • Hypertension     MEDS PRN   • Infectious mononucleosis    • Infectious viral hepatitis    • Left anterior fascicular block    • Mitral regurgitation     8/2010: mild per echo   • OA (osteoarthritis)    • Palpitations    • Pulmonary hypertension (HCC)     8/2010- mild per echo; 8/2012 - normal RVSP per echo   • Tricuspid regurgitation     8/2010: Mild to moderate per echo; 8/2012: Trace to mild per echo   • Venous insufficiency    • Ventricular septal defect     Per echocardiogram 2012   • Vitamin D deficiency          Past Surgical History:   Procedure Laterality Date   • APPENDECTOMY     • COLECTOMY PARTIAL / TOTAL  02/02/2015 2/2015 due to adenocarcinoma   • COLONOSCOPY      JAN 2015   •  COLONOSCOPY N/A 5/25/2016    Procedure: COLONOSCOPY to ANASTAMOSIS AND TERMINAL ILEUM;  Surgeon: Yfn Mcneil MD;  Location:  [facility];  Service:    • COLONOSCOPY N/A 7/11/2019    Procedure: COLONOSCOPY to cecum:;  Surgeon: Yfn Mcneil MD;  [facility];  Service: General   • HYSTERECTOMY     • INTRAOCULAR LENS EXCHANGE Bilateral    • JOINT MANIPULATION Right 1/9/2018    Procedure: MANIPULATION OF RT KNEE;  Surgeon: Andrea Caballero MD;  Location: [facility] MAIN OR;  Service:    • JOINT REPLACEMENT Right 11/09/2017   • KNEE SURGERY Left 2006    ARTHROSCOPY   • KS ARTHRP KNE CONDYLE&PLATU MEDIAL&LAT COMPARTMENTS Right 11/9/2017    Procedure: RIGHT TOTAL KNEE ARTHROPLASTY;  Surgeon: Andrea Caballero MD;  Location: [facility] MAIN OR;  Service: Orthopedics   • TONSILLECTOMY         Current Outpatient Medications on File Prior to Visit   Medication Sig Dispense Refill   • aspirin 81 MG EC tablet Take 1 tablet by mouth Every Other Day.     • docusate sodium 100 MG capsule Take 100 mg by mouth 2 (Two) Times a Day As Needed for Constipation. 40 capsule 1   • losartan (COZAAR) 25 MG tablet Take 1 tablet by mouth Daily. (Patient taking differently: Take 1 tablet by mouth Daily As Needed.) 90 tablet 2   • melatonin 1 MG tablet Take 1 tablet by mouth Every Night.     • meloxicam (MOBIC) 7.5 MG tablet TAKE 1 TABLET EVERY DAY 90 tablet 1   • Multiple Vitamins-Minerals (MULTIVITAMIN ADULT PO) Take 1 tablet by mouth Daily.     • simvastatin (ZOCOR) 40 MG tablet Take 1 tablet by mouth Every Night. 90 tablet 2     No current facility-administered medications on file prior to visit.       Social History     Socioeconomic History   • Marital status:      Spouse name: Marcus   • Number of children: 3   • Years of education: College   Tobacco Use   • Smoking status: Never     Passive exposure: Never   • Smokeless tobacco: Never   Vaping Use   • Vaping Use: Never used   Substance and Sexual Activity   •  "Alcohol use: No     Comment: Caffeine use: 3-4 cups half and half daily   • Drug use: Yes     Types: Oxycodone     Comment: caffine use   • Sexual activity: Defer           ROS    Procedures    ECG 12 Lead    Date/Time: 3/23/2023 1:13 PM  Performed by: Rowan Serrano MD  Authorized by: Rowan Serrano MD   Comparison: compared with previous ECG   Similar to previous ECG  Rhythm: sinus rhythm  Conduction: left anterior fascicular block  Other findings: poor R wave progression    Clinical impression: abnormal EKG                Objective:      Vitals:    03/23/23 1257   BP: (!) 180/108   Pulse: 84   Weight: 64 kg (141 lb)   Height: 157.5 cm (62\")     Body mass index is 25.79 kg/m².    Vitals reviewed.   Constitutional:       General: Not in acute distress.     Appearance: Well-developed. Not diaphoretic.   Eyes:      General: No scleral icterus.     Conjunctiva/sclera: Conjunctivae normal.   HENT:      Head: Normocephalic and atraumatic.   Neck:      Thyroid: No thyromegaly.      Vascular: No carotid bruit or JVD.      Lymphadenopathy: No cervical adenopathy.   Pulmonary:      Effort: Pulmonary effort is normal. No respiratory distress.      Breath sounds: Normal breath sounds. No wheezing. No rhonchi. No rales.   Chest:      Chest wall: Not tender to palpatation.   Cardiovascular:      Normal rate. Regular rhythm.      Murmurs: There is no murmur.      No gallop.   Pulses:     Intact distal pulses.   Edema:     Peripheral edema absent.   Abdominal:      General: Bowel sounds are normal. There is no distension or abdominal bruit.      Palpations: Abdomen is soft. There is no abdominal mass.      Tenderness: There is no abdominal tenderness.   Musculoskeletal:         General: No deformity.      Extremities: No clubbing present.     Cervical back: Neck supple. Skin:     General: Skin is warm and dry. There is no cyanosis.      Coloration: Skin is not pale.      Findings: No rash.   Neurological:      " Mental Status: Alert and oriented to person, place, and time.      Cranial Nerves: No cranial nerve deficit.   Psychiatric:         Judgment: Judgment normal.         Lab Review:       Assessment:      Diagnosis Plan   1. Essential hypertension        2. Nonrheumatic mitral valve regurgitation        3. Left anterior fascicular block        4. Palpitations        5. Rheumatic tricuspid valve regurgitation        6. Injury of left hand, initial encounter  Ambulatory Referral to Hand Surgery        1. Hypertension; well controlled.   2. Hyperlipidemia, well controlled on Lipitor.   3. Lower extremity edema due to venous insufficiency.   4. Osteoporosis.   5. Palpitations, stable.   6. trace tricuspid insufficiency and mitral insufficiency.   7. Left anterior fascicular block on ECG.   8. Small membranous VSD  9.  Fall with hematoma over the thumb, injured yesterday when she fell down the stairs her basement, referred to hand surgery.     Plan:       See back in 6 months, cardiac status is stable.  Blood pressure is high today but at home it measures 120/70.  Is high today because she is having pain in her thumb.  No other testing needed at this time but advised her to take losartan when she got home today.

## 2023-04-14 ENCOUNTER — HOSPITAL ENCOUNTER (OUTPATIENT)
Dept: BONE DENSITY | Facility: HOSPITAL | Age: 88
Discharge: HOME | End: 2023-04-14
Admitting: FAMILY MEDICINE
Payer: MEDICARE

## 2023-04-14 DIAGNOSIS — M85.852 OSTEOPENIA OF LEFT HIP: ICD-10-CM

## 2023-04-14 PROCEDURE — 77080 DXA BONE DENSITY AXIAL: CPT

## 2023-05-11 ENCOUNTER — TELEPHONE (OUTPATIENT)
Dept: INTERNAL MEDICINE | Facility: CLINIC | Age: 88
End: 2023-05-11

## 2023-05-11 ENCOUNTER — APPOINTMENT (OUTPATIENT)
Dept: GENERAL RADIOLOGY | Facility: HOSPITAL | Age: 88
End: 2023-05-11
Payer: MEDICARE

## 2023-05-11 ENCOUNTER — APPOINTMENT (OUTPATIENT)
Dept: CT IMAGING | Facility: HOSPITAL | Age: 88
End: 2023-05-11
Payer: MEDICARE

## 2023-05-11 ENCOUNTER — HOSPITAL ENCOUNTER (OUTPATIENT)
Facility: HOSPITAL | Age: 88
Setting detail: OBSERVATION
Discharge: HOME | End: 2023-05-12
Attending: EMERGENCY MEDICINE | Admitting: INTERNAL MEDICINE
Payer: MEDICARE

## 2023-05-11 DIAGNOSIS — R42 VERTIGO: Primary | ICD-10-CM

## 2023-05-11 LAB
ALBUMIN SERPL-MCNC: 4.8 G/DL (ref 3.5–5.2)
ALBUMIN/GLOB SERPL: 2.2 G/DL
ALP SERPL-CCNC: 76 U/L (ref 39–117)
ALT SERPL W P-5'-P-CCNC: 15 U/L (ref 1–33)
ANION GAP SERPL CALCULATED.3IONS-SCNC: 11.1 MMOL/L (ref 5–15)
AST SERPL-CCNC: 22 U/L (ref 1–32)
BACTERIA UR QL AUTO: NORMAL /HPF
BASOPHILS # BLD AUTO: 0.03 10*3/MM3 (ref 0–0.2)
BASOPHILS NFR BLD AUTO: 0.3 % (ref 0–1.5)
BILIRUB SERPL-MCNC: 0.4 MG/DL (ref 0–1.2)
BILIRUB UR QL STRIP: NEGATIVE
BUN SERPL-MCNC: 12 MG/DL (ref 8–23)
BUN/CREAT SERPL: 21.8 (ref 7–25)
CALCIUM SPEC-SCNC: 10.1 MG/DL (ref 8.6–10.5)
CHLORIDE SERPL-SCNC: 102 MMOL/L (ref 98–107)
CLARITY UR: CLEAR
CO2 SERPL-SCNC: 25.9 MMOL/L (ref 22–29)
COLOR UR: YELLOW
CREAT SERPL-MCNC: 0.55 MG/DL (ref 0.57–1)
DEPRECATED RDW RBC AUTO: 42.6 FL (ref 37–54)
EGFRCR SERPLBLD CKD-EPI 2021: 88.3 ML/MIN/1.73
EOSINOPHIL # BLD AUTO: 0.15 10*3/MM3 (ref 0–0.4)
EOSINOPHIL NFR BLD AUTO: 1.7 % (ref 0.3–6.2)
ERYTHROCYTE [DISTWIDTH] IN BLOOD BY AUTOMATED COUNT: 12.4 % (ref 12.3–15.4)
GLOBULIN UR ELPH-MCNC: 2.2 GM/DL
GLUCOSE BLDC GLUCOMTR-MCNC: 100 MG/DL (ref 70–130)
GLUCOSE SERPL-MCNC: 89 MG/DL (ref 65–99)
GLUCOSE UR STRIP-MCNC: NEGATIVE MG/DL
HCT VFR BLD AUTO: 44 % (ref 34–46.6)
HGB BLD-MCNC: 15.3 G/DL (ref 12–15.9)
HGB UR QL STRIP.AUTO: ABNORMAL
HOLD SPECIMEN: NORMAL
HOLD SPECIMEN: NORMAL
HYALINE CASTS UR QL AUTO: NORMAL /LPF
IMM GRANULOCYTES # BLD AUTO: 0.01 10*3/MM3 (ref 0–0.05)
IMM GRANULOCYTES NFR BLD AUTO: 0.1 % (ref 0–0.5)
INR PPP: 1 (ref 0.91–1.09)
KETONES UR QL STRIP: NEGATIVE
LEUKOCYTE ESTERASE UR QL STRIP.AUTO: NEGATIVE
LYMPHOCYTES # BLD AUTO: 2.2 10*3/MM3 (ref 0.7–3.1)
LYMPHOCYTES NFR BLD AUTO: 25.3 % (ref 19.6–45.3)
MAGNESIUM SERPL-MCNC: 2.3 MG/DL (ref 1.6–2.4)
MCH RBC QN AUTO: 32.1 PG (ref 26.6–33)
MCHC RBC AUTO-ENTMCNC: 34.8 G/DL (ref 31.5–35.7)
MCV RBC AUTO: 92.2 FL (ref 79–97)
MONOCYTES # BLD AUTO: 0.91 10*3/MM3 (ref 0.1–0.9)
MONOCYTES NFR BLD AUTO: 10.5 % (ref 5–12)
NEUTROPHILS NFR BLD AUTO: 5.39 10*3/MM3 (ref 1.7–7)
NEUTROPHILS NFR BLD AUTO: 62.1 % (ref 42.7–76)
NITRITE UR QL STRIP: NEGATIVE
PH UR STRIP.AUTO: 6.5 [PH] (ref 5–8)
PLATELET # BLD AUTO: 234 10*3/MM3 (ref 140–450)
PMV BLD AUTO: 10.2 FL (ref 6–12)
POTASSIUM SERPL-SCNC: 3.8 MMOL/L (ref 3.5–5.2)
PROT SERPL-MCNC: 7 G/DL (ref 6–8.5)
PROT UR QL STRIP: NEGATIVE
PROTHROMBIN TIME: 11.9 SECONDS (ref 10–13.8)
RBC # BLD AUTO: 4.77 10*6/MM3 (ref 3.77–5.28)
RBC # UR STRIP: NORMAL /HPF
REF LAB TEST METHOD: NORMAL
SODIUM SERPL-SCNC: 139 MMOL/L (ref 136–145)
SP GR UR STRIP: 1.01 (ref 1–1.03)
SQUAMOUS #/AREA URNS HPF: NORMAL /HPF
TROPONIN T SERPL HS-MCNC: 8 NG/L
UROBILINOGEN UR QL STRIP: ABNORMAL
WBC # UR STRIP: NORMAL /HPF
WBC NRBC COR # BLD: 8.69 10*3/MM3 (ref 3.4–10.8)
WHOLE BLOOD HOLD COAG: NORMAL
WHOLE BLOOD HOLD SPECIMEN: NORMAL

## 2023-05-11 PROCEDURE — 83735 ASSAY OF MAGNESIUM: CPT | Performed by: EMERGENCY MEDICINE

## 2023-05-11 PROCEDURE — 85025 COMPLETE CBC W/AUTO DIFF WBC: CPT | Performed by: EMERGENCY MEDICINE

## 2023-05-11 PROCEDURE — 80053 COMPREHEN METABOLIC PANEL: CPT | Performed by: EMERGENCY MEDICINE

## 2023-05-11 PROCEDURE — G0378 HOSPITAL OBSERVATION PER HR: HCPCS

## 2023-05-11 PROCEDURE — 96376 TX/PRO/DX INJ SAME DRUG ADON: CPT

## 2023-05-11 PROCEDURE — 82948 REAGENT STRIP/BLOOD GLUCOSE: CPT

## 2023-05-11 PROCEDURE — 84484 ASSAY OF TROPONIN QUANT: CPT | Performed by: EMERGENCY MEDICINE

## 2023-05-11 PROCEDURE — 70450 CT HEAD/BRAIN W/O DYE: CPT

## 2023-05-11 PROCEDURE — 81001 URINALYSIS AUTO W/SCOPE: CPT | Performed by: EMERGENCY MEDICINE

## 2023-05-11 PROCEDURE — 85610 PROTHROMBIN TIME: CPT | Performed by: PHYSICIAN ASSISTANT

## 2023-05-11 PROCEDURE — 99285 EMERGENCY DEPT VISIT HI MDM: CPT

## 2023-05-11 PROCEDURE — 96374 THER/PROPH/DIAG INJ IV PUSH: CPT

## 2023-05-11 PROCEDURE — 71045 X-RAY EXAM CHEST 1 VIEW: CPT

## 2023-05-11 RX ORDER — SODIUM CHLORIDE 0.9 % (FLUSH) 0.9 %
10 SYRINGE (ML) INJECTION AS NEEDED
Status: DISCONTINUED | OUTPATIENT
Start: 2023-05-11 | End: 2023-05-12 | Stop reason: HOSPADM

## 2023-05-11 RX ORDER — MAGNESIUM CHLORIDE 64 MG
64 TABLET, DELAYED RELEASE (ENTERIC COATED) ORAL 3 TIMES WEEKLY
COMMUNITY

## 2023-05-11 RX ORDER — ATORVASTATIN CALCIUM 20 MG/1
20 TABLET, FILM COATED ORAL DAILY
Status: DISCONTINUED | OUTPATIENT
Start: 2023-05-12 | End: 2023-05-12 | Stop reason: HOSPADM

## 2023-05-11 RX ORDER — MELOXICAM 7.5 MG/1
7.5 TABLET ORAL DAILY
Status: DISCONTINUED | OUTPATIENT
Start: 2023-05-12 | End: 2023-05-11

## 2023-05-11 RX ORDER — ASPIRIN 81 MG/1
81 TABLET ORAL EVERY OTHER DAY
Status: DISCONTINUED | OUTPATIENT
Start: 2023-05-13 | End: 2023-05-12 | Stop reason: HOSPADM

## 2023-05-11 RX ORDER — CHOLECALCIFEROL (VITAMIN D3) 125 MCG
5 CAPSULE ORAL NIGHTLY
Status: DISCONTINUED | OUTPATIENT
Start: 2023-05-11 | End: 2023-05-12 | Stop reason: HOSPADM

## 2023-05-11 RX ORDER — MELOXICAM 7.5 MG/1
7.5 TABLET ORAL 3 TIMES WEEKLY
Status: DISCONTINUED | OUTPATIENT
Start: 2023-05-13 | End: 2023-05-12 | Stop reason: HOSPADM

## 2023-05-11 RX ORDER — UREA 10 %
1 LOTION (ML) TOPICAL NIGHTLY
Status: DISCONTINUED | OUTPATIENT
Start: 2023-05-11 | End: 2023-05-11

## 2023-05-11 RX ORDER — DOCUSATE SODIUM 100 MG/1
100 CAPSULE, LIQUID FILLED ORAL 2 TIMES DAILY PRN
Status: DISCONTINUED | OUTPATIENT
Start: 2023-05-11 | End: 2023-05-12 | Stop reason: HOSPADM

## 2023-05-11 RX ORDER — LORAZEPAM 0.5 MG/1
0.5 TABLET ORAL ONCE
Status: COMPLETED | OUTPATIENT
Start: 2023-05-11 | End: 2023-05-12

## 2023-05-11 RX ORDER — ONDANSETRON 2 MG/ML
4 INJECTION INTRAMUSCULAR; INTRAVENOUS EVERY 6 HOURS PRN
Status: DISCONTINUED | OUTPATIENT
Start: 2023-05-11 | End: 2023-05-12 | Stop reason: HOSPADM

## 2023-05-11 RX ORDER — SODIUM CHLORIDE 9 MG/ML
40 INJECTION, SOLUTION INTRAVENOUS AS NEEDED
Status: DISCONTINUED | OUTPATIENT
Start: 2023-05-11 | End: 2023-05-12 | Stop reason: HOSPADM

## 2023-05-11 RX ORDER — MULTIPLE VITAMINS W/ MINERALS TAB 9MG-400MCG
1 TAB ORAL DAILY
Status: DISCONTINUED | OUTPATIENT
Start: 2023-05-12 | End: 2023-05-12 | Stop reason: HOSPADM

## 2023-05-11 RX ORDER — SODIUM CHLORIDE 9 MG/ML
75 INJECTION, SOLUTION INTRAVENOUS CONTINUOUS
Status: DISCONTINUED | OUTPATIENT
Start: 2023-05-11 | End: 2023-05-12 | Stop reason: HOSPADM

## 2023-05-11 RX ORDER — ASPIRIN 81 MG/1
324 TABLET, CHEWABLE ORAL ONCE
Status: COMPLETED | OUTPATIENT
Start: 2023-05-11 | End: 2023-05-11

## 2023-05-11 RX ORDER — MECLIZINE HYDROCHLORIDE 25 MG/1
25 TABLET ORAL ONCE
Status: DISCONTINUED | OUTPATIENT
Start: 2023-05-11 | End: 2023-05-11

## 2023-05-11 RX ORDER — MECLIZINE HYDROCHLORIDE 25 MG/1
25 TABLET ORAL 3 TIMES DAILY PRN
Status: DISCONTINUED | OUTPATIENT
Start: 2023-05-11 | End: 2023-05-12 | Stop reason: HOSPADM

## 2023-05-11 RX ORDER — LOSARTAN POTASSIUM 25 MG/1
25 TABLET ORAL DAILY
Status: DISCONTINUED | OUTPATIENT
Start: 2023-05-12 | End: 2023-05-12 | Stop reason: HOSPADM

## 2023-05-11 RX ORDER — LABETALOL HYDROCHLORIDE 5 MG/ML
10 INJECTION, SOLUTION INTRAVENOUS ONCE
Status: COMPLETED | OUTPATIENT
Start: 2023-05-11 | End: 2023-05-11

## 2023-05-11 RX ORDER — LABETALOL HYDROCHLORIDE 5 MG/ML
20 INJECTION, SOLUTION INTRAVENOUS ONCE
Status: COMPLETED | OUTPATIENT
Start: 2023-05-11 | End: 2023-05-11

## 2023-05-11 RX ORDER — SODIUM CHLORIDE 0.9 % (FLUSH) 0.9 %
10 SYRINGE (ML) INJECTION EVERY 12 HOURS SCHEDULED
Status: DISCONTINUED | OUTPATIENT
Start: 2023-05-11 | End: 2023-05-12 | Stop reason: HOSPADM

## 2023-05-11 RX ADMIN — Medication 10 ML: at 22:49

## 2023-05-11 RX ADMIN — ASPIRIN 324 MG: 81 TABLET, CHEWABLE ORAL at 16:53

## 2023-05-11 RX ADMIN — LABETALOL HYDROCHLORIDE 20 MG: 5 INJECTION, SOLUTION INTRAVENOUS at 18:16

## 2023-05-11 RX ADMIN — LABETALOL HYDROCHLORIDE 10 MG: 5 INJECTION, SOLUTION INTRAVENOUS at 16:51

## 2023-05-11 RX ADMIN — SODIUM CHLORIDE 75 ML/HR: 9 INJECTION, SOLUTION INTRAVENOUS at 22:49

## 2023-05-11 RX ADMIN — Medication 5 MG: at 22:49

## 2023-05-11 NOTE — Clinical Note
Level of Care: Telemetry [5]  Diagnosis: Vertigo [[id number]]  Admitting Physician: RIAZ REYNOLDS [[id number]]  Attending Physician: RIAZ REYNOLDS [[id number]]

## 2023-05-11 NOTE — TELEPHONE ENCOUNTER
PATIENT CALLED AND STATES SHE WAS SEEN AT [facility]  URGENT CARE TODAY. WAS TOLD TO GET A CAT SCAN ORDER OF BRAIN TODAY. SHE HAS AN APPOINTMENT TOMORROW WITH DR. LOWERY.     PLEASE CALL [phone]

## 2023-05-11 NOTE — TELEPHONE ENCOUNTER
The   told her to go to the ER today to rule out CVA or TIA.  I called pt and let her know that this is serious and time sensitive.  I gave her the address to [facility] on [address].  Her son will take her.

## 2023-05-11 NOTE — FSED PROVIDER NOTE
Subjective   History of Present Illness  Patient is an 88-year-old female presents the emergency room with vertigo that she noticed around midnight when she woke up to go the bathroom.  She was last normal between 1030 and 11 PM last night.  She describes it as a spinning sensation is associated with movement.  She was seen by her family doctor's office and given meclizine which she took prior to arrival.  She has improved but still has some vertigo with head movement.  She denies any recent illness including URI symptoms.  She has had some nausea without vomiting.        Review of Systems   Constitutional: Negative.    HENT: Negative.    Eyes: Negative.    Respiratory: Negative.    Cardiovascular: Negative.    Gastrointestinal: Positive for nausea. Negative for abdominal pain, diarrhea and vomiting.   Genitourinary: Negative.    Musculoskeletal: Negative.    Skin: Negative.    Neurological: Positive for dizziness. Negative for speech difficulty, weakness, light-headedness and headaches.   Psychiatric/Behavioral: Negative.        Past Medical History:   Diagnosis Date   • Anxiety    • Colon carcinoma 2015    Stage IIIB, T4N1a; underwent radiation therapy and colon resection by Dr. Mcneil   • Deep vein thrombosis 11/24/2017    right leg   • History of edema     LE   • History of rheumatic fever    • Hx of radiation therapy 2015    for adenocarcinoma of the colon Stage IIIB, T4N1a   • Hyperlipidemia    • Hypertension     MEDS PRN   • Infectious mononucleosis    • Infectious viral hepatitis    • Left anterior fascicular block    • Mitral regurgitation     8/2010: mild per echo   • OA (osteoarthritis)    • Palpitations    • Pulmonary hypertension     8/2010- mild per echo; 8/2012 - normal RVSP per echo   • Tricuspid regurgitation     8/2010: Mild to moderate per echo; 8/2012: Trace to mild per echo   • Venous insufficiency    • Ventricular septal defect     Per echocardiogram 2012   • Vitamin D deficiency        No  Known Allergies    Past Surgical History:   Procedure Laterality Date   • APPENDECTOMY     • COLECTOMY PARTIAL / TOTAL  02/02/2015 2/2015 due to adenocarcinoma   • COLONOSCOPY      JAN 2015   • COLONOSCOPY N/A 5/25/2016    Procedure: COLONOSCOPY to ANASTAMOSIS AND TERMINAL ILEUM;  Surgeon: Yfn Mcneil MD;  Location:  [facility];  Service:    • COLONOSCOPY N/A 7/11/2019    Procedure: COLONOSCOPY to cecum:;  Surgeon: Yfn Mcneil MD;  [facility];  Service: General   • HYSTERECTOMY     • INTRAOCULAR LENS EXCHANGE Bilateral    • JOINT MANIPULATION Right 1/9/2018    Procedure: MANIPULATION OF RT KNEE;  Surgeon: Andrea Caballero MD;  Location: [facility] MAIN OR;  Service:    • JOINT REPLACEMENT Right 11/09/2017   • KNEE SURGERY Left 2006    ARTHROSCOPY   • [place] ARTHRP KNE CONDYLE&PLATU MEDIAL&LAT COMPARTMENTS Right 11/9/2017    Procedure: RIGHT TOTAL KNEE ARTHROPLASTY;  Surgeon: Andrea Caballero MD;  Location: [facility] MAIN OR;  Service: Orthopedics   • TONSILLECTOMY         Family History   Problem Relation Age of Onset   • Alzheimer's disease Mother    • Heart disease Mother    • Heart attack Father    • Heart disease Father    • Heart disease Other    • Cancer Sister 60        Colon   • Malig Hyperthermia Neg Hx    • Breast cancer Neg Hx        Social History     Socioeconomic History   • Marital status:      Spouse name: Marcus   • Number of children: 3   • Years of education: College   Tobacco Use   • Smoking status: Never     Passive exposure: Never   • Smokeless tobacco: Never   Vaping Use   • Vaping Use: Never used   Substance and Sexual Activity   • Alcohol use: No     Comment: Caffeine use: 3-4 cups half and half daily   • Drug use: Never     Types: Oxycodone   • Sexual activity: Defer           Objective   Physical Exam  Constitutional:       Appearance: Normal appearance.   HENT:      Head: Normocephalic and atraumatic.      Right Ear: Tympanic membrane normal.      Left Ear:  Tympanic membrane normal.      Nose: Nose normal.      Mouth/Throat:      Pharynx: Oropharynx is clear.   Eyes:      Extraocular Movements: Extraocular movements intact.      Conjunctiva/sclera: Conjunctivae normal.      Pupils: Pupils are equal, round, and reactive to light.   Cardiovascular:      Rate and Rhythm: Normal rate.      Pulses: Normal pulses.      Heart sounds: Normal heart sounds.   Pulmonary:      Effort: Pulmonary effort is normal.      Breath sounds: Normal breath sounds.   Abdominal:      General: Bowel sounds are normal.   Musculoskeletal:         General: Normal range of motion.      Cervical back: Normal range of motion and neck supple.   Skin:     General: Skin is warm and dry.   Neurological:      General: No focal deficit present.      Mental Status: She is alert and oriented to person, place, and time.      GCS: GCS eye subscore is 4. GCS verbal subscore is 5. GCS motor subscore is 6.      Sensory: Sensation is intact.      Motor: Motor function is intact.      Coordination: Coordination is intact.      Gait: Gait is intact.   Psychiatric:         Mood and Affect: Mood normal.         Behavior: Behavior normal.         Thought Content: Thought content normal.         Judgment: Judgment normal.         ECG 12 Lead ED Triage Standing Order; Weak / Dizzy / AMS      Date/Time: 5/11/2023 2:08 PM  Performed by: Emilee Padilla PA-C  Authorized by: Rickey Schroeder MD   Interpreted by physician  Comparison: not compared with previous ECG   Rhythm: sinus rhythm  Rate: normal  BPM: 77  Comments: LVH, poor R wae progression, no stemi                 ED Course                                           Medical Decision Making  Presentation patient is nontoxic and well-appearing.  However she is hypertensive.  She is not vertiginous at this moment and says it is better since she took meclizine.  However, when she sat up to have x-ray she had the vertigo return.  She has an NIH stroke score of 0.  Labs  are reassuring.  Blood pressure did come down some and CT brain unremarkable other than arachnoid cyst.  I told patient about this.  She was given aspirin after normal CT reading.  I also ordered some labetalol to help lower her blood pressure slightly as it is wanting to stay in the 180s now and I would like it to be closer to 160s and 170s.    We talked about transfer which I feel is needed for patient to have MRI and further work-up.  She is agreeable with this as is her son.  She will go by ambulance.  Dr. Riaz Rodriguez accepts patient for [facility].    Amount and/or Complexity of Data Reviewed  Labs: ordered. Decision-making details documented in ED Course.  Radiology: ordered. Decision-making details documented in ED Course.  ECG/medicine tests: ordered and independent interpretation performed. Decision-making details documented in ED Course.      Risk  OTC drugs.  Prescription drug management.  Decision regarding hospitalization.          Final diagnoses:   None       ED Disposition  ED Disposition     ED Disposition   Decision to Admit    Condition   --    Comment   Level of Care: Telemetry [5]   Diagnosis: Vertigo [300121]   Admitting Physician: RIAZ RODRIGUEZ [[id number]]   Attending Physician: RIAZ RODRIGUEZ [[id number]]               No follow-up provider specified.       Medication List      No changes were made to your prescriptions during this visit.

## 2023-05-11 NOTE — H&P
Internal medicine history and physical  INTERNAL MEDICINE   [facility]       Patient Identification:  Name: Riri Damon  Age: 88 y.o.  Sex: female  :  1934  MRN: [id number]                   Primary Care Physician: Phoenix Velazquez MD                               Date of admission:2023    Chief Complaint: Sudden onset of vertigo while laying in the bed around midnight last night.    History of Present Illness:   Patient is a 88-year-old female with past medical history remarkable for hypertension, vertigo, history of adenocarcinoma of the colon was in her usual state of her health when she went to bed last night.  Around midnight she noticed while in the bed that if she moves her head or try to get up everything was spinning around her.  When she woke up this morning her symptoms were persistent she went to urgent care and from there she was sent to the emergency room for further evaluation.  In the emergency room work-up was unremarkable patient has taken meclizine but because of the lack of associated vomiting with this episode of vertigo they were concerned and patient is being admitted for rule out posterior circulation CVA.  Patient denies any focal weakness of arm or legs or difficulty in thinking understanding and expressing herself.      Past Medical History:  Past Medical History:   Diagnosis Date   • Anxiety    • Colon carcinoma     Stage IIIB, T4N1a; underwent radiation therapy and colon resection by Dr. Mcneil   • Deep vein thrombosis 2017    right leg   • History of edema     LE   • History of rheumatic fever    • Hx of radiation therapy     for adenocarcinoma of the colon Stage IIIB, T4N1a   • Hyperlipidemia    • Hypertension     MEDS PRN   • Infectious mononucleosis    • Infectious viral hepatitis    • Left anterior fascicular block    • Mitral regurgitation     2010: mild per echo   • OA (osteoarthritis)    • Palpitations    • Pulmonary  hypertension     8/2010- mild per echo; 8/2012 - normal RVSP per echo   • Tricuspid regurgitation     8/2010: Mild to moderate per echo; 8/2012: Trace to mild per echo   • Venous insufficiency    • Ventricular septal defect     Per echocardiogram 2012   • Vitamin D deficiency      Past Surgical History:  Past Surgical History:   Procedure Laterality Date   • APPENDECTOMY     • COLECTOMY PARTIAL / TOTAL  02/02/2015 2/2015 due to adenocarcinoma   • COLONOSCOPY      JAN 2015   • COLONOSCOPY N/A 5/25/2016    Procedure: COLONOSCOPY to ANASTAMOSIS AND TERMINAL ILEUM;  Surgeon: Yfn Mcneil MD;  Location:  [facility];  Service:    • COLONOSCOPY N/A 7/11/2019    Procedure: COLONOSCOPY to cecum:;  Surgeon: Yfn Mcneil MD;  [facility];  Service: General   • HYSTERECTOMY     • INTRAOCULAR LENS EXCHANGE Bilateral    • JOINT MANIPULATION Right 1/9/2018    Procedure: MANIPULATION OF RT KNEE;  Surgeon: Andrea Caballero MD;  Location: [facility] MAIN OR;  Service:    • JOINT REPLACEMENT Right 11/09/2017   • KNEE SURGERY Left 2006    ARTHROSCOPY   • OK ARTHRP KNE CONDYLE&PLATU MEDIAL&LAT COMPARTMENTS Right 11/9/2017    Procedure: RIGHT TOTAL KNEE ARTHROPLASTY;  Surgeon: Andrea Caballero MD;  Location: [facility] MAIN OR;  Service: Orthopedics   • TONSILLECTOMY        Home Meds:  (Not in a hospital admission)    Current Meds:     Current Facility-Administered Medications:   •  sodium chloride 0.9 % flush 10 mL, 10 mL, Intravenous, PRN, Rickey Schroeder MD    Current Outpatient Medications:   •  aspirin 81 MG EC tablet, Take 1 tablet by mouth Every Other Day., Disp: , Rfl:   •  docusate sodium 100 MG capsule, Take 100 mg by mouth 2 (Two) Times a Day As Needed for Constipation., Disp: 40 capsule, Rfl: 1  •  losartan (COZAAR) 25 MG tablet, Take 1 tablet by mouth Daily. (Patient taking differently: Take 1 tablet by mouth Daily As Needed.), Disp: 90 tablet, Rfl: 2  •  meclizine (ANTIVERT) 25 MG tablet, Take 1  "tablet by mouth 3 (Three) Times a Day As Needed for Dizziness for up to 7 days., Disp: 21 tablet, Rfl: 0  •  melatonin 1 MG tablet, Take 1 tablet by mouth Every Night., Disp: , Rfl:   •  meloxicam (MOBIC) 7.5 MG tablet, TAKE 1 TABLET EVERY DAY, Disp: 90 tablet, Rfl: 1  •  Multiple Vitamins-Minerals (MULTIVITAMIN ADULT PO), Take 1 tablet by mouth Daily., Disp: , Rfl:   •  simvastatin (ZOCOR) 40 MG tablet, Take 1 tablet by mouth Every Night., Disp: 90 tablet, Rfl: 2  Allergies:  No Known Allergies  Social History:   Social History     Tobacco Use   • Smoking status: Never     Passive exposure: Never   • Smokeless tobacco: Never   Substance Use Topics   • Alcohol use: No     Comment: Caffeine use: 3-4 cups half and half daily      Family History:  Family History   Problem Relation Age of Onset   • Alzheimer's disease Mother    • Heart disease Mother    • Heart attack Father    • Heart disease Father    • Heart disease Other    • Cancer Sister 60        Colon   • Malig Hyperthermia Neg Hx    • Breast cancer Neg Hx           Review of Systems  See history of present illness and past medical history.   Constitutional: Negative.    HENT: Negative.    Eyes: Negative.    Respiratory: Negative.    Cardiovascular: Negative.    Gastrointestinal: Positive for nausea. Negative for abdominal pain, diarrhea and vomiting.   Genitourinary: Negative.    Musculoskeletal: Negative.    Skin: Negative.    Neurological: Positive for dizziness. Negative for speech difficulty, weakness, light-headedness and headaches.   Psychiatric/Behavioral: Negative.      Vitals:   /84 (BP Location: Left arm, Patient Position: Lying)   Pulse 66   Temp 98.2 °F (36.8 °C) (Oral)   Resp 16   Ht 165.1 cm (65\")   Wt 61.2 kg (135 lb)   SpO2 98%   BMI 22.47 kg/m²   I/O: No intake or output data in the 24 hours ending 05/11/23 1953  Exam:  Patient is examined using the personal protective equipment as per guidelines from infection control for this " particular patient as enacted.  Hand washing was performed before and after patient interaction.  General Appearance:    Alert, cooperative, no distress, appears stated age   Head:    Normocephalic, without obvious abnormality, atraumatic   Eyes:    PERRL, conjunctiva/corneas clear, EOM's intact, both eyes   Ears:    Normal external ear canals, both ears   Nose:   Nares normal, septum midline, mucosa normal, no drainage    or sinus tenderness   Throat:   Lips, tongue, gums normal; oral mucosa pink and moist   Neck:   Supple, symmetrical, trachea midline, no adenopathy;     thyroid:  no enlargement/tenderness/nodules; no carotid    bruit or JVD   Back:     Symmetric, no curvature, ROM normal, no CVA tenderness   Lungs:     Clear to auscultation bilaterally, respirations unlabored   Chest Wall:    No tenderness or deformity    Heart:    Regular rate and rhythm, S1 and S2 normal, no murmur, rub   or gallop   Abdomen:     Soft, non-tender, bowel sounds active all four quadrants,     no masses, no hepatomegaly, no splenomegaly   Extremities:   Extremities normal, atraumatic, no cyanosis or edema   Pulses:   Pulses palpable in all extremities; symmetric all extremities   Skin:   Skin color normal, Skin is warm and dry,  no rashes or palpable lesions   Neurologic:   CNII-XII intact, motor strength grossly intact, sensation grossly intact to light touch, no focal deficits noted       Data Review:      I reviewed the patient's new clinical results.  Results from last 7 days   Lab Units 05/11/23  1410   WBC 10*3/mm3 8.69   HEMOGLOBIN g/dL 15.3   PLATELETS 10*3/mm3 234     Results from last 7 days   Lab Units 05/11/23  1409   SODIUM mmol/L 139   POTASSIUM mmol/L 3.8   CHLORIDE mmol/L 102   CO2 mmol/L 25.9   BUN mg/dL 12   CREATININE mg/dL 0.55*   CALCIUM mg/dL 10.1   GLUCOSE mg/dL 89     CT Head Without Contrast    Result Date: 5/11/2023  No evidence of acute infarction or hemorrhage. Further evaluation could be performed with  MRI examination of brain as indicated. A small arachnoid cyst involving the medial aspect of the left middle cranial fossa is noted.    Radiation dose reduction techniques were utilized, including automated exposure control and exposure modulation based on body size.  This report was finalized on 5/11/2023 5:18 PM by Dr. Ion Krishna M.D.      XR Chest 1 View    Result Date: 5/11/2023  1. Borderline cardiomegaly.  This report was finalized on 5/11/2023 2:21 PM by Dr. Rickey Roberts M.D.      DEXA Bone Density Axial    Result Date: 4/14/2023  1. Osteopenia. 2. When compared to the prior exam 11/13/2019, the bone mineral density of the left total hip is decreased 6% and bone mineral density of the lumbar spine is decreased 3% and these represent statistically significant changes. 3. The 10-year FRAX (World Health Organization) fracture risk for a major osteoporotic fracture is 22% and for a hip fracture is 11%.   Current recommendations are that a follow-up bone density be obtained at an interval of not less than 2 years except in specific circumstances, such as after initiation or change of therapy.  This report was finalized on 4/14/2023 11:20 AM by Dr. Chay Horowitz M.D.      ECG 12 Lead ED Triage Standing Order; Weak / Dizzy / AMS   ED Interpretation   Emilee Padilla PA-C     5/11/2023  5:52 PM   ECG 12 Lead ED Triage Standing Order; Weak / Dizzy / AMS         Date/Time: 5/11/2023 2:08 PM   Performed by: Emilee Padilla PA-C   Authorized by: Rickey Schroeder MD    Interpreted by physician   Comparison: not compared with previous ECG    Rhythm: sinus rhythm   Rate: normal   BPM: 77   Comments: LVH, poor R wae progression, no stemi           Microbiology Results (last 10 days)     ** No results found for the last 240 hours. **            Assessment:  Active Hospital Problems    Diagnosis  POA   • **Vertigo [R42]  Yes   • Malignant neoplasm of ascending colon [C18.2]  Yes     Added automatically from request  for surgery [id number]     • Essential hypertension [I10]  Yes   • Orthostatic lightheadedness [R42]  Yes       Medical decision making/care plan: See admitting orders  · Vertigo with no focal neurological deficits and has prior history-plan is to admit the patient continue with aspirin and statin perform neurochecks get MRI of her brain and neurology consultation.  Patient and her son at the bedside explained this care plan.  · Poorly controlled blood pressure-continue her home regimen allow for permissive hypertension in the first 24 hours with target blood pressure to be less than 130/80 eventually.      Julio C Rodriguez MD   5/11/2023  19:53 EDT    Parts of this note may be an electronic transcription/translation of spoken language to printed text using the Dragon dictation system.

## 2023-05-12 ENCOUNTER — READMISSION MANAGEMENT (OUTPATIENT)
Dept: CALL CENTER | Facility: HOSPITAL | Age: 88
End: 2023-05-12
Payer: MEDICARE

## 2023-05-12 ENCOUNTER — TRANSCRIBE ORDERS (OUTPATIENT)
Dept: HOME HEALTH SERVICES | Facility: HOME HEALTHCARE | Age: 88
End: 2023-05-12
Payer: MEDICARE

## 2023-05-12 ENCOUNTER — APPOINTMENT (OUTPATIENT)
Dept: CARDIOLOGY | Facility: HOSPITAL | Age: 88
End: 2023-05-12
Payer: MEDICARE

## 2023-05-12 ENCOUNTER — HOME HEALTH ADMISSION (OUTPATIENT)
Dept: HOME HEALTH SERVICES | Facility: HOME HEALTHCARE | Age: 88
End: 2023-05-12
Payer: MEDICARE

## 2023-05-12 ENCOUNTER — APPOINTMENT (OUTPATIENT)
Dept: MRI IMAGING | Facility: HOSPITAL | Age: 88
End: 2023-05-12
Payer: MEDICARE

## 2023-05-12 VITALS
WEIGHT: 135 LBS | DIASTOLIC BLOOD PRESSURE: 86 MMHG | HEART RATE: 76 BPM | RESPIRATION RATE: 18 BRPM | BODY MASS INDEX: 22.49 KG/M2 | TEMPERATURE: 98 F | HEIGHT: 65 IN | SYSTOLIC BLOOD PRESSURE: 139 MMHG | OXYGEN SATURATION: 97 %

## 2023-05-12 DIAGNOSIS — R42 VERTIGO: Primary | ICD-10-CM

## 2023-05-12 DIAGNOSIS — H81.12 BPPV (BENIGN PAROXYSMAL POSITIONAL VERTIGO), LEFT: ICD-10-CM

## 2023-05-12 LAB
ALBUMIN SERPL-MCNC: 3.8 G/DL (ref 3.5–5.2)
ALBUMIN/GLOB SERPL: 1.8 G/DL
ALP SERPL-CCNC: 57 U/L (ref 39–117)
ALT SERPL W P-5'-P-CCNC: 13 U/L (ref 1–33)
ANION GAP SERPL CALCULATED.3IONS-SCNC: 9.5 MMOL/L (ref 5–15)
AORTIC DIMENSIONLESS INDEX: 0.7 (DI)
AST SERPL-CCNC: 16 U/L (ref 1–32)
BH CV ECHO MEAS - AO MAX PG: 13.6 MMHG
BH CV ECHO MEAS - AO MEAN PG: 8 MMHG
BH CV ECHO MEAS - AO ROOT DIAM: 2.6 CM
BH CV ECHO MEAS - AO V2 MAX: 184.6 CM/SEC
BH CV ECHO MEAS - AO V2 VTI: 42.7 CM
BH CV ECHO MEAS - AVA(I,D): 1.6 CM2
BH CV ECHO MEAS - EDV(CUBED): 50.4 ML
BH CV ECHO MEAS - EDV(MOD-SP2): 49 ML
BH CV ECHO MEAS - EDV(MOD-SP4): 47 ML
BH CV ECHO MEAS - EF(MOD-BP): 59.4 %
BH CV ECHO MEAS - EF(MOD-SP2): 59.2 %
BH CV ECHO MEAS - EF(MOD-SP4): 59.6 %
BH CV ECHO MEAS - ESV(CUBED): 18 ML
BH CV ECHO MEAS - ESV(MOD-SP2): 20 ML
BH CV ECHO MEAS - ESV(MOD-SP4): 19 ML
BH CV ECHO MEAS - FS: 29 %
BH CV ECHO MEAS - IVS/LVPW: 1.18 CM
BH CV ECHO MEAS - IVSD: 0.98 CM
BH CV ECHO MEAS - LAT PEAK E' VEL: 6.1 CM/SEC
BH CV ECHO MEAS - LV DIASTOLIC VOL/BSA (35-75): 28.1 CM2
BH CV ECHO MEAS - LV MASS(C)D: 97.7 GRAMS
BH CV ECHO MEAS - LV MAX PG: 7.7 MMHG
BH CV ECHO MEAS - LV MEAN PG: 3.6 MMHG
BH CV ECHO MEAS - LV SYSTOLIC VOL/BSA (12-30): 11.4 CM2
BH CV ECHO MEAS - LV V1 MAX: 139.1 CM/SEC
BH CV ECHO MEAS - LV V1 VTI: 31.2 CM
BH CV ECHO MEAS - LVIDD: 3.7 CM
BH CV ECHO MEAS - LVIDS: 2.6 CM
BH CV ECHO MEAS - LVOT AREA: 2.19 CM2
BH CV ECHO MEAS - LVOT DIAM: 1.67 CM
BH CV ECHO MEAS - LVPWD: 0.83 CM
BH CV ECHO MEAS - MED PEAK E' VEL: 7.3 CM/SEC
BH CV ECHO MEAS - MR MAX PG: 90 MMHG
BH CV ECHO MEAS - MR MAX VEL: 474.4 CM/SEC
BH CV ECHO MEAS - MV A DUR: 0.11 SEC
BH CV ECHO MEAS - MV A MAX VEL: 130.1 CM/SEC
BH CV ECHO MEAS - MV DEC SLOPE: 466.2 CM/SEC2
BH CV ECHO MEAS - MV DEC TIME: 189 MSEC
BH CV ECHO MEAS - MV E MAX VEL: 95.3 CM/SEC
BH CV ECHO MEAS - MV E/A: 0.73
BH CV ECHO MEAS - MV MAX PG: 9.5 MMHG
BH CV ECHO MEAS - MV MEAN PG: 3.1 MMHG
BH CV ECHO MEAS - MV P1/2T: 66.9 MSEC
BH CV ECHO MEAS - MV V2 VTI: 27.9 CM
BH CV ECHO MEAS - MVA(P1/2T): 3.3 CM2
BH CV ECHO MEAS - MVA(VTI): 2.45 CM2
BH CV ECHO MEAS - PA ACC TIME: 0.13 SEC
BH CV ECHO MEAS - PA PR(ACCEL): 18.4 MMHG
BH CV ECHO MEAS - PA V2 MAX: 74.5 CM/SEC
BH CV ECHO MEAS - PULM A REVS DUR: 0.09 SEC
BH CV ECHO MEAS - PULM A REVS VEL: 44.7 CM/SEC
BH CV ECHO MEAS - PULM DIAS VEL: 29.4 CM/SEC
BH CV ECHO MEAS - PULM S/D: 1.35
BH CV ECHO MEAS - PULM SYS VEL: 39.8 CM/SEC
BH CV ECHO MEAS - RAP SYSTOLE: 3 MMHG
BH CV ECHO MEAS - RV MAX PG: 1.06 MMHG
BH CV ECHO MEAS - RV V1 MAX: 51.4 CM/SEC
BH CV ECHO MEAS - RV V1 VTI: 11.4 CM
BH CV ECHO MEAS - RVSP: 49 MMHG
BH CV ECHO MEAS - SI(MOD-SP2): 17.3 ML/M2
BH CV ECHO MEAS - SI(MOD-SP4): 16.7 ML/M2
BH CV ECHO MEAS - SV(LVOT): 68.6 ML
BH CV ECHO MEAS - SV(MOD-SP2): 29 ML
BH CV ECHO MEAS - SV(MOD-SP4): 28 ML
BH CV ECHO MEAS - TAPSE (>1.6): 1.9 CM
BH CV ECHO MEAS - TR MAX PG: 45.5 MMHG
BH CV ECHO MEAS - TR MAX VEL: 337.2 CM/SEC
BH CV ECHO MEASUREMENTS AVERAGE E/E' RATIO: 14.22
BH CV ECHO SHUNT ASSESSMENT PERFORMED (HIDDEN SCRIPTING): 1
BH CV XLRA - RV BASE: 3.2 CM
BH CV XLRA - RV LENGTH: 7.1 CM
BH CV XLRA - RV MID: 2.9 CM
BH CV XLRA - TDI S': 17.5 CM/SEC
BILIRUB SERPL-MCNC: 0.3 MG/DL (ref 0–1.2)
BUN SERPL-MCNC: 10 MG/DL (ref 8–23)
BUN/CREAT SERPL: 21.3 (ref 7–25)
CALCIUM SPEC-SCNC: 9 MG/DL (ref 8.6–10.5)
CHLORIDE SERPL-SCNC: 108 MMOL/L (ref 98–107)
CHOLEST SERPL-MCNC: 163 MG/DL (ref 0–200)
CO2 SERPL-SCNC: 26.5 MMOL/L (ref 22–29)
CREAT SERPL-MCNC: 0.47 MG/DL (ref 0.57–1)
DEPRECATED RDW RBC AUTO: 41.4 FL (ref 37–54)
EGFRCR SERPLBLD CKD-EPI 2021: 91.7 ML/MIN/1.73
ERYTHROCYTE [DISTWIDTH] IN BLOOD BY AUTOMATED COUNT: 12.3 % (ref 12.3–15.4)
GLOBULIN UR ELPH-MCNC: 2.1 GM/DL
GLUCOSE BLDC GLUCOMTR-MCNC: 89 MG/DL (ref 70–130)
GLUCOSE SERPL-MCNC: 96 MG/DL (ref 65–99)
HBA1C MFR BLD: 5.5 % (ref 4.8–5.6)
HCT VFR BLD AUTO: 39.1 % (ref 34–46.6)
HDLC SERPL-MCNC: 54 MG/DL (ref 40–60)
HGB BLD-MCNC: 13.2 G/DL (ref 12–15.9)
LDLC SERPL CALC-MCNC: 91 MG/DL (ref 0–100)
LDLC/HDLC SERPL: 1.66 {RATIO}
LEFT ATRIUM VOLUME INDEX: 20.6 ML/M2
MAXIMAL PREDICTED HEART RATE: 132 BPM
MCH RBC QN AUTO: 31.1 PG (ref 26.6–33)
MCHC RBC AUTO-ENTMCNC: 33.8 G/DL (ref 31.5–35.7)
MCV RBC AUTO: 92.2 FL (ref 79–97)
PLATELET # BLD AUTO: 213 10*3/MM3 (ref 140–450)
PMV BLD AUTO: 10.4 FL (ref 6–12)
POTASSIUM SERPL-SCNC: 3.9 MMOL/L (ref 3.5–5.2)
PROT SERPL-MCNC: 5.9 G/DL (ref 6–8.5)
RBC # BLD AUTO: 4.24 10*6/MM3 (ref 3.77–5.28)
SODIUM SERPL-SCNC: 144 MMOL/L (ref 136–145)
STRESS TARGET HR: 112 BPM
TRIGL SERPL-MCNC: 97 MG/DL (ref 0–150)
VLDLC SERPL-MCNC: 18 MG/DL (ref 5–40)
WBC NRBC COR # BLD: 6.61 10*3/MM3 (ref 3.4–10.8)

## 2023-05-12 PROCEDURE — 85027 COMPLETE CBC AUTOMATED: CPT | Performed by: INTERNAL MEDICINE

## 2023-05-12 PROCEDURE — 97535 SELF CARE MNGMENT TRAINING: CPT

## 2023-05-12 PROCEDURE — 93005 ELECTROCARDIOGRAM TRACING: CPT | Performed by: STUDENT IN AN ORGANIZED HEALTH CARE EDUCATION/TRAINING PROGRAM

## 2023-05-12 PROCEDURE — 83036 HEMOGLOBIN GLYCOSYLATED A1C: CPT | Performed by: INTERNAL MEDICINE

## 2023-05-12 PROCEDURE — G0378 HOSPITAL OBSERVATION PER HR: HCPCS

## 2023-05-12 PROCEDURE — 97530 THERAPEUTIC ACTIVITIES: CPT

## 2023-05-12 PROCEDURE — 99222 1ST HOSP IP/OBS MODERATE 55: CPT | Performed by: PHYSICIAN ASSISTANT

## 2023-05-12 PROCEDURE — 80061 LIPID PANEL: CPT | Performed by: INTERNAL MEDICINE

## 2023-05-12 PROCEDURE — 93306 TTE W/DOPPLER COMPLETE: CPT

## 2023-05-12 PROCEDURE — 97162 PT EVAL MOD COMPLEX 30 MIN: CPT

## 2023-05-12 PROCEDURE — 93306 TTE W/DOPPLER COMPLETE: CPT | Performed by: INTERNAL MEDICINE

## 2023-05-12 PROCEDURE — 82948 REAGENT STRIP/BLOOD GLUCOSE: CPT

## 2023-05-12 PROCEDURE — 80053 COMPREHEN METABOLIC PANEL: CPT | Performed by: INTERNAL MEDICINE

## 2023-05-12 PROCEDURE — 97166 OT EVAL MOD COMPLEX 45 MIN: CPT

## 2023-05-12 PROCEDURE — 70551 MRI BRAIN STEM W/O DYE: CPT

## 2023-05-12 RX ADMIN — MECLIZINE HYDROCHLORIDE 25 MG: 25 TABLET ORAL at 06:26

## 2023-05-12 RX ADMIN — ATORVASTATIN CALCIUM 20 MG: 20 TABLET, FILM COATED ORAL at 09:29

## 2023-05-12 RX ADMIN — Medication 10 ML: at 09:30

## 2023-05-12 RX ADMIN — LOSARTAN POTASSIUM 25 MG: 25 TABLET, FILM COATED ORAL at 09:29

## 2023-05-12 RX ADMIN — MULTIPLE VITAMINS W/ MINERALS TAB 1 TABLET: TAB at 09:29

## 2023-05-12 RX ADMIN — LORAZEPAM 0.5 MG: 0.5 TABLET ORAL at 03:51

## 2023-05-12 NOTE — CONSULTS
Neurology Consult Note    Consult Date: 5/12/2023    Referring MD: No ref. provider found    Reason for Consult I have been asked to see the patient in neurological consultation to render advice and opinion regarding dizziness     Riri Damon is a 88 y.o. female right-handed who presented with abrupt onset of dizziness occurring on Wednesday.  She says that she was asleep in bed turning over, it was about midnight, she felt dizzy like the room was spinning.  She called her daughter to come stay with her  and then she sought medical treatment.  She is dizzy anytime she moves.  She has a spinning sensation.  No nausea.  She had a similar happening years ago with associated nausea.  No hearing loss, no headache, no unilateral weakness problems with speech.  She feels a little better today'.  Incidentally about 6 wks ago she fell and injured her left thumb    In the ER blood pressure 195/89.  She had head CT that was negative for hemorrhage or acute finding.  She was admitted for stroke work-up.  She has not had vascular imaging.  MRI brain was negative for stroke shows some generalized atrophy and stable arachnoid cyst.  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 Mcneil   • Deep vein thrombosis 11/24/2017    right leg   • History of edema     LE   • History of rheumatic fever    • Hx of radiation therapy 2015    for adenocarcinoma of the colon Stage IIIB, T4N1a   • Hyperlipidemia    • Hypertension     MEDS PRN   • Infectious mononucleosis    • Infectious viral hepatitis    • Left anterior fascicular block    • Mitral regurgitation     8/2010: mild per echo   • OA (osteoarthritis)    • Palpitations    •  Pulmonary hypertension     8/2010- mild per echo; 8/2012 - normal RVSP per echo   • Tricuspid regurgitation     8/2010: Mild to moderate per echo; 8/2012: Trace to mild per echo   • Venous insufficiency    • Ventricular septal defect     Per echocardiogram 2012   • Vitamin D deficiency      Past Surgical History:   Procedure Laterality Date   • APPENDECTOMY     • COLECTOMY PARTIAL / TOTAL  02/02/2015 2/2015 due to adenocarcinoma   • COLONOSCOPY      JAN 2015   • COLONOSCOPY N/A 5/25/2016    Procedure: COLONOSCOPY to ANASTAMOSIS AND TERMINAL ILEUM;  Surgeon: Yfn Mcneil MD;  [facility];  Service:    • COLONOSCOPY N/A 7/11/2019    Procedure: COLONOSCOPY to cecum:;  Surgeon: Yfn Mcneil MD;  [facility];  Service: General   • HYSTERECTOMY     • INTRAOCULAR LENS EXCHANGE Bilateral    • JOINT MANIPULATION Right 1/9/2018    Procedure: MANIPULATION OF RT KNEE;  Surgeon: Andrea Caballero MD;  Location: [facility] MAIN OR;  Service:    • JOINT REPLACEMENT Right 11/09/2017   • KNEE SURGERY Left 2006    ARTHROSCOPY   • GA ARTHRP KNE CONDYLE&PLATU MEDIAL&LAT COMPARTMENTS Right 11/9/2017    Procedure: RIGHT TOTAL KNEE ARTHROPLASTY;  Surgeon: Andrea Caballero MD;  Location: [facility] MAIN OR;  Service: Orthopedics   • TONSILLECTOMY         Medications On Admission  Medications Prior to Admission   Medication Sig Dispense Refill Last Dose   • aspirin 81 MG EC tablet Take 1 tablet by mouth Every Other Day.      • Calcium Citrate (CITRACAL PO) Take 200 mg by mouth 3 (Three) Times a Week. Monday, Wednesday, friday      • docusate sodium 100 MG capsule Take 100 mg by mouth 2 (Two) Times a Day As Needed for Constipation. 40 capsule 1    • losartan (COZAAR) 25 MG tablet Take 1 tablet by mouth Daily. (Patient taking differently: Take 1 tablet by mouth Daily As Needed.) 90 tablet 2    • magnesium chloride ER 64 MG DR tablet Take 1 tablet by mouth 3 (Three) Times a Week. Monday, Wednesday, Friday     "  • meclizine (ANTIVERT) 25 MG tablet Take 1 tablet by mouth 3 (Three) Times a Day As Needed for Dizziness for up to 7 days. 21 tablet 0    • melatonin 1 MG tablet Take 5 tablets by mouth Every Night.      • meloxicam (MOBIC) 7.5 MG tablet TAKE 1 TABLET EVERY DAY (Patient taking differently: 1 tablet 3 (Three) Times a Week.) 90 tablet 1    • Multiple Vitamins-Minerals (MULTIVITAMIN ADULT PO) Take 1 tablet by mouth Daily.      • simvastatin (ZOCOR) 40 MG tablet Take 1 tablet by mouth Every Night. 90 tablet 2        Allergies:  No Known Allergies    Social Hx:  Social History     Socioeconomic History   • Marital status:      Spouse name: Marcus   • Number of children: 3   • Years of education: College   Tobacco Use   • Smoking status: Never     Passive exposure: Never   • Smokeless tobacco: Never   Vaping Use   • Vaping Use: Never used   Substance and Sexual Activity   • Alcohol use: No     Comment: Caffeine use: 3-4 cups half and half daily   • Drug use: Never     Types: Oxycodone   • Sexual activity: Defer       Family Hx:  Family History   Problem Relation Age of Onset   • Alzheimer's disease Mother    • Heart disease Mother    • Heart attack Father    • Heart disease Father    • Heart disease Other    • Cancer Sister 60        Colon   • Malig Hyperthermia Neg Hx    • Breast cancer Neg Hx        Review of systems  Constitutional: [No fevers, chills]  Eye: [No vision loss, diplopia]  HEENT: [no hearing loss, vertigo]  Cardiovascular: [No Chest pain, palpitations]  Neurologic: [No weakness, numbness]  Psychiatric: [No anxiety, depression]    All other systems reviewed and are negative    Exam    /81   Pulse 75   Temp 97.8 °F (36.6 °C) (Oral)   Resp 18   Ht 165.1 cm (65\")   Wt 61.2 kg (135 lb)   SpO2 95%   BMI 22.47 kg/m²   gen: NAD, vitals reviewed  Eyes: fundus sharp with no papilledema or retinal hemorrhages  HEENT: no nuchal rigidity  CVS: RRR, S1, S2  MS: oriented x3, recent/remote memory " intact, normal attention/concentration, language intact, no neglect, normal fund of knowledge  CN: visual acuity grossly normal, visual fields full, PERRL, EOMI, few beats of horizontal nondirection changing nystagmus, facial sensation equal, no facial droop, hearing symmetric, corrective saccade with head to the left, positive [place]-Hallpike to left, palate elevates symmetrically, shoulder shrug equal, tongue midline  Motor: 5/5 throughout upper and lower extremities, normal tone  Sensation: intact to vibration and temperature throughout  Reflexes: 2+ throughout upper and lower extremities, downgoing plantars  Coordination: no dysmetria with finger to nose bilaterally      DATA:    Lab Results   Component Value Date    GLUCOSE 96 05/12/2023    CALCIUM 9.0 05/12/2023     05/12/2023    K 3.9 05/12/2023    CO2 26.5 05/12/2023     (H) 05/12/2023    BUN 10 05/12/2023    CREATININE 0.47 (L) 05/12/2023    EGFRIFAFRI 125 05/10/2019    EGFRIFNONA 105 11/12/2021    BCR 21.3 05/12/2023    ANIONGAP 9.5 05/12/2023     Lab Results   Component Value Date    WBC 6.61 05/12/2023    HGB 13.2 05/12/2023    HCT 39.1 05/12/2023    MCV 92.2 05/12/2023     05/12/2023     Lab Results   Component Value Date    LDL 91 05/12/2023     (H) 12/13/2022     (H) 06/13/2022     Lab Results   Component Value Date    HGBA1C 5.50 05/12/2023     Lab Results   Component Value Date    INR 1.0 05/11/2023    INR 0.99 10/25/2017    PROTIME 11.9 05/11/2023    PROTIME 12.7 10/25/2017       Lab review:     Imaging review: MRI brain without generalized parenchymal volume loss, scattered T2 flair hyperintensities most consistent with chronic microvascular ischemic change and a stable arachnoid cyst of the left middle cranial fossa    Diagnoses:  1.  BPPV  2.  Hypertension    Impression: 88-year-old right-handed female with past medical history of hypertension, hyperlipidemia, osteoarthritis, colon cancer recent basal cell carcinoma  with recent MOHs, non rheumatic mvr who presented with onset of vertigo consistent with BPPV, symptoms occurring with head position change, rolling over in bed.  Exam findings consistent as well with corrective saccade with head turn to the left and positive Sara-Hallpike with reduced producible symptoms.  Performed Epley.  Okay with short course of symptomatic medication like an histamines, meclizine, low dose benzodiazepine for refractory symptoms.  She may benefit from vestibular rehab referral but seems reluctant to leave her  for appointments.  Will give handout on how to do Epley at home as well    I spent at least 40 minutes interviewing, examining, and counseling patient.  I independently reviewed documentation, laboratory and diagnostic findings, external documentation where applicable, and formulated treatment plan which was discussed with the patient.      Thank you for this consultation.  Discussed above plan with neuro attending, Dr. Mitchell who agrees with above plan.  Neurology team is available for concerns or questions.

## 2023-05-12 NOTE — THERAPY EVALUATION
Patient Name: Riri Damon  : 1934    MRN: [id number]                              Today's Date: 2023       Admit Date: 2023    Visit Dx:     ICD-10-CM ICD-9-CM   1. Vertigo  R42 780.4     Patient Active Problem List   Diagnosis   • Hyperlipidemia   • Low back pain   • Mitral valve insufficiency   • Palpitations   • Knee pain   • Tricuspid valve insufficiency   • Arthritis   • Medicare annual wellness visit, subsequent   • Screening for osteoporosis   • Orthostatic lightheadedness   • Essential hypertension   • OA (osteoarthritis) of knee   • Ventricular septal defect   • Left anterior fascicular block   • Malignant neoplasm of ascending colon   • Family history of colon cancer   • Osteopenia of left hip   • Acute pain of left shoulder   • Wellness examination   • Anxiety   • Cyst of right ovary   • Primary insomnia   • Vertigo     Past Medical History:   Diagnosis Date   • Anxiety    • Colon carcinoma     Stage IIIB, T4N1a; underwent radiation therapy and colon resection by Dr. Mcneil   • Deep vein thrombosis 2017    right leg   • History of edema     LE   • History of rheumatic fever    • Hx of radiation therapy     for adenocarcinoma of the colon Stage IIIB, T4N1a   • Hyperlipidemia    • Hypertension     MEDS PRN   • Infectious mononucleosis    • Infectious viral hepatitis    • Left anterior fascicular block    • Mitral regurgitation     2010: mild per echo   • OA (osteoarthritis)    • Palpitations    • Pulmonary hypertension     2010- mild per echo; 2012 - normal RVSP per echo   • Tricuspid regurgitation     2010: Mild to moderate per echo; 2012: Trace to mild per echo   • Venous insufficiency    • Ventricular septal defect     Per echocardiogram    • Vitamin D deficiency      Past Surgical History:   Procedure Laterality Date   • APPENDECTOMY     • COLECTOMY PARTIAL / TOTAL  2015 due to adenocarcinoma   • COLONOSCOPY      2015   •  COLONOSCOPY N/A 5/25/2016    Procedure: COLONOSCOPY to ANASTAMOSIS AND TERMINAL ILEUM;  Surgeon: Yfn Mcneil MD;  Location: [facility] ENDOSCOPY;  Service:    • COLONOSCOPY N/A 7/11/2019    Procedure: COLONOSCOPY to cecum:;  Surgeon: Yfn Mcneil MD;  Location: [facility] ENDOSCOPY;  Service: General   • HYSTERECTOMY     • INTRAOCULAR LENS EXCHANGE Bilateral    • JOINT MANIPULATION Right 1/9/2018    Procedure: MANIPULATION OF RT KNEE;  Surgeon: Andrea Caballero MD;  Location: [facility] MAIN OR;  Service:    • JOINT REPLACEMENT Right 11/09/2017   • KNEE SURGERY Left 2006    ARTHROSCOPY   • NH ARTHRP KNE CONDYLE&PLATU MEDIAL&LAT COMPARTMENTS Right 11/9/2017    Procedure: RIGHT TOTAL KNEE ARTHROPLASTY;  Surgeon: Andrea Caballero MD;  Location: [facility] MAIN OR;  Service: Orthopedics   • TONSILLECTOMY        General Information     Row Name 05/12/23 0917          OT Time and Intention    Document Type evaluation;therapy note (daily note)  -LE     Mode of Treatment individual therapy;occupational therapy  -     Row Name 05/12/23 0917          General Information    Patient Profile Reviewed yes  -LE     Prior Level of Function independent:;ADL's;transfer  cares for spouse who has dementia.  -LE     Existing Precautions/Restrictions fall  due to h/o and recent dementia  -LE     Row Name 05/12/23 0917          Living Environment    People in Home spouse  -LE     Row Name 05/12/23 0917          Cognition    Orientation Status (Cognition) oriented x 4  -LE     Row Name 05/12/23 0917          Safety Issues, Functional Mobility    Comment, Safety Issues/Impairments (Mobility) gait belt and non skid socks.  -LE           User Key  (r) = Recorded By, (t) = Taken By, (c) = Cosigned By    Initials Name Provider Type    Sinai Sykes OTR Occupational Therapist                 Mobility/ADL's     Row Name 05/12/23 0918          Bed Mobility    Bed Mobility supine-sit  -LE     Supine-Sit [place] (Bed Mobility) independent  -LE      Row Name 05/12/23 0918          Transfers    Transfers stand-sit transfer;sit-stand transfer;bed-chair transfer;toilet transfer  -LE     Comment, (Transfers) VC for hand placement and body mechanics. ed pt on transfer tub bench benefit, print off photo, ed out of pocket, how to obtain.  pt appreciative of information  -LE     Row Name 05/12/23 0918          Bed-Chair Transfer    Bed-Chair [place] (Transfers) standby assist;verbal cues  -LE     Row Name 05/12/23 0918          Sit-Stand Transfer    Sit-Stand [place] (Transfers) standby assist;verbal cues  -LE     Assistive Device (Sit-Stand Transfers) walker, front-wheeled  -LE     Row Name 05/12/23 0918          Stand-Sit Transfer    Stand-Sit [place] (Transfers) standby assist;verbal cues  -LE     Assistive Device (Stand-Sit Transfers) walker, front-wheeled  -LE     Row Name 05/12/23 0918          Toilet Transfer    Type (Toilet Transfer) stand pivot/stand step  -LE     [place] Level (Toilet Transfer) minimum assist (75% patient effort);verbal cues;nonverbal cues (demo/gesture)  -LE     Assistive Device (Toilet Transfer) walker, front-wheeled  -LE     Row Name 05/12/23 0918          Functional Mobility    Functional Mobility- Ind. Level standby assist  -LE     Functional Mobility- Device walker, front-wheeled  -LE     Functional Mobility- Comment bed to bathroom with walker and then bathroom to chair without walker.  pt has not been using walker but looks steadier using.  without walker pt reaches for sink, etc which pt reports is her baseline.  -LE     Row Name 05/12/23 0918          Activities of Daily Living    BADL Assessment/Intervention toileting;feeding;grooming;lower body dressing;upper body dressing  -LE     Row Name 05/12/23 0918          Toileting Assessment/Training    [place] Level (Toileting) perform perineal hygiene;adjust/manage clothing;standby assist  -LE     Position (Toileting) unsupported standing;unsupported sitting   -[facility] Name 05/12/23 0918          Grooming Assessment/Training    [place] Level (Grooming) standby assist;wash face, hands  -LE     Position (Grooming) sink side;unsupported standing  -LE           User Key  (r) = Recorded By, (t) = Taken By, (c) = Cosigned By    Initials Name Provider Type    Sinai Sykes OTR Occupational Therapist               Obj/Interventions     [facility] Name 05/12/23 0922          Sensory Assessment (Somatosensory)    Sensory Assessment (Somatosensory) UE sensation intact  -LE     Row Name 05/12/23 0922          Vision Assessment/Intervention    Vision Assessment Comment no reports of changes.  -[facility] Name 05/12/23 0922          Range of Motion Comprehensive    Comment, General Range of Motion B UE 7/8 AROM  -LE     Row Name 05/12/23 0922          Strength Comprehensive (MMT)    Comment, General Manual Muscle Testing (MMT) Assessment B UE 4/5. no unitlateral weakness noted.  -LE     Row Name 05/12/23 0922          Balance    Balance Assessment sitting static balance;standing static balance;standing dynamic balance  -LE     Static Sitting Balance independent  -LE     Static Standing Balance independent  -LE     Dynamic Standing Balance standby assist  -LE           User Key  (r) = Recorded By, (t) = Taken By, (c) = Cosigned By    Initials Name Provider Type    Sinai Sykes OTR Occupational Therapist               Goals/Plan     Row Name 05/12/23 0929          Transfer Goal 1 (OT)    Activity/Assistive Device (Transfer Goal 1, OT) sit-to-stand/stand-to-sit;bed-to-chair/chair-to-bed;toilet  -LE     [place] Level/Cues Needed (Transfer Goal 1, OT) modified independence;independent  -LE     Time Frame (Transfer Goal 1, OT) 2 weeks  -LE     Progress/Outcome (Transfer Goal 1, OT) goal ongoing  -LE     Row Name 05/12/23 0929          Bathing Goal 1 (OT)    Activity/Device (Bathing Goal 1, OT) bathing skills, all  -LE     [place] Level/Cues Needed (Bathing Goal 1, OT)  standby assist  -LE     Time Frame (Bathing Goal 1, OT) 2 weeks  -LE     Progress/Outcomes (Bathing Goal 1, OT) goal ongoing  -LE     Row Name 05/12/23 0929          Dressing Goal 1 (OT)    Activity/Device (Dressing Goal 1, OT) dressing skills, all  -LE     [place]/Cues Needed (Dressing Goal 1, OT) modified independence  including item retrieval without LOB/unsteadiness noted.  -LE     Time Frame (Dressing Goal 1, OT) 2 weeks  -LE     Progress/Outcome (Dressing Goal 1, OT) goal ongoing  -LE     Row Name 05/12/23 0929          Problem Specific Goal 1 (OT)    Problem Specific Goal 1 (OT) Mod I/I consistently for ambulation to/from bathroom  -LE     Time Frame (Problem Specific Goal 1, OT) 2 weeks  -LE     Progress/Outcome (Problem Specific Goal 1, OT) goal ongoing  -LE     Row Name 05/12/23 0929          Therapy Assessment/Plan (OT)    Planned Therapy Interventions (OT) activity tolerance training;adaptive equipment training;BADL retraining;transfer/mobility retraining;patient/caregiver education/training;functional balance retraining;occupation/activity based interventions  -LE           User Key  (r) = Recorded By, (t) = Taken By, (c) = Cosigned By    Initials Name Provider Type    Sinai Sykes OTR Occupational Therapist               Clinical Impression     Row Name 05/12/23 0923          Pain Assessment    Pretreatment Pain Rating 0/10 - no pain  -LE     Row Name 05/12/23 0923          Plan of Care Review    Plan of Care Reviewed With patient  -LE     Outcome Evaluation Pt admit from home with vertigo symptoms. Pt reports h/o vertigo. Pt lives at home with spouse who has dementia and pt provides care for spouse.  Pt seen by OT and pt is able to move OOB, to bathroom for toileting, to sink for grooming and then up to chair.  These tasks completed with SBA/close SBA both with and without walker.  Pt reports plan to return home.  OT ed pt on home safety recommendations including education and handout on tub  DME. OT recommends home health OT at d/c and discusses pt with RN and CCP.  Will cont to follow for skilled OT while in acute care.  OT recommends pt cont up to bathroom with nursing.  -ANNMARIE     Row Name 05/12/23 0923          Therapy Assessment/Plan (OT)    Patient/Family Therapy Goal Statement (OT) return to prior level of function.  -LE     Rehab Potential (OT) good, to achieve stated therapy goals  -LE     Criteria for Skilled Therapeutic Interventions Met (OT) meets criteria;yes  -LE     Therapy Frequency (OT) 3 times/wk  -ANNMARIE     Row Name 05/12/23 0923          Therapy Plan Review/Discharge Plan (OT)    Equipment Needs Upon Discharge (OT) --  has walker in the home, has higher toilet in spouse bathroom.  OT ed and recommend transfer tub bench.  -LE     Anticipated Discharge Disposition (OT) home with home health;home with assist  kids currently assiting spouse while pt in hospital.  OT recommend cont supervision/assist by family at d/c as pt usually provides care for spouse and at this time needs to be able to return to taking care of herself.  -ANNMARIE     Row Name 05/12/23 0923          Vital Signs    Pre Systolic BP Rehab 128  -LE     Pre Treatment Diastolic BP 61  -LE     O2 Delivery Pre Treatment room air  -LE     O2 Delivery Intra Treatment room air  -LE     Post SpO2 (%) 96  -LE     O2 Delivery Post Treatment room air  -LE     Pre Patient Position Supine  -LE     Intra Patient Position Standing  -LE     Post Patient Position Sitting  -LE     Activity Duration --  no c/o dizziness with tasks.  -ANNMARIE     Row Name 05/12/23 0923          Positioning and Restraints    Pre-Treatment Position in bed  -LE     Post Treatment Position chair  -LE     In Chair notified nsg;reclined;call light within reach;encouraged to call for assist  RN aware exit alarm pad and box in room but not batteries.  Ask nurse to get batteries as OT cannot locate them on the floor.  RN OK pt in chair at this time.  -LE           User Key  (r) =  Recorded By, (t) = Taken By, (c) = Cosigned By    Initials Name Provider Type    Sinai Sykes OTR Occupational Therapist               Outcome Measures     Row Name 05/12/23 0930          How much help from another is currently needed...    Putting on and taking off regular lower body clothing? 3  -LE     Bathing (including washing, rinsing, and drying) 3  -LE     Toileting (which includes using toilet bed pan or urinal) 3  -LE     Putting on and taking off regular upper body clothing 3  -LE     Taking care of personal grooming (such as brushing teeth) 3  -LE     Eating meals 4  -LE     AM-PAC 6 Clicks Score (OT) 19  -LE     Row Name 05/12/23 0930          Modified [place] Scale    Modified Joy Scale 2 - Slight disability.  Unable to carry out all previous activities but able to look after own affairs without assistance.  -LE     Row Name 05/12/23 0930          Functional Assessment    Outcome Measure Options AM-PAC 6 Clicks Daily Activity (OT);Modified Joy  -LE           User Key  (r) = Recorded By, (t) = Taken By, (c) = Cosigned By    Initials Name Provider Type    Sinai Sykes OTR Occupational Therapist                Occupational Therapy Education     Title: PT OT SLP Therapies (Done)     Topic: Occupational Therapy (Done)     Point: ADL training (Done)     Description:   Instruct learner(s) on proper safety adaptation and remediation techniques during self care or transfers.   Instruct in proper use of assistive devices.              Learning Progress Summary           Patient Acceptance, E,TB,D,H, Bed IU by ANNMARIE at 5/12/2023 0930                   Point: Precautions (Done)     Description:   Instruct learner(s) on prescribed precautions during self-care and functional transfers.              Learning Progress Summary           Patient Acceptance, E,TB,D,H, Bed IU by ANNMARIE at 5/12/2023 0930                   Point: Body mechanics (Done)     Description:   Instruct learner(s) on proper positioning and  spine alignment during self-care, functional mobility activities and/or exercises.              Learning Progress Summary           Patient Acceptance, E,TB,D,H, Bed IU by LE at 5/12/2023 0930                               User Key     Initials Effective Dates Name Provider Type Discipline    LE 06/16/21 -  Sinai Farley OTR Occupational Therapist OT              OT Recommendation and Plan  Planned Therapy Interventions (OT): activity tolerance training, adaptive equipment training, BADL retraining, transfer/mobility retraining, patient/caregiver education/training, functional balance retraining, occupation/activity based interventions  Therapy Frequency (OT): 3 times/wk  Plan of Care Review  Plan of Care Reviewed With: patient  Outcome Evaluation: Pt admit from home with vertigo symptoms. Pt reports h/o vertigo. Pt lives at home with spouse who has dementia and pt provides care for spouse.  Pt seen by OT and pt is able to move OOB, to bathroom for toileting, to sink for grooming and then up to chair.  These tasks completed with SBA/close SBA both with and without walker.  Pt reports plan to return home.  OT ed pt on home safety recommendations including education and handout on tub DME. OT recommends home health OT at d/c and discusses pt with RN and CCP.  Will cont to follow for skilled OT while in acute care.  OT recommends pt cont up to bathroom with nursing.     Time Calculation:    Time Calculation- OT     Row Name 05/12/23 0931             Time Calculation- OT    OT Start Time 0840  -LE      OT Stop Time 0914  -LE      OT Time Calculation (min) 34 min  -LE      Total Timed Code Minutes- OT 24 minute(s)  -LE      OT Received On 05/12/23  -LE      OT - Next Appointment 05/15/23  -LE      OT Goal Re-Cert Due Date 05/26/23  -LE         Timed Charges    45086 - OT Self Care/Mgmt Minutes 24  -LE         Total Minutes    Timed Charges Total Minutes 24  -LE       Total Minutes 24  -LE            User Key  (r) =  Recorded By, (t) = Taken By, (c) = Cosigned By    Initials Name Provider Type    Sinai Sykes, OTR Occupational Therapist              Therapy Charges for Today     Code Description Service Date Service Provider Modifiers Qty    [id number] HC OT EVAL MOD COMPLEXITY 3 5/12/2023 Sinai Farley OTR GO 1    [id number]  OT SELF CARE/MGMT/TRAIN EA 15 MIN 5/12/2023 Sinai Farley OTR GO 2               QUINN Momin  5/12/2023

## 2023-05-12 NOTE — THERAPY EVALUATION
Patient Name: Riri Damon  : 1934    MRN: [id number]                              Today's Date: 2023       Admit Date: 2023    Visit Dx:     ICD-10-CM ICD-9-CM   1. Vertigo  R42 780.4     Patient Active Problem List   Diagnosis   • Hyperlipidemia   • Low back pain   • Mitral valve insufficiency   • Palpitations   • Knee pain   • Tricuspid valve insufficiency   • Arthritis   • Medicare annual wellness visit, subsequent   • Screening for osteoporosis   • Orthostatic lightheadedness   • Essential hypertension   • OA (osteoarthritis) of knee   • Ventricular septal defect   • Left anterior fascicular block   • Malignant neoplasm of ascending colon   • Family history of colon cancer   • Osteopenia of left hip   • Acute pain of left shoulder   • Wellness examination   • Anxiety   • Cyst of right ovary   • Primary insomnia   • Vertigo     Past Medical History:   Diagnosis Date   • Anxiety    • Colon carcinoma     Stage IIIB, T4N1a; underwent radiation therapy and colon resection by Dr. Mcneil   • Deep vein thrombosis 2017    right leg   • History of edema     LE   • History of rheumatic fever    • Hx of radiation therapy     for adenocarcinoma of the colon Stage IIIB, T4N1a   • Hyperlipidemia    • Hypertension     MEDS PRN   • Infectious mononucleosis    • Infectious viral hepatitis    • Left anterior fascicular block    • Mitral regurgitation     2010: mild per echo   • OA (osteoarthritis)    • Palpitations    • Pulmonary hypertension     2010- mild per echo; 2012 - normal RVSP per echo   • Tricuspid regurgitation     2010: Mild to moderate per echo; 2012: Trace to mild per echo   • Venous insufficiency    • Ventricular septal defect     Per echocardiogram    • Vitamin D deficiency      Past Surgical History:   Procedure Laterality Date   • APPENDECTOMY     • COLECTOMY PARTIAL / TOTAL  2015 due to adenocarcinoma   • COLONOSCOPY      2015   •  COLONOSCOPY N/A 5/25/2016    Procedure: COLONOSCOPY to ANASTAMOSIS AND TERMINAL ILEUM;  Surgeon: Yfn Mcneil MD;  [facility];  Service:    • COLONOSCOPY N/A 7/11/2019    Procedure: COLONOSCOPY to cecum:;  Surgeon: Yfn Mcneil MD;  Location: [facility] ENDOSCOPY;  Service: General   • HYSTERECTOMY     • INTRAOCULAR LENS EXCHANGE Bilateral    • JOINT MANIPULATION Right 1/9/2018    Procedure: MANIPULATION OF RT KNEE;  Surgeon: Andrea Caballero MD;  Location: [facility] MAIN OR;  Service:    • JOINT REPLACEMENT Right 11/09/2017   • KNEE SURGERY Left 2006    ARTHROSCOPY   • CO ARTHRP KNE CONDYLE&PLATU MEDIAL&LAT COMPARTMENTS Right 11/9/2017    Procedure: RIGHT TOTAL KNEE ARTHROPLASTY;  Surgeon: Andrea Caballero MD;  Location: [facility] MAIN OR;  Service: Orthopedics   • TONSILLECTOMY        General Information     Row Name 05/12/23 1666          Physical Therapy Time and Intention    Document Type evaluation  -MG     Mode of Treatment individual therapy;physical therapy  -MG     Row Name 05/12/23 3702          General Information    Patient Profile Reviewed yes  -MG     Prior Level of Function independent:  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 No AD/DME  -MG     Existing Precautions/Restrictions fall  due to h/o and recent dementia  -MG     Barriers to Rehab none identified  -MG     Row Name 05/12/23 1330          Living Environment    People in Home spouse  -MG     Row Name 05/12/23 1330          Home Main Entrance    Number of Stairs, Main Entrance three  -MG     Stair Railings, Main Entrance none  -MG     Row Name 05/12/23 1330          Stairs Within Home, Primary    Stairs, Within Home, Primary Flight upstairs to bedroom. Not able to stay on first floor.  -MG     Number of Stairs, Within Home, Primary twelve  -MG     Stair Railings, Within Home, Primary railings safe and in good condition;railing on right side (ascending)  No HR first few steps  -MG     Row Name 05/12/23 1330          Cognition     Orientation Status (Cognition) oriented x 4  -MG     Row Name 05/12/23 1330          Safety Issues, Functional Mobility    Impairments Affecting Function (Mobility) balance;endurance/activity tolerance;strength  -MG     Comment, Safety Issues/Impairments (Mobility) Gait belt and non-skid socks donned.  -MG           User Key  (r) = Recorded By, (t) = Taken By, (c) = Cosigned By    Initials Name Provider Type    MG Opal Brown, PT Physical Therapist               Mobility     Row Name 05/12/23 1512          Bed Mobility    Comment, (Bed Mobility) Seated EOB on PT arrival and remained sitting EOB.  -MG     Row Name 05/12/23 1512          Sit-Stand Transfer    Sit-Stand Crockett (Transfers) standby assist;verbal cues  -MG     Assistive Device (Sit-Stand Transfers) other (see comments)  no AD  -MG     Row Name 05/12/23 1512          Gait/Stairs (Locomotion)    Crockett Level (Gait) contact guard;standby assist  -MG     Assistive Device (Gait) other (see comments)  no AD  -MG     Distance in Feet (Gait) 150  -MG     Deviations/Abnormal Patterns (Gait) gait speed decreased  -MG     Comment, (Gait/Stairs) Pt ambulating in room and one lap around nsg station without AD. One small LOB w/ pt catching L toes on the floor that pt was able to self correct, no overt LOB. Pt denied any dizziness with ambulation or head turns.  -MG           User Key  (r) = Recorded By, (t) = Taken By, (c) = Cosigned By    Initials Name Provider Type    MG Opal Brown, PT Physical Therapist               Obj/Interventions     Row Name 05/12/23 1334          Range of Motion Comprehensive    General Range of Motion bilateral lower extremity ROM WFL  -MG     Row Name 05/12/23 1332          Strength Comprehensive (MMT)    General Manual Muscle Testing (MMT) Assessment lower extremity strength deficits identified  -MG     Comment, General Manual Muscle Testing (MMT) Assessment Grossly 4/5  -MG     Row Name 05/12/23 1330          Balance     Static Sitting Balance independent  -MG     Static Standing Balance independent  -MG     Dynamic Standing Balance standby assist  -MG     Comment, Balance Pt sat EOB while smooth pursuit and head turns were performed. No reproduction of symptoms or nystagmus noted.  -MG     Row Name 05/12/23 1334          Sensory Assessment (Somatosensory)    Sensory Assessment (Somatosensory) LE sensation intact  Denies N/T. Has cramping to LLE.  -MG           User Key  (r) = Recorded By, (t) = Taken By, (c) = Cosigned By    Initials Name Provider Type    MG Opal Brown, PT Physical Therapist               Goals/Plan    No documentation.                Clinical Impression     Row Name 05/12/23 1517          Pain    Pretreatment Pain Rating 0/10 - no pain  -MG     Posttreatment Pain Rating 0/10 - no pain  -MG     Pain Intervention(s) Medication (See MAR);Ambulation/increased activity;Repositioned;Rest  -MG     Row Name 05/12/23 8657          Plan of Care Review    Plan of Care Reviewed With patient;son  -MG     Progress improving  -MG     Outcome Evaluation Pt is a 87 y/o F who presented to ED with c/o vertigo symptoms - spinning sensation when rolling in bed. CT/MRI (-) for CVA. Smooth pursuit and head turns (-) for symptom reproduction or nystagmus. Per incomplete neuro note and pt recall neuro performed a positive L Lincoln-Hallpike and Epley earlier this date. On PT arrival pt was sitting EOB w/ her son present. 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 Son stating some concerns with pts dizziness and ambulating stairs to basement for laundry or upstairs to her bedroom. Pt is SBA for STS/transfers w/o AD and CGA for ambulation of 150' without AD. No c/o dizziness, fatigue or SOB with mobility. Noted pts HR elevated in 110's on return to sitting EOB and pt has had elevated BP this date. Pt and son stating pt has been stressed at home w/ caregiving and  currently stressed here with testing. Pt had one moment during ambulation where she caught the top of her toes on the floor causing a small LOB that she was able to self correct. Pt stating feeling back to baseline, no longer experiencing the symptoms that brought her in. As pt is at baseline and no longer experiencing vertigo symptoms, PT will sign off. Would rec a RW be ordered for home for falls safety with mobility. Rec HH PT w/ a vestibular therapist as pt does not think she would be able to leave the home for outpatient vestibular appts.  -MG     Row Name 05/12/23 1517          Therapy Assessment/Plan (PT)    Criteria for Skilled Interventions Met (PT) no  -MG     Therapy Frequency (PT) evaluation only  -MG     Row Name 05/12/23 1517          Vital Signs    O2 Delivery Pre Treatment room air  -MG     O2 Delivery Intra Treatment room air  -MG     O2 Delivery Post Treatment room air  -MG     Pre Patient Position Sitting  -MG     Intra Patient Position Standing  -MG     Post Patient Position Sitting  -MG     Row Name 05/12/23 1517          Positioning and Restraints    Pre-Treatment Position in bed  -MG     Post Treatment Position bed  -MG     In Bed notified nsg;sitting EOB;call light within reach;encouraged to call for assist;with family/caregiver  -MG           User Key  (r) = Recorded By, (t) = Taken By, (c) = Cosigned By    Initials Name Provider Type    Opal Carr, PT Physical Therapist               Outcome Measures     Row Name 05/12/23 1524 05/12/23 0931       How much help from another person do you currently need...    Turning from your back to your side while in flat bed without using bedrails? 4  -MG 4  -AW    Moving from lying on back to sitting on the side of a flat bed without bedrails? 4  -MG 4  -AW    Moving to and from a bed to a chair (including a wheelchair)? 3  -MG 4  -AW    Standing up from a chair using your arms (e.g., wheelchair, bedside chair)? 4  -MG 4  -AW    Climbing 3-5 steps  with a railing? 3  -MG 3  -AW    To walk in hospital room? 3  -MG 3  -AW    AM-PAC 6 Clicks Score (PT) 21  -MG 22  -AW    Highest level of mobility 6 --> Walked 10 steps or more  -MG 7 --> Walked 25 feet or more  -AW    Row Name 05/12/23 0930          Modified Joy Scale    Modified [place] Scale 2 - Slight disability.  Unable to carry out all previous activities but able to look after own affairs without assistance.  -LE     Row Name 05/12/23 0930          Functional Assessment    Outcome Measure Options AM-PAC 6 Clicks Daily Activity (OT);Modified [place]  -LE           User Key  (r) = Recorded By, (t) = Taken By, (c) = Cosigned By    Initials Name Provider Type    Sinai Sykes, OTR Occupational Therapist    Opal Carr, PT Physical Therapist    Frances Nascimento, RN Registered Nurse                             Physical Therapy Education     Title: PT OT SLP Therapies (In Progress)     Topic: Physical Therapy (In Progress)     Point: Mobility training (Done)     Learning Progress Summary           Patient Acceptance, E,D, VU,DU by  at 5/12/2023 1524   Family Acceptance, E,D, VU,DU by  at 5/12/2023 1524                   Point: Home exercise program (Not Started)     Learner Progress:  Not documented in this visit.          Point: Body mechanics (Done)     Learning Progress Summary           Patient Acceptance, E,D, VU,DU by  at 5/12/2023 1524   Family Acceptance, E,D, VU,DU by  at 5/12/2023 1524                   Point: Precautions (Done)     Learning Progress Summary           Patient Acceptance, E,D, VU,DU by  at 5/12/2023 1524   Family Acceptance, E,D, VU,DU by  at 5/12/2023 1524                               User Key     Initials Effective Dates Name Provider Type Discipline     05/24/22 -  Opal Brown, PT Physical Therapist PT              PT Recommendation and Plan     Plan of Care Reviewed With: patient, son  Progress: improving  Outcome Evaluation: Pt is a 87 y/o F who presented  to ED with c/o vertigo symptoms - spinning sensation when rolling in bed. CT/MRI (-) for CVA. Smooth pursuit and head turns (-) for symptom reproduction or nystagmus. Per incomplete neuro note and pt recall neuro performed a positive L Sara-Hallpike and Epley earlier this date. On PT arrival pt was sitting EOB w/ her son present. 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 Son stating some concerns with pts dizziness and ambulating stairs to basement for laundry or upstairs to her bedroom. Pt is SBA for STS/transfers w/o AD and CGA for ambulation of 150' without AD. No c/o dizziness, fatigue or SOB with mobility. Noted pts HR elevated in 110's on return to sitting EOB and pt has had elevated BP this date. Pt and son stating pt has been stressed at home w/ caregiving and currently stressed here with testing. Pt had one moment during ambulation where she caught the top of her toes on the floor causing a small LOB that she was able to self correct. Pt stating feeling back to baseline, no longer experiencing the symptoms that brought her in. As pt is at baseline and no longer experiencing vertigo symptoms, PT will sign off. Would rec a RW be ordered for home for falls safety with mobility. Rec HH PT w/ a vestibular therapist as pt does not think she would be able to leave the home for outpatient vestibular appts.     Time Calculation:    PT Charges     Row Name 05/12/23 1524             Time Calculation    Start Time 1325  -MG      Stop Time 1355  -MG      Time Calculation (min) 30 min  -MG      PT Received On 05/12/23  -MG         Time Calculation- PT    Total Timed Code Minutes- PT 24 minute(s)  -MG            User Key  (r) = Recorded By, (t) = Taken By, (c) = Cosigned By    Initials Name Provider Type    Opal Carr, PT Physical Therapist              Therapy Charges for Today     Code Description Service Date Service Provider Modifiers Qty     21778813916 HC PT EVAL MOD COMPLEXITY 3 5/12/2023 Opal Brown, PT GP 1    20789079795 HC PT THERAPEUTIC ACT EA 15 MIN 5/12/2023 Opal Brown, PT GP 2          PT G-Codes  Outcome Measure Options: AM-PAC 6 Clicks Daily Activity (OT), Modified Joy  AM-PAC 6 Clicks Score (PT): 21  AM-PAC 6 Clicks Score (OT): 19  Modified [place] Scale: 2 - Slight disability.  Unable to carry out all previous activities but able to look after own affairs without assistance.  PT Discharge Summary  Anticipated Discharge Disposition (PT): home with home health    Opal Brown, PT  5/12/2023

## 2023-05-12 NOTE — PROGRESS NOTES
[facility] Acute Rehab Note:    Referral received via stroke order set.  Please note this is a screening only, rehab admissions will not actively be evaluating this patient.  If felt patient is appropriate for our services once therapies begin, please call our office at [phone], to initiate a full referral.    Thank you,   Maggie Siddiqui, RN  Rehab Admission Nurse

## 2023-05-12 NOTE — OUTREACH NOTE
Prep Survey    Flowsheet Row Responses   [facility] facility patient discharged from? [facility]   Is LACE score < 7 ? Yes   Eligibility [facility]   Date of Admission 05/11/23   Date of Discharge 05/12/23   Discharge Disposition Home or Self Care   Discharge diagnosis Malignant neoplasm of ascending colon Vertigo   Does the patient have one of the following disease processes/diagnoses(primary or secondary)? Other   Does the patient have Home health ordered? Yes   What is the Home health agency?  [facility]    Is there a DME ordered? Yes   What DME was ordered? [facility]    Prep survey completed? Yes          JAMSHID BASS - Registered Nurse

## 2023-05-12 NOTE — PROGRESS NOTES
[facility] to provide Home care services. Patient and family agreeable. PCP and contact information confirmed. Alternate contact to schedule HH visits, son Matt [phone].

## 2023-05-12 NOTE — PLAN OF CARE
Goal Outcome Evaluation:  Plan of Care Reviewed With: patient           Outcome Evaluation: Orders received. Pt passed the nursing swallow screen and is tolerating a regular diet. Discussed with pt and RN. Pt reported no change in speech or cognition. MRI was negative. ST to sign off at this time. Please reconsult if needed.

## 2023-05-12 NOTE — PLAN OF CARE
Goal Outcome Evaluation:  Plan of Care Reviewed With: patient, son        Progress: improving  Outcome Evaluation: Pt is a 87 y/o F who presented to ED with c/o vertigo symptoms - spinning sensation when rolling in bed. CT/MRI (-) for CVA. Smooth pursuit and head turns (-) for symptom reproduction or nystagmus. Per incomplete neuro note and pt recall neuro performed a positive L Sara-Hallpike and Epley earlier this date. On PT arrival pt was sitting EOB w/ her son present. 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 Son stating some concerns with pts dizziness and ambulating stairs to basement for laundry or upstairs to her bedroom. Pt is SBA for STS/transfers w/o AD and CGA for ambulation of 150' without AD. No c/o dizziness, fatigue or SOB with mobility. Noted pts HR elevated in 110's on return to sitting EOB and pt has had elevated BP this date. Pt and son stating pt has been stressed at home w/ caregiving and currently stressed here with testing. Pt had one moment during ambulation where she caught the top of her toes on the floor causing a small LOB that she was able to self correct. Educated and encouraged to sit EOB or UIC for all meals for at least one hour each and to ambulate with nsg staff. Pt stating feeling back to baseline, no longer experiencing the symptoms that brought her in. As pt is at baseline and no longer experiencing vertigo symptoms, PT will sign off. Would rec a RW be ordered for home for falls safety with mobility. Rec  PT w/ a vestibular therapist as pt does not think she would be able to leave the home for outpatient vestibular appts.

## 2023-05-12 NOTE — PLAN OF CARE
Goal Outcome Evaluation:  Plan of Care Reviewed With: patient           Outcome Evaluation: Pt admit from home with vertigo symptoms. Pt reports h/o vertigo. Pt lives at home with spouse who has dementia and pt provides care for spouse.  Pt seen by OT and pt is able to move OOB, to bathroom for toileting, to sink for grooming and then up to chair.  These tasks completed with SBA/close SBA both with and without walker.  Pt reports plan to return home.  OT ed pt on home safety recommendations including education and handout on tub DME. OT recommends home health OT at d/c and discusses pt with RN and CCP.  Will cont to follow for skilled OT while in acute care.  OT recommends pt cont up to bathroom with nursing.

## 2023-05-12 NOTE — PLAN OF CARE
Goal Outcome Evaluation:  Plan of Care Reviewed With: patient        Progress: no change  Outcome Evaluation: new admit, pt is alert and oriented, room air, no falls, bed alarm on, up x1 to bathroom, some dizziness, IV fluids, MRI ordered, ativan given prior, echo, neuro consult

## 2023-05-12 NOTE — DISCHARGE SUMMARY
Patient Name: Riri Damon  : 1934  MRN: [id number]    Date of Admission: 2023  Date of Discharge:  2023  Primary Care Physician: Phoenix Velazquez MD      Chief Complaint:   Dizziness (Dizziness since last night, worse with movement. Denies n/v, HA. Patient reports fall down stairs 1 mo. Ago and was not checked out. )      Discharge Diagnoses     Active Hospital Problems    Diagnosis  POA   • **Vertigo [R42]  Yes   • Malignant neoplasm of ascending colon [C18.2]  Yes   • Essential hypertension [I10]  Yes   • Orthostatic lightheadedness [R42]  Yes      Resolved Hospital Problems   No resolved problems to display.        Hospital Course     This is an 88-year-old man with a past medical history of hypertension comes to the hospital after experiencing episodes of abrupt onset dizziness.  This first occurred while she was in her bed and turning over.  She felt dizzy and like the room was spinning.  She had another episode of similar that was associated nausea.  She presented to the hospital for further evaluation management.  In the ER her blood pressure was elevated 25/89.  CT of the head was negative for any acute strokes.  MRI was also ordered that was negative for strokes but did show some generalized atrophy.  She received meclizine and was admitted for further evaluation.  Neuro saw the patient in consultation and the patient was diagnosed with BPPV.  She was referred here for vestibular therapy at discharge    Day of Discharge       Physical Exam:  Temp:  [97.8 °F (36.6 °C)-98.6 °F (37 °C)] 98 °F (36.7 °C)  Heart Rate:  [66-76] 76  Resp:  [16-18] 18  BP: (128-189)/(61-87) 139/86  Body mass index is 22.47 kg/m².  Physical Exam    General: Alert and oriented x3, no acute distress  HEENT: Normocephalic, atraumatic  CV: Regular rate and rhythm, no murmurs rubs or gallops  Lungs: Clear to auscultation bilaterally, no crackles or wheezes  Abdomen: Soft, nontender, nondistended  Neuro: CN  II-XII intact, no FND appreciated     Consultants     Consult Orders (all) (From admission, onward)     Start     Ordered    05/11/23 2029  Notify Stroke Coordinator  Once        Provider:  (Not yet assigned)    05/11/23 2028 05/11/23 2029  Inpatient Rehab Admission Consult  Once        Provider:  (Not yet assigned)    05/11/23 2028 05/11/23 2029  Consult to Case Management   Once        Provider:  (Not yet assigned)    05/11/23 2028 05/11/23 2029  Consult to Diabetes Educator  Once,   Status:  Canceled        Provider:  (Not yet assigned)    05/11/23 2028 05/11/23 2029  Inpatient Neurology Consult Stroke  Once        Specialty:  Neurology  Provider:  Irving Tellez MD    05/11/23 2028              Procedures     Imaging Results (All)     Procedure Component Value Units Date/Time    MRI Brain Without Contrast [[id number]] Collected: 05/12/23 0723     Updated: 05/12/23 0723    Narrative:        Patient: ANNIAK CANTU  Time Out: 07:22  Exam(s): MRI HEAD Without Contrast     EXAM:    MR Head Without Intravenous Contrast    CLINICAL HISTORY:     Reason for exam: posterior circulation TIA vs CVA.    TECHNIQUE:    Magnetic resonance images of the head brain without intravenous   contrast in multiple planes.    COMPARISON:  CT head performed 5 11 23.    FINDINGS:  No acute infarct or intracranial hemorrhage is seen.     There is prominence of ventricles and sulci consistent with generalized   parenchymal volume loss.  Scattered T2 FLAIR hyperintensities throughout   the periventricular and subcortical white matter noted, most consistent   with sequela of chronic microvascular ischemic changes.  Redemonstration   of approximately 2.0 x 1.3 cm arachnoid cyst in the left middle cranial   fossa.    There is no mass-effect or midline shift.  Bilateral lens replacements   noted.  The paranasal sinuses and mastoid air cells are clear.  Midline   structures are grossly  unremarkable.    IMPRESSION:  No acute intracranial pathology identified.    Generalized parenchymal volume loss and sequela of chronic microvascular   ischemic angiopathy.       Impression:          Electronically signed by Saad Stroud M.D. on 05-12-23 at 0722    CT Head Without Contrast [[id number]] Collected: 05/11/23 1542     Updated: 05/11/23 1721    Narrative:      CT HEAD WITHOUT CONTRAST     HISTORY: Dizziness, weakness.     COMPARISON: None.     FINDINGS: The brain ventricles are symmetrical. There is no evidence of  intracranial hemorrhage or of a focal area of decreased attenuation to  suggest acute infarction. Prominent CSF medial to the left temporal lobe  is noted consistent with a small arachnoid cyst. This measures  approximately 2.5 x 1.2 x 1.2 cm in size. Mild-to-moderate vascular  calcification involving the carotid siphons are appreciated bilaterally.       Impression:      No evidence of acute infarction or hemorrhage. Further  evaluation could be performed with MRI examination of brain as  indicated. A small arachnoid cyst involving the medial aspect of the  left middle cranial fossa is noted.           Radiation dose reduction techniques were utilized, including automated  exposure control and exposure modulation based on body size.     This report was finalized on 5/11/2023 5:18 PM by Dr. Ion Krishna M.D.       XR Chest 1 View [[id number]] Collected: 05/11/23 1420     Updated: 05/11/23 1424    Narrative:      XR CHEST 1 VW-  05/11/2023     HISTORY: Weakness. Dizziness.     Heart size is at the upper limits of normal. Lungs appear free of acute  infiltrates. Mild thoracic scoliosis. Bones and soft tissues are  otherwise unremarkable.       Impression:      1. Borderline cardiomegaly.     This report was finalized on 5/11/2023 2:21 PM by Dr. Rickey Roberts M.D.             Pertinent Labs     Results from last 7 days   Lab Units 05/12/23  0547 05/11/23  1410   WBC 10*3/mm3 6.61 8.69    HEMOGLOBIN g/dL 13.2 15.3   PLATELETS 10*3/mm3 213 234     Results from last 7 days   Lab Units 05/12/23  0547 05/11/23  1409   SODIUM mmol/L 144 139   POTASSIUM mmol/L 3.9 3.8   CHLORIDE mmol/L 108* 102   CO2 mmol/L 26.5 25.9   BUN mg/dL 10 12   CREATININE mg/dL 0.47* 0.55*   GLUCOSE mg/dL 96 89   Estimated Creatinine Clearance: 79.9 mL/min (A) (by C-G formula based on SCr of 0.47 mg/dL (L)).  Results from last 7 days   Lab Units 05/12/23  0547 05/11/23  1409   ALBUMIN g/dL 3.8 4.8   BILIRUBIN mg/dL 0.3 0.4   ALK PHOS U/L 57 76   AST (SGOT) U/L 16 22   ALT (SGPT) U/L 13 15     Results from last 7 days   Lab Units 05/12/23  0547 05/11/23  1409   CALCIUM mg/dL 9.0 10.1   ALBUMIN g/dL 3.8 4.8   MAGNESIUM mg/dL  --  2.3       Results from last 7 days   Lab Units 05/11/23  1409   HSTROP T ng/L 8       Results from last 7 days   Lab Units 05/12/23  0547   CHOLESTEROL mg/dL 163   TRIGLYCERIDES mg/dL 97   HDL CHOL mg/dL 54   LDL CHOL mg/dL 91           Test Results Pending at Discharge       Discharge Details        Discharge Medications      Changes to Medications      Instructions Start Date   losartan 25 MG tablet  Commonly known as: COZAAR  What changed:   · when to take this  · reasons to take this   25 mg, Oral, Daily      meloxicam 7.5 MG tablet  Commonly known as: MOBIC  What changed:   · how to take this  · when to take this   TAKE 1 TABLET EVERY DAY         Continue These Medications      Instructions Start Date   aspirin 81 MG EC tablet   81 mg, Oral, Every Other Day      CITRACAL PO   200 mg, Oral, 3 Times Weekly, Monday, Wednesday, friday      docusate sodium 100 MG capsule   100 mg, Oral, 2 Times Daily PRN      magnesium chloride ER 64 MG DR tablet   64 mg, Oral, 3 Times Weekly, Monday, Wednesday, Friday       meclizine 25 MG tablet  Commonly known as: ANTIVERT   25 mg, Oral, 3 Times Daily PRN      melatonin 1 MG tablet   5 mg, Oral, Nightly      multivitamin with minerals tablet tablet   1 tablet, Oral,  Daily      simvastatin 40 MG tablet  Commonly known as: ZOCOR   40 mg, Oral, Nightly             No Known Allergies      Discharge Disposition:  Home or Self Care    Discharge Diet:  Diet Order   Procedures   • Diet: Regular/House Diet; Texture: Regular Texture (IDDSI 7); Fluid Consistency: Thin (IDDSI 0)       Discharge Activity:       CODE STATUS:    Code Status and Medical Interventions:   Ordered at: 05/11/23 1955     Code Status (Patient has no pulse and is not breathing):    CPR (Attempt to Resuscitate)     Medical Interventions (Patient has pulse or is breathing):    Full Support     Release to patient:    Routine Release       Future Appointments   Date Time Provider Department Center   6/13/2023 12:30 PM Phoenix Velazquez MD [facility]   8/22/2023 12:30 PM LAB CHAIR [facility]  [facility]   8/22/2023  1:00 PM Asiya Hinton MD [facility] [place]   10/5/2023  1:00 PM Rowan Serrano MD [facility]  [facility]     Additional Instructions for the Follow-ups that You Need to Schedule     Ambulatory Referral to Occupational Therapy   As directed      Specialty needed: Vestibular    Follow-up needed: Yes            Follow-up Information     Phoenix Velazquez MD .    Specialty: Family Medicine  Contact information:  [address] [id number] [phone]                         Additional Instructions for the Follow-ups that You Need to Schedule     Ambulatory Referral to Occupational Therapy   As directed      Specialty needed: Vestibular    Follow-up needed: Yes           Time Spent on Discharge:  Greater than 30 minutes      Rakesh Gillette MD  [facility]  05/12/23  16:09 EDT

## 2023-05-12 NOTE — DISCHARGE PLACEMENT REQUEST
"Riri Damon \"Pat\" (88 y.o. Female)     Date of Birth   1934    Social Security Number       Address   [address] [address]    Home Phone   [phone]    MRN   [id number]       Church   Worship    Marital Status                               Admission Date   5/11/23    Admission Type   Emergency    Admitting Provider   Julio C Rodriguez MD    Attending Provider   Rakesh Gillette MD    Department, Room/Bed   [address], S[address]/1       Discharge Date       Discharge Disposition       Discharge Destination                               Attending Provider: Rakesh Gillette MD    Allergies: No Known Allergies    Isolation: None   Infection: None   Code Status: CPR    Ht: 165.1 cm (65\")   Wt: 61.2 kg (135 lb)    Admission Cmt: None   Principal Problem: Vertigo [R42]                 Active Insurance as of 5/11/2023     Primary Coverage     Payor Plan Insurance Group Employer/Plan Group    HUMANA MEDICARE REPLACEMENT HUMANA MEDICARE REPLACEMENT [id number]     Payor Plan Address Payor Plan Phone Number Payor Plan Fax Number Effective Dates    [address] [address] [phone]  1/1/2014 - None Entered    [facility] [address]       Subscriber Name Subscriber Birth Date Member ID       RIRI DAMON 1934 [id number]                 Emergency Contacts      (Rel.) Home Phone Work Phone Mobile Phone    Matt Damon (Son) [phone] -- [phone]    Marito Damon (Son) [phone] -- [phone]              "

## 2023-05-14 LAB — QT INTERVAL: 403 MS

## 2023-05-15 ENCOUNTER — TRANSITIONAL CARE MANAGEMENT TELEPHONE ENCOUNTER (OUTPATIENT)
Dept: CALL CENTER | Facility: HOSPITAL | Age: 88
End: 2023-05-15
Payer: MEDICARE

## 2023-05-15 NOTE — OUTREACH NOTE
Call Center TCM Note    Flowsheet Row Responses   [facility] patient discharged from? [place]   Does the patient have one of the following disease processes/diagnoses(primary or secondary)? Other   TCM attempt successful? Yes   Call start time 1440   Call end time 1443   Discharge diagnosis Malignant neoplasm of ascending colon Vertigo   Meds reviewed with patient/caregiver? Yes   Is the patient having any side effects they believe may be caused by any medication additions or changes? No   Does the patient have all medications ordered at discharge? N/A   Is the patient taking all medications as directed (includes completed medication regime)? Yes   Comments Hosp dc fu apt on 5/23/23 with PCP    Does the patient have an appointment with their PCP within 7 days of discharge? Yes   What is the Home health agency?  [facility]    Home health comments  starts 5/16/23    What DME was ordered? [facility]    Has all DME been delivered? Yes   DME comments [place]    Psychosocial issues? No   Did the patient receive a copy of their discharge instructions? Yes   Nursing interventions Reviewed instructions with patient   What is the patient's perception of their health status since discharge? Improving   Is the patient/caregiver able to teach back signs and symptoms related to disease process for when to call PCP? Yes   Is the patient/caregiver able to teach back signs and symptoms related to disease process for when to call 911? Yes   Is the patient/caregiver able to teach back the hierarchy of who to call/visit for symptoms/problems? PCP, Specialist, Home health nurse, Urgent Care, ED, 911 Yes   If the patient is a current smoker, are they able to teach back resources for cessation? Not a smoker   TCM call completed? Yes   Call end time 1443          Luma Abbasi RN    5/15/2023, 14:44 EDT

## 2023-05-16 ENCOUNTER — HOME CARE VISIT (OUTPATIENT)
Dept: HOME HEALTH SERVICES | Facility: HOME HEALTHCARE | Age: 88
End: 2023-05-16
Payer: MEDICARE

## 2023-05-16 NOTE — Clinical Note
"Riri Holley Nelson was referred for Homecare services following 24 hour hospitalization for LEFT POSTERIOR BPPV.  Patient was successfully treated at the hospital with the Epleyjohan Salgado.  She was not admitted for Homecare services, as her issues had been resolved.  The following is a summary of my visit.    REASON FOR REFERRAL:  88 yr old female hospitalized at [facility] 5/11/23 - 5/12/23 for BPPV.  Pt reported c/o vertigo while turning in bed.  Workup for CVA was negative.  She was treated with Epley Manuever at hospital with good results, reporting she felt she had returned to her baseline.  Patient discharged home with referral for home health PT services for treatment of (L) POSTERIOR BPPV.    Pt states she felt slight dizziness on 5/13/23, however none since then. She reports feeling slightly \"floaty\" when she walks, but not dizzy.  She has not been taking Meclizine.    BP:  140/86 Sitting,  140/82 Standing    CAREGIVER:  Son- Anurag Damon who was present for evaluation.      HOME ENVIRONMENT:  Lives with spouse who has dementia in 2 story home with 12 steps & single railing to second floor bedrooms.  Laundry in basement.    MENTAL STATUS:  Alert & Oriented x 4    OCCULOMOTOR TESTING  SMOOTH PURSUITS:  WNL  SACCADES:   WNL  CONVERGENCE:   WNL  PERIPHERAL VISION:   WNL  VESTIBULO-OCCULOMOTOR FUNCTION:  Trace impairment    JR-HALLPIKE:  Negative (B) Posterior Canals  HORIZONTAL ROLL:  Negative (B) Horizontal Canals    STRENGTH  No functional deficits noted.    TRANFERS:  Independent in & out of chair/bed    GAIT:  Pt ambulates independently without assistive device over level surfaces & 13 steps with rail with good balance/coordination.    POST EVALUATION ASSESSMENT:  Patient is negative for BPPV.  She did demonstrate very slight vestibular hypofunction which was corrected after performing VOR-1 exercises x 15 reps.  Pt & son extensively educated in BPPV with animated instructional video utilized.  " "Recommend pt place blanket or pillow between mattress at head of bed for slight elevation & purchase a bed rail for safety.  Pt reports of feeling \"floaty\" during ambulation likely related to vestibular hypofunction, which was corrected with VOR-1 exercises.  She was provided with an instructional home program for performance of VOR exercises to be performed 1-5 times daily & demonstrated good understanding & performance.  No further visits planned.  Patient was not admitted for homecare services.  She was instructed to contact her PCP for referral if she experiences positional vertigo again.    Please feel free to contact me if you have any questions.  Thank you,         Lindsey Smith, PT        [phone]    "

## 2023-05-16 NOTE — HOME HEALTH
"_________________________________________________  PHYSICAL THERAPY EVALUATION    REASON FOR REFERRAL:  88 yr old female hospitalized at [facility] 5/11/23 - 5/12/23 for BPPV.  Pt reported c/o vertigo while turning in bed.  Workup for CVA was negative.  She was treated with Epley Manuever at hospital with good results, reporting she felt she had returned to her baseline.  Patient discharged home with referral for home health PT services for treatment of (L) POSTERIOR BPPV.    Pt states she felt slight dizziness on 5/13/23, however none since then. She reports feeling slightly \"floaty\" when she walks, but not dizzy.  She has not been taking Meclizine.    PERTINENT PAST MEDICAL HISTORY:    Anxiety    Colon carcinoma- 2015-underwent radiation therapy and colon resection by Dr. Mcneil  Deep vein thrombosis (R) LE  History of edema    History of rheumatic fever    Hyperlipidemia    Hypertension    Infectious mononucleosis    Infectious viral hepatitis    Left anterior fascicular block    Mitral regurgitation    OA (osteoarthritis)    Palpitations    Pulmonary hypertension    Tricuspid regurgitation    Venous insufficiency    Ventricular septal defect    Vitamin D deficiency     PERTINENT PAST SURGICAL HISTORY:    APPENDECTOMY      COLECTOMY PARTIAL / TOTAL     HYSTERECTOMY      (B) INTRAOCULAR LENS EXCHANGE    (R) TKA WITH JOINT MANIPULATION 1/9/2018  (L) ARTHROSCOPIC KNEE SURGERY  TONSILLECTOMY      PRIOR LEVEL OF FUNCTION:  Independent ADL's, I-ADL's, all transfers & ambulation without asst device.  Is primary caregiver for spouse who has dementia.    CAREGIVER:  Son- Anurag Damon who was present for evaluation.      HOME ENVIRONMENT:  Lives with spouse who has dementia in 2 story home with 12 steps & single railing to second floor bedrooms.  Laundry in basement.    MENTAL STATUS:  Alert & Oriented x 4    OCCULOMOTOR TESTING  SMOOTH PURSUITS:  WNL  SACCADES:   WNL  CONVERGENCE:   WNL  PERIPHERAL VISION:   " "WNL  VESTIBULO-OCCULOMOTOR FUNCTION:  Trace impairment    JR-HALLPIKE:  Negative (B) Posterior Canals  HORIZONTAL ROLL:  Negative (B) Horizontal Canals    STRENGTH  No functional deficits noted.    TRANFERS:  Independent in & out of chair/bed    GAIT:  Pt ambulates independently without assistive device over level surfaces & 13 steps with rail with good balance/coordination.    POST EVALUATION ASSESSMENT:  Patient is negative for BPPV.  She did demonstrate very slight vestibular hypofunction which was corrected after performing VOR-1 exercises x 15 reps.  Pt & son extensively educated in BPPV with animated instructional video utilized.  Recommend pt place blanket or pillow between mattress at head of bed for slight elevation & purchase a bed rail for safety.  Pt reports of feeling \"floaty\" during ambulation likely related to vestibular hypofunction, which was corrected with VOR-1 exercises.  She was provided with an instructional home program for performance of VOR exercises to be performed 1-5 times daily & demonstrated good understanding & performance.  No further visits planned.  Patient was not admitted for homecare services.  She was instructed to contact her PCP for referral if she experiences positional vertigo again."

## 2023-05-17 VITALS
SYSTOLIC BLOOD PRESSURE: 140 MMHG | DIASTOLIC BLOOD PRESSURE: 82 MMHG | HEART RATE: 84 BPM | TEMPERATURE: 98.4 F | OXYGEN SATURATION: 98 % | RESPIRATION RATE: 18 BRPM

## 2023-05-23 ENCOUNTER — OFFICE VISIT (OUTPATIENT)
Dept: INTERNAL MEDICINE | Facility: CLINIC | Age: 88
End: 2023-05-23
Payer: MEDICARE

## 2023-05-23 VITALS
HEART RATE: 69 BPM | BODY MASS INDEX: 22.81 KG/M2 | DIASTOLIC BLOOD PRESSURE: 84 MMHG | OXYGEN SATURATION: 97 % | WEIGHT: 136.9 LBS | SYSTOLIC BLOOD PRESSURE: 144 MMHG | HEIGHT: 65 IN

## 2023-05-23 DIAGNOSIS — I10 ESSENTIAL HYPERTENSION: ICD-10-CM

## 2023-05-23 DIAGNOSIS — R42 VERTIGO: Primary | ICD-10-CM

## 2023-05-23 DIAGNOSIS — K59.04 CHRONIC IDIOPATHIC CONSTIPATION: ICD-10-CM

## 2023-05-23 RX ORDER — MECLIZINE HCL 12.5 MG/1
12.5 TABLET ORAL 3 TIMES DAILY PRN
COMMUNITY

## 2023-05-23 RX ORDER — PSEUDOEPHEDRINE HCL 30 MG
100 TABLET ORAL 2 TIMES DAILY PRN
Qty: 40 CAPSULE | Refills: 1 | Status: SHIPPED | OUTPATIENT
Start: 2023-05-23

## 2023-05-23 NOTE — PROGRESS NOTES
"Chief Complaint  Dizziness and Med Refill    Subjective        Riri Damon presents to [facility]  History of Present Illness  Patient follows up for ongoing management of dizziness which seem to be positional vertigo she uses meclizine and is doing exercises her symptoms have improved  She tries to stay well-hydrated  Recent hospitalization  Neuro saw the patient in consultation and the patient was diagnosed with BPPV.  She was referred here for vestibular therapy at discharge  Objective   Vital Signs:  /84   Pulse 69   Ht 165.1 cm (65\")   Wt 62.1 kg (136 lb 14.4 oz)   SpO2 97%   BMI 22.78 kg/m²   Estimated body mass index is 22.78 kg/m² as calculated from the following:    Height as of this encounter: 165.1 cm (65\").    Weight as of this encounter: 62.1 kg (136 lb 14.4 oz).       BMI is within normal parameters. No other follow-up for BMI required.      Physical Exam  Vitals and nursing note reviewed.   Constitutional:       Appearance: Normal appearance.   HENT:      Head: Normocephalic and atraumatic.      Right Ear: Tympanic membrane, ear canal and external ear normal.      Left Ear: Tympanic membrane and ear canal normal.      Nose: Nose normal.   Eyes:      General: No scleral icterus.  Cardiovascular:      Rate and Rhythm: Normal rate and regular rhythm.      Pulses: Normal pulses.      Heart sounds: Normal heart sounds.   Neurological:      Mental Status: She is alert.   Psychiatric:         Mood and Affect: Mood normal.         Behavior: Behavior normal.         Thought Content: Thought content normal.         Judgment: Judgment normal.        Result Review :      Data reviewed: Radiologic studies CT of the head was negative for any acute strokes.  MRI was also ordered that was negative for strokes but did show some generalized atrophy.             Assessment and Plan   Diagnoses and all orders for this visit:    1. Vertigo (Primary)    2. Essential " hypertension    3. Chronic idiopathic constipation    Other orders  -     docusate sodium 100 MG capsule; Take 1 capsule by mouth 2 (Two) Times a Day As Needed (constipation).  Dispense: 40 capsule; Refill: 1    Intermittent use of meclizine as exercises to treat vertigo  Monitor blood pressure goals less than 140/90 greater than 100/60 continue losartan  Obstipation restart stool softener         Follow Up   No follow-ups on file.  Patient was given instructions and counseling regarding her condition or for health maintenance advice. Please see specific information pulled into the AVS if appropriate.        flank

## 2023-08-14 DIAGNOSIS — E78.5 HYPERLIPIDEMIA, UNSPECIFIED HYPERLIPIDEMIA TYPE: ICD-10-CM

## 2023-08-14 RX ORDER — SIMVASTATIN 40 MG
TABLET ORAL
Qty: 90 TABLET | Refills: 2 | Status: SHIPPED | OUTPATIENT
Start: 2023-08-14

## 2023-08-22 ENCOUNTER — LAB (OUTPATIENT)
Dept: LAB | Facility: HOSPITAL | Age: 88
End: 2023-08-22
Payer: MEDICARE

## 2023-08-22 ENCOUNTER — OFFICE VISIT (OUTPATIENT)
Dept: ONCOLOGY | Facility: CLINIC | Age: 88
End: 2023-08-22
Payer: MEDICARE

## 2023-08-22 VITALS
BODY MASS INDEX: 24.24 KG/M2 | OXYGEN SATURATION: 97 % | HEART RATE: 84 BPM | DIASTOLIC BLOOD PRESSURE: 87 MMHG | RESPIRATION RATE: 16 BRPM | TEMPERATURE: 98.2 F | WEIGHT: 136.8 LBS | SYSTOLIC BLOOD PRESSURE: 150 MMHG | HEIGHT: 63 IN

## 2023-08-22 DIAGNOSIS — C18.2 MALIGNANT NEOPLASM OF ASCENDING COLON: Primary | ICD-10-CM

## 2023-08-22 DIAGNOSIS — C18.2 MALIGNANT NEOPLASM OF ASCENDING COLON: ICD-10-CM

## 2023-08-22 DIAGNOSIS — E87.5 HYPERKALEMIA: ICD-10-CM

## 2023-08-22 LAB
ALBUMIN SERPL-MCNC: 4.4 G/DL (ref 3.5–5.2)
ALBUMIN/GLOB SERPL: 1.8 G/DL
ALP SERPL-CCNC: 66 U/L (ref 39–117)
ALT SERPL W P-5'-P-CCNC: 9 U/L (ref 1–33)
ANION GAP SERPL CALCULATED.3IONS-SCNC: 10.1 MMOL/L (ref 5–15)
AST SERPL-CCNC: 28 U/L (ref 1–32)
BASOPHILS # BLD AUTO: 0.05 10*3/MM3 (ref 0–0.2)
BASOPHILS NFR BLD AUTO: 0.6 % (ref 0–1.5)
BILIRUB SERPL-MCNC: 0.4 MG/DL (ref 0–1.2)
BUN SERPL-MCNC: 13 MG/DL (ref 8–23)
BUN/CREAT SERPL: 22.4 (ref 7–25)
CALCIUM SPEC-SCNC: 9.1 MG/DL (ref 8.6–10.5)
CEA SERPL-MCNC: 2.68 NG/ML
CHLORIDE SERPL-SCNC: 104 MMOL/L (ref 98–107)
CO2 SERPL-SCNC: 26.9 MMOL/L (ref 22–29)
CREAT SERPL-MCNC: 0.58 MG/DL (ref 0.6–1.1)
DEPRECATED RDW RBC AUTO: 44.7 FL (ref 37–54)
EGFRCR SERPLBLD CKD-EPI 2021: 87.2 ML/MIN/1.73
EOSINOPHIL # BLD AUTO: 0.13 10*3/MM3 (ref 0–0.4)
EOSINOPHIL NFR BLD AUTO: 1.5 % (ref 0.3–6.2)
ERYTHROCYTE [DISTWIDTH] IN BLOOD BY AUTOMATED COUNT: 12.5 % (ref 12.3–15.4)
GLOBULIN UR ELPH-MCNC: 2.4 GM/DL
GLUCOSE SERPL-MCNC: 97 MG/DL (ref 65–99)
HCT VFR BLD AUTO: 44.6 % (ref 34–46.6)
HGB BLD-MCNC: 14.7 G/DL (ref 12–15.9)
IMM GRANULOCYTES # BLD AUTO: 0.01 10*3/MM3 (ref 0–0.05)
IMM GRANULOCYTES NFR BLD AUTO: 0.1 % (ref 0–0.5)
LYMPHOCYTES # BLD AUTO: 1.84 10*3/MM3 (ref 0.7–3.1)
LYMPHOCYTES NFR BLD AUTO: 21.5 % (ref 19.6–45.3)
MCH RBC QN AUTO: 32 PG (ref 26.6–33)
MCHC RBC AUTO-ENTMCNC: 33 G/DL (ref 31.5–35.7)
MCV RBC AUTO: 97 FL (ref 79–97)
MONOCYTES # BLD AUTO: 0.89 10*3/MM3 (ref 0.1–0.9)
MONOCYTES NFR BLD AUTO: 10.4 % (ref 5–12)
NEUTROPHILS NFR BLD AUTO: 5.64 10*3/MM3 (ref 1.7–7)
NEUTROPHILS NFR BLD AUTO: 65.9 % (ref 42.7–76)
NRBC BLD AUTO-RTO: 0 /100 WBC (ref 0–0.2)
PLATELET # BLD AUTO: 252 10*3/MM3 (ref 140–450)
PMV BLD AUTO: 10 FL (ref 6–12)
POTASSIUM SERPL-SCNC: 5.2 MMOL/L (ref 3.5–5.2)
PROT SERPL-MCNC: 6.8 G/DL (ref 6–8.5)
RBC # BLD AUTO: 4.6 10*6/MM3 (ref 3.77–5.28)
SODIUM SERPL-SCNC: 141 MMOL/L (ref 136–145)
WBC NRBC COR # BLD: 8.56 10*3/MM3 (ref 3.4–10.8)

## 2023-08-22 PROCEDURE — 1159F MED LIST DOCD IN RCRD: CPT | Performed by: INTERNAL MEDICINE

## 2023-08-22 PROCEDURE — 99214 OFFICE O/P EST MOD 30 MIN: CPT | Performed by: INTERNAL MEDICINE

## 2023-08-22 PROCEDURE — 1126F AMNT PAIN NOTED NONE PRSNT: CPT | Performed by: INTERNAL MEDICINE

## 2023-08-22 PROCEDURE — 36415 COLL VENOUS BLD VENIPUNCTURE: CPT

## 2023-08-22 PROCEDURE — 85025 COMPLETE CBC W/AUTO DIFF WBC: CPT

## 2023-08-22 PROCEDURE — 82378 CARCINOEMBRYONIC ANTIGEN: CPT | Performed by: INTERNAL MEDICINE

## 2023-08-22 PROCEDURE — 1160F RVW MEDS BY RX/DR IN RCRD: CPT | Performed by: INTERNAL MEDICINE

## 2023-08-22 PROCEDURE — 80053 COMPREHEN METABOLIC PANEL: CPT

## 2023-08-22 NOTE — PROGRESS NOTES
Subjective     CHIEF COMPLAINT:      Chief Complaint   Patient presents with    Annual Exam     No concerns     HISTORY OF PRESENT ILLNESS:     Riri Damon is a 88 y.o. female patient who returns today for follow up on her colon cancer.  She returns today for follow-up reporting that she developed an episode of vertigo in May 2023.  She was hospitalized at [facility] between 5/11/2023 and 5/12/2023.  MRI of the brain was obtained and there was no evidence of metastasis or new stroke.  The vertigo lasted for about 2 days.  She took meclizine and this has helped.    Patient is not having abdominal pain or distention.  No blood in the stool.    ROS:  Pertinent ROS is in the HPI.     Past medical, surgical, social and family history were reviewed.     MEDICATIONS:    Current Outpatient Medications:     aspirin 81 MG EC tablet, Take 1 tablet by mouth Every Other Day., Disp: , Rfl:     Calcium Citrate (CITRACAL PO), Take 200 mg by mouth 3 (Three) Times a Week. Monday, Wednesday, friday, Disp: , Rfl:     docusate sodium 100 MG capsule, Take 1 capsule by mouth 2 (Two) Times a Day As Needed (constipation)., Disp: 40 capsule, Rfl: 1    losartan (COZAAR) 25 MG tablet, Take 1 tablet by mouth Daily., Disp: 90 tablet, Rfl: 2    magnesium chloride ER 64 MG DR tablet, Take 1 tablet by mouth 3 (Three) Times a Week. Monday, Wednesday, Friday, Disp: , Rfl:     meclizine (ANTIVERT) 12.5 MG tablet, Take 1 tablet by mouth 3 (Three) Times a Day As Needed for Dizziness., Disp: , Rfl:     melatonin 1 MG tablet, Take 5 tablets by mouth Every Night., Disp: , Rfl:     Multiple Vitamins-Minerals (MULTIVITAMIN ADULT PO), Take 1 tablet by mouth Daily., Disp: , Rfl:     simvastatin (ZOCOR) 40 MG tablet, TAKE 1 TABLET EVERY NIGHT, Disp: 90 tablet, Rfl: 2    Objective     VITAL SIGNS:     Vitals:    08/22/23 1249   BP: 150/87   Pulse: 84   Resp: 16   Temp: 98.2 øF (36.8 øC)   TempSrc: Temporal   SpO2: 97%   Weight: 62.1 kg (136  "lb 12.8 oz)   Height: 159.5 cm (62.8\")  Comment: new ht   PainSc: 0-No pain     Body mass index is 24.39 kg/mý.     Wt Readings from Last 5 Encounters:   08/22/23 62.1 kg (136 lb 12.8 oz)   05/23/23 62.1 kg (136 lb 14.4 oz)   05/12/23 61.2 kg (135 lb)   05/11/23 61.2 kg (135 lb)   03/23/23 64 kg (141 lb)     PHYSICAL EXAMINATION:   GENERAL: The patient appears in good general condition, not in acute distress.   SKIN: No ecchymosis.  EYES: No jaundice. No pallor.  CHEST: Normal respiratory effort.  Lungs clear bilaterally.  No added sounds.  CVS: Normal S1-S2.  No murmurs.  ABDOMEN: Soft. No tenderness. No Hepatomegaly. No Splenomegaly. No masses.  EXTREMITIES: No noted deformity.     DIAGNOSTIC DATA:     Results from last 7 days   Lab Units 08/22/23  1204   WBC 10*3/mm3 8.56   NEUTROS ABS 10*3/mm3 5.64   HEMOGLOBIN g/dL 14.7   HEMATOCRIT % 44.6   PLATELETS 10*3/mm3 252     Results from last 7 days   Lab Units 08/22/23  1204   SODIUM mmol/L 141   POTASSIUM mmol/L 5.2   CHLORIDE mmol/L 104   CO2 mmol/L 26.9   BUN mg/dL 13   CREATININE mg/dL 0.58*   CALCIUM mg/dL 9.1   ALBUMIN g/dL 4.4   BILIRUBIN mg/dL 0.4   ALK PHOS U/L 66   ALT (SGPT) U/L 9   AST (SGOT) U/L 28   GLUCOSE mg/dL 97     Lab Results   Component Value Date    CEA 2.68 08/22/2023    CEA 2.85 08/22/2022    CEA 2.94 11/12/2021    CEA 3.18 08/11/2021    CEA 3.64 05/20/2021      Assessment & Plan    *Adenocarcinoma of the ascending colon, stage III  S/P right hemicolectomy on 2/2/2015.  T4 a N1 a, stage III  1 out of 29 lymph nodes were positive.  The circumferential margin was involved by invasive carcinoma.  CT scans on 6/18/2019 showed no evidence of recurrence.  Colonoscopy on 7/11/2019 showed no suspicious findings.  CT on 6/15/2020 showed no evidence of recurrence.  CT chest abdomen pelvis on 5/20/2021 revealed mild wall thickening at the level of the anastomosis?  Underdistention.  CT of the abdomen pelvis on 8/11/2021 revealed circumferential wall " thickening and luminal narrowing of the small bowel at the ileocolonic anastomosis.  She was evaluated by her surgeon.  She had small bowel follow-through on 9/17/2021 that showed no wall thickening or a mass.  CT on 8/18/2022 revealed no abnormality in the colon.  The radiologist reported a 9 mm mediastinal lymph node to the left of the trachea without a change.  CEA was 2.85 on 8/22/2022.  She is 8 years since diagnosis.  She is doing well with no evidence of recurrence.  8/22/2023: CEA normal at 2.68.    *Hyperkalemia.  Potassium was 5.0 on 11/12/2021.    Potassium improved to 4.4 on 8/22/2022.  8/22/2023: Potassium increased to 5.2.  This is attributed to medications (losartan) and to her diet being high in potassium rich food (bananas).    *Right ovarian cyst.    The cyst measured 2.4 cm on 6/15/2020.  The cyst increased to 2.7 cm on 5/20/2021.  Ultrasound on 7/20/2021 did not show a solid component in the cyst.  Patient was evaluated by gynecology.    The cyst was not considered to be suspicious.    PLAN:    1.  Reduce intake of potassium rich food.   2.  We will see her in follow-up in 1 year.  We will obtain CBC CMP and CEA.       Asiya Hinton MD  08/22/23

## 2023-08-30 RX ORDER — LOSARTAN POTASSIUM 25 MG/1
TABLET ORAL
Qty: 90 TABLET | Refills: 2 | Status: SHIPPED | OUTPATIENT
Start: 2023-08-30

## 2023-10-05 ENCOUNTER — OFFICE VISIT (OUTPATIENT)
Dept: CARDIOLOGY | Facility: CLINIC | Age: 88
End: 2023-10-05
Payer: MEDICARE

## 2023-10-05 ENCOUNTER — TELEPHONE (OUTPATIENT)
Dept: CARDIOLOGY | Facility: CLINIC | Age: 88
End: 2023-10-05

## 2023-10-05 VITALS
SYSTOLIC BLOOD PRESSURE: 146 MMHG | DIASTOLIC BLOOD PRESSURE: 94 MMHG | HEART RATE: 97 BPM | BODY MASS INDEX: 24.45 KG/M2 | WEIGHT: 138 LBS | HEIGHT: 63 IN

## 2023-10-05 DIAGNOSIS — E78.2 MIXED HYPERLIPIDEMIA: ICD-10-CM

## 2023-10-05 DIAGNOSIS — I10 ESSENTIAL HYPERTENSION: Primary | ICD-10-CM

## 2023-10-05 DIAGNOSIS — I07.1 RHEUMATIC TRICUSPID VALVE REGURGITATION: ICD-10-CM

## 2023-10-05 DIAGNOSIS — I34.0 NONRHEUMATIC MITRAL VALVE REGURGITATION: ICD-10-CM

## 2023-10-05 DIAGNOSIS — I44.4 LEFT ANTERIOR FASCICULAR BLOCK: ICD-10-CM

## 2023-10-05 PROCEDURE — 99213 OFFICE O/P EST LOW 20 MIN: CPT | Performed by: INTERNAL MEDICINE

## 2023-10-05 PROCEDURE — 93000 ELECTROCARDIOGRAM COMPLETE: CPT | Performed by: INTERNAL MEDICINE

## 2023-10-05 NOTE — TELEPHONE ENCOUNTER
To whom it may concern:      Riri Damon is followed in our office for hypertension, LAFB, ventricular septal defect.  Please maintain her continuity of care in our practice as she has been seen here for years and struggles of anxiety thinking about trying to find another physician.  Description of her care is problematic.    Thank you for your help with this.    Sincerely-      Rowan Serrano MD

## 2023-10-05 NOTE — PROGRESS NOTES
Date of Office Visit: 10/05/2023  Encounter Provider: Rowan Serrano MD  Place of Service: [facility] CARDIOLOGY  Patient Name: Riri Damon  :1934      Patient ID:  Riri Damon is a 89 y.o. female is here for  followup for hypertension, LAFB.        History of Present Illness    She had rheumatic fever as a kid and infectious mononucleosis as a teenager, aortic valve calcification, lower extremity edema due to venous incompetence, hypertension, history of adenocarcinoma the right colon (), hyperlipidemia and osteoporosis.     She had an echocardiogram done on 2010, showing an ejection  fraction of 59%, mild systolic anterior motion of the anterior mitral  leaflet, cordal structures but no LVOT obstruction, and mild mitral  insufficiency. She had mild to moderate tricuspid insufficiency and mild  pulmonary hypertension. She had a dual-isotope nuclear perfusion study done  for chest pain on 2010, which showed no ischemia.     She had an echocardiogram performed on 08/15/2012 which showed an ejection fraction of  60%, normal diastolic function, normal RVSP, trace to mild tricuspid insufficiency, small  perimembranous ventricular septal defect.       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 She underwent radiation therapy and is now on Xeloda twice daily. She had  resection done by Dr. Mcneil; a laparoscopic right colectomy on 2015.      She had a CT of the abdomen and pelvis done 2021 for follow-up from colon cancer with right hemicolectomy.  There is no evidence of metastatic disease but there was colonic thickening at the anastomosis.  A repeat CT of the abdomen pelvis on 2021 shows a thickening there at the anastomosis again concerning for recurrent cancer.  She saw Dr. Aquino  and the work-up after that visit showed that there was no recurrent cancer.  She saw him  follow-up August 2022.       At her visit March 2023, she had a hematoma over her left thumb and I sent her to hand surgery.    Labs on 8/22/2023 show normal CMP and CBC.  Echo done 5/12/2023 showed ejection fraction 61 to 65% with grade 1A diastolic dysfunction, normal saline study, mild left atrial enlargement, calcified aortic valve without stenosis, mild to moderate tricuspid insufficiency with RVSP 49 mmHg.    She has no chest pain or pressure.  She does not feel her heart racing or skipping.  She has had no tachycardia, dizziness or syncope.  She is very worried about her .  He is in rehab and not doing well.  Her blood pressure is high here today but has been well controlled.    Past Medical History:   Diagnosis Date    Anxiety     Colon carcinoma 2015    Stage IIIB, T4N1a; underwent radiation therapy and colon resection by Dr. Mcneil    Deep vein thrombosis 11/24/2017    right leg    History of edema     LE    History of rheumatic fever     Hx of radiation therapy 2015    for adenocarcinoma of the colon Stage IIIB, T4N1a    Hyperlipidemia     Hypertension     MEDS PRN    Infectious mononucleosis     Infectious viral hepatitis     Left anterior fascicular block     Mitral regurgitation     8/2010: mild per echo    OA (osteoarthritis)     Palpitations     Pulmonary hypertension     8/2010- mild per echo; 8/2012 - normal RVSP per echo    Tricuspid regurgitation     8/2010: Mild to moderate per echo; 8/2012: Trace to mild per echo    Venous insufficiency     Ventricular septal defect     Per echocardiogram 2012    Vitamin D deficiency          Past Surgical History:   Procedure Laterality Date    APPENDECTOMY      COLECTOMY PARTIAL / TOTAL  02/02/2015 2/2015 due to adenocarcinoma    COLONOSCOPY      JAN 2015    COLONOSCOPY N/A 5/25/2016    Procedure: COLONOSCOPY to ANASTAMOSIS AND TERMINAL ILEUM;  Surgeon: Yfn Mcneil MD;  Location: [facility] ENDOSCOPY;  Service:     COLONOSCOPY N/A 7/11/2019     Procedure: COLONOSCOPY to cecum:;  Surgeon: Yfn Mcneil MD;  Location: [facility] ENDOSCOPY;  Service: General    HYSTERECTOMY      INTRAOCULAR LENS EXCHANGE Bilateral     JOINT MANIPULATION Right 1/9/2018    Procedure: MANIPULATION OF RT KNEE;  Surgeon: Andrea Caballero MD;  Location: [facility] MAIN OR;  Service:     JOINT REPLACEMENT Right 11/09/2017    KNEE SURGERY Left 2006    ARTHROSCOPY    WA ARTHRP KNE CONDYLE&PLATU MEDIAL&LAT COMPARTMENTS Right 11/9/2017    Procedure: RIGHT TOTAL KNEE ARTHROPLASTY;  Surgeon: Andrea Caballero MD;  Location: [facility] MAIN OR;  Service: Orthopedics    TONSILLECTOMY         Current Outpatient Medications on File Prior to Visit   Medication Sig Dispense Refill    aspirin 81 MG EC tablet Take 1 tablet by mouth Every Other Day.      Calcium Citrate (CITRACAL PO) Take 200 mg by mouth 3 (Three) Times a Week. Monday, Wednesday, friday      docusate sodium 100 MG capsule Take 1 capsule by mouth 2 (Two) Times a Day As Needed (constipation). 40 capsule 1    losartan (COZAAR) 25 MG tablet TAKE 1 TABLET EVERY DAY 90 tablet 2    magnesium chloride ER 64 MG DR tablet Take 1 tablet by mouth 3 (Three) Times a Week. Monday, Wednesday, Friday      meclizine (ANTIVERT) 12.5 MG tablet Take 1 tablet by mouth 3 (Three) Times a Day As Needed for Dizziness.      melatonin 1 MG tablet Take 5 tablets by mouth Every Night.      Multiple Vitamins-Minerals (MULTIVITAMIN ADULT PO) Take 1 tablet by mouth Daily.      simvastatin (ZOCOR) 40 MG tablet TAKE 1 TABLET EVERY NIGHT 90 tablet 2     No current facility-administered medications on file prior to visit.       Social History     Socioeconomic History    Marital status:      Spouse name: Marcus    Number of children: 3    Years of education: College   Tobacco Use    Smoking status: Never     Passive exposure: Never    Smokeless tobacco: Never   Vaping Use    Vaping Use: Never used   Substance and Sexual Activity    Alcohol use: No     Comment: Caffeine  use: 3-4 cups half and half daily    Drug use: Never     Types: Oxycodone    Sexual activity: Defer           ROS    Procedures    ECG 12 Lead    Date/Time: 10/5/2023 2:19 PM  Performed by: Rowan Serrano MD  Authorized by: Rowan Serrano MD   Comparison: compared with previous ECG   Similar to previous ECG  Rhythm: sinus rhythm  Conduction: left anterior fascicular block    Clinical impression: abnormal EKG            Objective:    There were no vitals filed for this visit.  There is no height or weight on file to calculate BMI.    Vitals reviewed.   Constitutional:       General: Not in acute distress.     Appearance: Well-developed. Not diaphoretic.   Eyes:      General: No scleral icterus.     Conjunctiva/sclera: Conjunctivae normal.   HENT:      Head: Normocephalic and atraumatic.   Neck:      Thyroid: No thyromegaly.      Vascular: No carotid bruit or JVD.      Lymphadenopathy: No cervical adenopathy.   Pulmonary:      Effort: Pulmonary effort is normal. No respiratory distress.      Breath sounds: Normal breath sounds. No wheezing. No rhonchi. No rales.   Chest:      Chest wall: Not tender to palpatation.   Cardiovascular:      Normal rate. Regular rhythm.      Murmurs: There is no murmur.      No gallop.  No rub.   Pulses:     Intact distal pulses.      Carotid: 2+ bilaterally.     Radial: 2+ bilaterally.     Dorsalis pedis: 2+ bilaterally.     Posterior tibial: 2+ bilaterally.  Edema:     Peripheral edema absent.   Abdominal:      General: Bowel sounds are normal. There is no distension or abdominal bruit.      Palpations: Abdomen is soft. There is no abdominal mass.      Tenderness: There is no abdominal tenderness.   Musculoskeletal:         General: No deformity.      Extremities: No clubbing present.     Cervical back: Neck supple. Skin:     General: Skin is warm and dry. There is no cyanosis.      Coloration: Skin is not pale.      Findings: No rash.   Neurological:      Mental Status:  "Alert and oriented to person, place, and time.      Cranial Nerves: No cranial nerve deficit.   Psychiatric:         Judgment: Judgment normal.       Lab Review:       Assessment:      Diagnosis Plan   1. Essential hypertension        2. Mixed hyperlipidemia        3. Left anterior fascicular block        4. Nonrheumatic mitral valve regurgitation        5. Rheumatic tricuspid valve regurgitation            Hypertension; well controlled.   Hyperlipidemia, well controlled on Lipitor.   Lower extremity edema due to venous insufficiency.   Osteoporosis.   Palpitations, stable.   Left anterior fascicular block on ECG.   Small membranous VSD  Murmur due to aortic valve calcification     Plan:       See me in 6 months, no medication changes, overall stable.    STOP-Bang Score  Have you been diagnosed with Sleep Apnea?: no  Snoring?: no  Tired?: yes  Observed?: no  Pressure?: yes  Stop Score: 2  Body Mass Index more than 35 kg/m2?: no  Age older than 50 year old?: yes  Neck large? \">17\"/43cm-M, >16\"/41cm-F: no  Gender=Male?: no  Total Stop-Bang Score: 3    "

## 2023-10-27 DIAGNOSIS — M19.90 ARTHRITIS: ICD-10-CM

## 2023-10-27 NOTE — TELEPHONE ENCOUNTER
Caller: [facility] - [place], [place] - [address] - [phone] [facility] [phone] FX    Relationship: Pharmacy    Best call back number: [phone]    Requested Prescriptions:   Requested Prescriptions     Pending Prescriptions Disp Refills    meloxicam (MOBIC) 7.5 MG tablet 90 tablet 1     Sig: Take 1 tablet by mouth Daily.        Pharmacy where request should be sent: [facility] - [place], [place] - [address] - [phone] [facility] [phone] FX     Last office visit with prescribing clinician: 5/23/2023   Last telemedicine visit with prescribing clinician: Visit date not found   Next office visit with prescribing clinician: 11/13/2023     Additional details provided by patient: NONE.    Does the patient have less than a 3 day supply:  [] Yes  [] No UNKNOWN      Emily Ferraro Rep   10/27/23 08:52 EDT

## 2023-10-29 ENCOUNTER — APPOINTMENT (OUTPATIENT)
Dept: GENERAL RADIOLOGY | Facility: HOSPITAL | Age: 88
End: 2023-10-29
Payer: MEDICARE

## 2023-10-29 ENCOUNTER — HOSPITAL ENCOUNTER (INPATIENT)
Facility: HOSPITAL | Age: 88
LOS: 10 days | Discharge: NURSING, REHAB OR LONG-TERM CARE | End: 2023-11-08
Attending: EMERGENCY MEDICINE | Admitting: STUDENT IN AN ORGANIZED HEALTH CARE EDUCATION/TRAINING PROGRAM
Payer: MEDICARE

## 2023-10-29 DIAGNOSIS — S72.002A CLOSED FRACTURE OF LEFT HIP, INITIAL ENCOUNTER: Primary | ICD-10-CM

## 2023-10-29 PROBLEM — S72.009A HIP FRACTURE: Status: ACTIVE | Noted: 2023-10-29

## 2023-10-29 LAB
ALBUMIN SERPL-MCNC: 4 G/DL (ref 3.5–5.2)
ALBUMIN/GLOB SERPL: 1.9 G/DL
ALP SERPL-CCNC: 62 U/L (ref 39–117)
ALT SERPL W P-5'-P-CCNC: 18 U/L (ref 1–33)
ANION GAP SERPL CALCULATED.3IONS-SCNC: 10 MMOL/L (ref 5–15)
APTT PPP: 25.7 SECONDS (ref 22.7–35.4)
AST SERPL-CCNC: 19 U/L (ref 1–32)
BASOPHILS # BLD AUTO: 0.06 10*3/MM3 (ref 0–0.2)
BASOPHILS NFR BLD AUTO: 0.5 % (ref 0–1.5)
BILIRUB SERPL-MCNC: 0.2 MG/DL (ref 0–1.2)
BUN SERPL-MCNC: 15 MG/DL (ref 8–23)
BUN/CREAT SERPL: 24.6 (ref 7–25)
CALCIUM SPEC-SCNC: 9 MG/DL (ref 8.6–10.5)
CHLORIDE SERPL-SCNC: 104 MMOL/L (ref 98–107)
CO2 SERPL-SCNC: 26 MMOL/L (ref 22–29)
CREAT SERPL-MCNC: 0.61 MG/DL (ref 0.57–1)
DEPRECATED RDW RBC AUTO: 42.9 FL (ref 37–54)
EGFRCR SERPLBLD CKD-EPI 2021: 85.6 ML/MIN/1.73
EOSINOPHIL # BLD AUTO: 0.11 10*3/MM3 (ref 0–0.4)
EOSINOPHIL NFR BLD AUTO: 1 % (ref 0.3–6.2)
ERYTHROCYTE [DISTWIDTH] IN BLOOD BY AUTOMATED COUNT: 12.5 % (ref 12.3–15.4)
GLOBULIN UR ELPH-MCNC: 2.1 GM/DL
GLUCOSE SERPL-MCNC: 165 MG/DL (ref 65–99)
HCT VFR BLD AUTO: 40.2 % (ref 34–46.6)
HGB BLD-MCNC: 13.8 G/DL (ref 12–15.9)
IMM GRANULOCYTES # BLD AUTO: 0.05 10*3/MM3 (ref 0–0.05)
IMM GRANULOCYTES NFR BLD AUTO: 0.4 % (ref 0–0.5)
INR PPP: 1.05 (ref 0.9–1.1)
LYMPHOCYTES # BLD AUTO: 1.06 10*3/MM3 (ref 0.7–3.1)
LYMPHOCYTES NFR BLD AUTO: 9.5 % (ref 19.6–45.3)
MCH RBC QN AUTO: 32.4 PG (ref 26.6–33)
MCHC RBC AUTO-ENTMCNC: 34.3 G/DL (ref 31.5–35.7)
MCV RBC AUTO: 94.4 FL (ref 79–97)
MONOCYTES # BLD AUTO: 0.87 10*3/MM3 (ref 0.1–0.9)
MONOCYTES NFR BLD AUTO: 7.8 % (ref 5–12)
NEUTROPHILS NFR BLD AUTO: 80.8 % (ref 42.7–76)
NEUTROPHILS NFR BLD AUTO: 9.01 10*3/MM3 (ref 1.7–7)
NRBC BLD AUTO-RTO: 0 /100 WBC (ref 0–0.2)
PLATELET # BLD AUTO: 229 10*3/MM3 (ref 140–450)
PMV BLD AUTO: 10.1 FL (ref 6–12)
POTASSIUM SERPL-SCNC: 4 MMOL/L (ref 3.5–5.2)
PROT SERPL-MCNC: 6.1 G/DL (ref 6–8.5)
PROTHROMBIN TIME: 13.8 SECONDS (ref 11.7–14.2)
RBC # BLD AUTO: 4.26 10*6/MM3 (ref 3.77–5.28)
SODIUM SERPL-SCNC: 140 MMOL/L (ref 136–145)
WBC NRBC COR # BLD: 11.16 10*3/MM3 (ref 3.4–10.8)

## 2023-10-29 PROCEDURE — 73502 X-RAY EXAM HIP UNI 2-3 VIEWS: CPT

## 2023-10-29 PROCEDURE — 93005 ELECTROCARDIOGRAM TRACING: CPT | Performed by: EMERGENCY MEDICINE

## 2023-10-29 PROCEDURE — 99285 EMERGENCY DEPT VISIT HI MDM: CPT

## 2023-10-29 PROCEDURE — 25010000002 MORPHINE PER 10 MG: Performed by: STUDENT IN AN ORGANIZED HEALTH CARE EDUCATION/TRAINING PROGRAM

## 2023-10-29 PROCEDURE — 25810000003 SODIUM CHLORIDE 0.9 % SOLUTION: Performed by: STUDENT IN AN ORGANIZED HEALTH CARE EDUCATION/TRAINING PROGRAM

## 2023-10-29 PROCEDURE — 25010000002 ONDANSETRON PER 1 MG: Performed by: EMERGENCY MEDICINE

## 2023-10-29 PROCEDURE — 93010 ELECTROCARDIOGRAM REPORT: CPT | Performed by: INTERNAL MEDICINE

## 2023-10-29 PROCEDURE — 80053 COMPREHEN METABOLIC PANEL: CPT | Performed by: EMERGENCY MEDICINE

## 2023-10-29 PROCEDURE — 25010000002 HYDROMORPHONE PER 4 MG: Performed by: EMERGENCY MEDICINE

## 2023-10-29 PROCEDURE — 85730 THROMBOPLASTIN TIME PARTIAL: CPT | Performed by: EMERGENCY MEDICINE

## 2023-10-29 PROCEDURE — 85610 PROTHROMBIN TIME: CPT | Performed by: EMERGENCY MEDICINE

## 2023-10-29 PROCEDURE — 71045 X-RAY EXAM CHEST 1 VIEW: CPT

## 2023-10-29 PROCEDURE — 85025 COMPLETE CBC W/AUTO DIFF WBC: CPT | Performed by: EMERGENCY MEDICINE

## 2023-10-29 RX ORDER — SODIUM CHLORIDE 0.9 % (FLUSH) 0.9 %
10 SYRINGE (ML) INJECTION AS NEEDED
Status: DISCONTINUED | OUTPATIENT
Start: 2023-10-29 | End: 2023-11-08 | Stop reason: HOSPADM

## 2023-10-29 RX ORDER — LOSARTAN POTASSIUM 25 MG/1
25 TABLET ORAL DAILY
Status: DISCONTINUED | OUTPATIENT
Start: 2023-10-30 | End: 2023-11-08 | Stop reason: HOSPADM

## 2023-10-29 RX ORDER — MELOXICAM 15 MG/1
15 TABLET ORAL DAILY
COMMUNITY

## 2023-10-29 RX ORDER — ONDANSETRON 2 MG/ML
4 INJECTION INTRAMUSCULAR; INTRAVENOUS ONCE
Status: COMPLETED | OUTPATIENT
Start: 2023-10-29 | End: 2023-10-29

## 2023-10-29 RX ORDER — ONDANSETRON 4 MG/1
4 TABLET, FILM COATED ORAL EVERY 6 HOURS PRN
Status: DISCONTINUED | OUTPATIENT
Start: 2023-10-29 | End: 2023-11-08 | Stop reason: HOSPADM

## 2023-10-29 RX ORDER — NALOXONE HCL 0.4 MG/ML
0.4 VIAL (ML) INJECTION
Status: DISCONTINUED | OUTPATIENT
Start: 2023-10-29 | End: 2023-11-08 | Stop reason: HOSPADM

## 2023-10-29 RX ORDER — AMOXICILLIN 250 MG
2 CAPSULE ORAL 2 TIMES DAILY
Status: DISCONTINUED | OUTPATIENT
Start: 2023-10-29 | End: 2023-11-02

## 2023-10-29 RX ORDER — LIDOCAINE 50 MG/G
1 PATCH TOPICAL NIGHTLY
Status: DISCONTINUED | OUTPATIENT
Start: 2023-10-29 | End: 2023-11-08 | Stop reason: HOSPADM

## 2023-10-29 RX ORDER — BISACODYL 10 MG
10 SUPPOSITORY, RECTAL RECTAL DAILY PRN
Status: DISCONTINUED | OUTPATIENT
Start: 2023-10-29 | End: 2023-11-02

## 2023-10-29 RX ORDER — BISACODYL 5 MG/1
5 TABLET, DELAYED RELEASE ORAL DAILY PRN
Status: DISCONTINUED | OUTPATIENT
Start: 2023-10-29 | End: 2023-11-02

## 2023-10-29 RX ORDER — ACETAMINOPHEN 325 MG/1
650 TABLET ORAL EVERY 4 HOURS PRN
Status: DISCONTINUED | OUTPATIENT
Start: 2023-10-29 | End: 2023-11-08 | Stop reason: HOSPADM

## 2023-10-29 RX ORDER — POLYETHYLENE GLYCOL 3350 17 G/17G
17 POWDER, FOR SOLUTION ORAL DAILY PRN
Status: DISCONTINUED | OUTPATIENT
Start: 2023-10-29 | End: 2023-11-02

## 2023-10-29 RX ORDER — UREA 10 %
3 LOTION (ML) TOPICAL NIGHTLY PRN
Status: DISCONTINUED | OUTPATIENT
Start: 2023-10-29 | End: 2023-10-31

## 2023-10-29 RX ORDER — ATORVASTATIN CALCIUM 20 MG/1
20 TABLET, FILM COATED ORAL DAILY
Status: DISCONTINUED | OUTPATIENT
Start: 2023-10-30 | End: 2023-11-08 | Stop reason: HOSPADM

## 2023-10-29 RX ORDER — HYDROMORPHONE HYDROCHLORIDE 1 MG/ML
0.25 INJECTION, SOLUTION INTRAMUSCULAR; INTRAVENOUS; SUBCUTANEOUS ONCE
Status: COMPLETED | OUTPATIENT
Start: 2023-10-29 | End: 2023-10-29

## 2023-10-29 RX ORDER — MORPHINE SULFATE 2 MG/ML
1 INJECTION, SOLUTION INTRAMUSCULAR; INTRAVENOUS EVERY 4 HOURS PRN
Status: DISPENSED | OUTPATIENT
Start: 2023-10-29 | End: 2023-11-05

## 2023-10-29 RX ORDER — ONDANSETRON 2 MG/ML
4 INJECTION INTRAMUSCULAR; INTRAVENOUS EVERY 6 HOURS PRN
Status: DISCONTINUED | OUTPATIENT
Start: 2023-10-29 | End: 2023-11-08 | Stop reason: HOSPADM

## 2023-10-29 RX ORDER — SODIUM CHLORIDE 9 MG/ML
50 INJECTION, SOLUTION INTRAVENOUS CONTINUOUS
Status: DISCONTINUED | OUTPATIENT
Start: 2023-10-30 | End: 2023-11-04

## 2023-10-29 RX ORDER — HYDROCODONE BITARTRATE AND ACETAMINOPHEN 5; 325 MG/1; MG/1
1 TABLET ORAL EVERY 4 HOURS PRN
Status: DISPENSED | OUTPATIENT
Start: 2023-10-29 | End: 2023-11-05

## 2023-10-29 RX ADMIN — LIDOCAINE 1 PATCH: 50 PATCH CUTANEOUS at 23:58

## 2023-10-29 RX ADMIN — HYDROCODONE BITARTRATE AND ACETAMINOPHEN 1 TABLET: 5; 325 TABLET ORAL at 23:58

## 2023-10-29 RX ADMIN — SODIUM CHLORIDE 50 ML/HR: 9 INJECTION, SOLUTION INTRAVENOUS at 23:58

## 2023-10-29 RX ADMIN — ONDANSETRON 4 MG: 2 INJECTION INTRAMUSCULAR; INTRAVENOUS at 20:04

## 2023-10-29 RX ADMIN — Medication 3 MG: at 23:58

## 2023-10-29 RX ADMIN — HYDROMORPHONE HYDROCHLORIDE 0.25 MG: 1 INJECTION, SOLUTION INTRAMUSCULAR; INTRAVENOUS; SUBCUTANEOUS at 20:04

## 2023-10-29 RX ADMIN — MORPHINE SULFATE 1 MG: 2 INJECTION, SOLUTION INTRAMUSCULAR; INTRAVENOUS at 22:55

## 2023-10-29 NOTE — ED TRIAGE NOTES
To ER via EMS from home. C/o fall with left hip pain.  Pt stumbled over leg of recliner.  LLE is shortened and externally rotated.      Fentanyl 75 mcg IV PTA

## 2023-10-29 NOTE — ED PROVIDER NOTES
EMERGENCY DEPARTMENT ENCOUNTER    Room Number:  P896/1  Date seen:  10/29/2023  PCP: Phoenix Velazquez MD  Historian(s): Patient, paramedics      HPI:  Chief Complaint: Fall, hip injury  A complete HPI/ROS/PMH/PSH/SH/FH are unobtainable / limited due to: None  Context: Riri Damon is a 89 y.o. female who presents to the ED via EMS from home for evaluation of an accidental fall with injury to the left hip.  Patient was in her home this evening when she unfortunately tripped over the legs of a recliner and fell directly on her left side.  She had immediate pain in the left hip area and could not move her left lower extremity due to the pain.  She did not hit her head or have any loss of consciousness.  She denies any other areas of injury or concern.  Paramedics were called to the scene.  They found her to have a foreshortened left lower extremity.  They gave her fentanyl via IV prior to arrival.      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    Arthritis 05/18/2016    Medicare annual wellness visit, subsequent 05/30/2017    Screening for osteoporosis 05/30/2017    Orthostatic lightheadedness 06/05/2017    Essential hypertension 06/16/2017    OA (osteoarthritis) of knee 11/09/2017    Ventricular septal defect     Left anterior fascicular block     Malignant neoplasm of ascending colon 05/06/2019    Family history of colon cancer 05/06/2019    Osteopenia of left hip 11/12/2019    Acute pain of left shoulder 11/12/2019    Wellness examination 06/11/2020    Anxiety 06/11/2020    Cyst of right ovary 06/10/2021    Primary insomnia 06/11/2021    Vertigo 05/11/2023    Chronic idiopathic constipation 05/23/2023     Resolved Ambulatory Problems     Diagnosis Date Noted    Abdominal pain 05/18/2016     Abnormal mammogram 05/18/2016    Mass of colon 05/18/2016    Pulmonary hypertension 05/18/2016    Vitamin D deficiency 05/18/2016    DVT, lower extremity, distal, acute, right 12/12/2017     Past Medical History:   Diagnosis Date    Colon carcinoma 2015    Deep vein thrombosis 11/24/2017    History of edema     History of rheumatic fever     Hx of radiation therapy 2015    Hypertension     Infectious mononucleosis     Infectious viral hepatitis     Mitral regurgitation     OA (osteoarthritis)     Tricuspid regurgitation     Venous insufficiency          PAST SURGICAL HISTORY  Past Surgical History:   Procedure Laterality Date    APPENDECTOMY      COLECTOMY PARTIAL / TOTAL  02/02/2015 2/2015 due to adenocarcinoma    COLONOSCOPY      JAN 2015    COLONOSCOPY N/A 5/25/2016    Procedure: COLONOSCOPY to ANASTAMOSIS AND TERMINAL ILEUM;  Surgeon: Yfn Mcneil MD;  Location[facility] ENDOSCOPY;  Service:     COLONOSCOPY N/A 7/11/2019    Procedure: COLONOSCOPY to cecum:;  Surgeon: Yfn Mcneil MD;  Location: [facility] ENDOSCOPY;  Service: General    HYSTERECTOMY      INTRAOCULAR LENS EXCHANGE Bilateral     JOINT MANIPULATION Right 1/9/2018    Procedure: MANIPULATION OF RT KNEE;  Surgeon: Andrea Caballero MD;  Location: [facility] OR;  Service:     JOINT REPLACEMENT Right 11/09/2017    KNEE SURGERY Left 2006    ARTHROSCOPY    HI ARTHRP KNE CONDYLE&PLATU MEDIAL&LAT COMPARTMENTS Right 11/9/2017    Procedure: RIGHT TOTAL KNEE ARTHROPLASTY;  Surgeon: Andrea Caballero MD;  Location: [facility] OR;  Service: Orthopedics    TONSILLECTOMY           FAMILY HISTORY  Family History   Problem Relation Age of Onset    Alzheimer's disease Mother     Heart disease Mother     Heart attack Father     Heart disease Father     Heart disease Other     Cancer Sister 60        Colon    Malig Hyperthermia Neg Hx     Breast cancer Neg Hx          SOCIAL HISTORY  Social History     Socioeconomic History    Marital status:      Spouse  name: Marcus    Number of children: 3    Years of education: College   Tobacco Use    Smoking status: Never     Passive exposure: Never    Smokeless tobacco: Never   Vaping Use    Vaping Use: Never used   Substance and Sexual Activity    Alcohol use: No     Comment: Caffeine use: 3-4 cups half and half daily    Drug use: Never     Types: Oxycodone    Sexual activity: Defer         ALLERGIES  Patient has no known allergies.        REVIEW OF SYSTEMS  Review of Systems   Constitutional:  Negative for activity change and fever.   HENT: Negative.     Eyes:  Negative for pain and visual disturbance.   Respiratory:  Negative for cough and shortness of breath.    Cardiovascular:  Negative for chest pain.   Gastrointestinal:  Negative for abdominal pain.   Genitourinary:  Negative for dysuria.   Musculoskeletal:  Positive for arthralgias, gait problem and myalgias.   Skin:  Negative for color change.   Neurological:  Negative for syncope and headaches.   All other systems reviewed and are negative.           PHYSICAL EXAM  ED Triage Vitals [10/29/23 1911]   Temp Heart Rate Resp BP SpO2   98.4 °F (36.9 °C) 82 13 172/77 99 %      Temp src Heart Rate Source Patient Position BP Location FiO2 (%)   -- -- -- -- --       Physical Exam      GENERAL: Older lady, appears uncomfortable, no diaphoresis  HENT: nares patent, normocephalic and atraumatic  EYES: no scleral icterus, EOMI, normal conjunctivae  CV: regular rhythm, normal rate, normal distal pulses  RESPIRATORY: normal effort, no stridor, lungs clear to auscultation bilaterally.  No rib tenderness on the left side  ABDOMEN: soft, nontender in all quadrants  MUSCULOSKELETAL: The left lower extremity is foreshortened and externally rotated to some degree.  There is moderate tenderness to the left hip rather diffusely.  Patient is not able to tolerate any flexion of the left hip joint at all.  Distal neurovascular exam is normal.  NEURO: alert, moves all extremities, follows  commands  PSYCH:  calm, cooperative  SKIN: warm, dry    Vital signs and nursing notes reviewed.        LAB RESULTS  Recent Results (from the past 24 hour(s))   ECG 12 Lead Other; pre-op    Collection Time: 10/29/23  7:33 PM   Result Value Ref Range    QT Interval 396 ms    QTC Interval 455 ms   Comprehensive Metabolic Panel    Collection Time: 10/29/23  7:57 PM    Specimen: Blood   Result Value Ref Range    Glucose 165 (H) 65 - 99 mg/dL    BUN 15 8 - 23 mg/dL    Creatinine 0.61 0.57 - 1.00 mg/dL    Sodium 140 136 - 145 mmol/L    Potassium 4.0 3.5 - 5.2 mmol/L    Chloride 104 98 - 107 mmol/L    CO2 26.0 22.0 - 29.0 mmol/L    Calcium 9.0 8.6 - 10.5 mg/dL    Total Protein 6.1 6.0 - 8.5 g/dL    Albumin 4.0 3.5 - 5.2 g/dL    ALT (SGPT) 18 1 - 33 U/L    AST (SGOT) 19 1 - 32 U/L    Alkaline Phosphatase 62 39 - 117 U/L    Total Bilirubin 0.2 0.0 - 1.2 mg/dL    Globulin 2.1 gm/dL    A/G Ratio 1.9 g/dL    BUN/Creatinine Ratio 24.6 7.0 - 25.0    Anion Gap 10.0 5.0 - 15.0 mmol/L    eGFR 85.6 >60.0 mL/min/1.73   Protime-INR    Collection Time: 10/29/23  7:57 PM    Specimen: Blood   Result Value Ref Range    Protime 13.8 11.7 - 14.2 Seconds    INR 1.05 0.90 - 1.10   aPTT    Collection Time: 10/29/23  7:57 PM    Specimen: Blood   Result Value Ref Range    PTT 25.7 22.7 - 35.4 seconds   CBC Auto Differential    Collection Time: 10/29/23  7:57 PM    Specimen: Blood   Result Value Ref Range    WBC 11.16 (H) 3.40 - 10.80 10*3/mm3    RBC 4.26 3.77 - 5.28 10*6/mm3    Hemoglobin 13.8 12.0 - 15.9 g/dL    Hematocrit 40.2 34.0 - 46.6 %    MCV 94.4 79.0 - 97.0 fL    MCH 32.4 26.6 - 33.0 pg    MCHC 34.3 31.5 - 35.7 g/dL    RDW 12.5 12.3 - 15.4 %    RDW-SD 42.9 37.0 - 54.0 fl    MPV 10.1 6.0 - 12.0 fL    Platelets 229 140 - 450 10*3/mm3    Neutrophil % 80.8 (H) 42.7 - 76.0 %    Lymphocyte % 9.5 (L) 19.6 - 45.3 %    Monocyte % 7.8 5.0 - 12.0 %    Eosinophil % 1.0 0.3 - 6.2 %    Basophil % 0.5 0.0 - 1.5 %    Immature Grans % 0.4 0.0 - 0.5 %     Neutrophils, Absolute 9.01 (H) 1.70 - 7.00 10*3/mm3    Lymphocytes, Absolute 1.06 0.70 - 3.10 10*3/mm3    Monocytes, Absolute 0.87 0.10 - 0.90 10*3/mm3    Eosinophils, Absolute 0.11 0.00 - 0.40 10*3/mm3    Basophils, Absolute 0.06 0.00 - 0.20 10*3/mm3    Immature Grans, Absolute 0.05 0.00 - 0.05 10*3/mm3    nRBC 0.0 0.0 - 0.2 /100 WBC       Ordered the above labs and reviewed the results.        RADIOLOGY  XR Hip With or Without Pelvis 2 - 3 View Left    Result Date: 10/29/2023  AP VIEW THE PELVIS +2 VIEWS LEFT HIP  HISTORY: Left hip injury  COMPARISON: None available.  FINDINGS: There is a comminuted intertrochanteric fracture of the left femur. No additional fractures are seen. There are symmetric degenerative changes involving the hips and SI joints bilaterally.      Comminuted intertrochanteric left femoral fracture.  This report was finalized on 10/29/2023 9:05 PM by Dr. Estefania Garcia M.D on Workstation: [id number]      XR Chest 1 View    Result Date: 10/29/2023  SINGLE VIEW OF THE CHEST  HISTORY: Preoperative exam  COMPARISON: May 11, 2023  FINDINGS: There is cardiomegaly. There is calcification of the aorta. No pneumothorax is seen. No displaced rib fractures are identified. There is a nonspecific left basilar consolidation. This is favored to be atelectasis and scarring, although correlation with any evidence of infection is recommended. There is also some right basilar atelectasis.      Nonspecific left basilar consolidation.  This report was finalized on 10/29/2023 9:03 PM by Dr. Estefania Garcia M.D on Workstation: [id number]       Ordered the above noted radiological studies. Reviewed by me in PACS.          PROCEDURES  Procedures        MEDICATIONS GIVEN IN ER  Medications   sodium chloride 0.9 % flush 10 mL (has no administration in time range)   acetaminophen (TYLENOL) tablet 650 mg (has no administration in time range)   sennosides-docusate (PERICOLACE) 8.6-50 MG per tablet 2 tablet (has no  administration in time range)     And   polyethylene glycol (MIRALAX) packet 17 g (has no administration in time range)     And   bisacodyl (DULCOLAX) EC tablet 5 mg (has no administration in time range)     And   bisacodyl (DULCOLAX) suppository 10 mg (has no administration in time range)   ondansetron (ZOFRAN) tablet 4 mg (has no administration in time range)     Or   ondansetron (ZOFRAN) injection 4 mg (has no administration in time range)   melatonin tablet 3 mg (has no administration in time range)   HYDROcodone-acetaminophen (NORCO) 5-325 MG per tablet 1 tablet (has no administration in time range)   morphine injection 1 mg (has no administration in time range)     And   naloxone (NARCAN) injection 0.4 mg (has no administration in time range)   sodium chloride 0.9 % infusion (has no administration in time range)   ondansetron (ZOFRAN) injection 4 mg (4 mg Intravenous Given 10/29/23 2004)   HYDROmorphone (DILAUDID) injection 0.25 mg (0.25 mg Intravenous Given 10/29/23 2004)           MEDICAL DECISION MAKING, PROGRESS, and CONSULTS    All labs have been independently reviewed by me.  All radiology studies have been reviewed by me and I have also reviewed the radiology report.   EKG's independently viewed and interpreted by me.  Discussion below represents my analysis of pertinent findings related to patient's condition, differential diagnosis, treatment plan and final disposition.      Additional sources:  - Discussed/ obtained information from independent historians:  I discussed with paramedics to receive report of patient's condition on arrival and treatments initiated in route to the hospital.    - External (non-ED) record review: I reviewed the discharge summary from May 12, 2023 when she was admitted for vertigo symptoms.    - Chronic or social conditions impacting care: Elderly patient who lives at home.  Unfortunately, right now her  is admitted in this hospital for a hip injury as well and he has  multiple other medical problems.  This patient has been burdened and stressed recently with being  from her  and looking after his medical care.      Orders placed during this visit:  Orders Placed This Encounter   Procedures    XR Chest 1 View    XR Hip With or Without Pelvis 2 - 3 View Left    Comprehensive Metabolic Panel    Protime-INR    aPTT    CBC Auto Differential    Basic Metabolic Panel    CBC (No Diff)    Hemoglobin A1c    Diet: Regular/House Diet; Texture: Regular Texture (IDDSI 7); Fluid Consistency: Thin (IDDSI 0)    NPO Diet NPO Type: Sips with Meds    Pulse Oximetry, Continuous    Vital Signs    Activity - Ad Kasey    Intake & Output    Oral Care    Place Sequential Compression Device    Maintain Sequential Compression Device    Please message [facility] once home medications are reconciled  Nursing Communication    Code Status and Medical Interventions:    Ortho (on-call MD unless specified)    [facility] (on-call MD unless specified) Details    PT Consult: Eval & Treat As Tolerated    ECG 12 Lead Other; pre-op    Insert Peripheral IV    Inpatient Admission    CBC & Differential           Differential diagnosis includes but is not limited to:    Hip fracture, hip dislocation, hip contusion, pelvic fracture      Independent interpretation of labs, radiology studies, and discussions with consultants:  ED Course as of 10/29/23 2254   Sun Oct 29, 2023   1931 On physical exam, patient has findings that are highly suspicious for a left hip fracture.  I will try to keep her comfortable with appropriate analgesics via IV.  Proceeding with typical hip fracture work-up including x-ray of the hip and pelvis.  I will also order some typical preoperative labs and studies with anticipation that she will need to be admitted to the hospital with orthopedic consult. [BAKARI]   1938 EKG           EKG time/Interp time: 1933/1935  Rhythm/Rate: Sinus rhythm, 79 bpm  P waves and MT: Present, 161 ms  QRS, axis: 108 ms, left  axis deviation, LVH  ST and T waves: No ST segment elevation present.    Independently interpreted by me contemporaneously with treatment     [BAKARI]   2027 I independently interpreted the x-ray of the left hip and my findings are: Comminuted intertrochanteric fracture, no dislocation [BAKARI]   2027 Paging [facility] and orthopedics now. [BAKARI]   2049 I just discussed with Dr. Bird from [facility] about the patient.  He agrees to accept her to the hospitalist service for further medical management. [BAKARI]   2158 I independently interpreted the Chest X-ray and my findings are: No Pneumothorax, No Effusion, No Infiltrate   [BAKARI]   2253 I placed a consult request for [facility] since patient has been established with Dr. Preciado in the past.  Dr. Macias was kind enough to call back about the patient however he is not in Dr. Preciado's group.  is trying to make contact with the correct orthopedic group tonight if possible. [BAKARI]      ED Course User Index  [BAKARI] Colton Wise MD         DIAGNOSIS  Final diagnoses:   Closed fracture of left hip, initial encounter         DISPOSITION  Admit to [facility]  Orthopedics consult        Latest Documented Vital Signs:  As of 22:54 EDT  BP- 172/77 HR- 82 Temp- 98.4 °F (36.9 °C) O2 sat- 99%              --    Please note that portions of this were completed with a voice recognition program.       Note Disclaimer: At [facility], we believe that sharing information builds trust and better relationships. You are receiving this note because you are receiving care at [facility] or recently visited. It is possible you will see health information before a provider has talked with you about it. This kind of information can be easy to misunderstand. To help you fully understand what it means for your health, we urge you to discuss this note with your provider.             Colton Wise MD  10/29/23 3179

## 2023-10-30 PROBLEM — R73.9 HYPERGLYCEMIA: Status: ACTIVE | Noted: 2023-10-30

## 2023-10-30 PROBLEM — D72.829 LEUKOCYTOSIS: Status: ACTIVE | Noted: 2023-10-30

## 2023-10-30 LAB
ANION GAP SERPL CALCULATED.3IONS-SCNC: 8 MMOL/L (ref 5–15)
BUN SERPL-MCNC: 14 MG/DL (ref 8–23)
BUN/CREAT SERPL: 24.6 (ref 7–25)
CALCIUM SPEC-SCNC: 8.4 MG/DL (ref 8.6–10.5)
CHLORIDE SERPL-SCNC: 104 MMOL/L (ref 98–107)
CO2 SERPL-SCNC: 25 MMOL/L (ref 22–29)
CREAT SERPL-MCNC: 0.57 MG/DL (ref 0.57–1)
DEPRECATED RDW RBC AUTO: 40.2 FL (ref 37–54)
EGFRCR SERPLBLD CKD-EPI 2021: 87 ML/MIN/1.73
ERYTHROCYTE [DISTWIDTH] IN BLOOD BY AUTOMATED COUNT: 12.1 % (ref 12.3–15.4)
GLUCOSE SERPL-MCNC: 149 MG/DL (ref 65–99)
HBA1C MFR BLD: 5.4 % (ref 4.8–5.6)
HCT VFR BLD AUTO: 34.3 % (ref 34–46.6)
HGB BLD-MCNC: 12 G/DL (ref 12–15.9)
MCH RBC QN AUTO: 32.1 PG (ref 26.6–33)
MCHC RBC AUTO-ENTMCNC: 35 G/DL (ref 31.5–35.7)
MCV RBC AUTO: 91.7 FL (ref 79–97)
PLATELET # BLD AUTO: 221 10*3/MM3 (ref 140–450)
PMV BLD AUTO: 10.4 FL (ref 6–12)
POTASSIUM SERPL-SCNC: 4.2 MMOL/L (ref 3.5–5.2)
QT INTERVAL: 396 MS
QTC INTERVAL: 455 MS
RBC # BLD AUTO: 3.74 10*6/MM3 (ref 3.77–5.28)
SODIUM SERPL-SCNC: 137 MMOL/L (ref 136–145)
WBC NRBC COR # BLD: 13 10*3/MM3 (ref 3.4–10.8)

## 2023-10-30 PROCEDURE — 80048 BASIC METABOLIC PNL TOTAL CA: CPT | Performed by: STUDENT IN AN ORGANIZED HEALTH CARE EDUCATION/TRAINING PROGRAM

## 2023-10-30 PROCEDURE — 25010000002 MORPHINE PER 10 MG: Performed by: STUDENT IN AN ORGANIZED HEALTH CARE EDUCATION/TRAINING PROGRAM

## 2023-10-30 PROCEDURE — 25810000003 SODIUM CHLORIDE 0.9 % SOLUTION: Performed by: STUDENT IN AN ORGANIZED HEALTH CARE EDUCATION/TRAINING PROGRAM

## 2023-10-30 PROCEDURE — 83036 HEMOGLOBIN GLYCOSYLATED A1C: CPT | Performed by: STUDENT IN AN ORGANIZED HEALTH CARE EDUCATION/TRAINING PROGRAM

## 2023-10-30 PROCEDURE — 85027 COMPLETE CBC AUTOMATED: CPT | Performed by: STUDENT IN AN ORGANIZED HEALTH CARE EDUCATION/TRAINING PROGRAM

## 2023-10-30 PROCEDURE — 36415 COLL VENOUS BLD VENIPUNCTURE: CPT | Performed by: STUDENT IN AN ORGANIZED HEALTH CARE EDUCATION/TRAINING PROGRAM

## 2023-10-30 RX ADMIN — SODIUM CHLORIDE 50 ML/HR: 9 INJECTION, SOLUTION INTRAVENOUS at 20:29

## 2023-10-30 RX ADMIN — DOCUSATE SODIUM 50MG AND SENNOSIDES 8.6MG 2 TABLET: 8.6; 5 TABLET, FILM COATED ORAL at 20:28

## 2023-10-30 RX ADMIN — HYDROCODONE BITARTRATE AND ACETAMINOPHEN 1 TABLET: 5; 325 TABLET ORAL at 13:48

## 2023-10-30 RX ADMIN — HYDROCODONE BITARTRATE AND ACETAMINOPHEN 1 TABLET: 5; 325 TABLET ORAL at 18:35

## 2023-10-30 RX ADMIN — MORPHINE SULFATE 1 MG: 2 INJECTION, SOLUTION INTRAMUSCULAR; INTRAVENOUS at 05:30

## 2023-10-30 RX ADMIN — HYDROCODONE BITARTRATE AND ACETAMINOPHEN 1 TABLET: 5; 325 TABLET ORAL at 23:57

## 2023-10-30 RX ADMIN — LIDOCAINE 1 PATCH: 50 PATCH CUTANEOUS at 21:33

## 2023-10-30 RX ADMIN — HYDROCODONE BITARTRATE AND ACETAMINOPHEN 1 TABLET: 5; 325 TABLET ORAL at 03:48

## 2023-10-30 RX ADMIN — Medication 3 MG: at 23:57

## 2023-10-30 RX ADMIN — HYDROCODONE BITARTRATE AND ACETAMINOPHEN 1 TABLET: 5; 325 TABLET ORAL at 09:10

## 2023-10-30 RX ADMIN — MORPHINE SULFATE 1 MG: 2 INJECTION, SOLUTION INTRAMUSCULAR; INTRAVENOUS at 10:55

## 2023-10-30 RX ADMIN — MORPHINE SULFATE 1 MG: 2 INJECTION, SOLUTION INTRAMUSCULAR; INTRAVENOUS at 20:29

## 2023-10-30 NOTE — ED NOTES
"Nursing report ED to floor  Riri Damon  89 y.o.  female    HPI (triage note):   Chief Complaint   Patient presents with    Fall    Hip Injury       Admitting doctor:   Stew Bird MD    Admitting diagnosis:   The encounter diagnosis was Closed fracture of left hip, initial encounter.    Code status:   Current Code Status       Date Active Code Status Order ID Comments User Context       Prior            Allergies:   Patient has no known allergies.    Weight:       10/29/23  1911   Weight: 61.7 kg (136 lb)       Most recent vitals:   Vitals:    10/29/23 1911   BP: 172/77   Pulse: 82   Resp: 13   Temp: 98.4 °F (36.9 °C)   SpO2: 99%   Weight: 61.7 kg (136 lb)   Height: 165.1 cm (65\")       Active LDAs/IV Access:   Lines, Drains & Airways       Active LDAs       Name Placement date Placement time Site Days    Peripheral IV 10/29/23 1958 Posterior;Right Forearm 10/29/23  1958  Forearm  less than 1                    Labs (abnormal labs have a star):   Labs Reviewed   COMPREHENSIVE METABOLIC PANEL - Abnormal; Notable for the following components:       Result Value    Glucose 165 (*)     All other components within normal limits    Narrative:     GFR Normal >60  Chronic Kidney Disease <60  Kidney Failure <15    The GFR formula is only valid for adults with stable renal function between ages 18 and 70.   CBC WITH AUTO DIFFERENTIAL - Abnormal; Notable for the following components:    WBC 11.16 (*)     Neutrophil % 80.8 (*)     Lymphocyte % 9.5 (*)     Neutrophils, Absolute 9.01 (*)     All other components within normal limits   PROTIME-INR - Normal   APTT - Normal   CBC AND DIFFERENTIAL    Narrative:     The following orders were created for panel order CBC & Differential.  Procedure                               Abnormality         Status                     ---------                               -----------         ------                     CBC Auto Differential[[id number]]        Abnormal            Final " result                 Please view results for these tests on the individual orders.       EKG:   ECG 12 Lead Other; pre-op   Preliminary Result   HEART RATE= 79  bpm   RR Interval= 759  ms   TN Interval= 161  ms   P Horizontal Axis= 34  deg   P Front Axis= 24  deg   QRSD Interval= 108  ms   QT Interval= 396  ms   QTcB= 455  ms   QRS Axis= -64  deg   T Wave Axis= 50  deg   - ABNORMAL ECG -   Sinus rhythm   Left anterior fascicular block   Abnormal R-wave progression, late transition   Probable left ventricular hypertrophy   Electronically Signed By:    Date and Time of Study: 2023-10-29 19:33:08          Meds given in ED:   Medications   sodium chloride 0.9 % flush 10 mL (has no administration in time range)   acetaminophen (TYLENOL) tablet 650 mg (has no administration in time range)   sennosides-docusate (PERICOLACE) 8.6-50 MG per tablet 2 tablet (has no administration in time range)     And   polyethylene glycol (MIRALAX) packet 17 g (has no administration in time range)     And   bisacodyl (DULCOLAX) EC tablet 5 mg (has no administration in time range)     And   bisacodyl (DULCOLAX) suppository 10 mg (has no administration in time range)   ondansetron (ZOFRAN) tablet 4 mg (has no administration in time range)     Or   ondansetron (ZOFRAN) injection 4 mg (has no administration in time range)   melatonin tablet 3 mg (has no administration in time range)   HYDROcodone-acetaminophen (NORCO) 5-325 MG per tablet 1 tablet (has no administration in time range)   morphine injection 1 mg (has no administration in time range)     And   naloxone (NARCAN) injection 0.4 mg (has no administration in time range)   ondansetron (ZOFRAN) injection 4 mg (4 mg Intravenous Given 10/29/23 2004)   HYDROmorphone (DILAUDID) injection 0.25 mg (0.25 mg Intravenous Given 10/29/23 2004)       Imaging results:  XR Hip With or Without Pelvis 2 - 3 View Left    Result Date: 10/29/2023  Comminuted intertrochanteric left femoral fracture.  This  report was finalized on 10/29/2023 9:05 PM by Dr. Estefania Garcia M.D on Workstation: [id number]      XR Chest 1 View    Result Date: 10/29/2023  Nonspecific left basilar consolidation.  This report was finalized on 10/29/2023 9:03 PM by Dr. Estefania Garcia M.D on Workstation: [id number]       Ambulatory status:   - bedrest      Social issues:   Social History     Socioeconomic History    Marital status:      Spouse name: Marcus    Number of children: 3    Years of education: College   Tobacco Use    Smoking status: Never     Passive exposure: Never    Smokeless tobacco: Never   Vaping Use    Vaping Use: Never used   Substance and Sexual Activity    Alcohol use: No     Comment: Caffeine use: 3-4 cups half and half daily    Drug use: Never     Types: Oxycodone    Sexual activity: Defer

## 2023-10-30 NOTE — PROGRESS NOTES
Name: Riri Damon ADMIT: 10/29/2023   : 1934  PCP: Phoenix Velazquez MD    MRN: [id number] LOS: 1 days   AGE/SEX: 89 y.o. female  ROOM: [facility]     Subjective   Subjective   No acute events overnight.  Patient states that she is feeling okay, just complaining of continued pain in her left lower extremity.  No chest pain or shortness of breath.  Planning for OR today.     Objective   Objective     Vital Signs  Temp:  [98.4 °F (36.9 °C)-99.2 °F (37.3 °C)] 98.7 °F (37.1 °C)  Heart Rate:  [77-83] 83  Resp:  [13-16] 16  BP: (105-172)/(59-78) 105/59  SpO2:  [92 %-99 %] 92 %  on   ;   Device (Oxygen Therapy): room air  Body mass index is 22.63 kg/m².    Physical Exam  General: Alert, no acute distress.  Very pleasant and conversive.  Comfortable appearing.  ENT: No conjunctival injection or scleral icterus. Moist mucous membranes.   Neuro: Eyes open and moving in all directions, no focal deficits.   Lungs: Clear to auscultation bilaterally. No wheeze or crackles. No distress.   Heart: RRR, no murmurs. No edema.  Abdomen: Soft, non-tender, non-distended. Normal bowel sounds. No hepatosplenomegaly.   Ext: LLE held in abduction, mild swelling present.  ROM deferred.  Skin: No rashes or lesions. IV site without swelling or erythema.     Results Review     I reviewed the patient's new clinical results:  Results from last 7 days   Lab Units 10/30/23  0437 10/29/23  1957   WBC 10*3/mm3 13.00* 11.16*   HEMOGLOBIN g/dL 12.0 13.8   PLATELETS 10*3/mm3 221 229     Results from last 7 days   Lab Units 10/30/23  0437 10/29/23  1957   SODIUM mmol/L 137 140   POTASSIUM mmol/L 4.2 4.0   CHLORIDE mmol/L 104 104   CO2 mmol/L 25.0 26.0   BUN mg/dL 14 15   CREATININE mg/dL 0.57 0.61   GLUCOSE mg/dL 149* 165*   EGFR mL/min/1.73 87.0 85.6     Results from last 7 days   Lab Units 10/29/23  1957   ALBUMIN g/dL 4.0   BILIRUBIN mg/dL 0.2   ALK PHOS U/L 62   AST (SGOT) U/L 19   ALT (SGPT) U/L 18     Results from last 7 days   Lab  Units 10/30/23  0437 10/29/23  1957   CALCIUM mg/dL 8.4* 9.0   ALBUMIN g/dL  --  4.0       Hemoglobin A1C   Date/Time Value Ref Range Status   10/30/2023 0437 5.40 4.80 - 5.60 % Final       XR Hip With or Without Pelvis 2 - 3 View Left    Result Date: 10/29/2023  Comminuted intertrochanteric left femoral fracture.  This report was finalized on 10/29/2023 9:05 PM by Dr. Estefania Garcia M.D on Workstation: [id number] - /l?k â€¢ splik/E3      XR Chest 1 View    Result Date: 10/29/2023  Nonspecific left basilar consolidation.  This report was finalized on 10/29/2023 9:03 PM by Dr. Estefania Garcia M.D on Workstation: [email or web address] - /l?k â€¢ splik/E3       I have personally reviewed all medications:  Scheduled Medications  atorvastatin, 20 mg, Oral, Daily  lidocaine, 1 patch, Transdermal, Nightly  losartan, 25 mg, Oral, Daily  senna-docusate sodium, 2 tablet, Oral, BID    Infusions  sodium chloride, 50 mL/hr, Last Rate: 50 mL/hr (10/29/23 7118)    Diet  NPO Diet NPO Type: Sips with Meds    I have personally reviewed:  [x]  Laboratory   []  Microbiology   [x]  Radiology   [x]  EKG/Telemetry  []  Cardiology/Vascular   []  Pathology    [x]  Records    Assessment/Plan     Active Hospital Problems    Diagnosis  POA    **Hip fracture [S72.009A]  Yes    Leukocytosis [D72.829]  Unknown    Hyperglycemia [R73.9]  Unknown    Left anterior fascicular block [I44.4]  Yes    Essential hypertension [I10]  Yes    Hyperlipidemia [E78.5]  Yes      Resolved Hospital Problems   No resolved problems to display.       89 y.o. female with Hip fracture.    Left femoral fracture  -S/p mechanical fall and left hip fracture  -Orthopedic surgery consulted  -Planning for OR today  -Pain control and bowel regimen postoperatively  -PT/OT consult  -Oral Norco and IV morphine for pain control as needed    Left basilar consolidation  Leukocytosis  -CXR with evidence of left-sided consolidation, most likely atelectasis though infection cannot be ruled out  -WBC has increased to 13 K  today  -Patient remains on room air, afebrile, no cough  -Very low suspicion for pneumonia at this time.  Leukocytosis seems to be most likely reactive at this point.  Continue to closely monitor.  Can pursue additional infectious work-up and start antibiotics should the patient change clinically.  -Use of IS postoperatively  -Monitor closely with daily CBC    HTN  Hyperlipidemia  -Continue home losartan  -Continue home statin    Hyperglycemia  -Hgb A1c 5.4  -Glucoses have been somewhat elevated, but overall okay for hospitalization  -Closely monitor, start SSI if needed    SCDs for DVT prophylaxis.  Limited code (no CPR, no intubation).  Discussed with patient and nursing staff.  Anticipate discharge to SNU facility timing yet to be determined.      Jody Salgado MD  [facility]  10/30/23  08:50 EDT

## 2023-10-30 NOTE — H&P
Patient Name:  Riri Damon  YOB: 1934  MRN:  [id number]  Admit Date:  10/29/2023  Patient Care Team:  Phoenix Velazquez MD as PCP - Yfn Desai MD as Referring Physician (General Surgery)  Asiya Hinton MD as Consulting Physician (Hematology and Oncology)  Rowan Serrano MD as Consulting Physician (Cardiology)      Subjective   History Present Illness     Chief Complaint   Patient presents with   • Fall   • Hip Injury       Ms. Damon is a 89 y.o. woman with hypertension, hyperlipidemia, bppv, chronic lower extremity venous insufficiency, LAFB, osteoporosis, history of colon cancer who presents after a mechanical fall with left hip pain. She reports that it was dark and she was moving through a room and tripped over furniture. She denies loss of consciousness, head strike, or any presyncopal symptoms. Prior to her fall, she could climb 2 flights of stairs without stopping.     History of Present Illness  Review of Systems   Constitutional:  Negative for chills, diaphoresis and fever.   HENT:  Negative for rhinorrhea and sinus pain.    Eyes: Negative.    Respiratory:  Negative for cough and shortness of breath.    Cardiovascular:  Negative for chest pain and palpitations.   Gastrointestinal:  Negative for abdominal pain, constipation, diarrhea, nausea and vomiting.   Endocrine: Negative.    Genitourinary:  Negative for dysuria, flank pain, frequency and urgency.   Musculoskeletal:         Left hip pain   Skin:  Negative for rash and wound.   Allergic/Immunologic: Negative.    Neurological:  Negative for syncope, light-headedness and headaches.   Hematological: Negative.    Psychiatric/Behavioral:  Negative for confusion and decreased concentration.         Personal History     Past Medical History:   Diagnosis Date   • Anxiety    • Colon carcinoma 2015    Stage IIIB, T4N1a; underwent radiation therapy and colon resection by Dr. Mcneil   • Deep vein thrombosis  11/24/2017    right leg   • History of edema     LE   • History of rheumatic fever    • Hx of radiation therapy 2015    for adenocarcinoma of the colon Stage IIIB, T4N1a   • Hyperlipidemia    • Hypertension     MEDS PRN   • Infectious mononucleosis    • Infectious viral hepatitis    • Left anterior fascicular block    • Mitral regurgitation     8/2010: mild per echo   • OA (osteoarthritis)    • Palpitations    • Pulmonary hypertension     8/2010- mild per echo; 8/2012 - normal RVSP per echo   • Tricuspid regurgitation     8/2010: Mild to moderate per echo; 8/2012: Trace to mild per echo   • Venous insufficiency    • Ventricular septal defect     Per echocardiogram 2012   • Vitamin D deficiency      Past Surgical History:   Procedure Laterality Date   • APPENDECTOMY     • COLECTOMY PARTIAL / TOTAL  02/02/2015 2/2015 due to adenocarcinoma   • COLONOSCOPY      JAN 2015   • COLONOSCOPY N/A 5/25/2016    Procedure: COLONOSCOPY to ANASTAMOSIS AND TERMINAL ILEUM;  Surgeon: Yfn Mcneil MD;  Location: [facility] ENDOSCOPY;  Service:    • COLONOSCOPY N/A 7/11/2019    Procedure: COLONOSCOPY to cecum:;  Surgeon: Yfn Mcneil MD;  Location: [facility] ENDOSCOPY;  Service: General   • HYSTERECTOMY     • INTRAOCULAR LENS EXCHANGE Bilateral    • JOINT MANIPULATION Right 1/9/2018    Procedure: MANIPULATION OF RT KNEE;  Surgeon: Andrea Caballero MD;  Location: [facility] OR;  Service:    • JOINT REPLACEMENT Right 11/09/2017   • KNEE SURGERY Left 2006    ARTHROSCOPY   • UT ARTHRP KNE CONDYLE&PLATU MEDIAL&LAT COMPARTMENTS Right 11/9/2017    Procedure: RIGHT TOTAL KNEE ARTHROPLASTY;  Surgeon: Andrea Caballero MD;  Location: [facility] OR;  Service: Orthopedics   • TONSILLECTOMY       Family History   Problem Relation Age of Onset   • Alzheimer's disease Mother    • Heart disease Mother    • Heart attack Father    • Heart disease Father    • Heart disease Other    • Cancer Sister 60        Colon   • Malig Hyperthermia Neg Hx    •  Breast cancer Neg Hx      Social History     Tobacco Use   • Smoking status: Never     Passive exposure: Never   • Smokeless tobacco: Never   Vaping Use   • Vaping Use: Never used   Substance Use Topics   • Alcohol use: No     Comment: Caffeine use: 3-4 cups half and half daily   • Drug use: Never     Types: Oxycodone     No current facility-administered medications on file prior to encounter.     Current Outpatient Medications on File Prior to Encounter   Medication Sig Dispense Refill   • aspirin 81 MG EC tablet Take 1 tablet by mouth Every Other Day.     • Calcium Citrate (CITRACAL PO) Take 200 mg by mouth 3 (Three) Times a Week. Monday, Wednesday, friday     • docusate sodium 100 MG capsule Take 1 capsule by mouth 2 (Two) Times a Day As Needed (constipation). 40 capsule 1   • losartan (COZAAR) 25 MG tablet TAKE 1 TABLET EVERY DAY 90 tablet 2   • magnesium chloride ER 64 MG DR tablet Take 1 tablet by mouth 3 (Three) Times a Week. Monday, Wednesday, Friday     • melatonin 1 MG tablet Take 5 tablets by mouth Every Night.     • meloxicam (MOBIC) 15 MG tablet Take 1 tablet by mouth Daily.     • Multiple Vitamins-Minerals (MULTIVITAMIN ADULT PO) Take 1 tablet by mouth Daily.     • simvastatin (ZOCOR) 40 MG tablet TAKE 1 TABLET EVERY NIGHT 90 tablet 2     No Known Allergies    Objective    Objective     Vital Signs  Temp:  [98.4 °F (36.9 °C)] 98.4 °F (36.9 °C)  Heart Rate:  [82] 82  Resp:  [13] 13  BP: (172)/(77) 172/77  SpO2:  [99 %] 99 %  on   ;      Body mass index is 22.63 kg/m².    Physical Exam  Vitals and nursing note reviewed.   Constitutional:       General: She is not in acute distress.     Appearance: She is normal weight. She is not toxic-appearing.   HENT:      Head: Normocephalic and atraumatic.      Mouth/Throat:      Mouth: Mucous membranes are moist.      Pharynx: Oropharynx is clear. No oropharyngeal exudate.   Eyes:      Extraocular Movements: Extraocular movements intact.      Conjunctiva/sclera:  Conjunctivae normal.      Pupils: Pupils are equal, round, and reactive to light.   Cardiovascular:      Rate and Rhythm: Normal rate and regular rhythm.      Heart sounds: Normal heart sounds.   Pulmonary:      Effort: Pulmonary effort is normal. No respiratory distress.      Breath sounds: Normal breath sounds. No wheezing, rhonchi or rales.   Abdominal:      General: Bowel sounds are normal. There is no distension.      Palpations: Abdomen is soft.      Tenderness: There is no abdominal tenderness. There is no guarding or rebound.   Musculoskeletal:         General: Signs of injury (left leg shortened and externally rotated) present. No tenderness.      Cervical back: Normal range of motion and neck supple.      Right lower leg: No edema.      Left lower leg: No edema.   Skin:     General: Skin is warm and dry.      Findings: No erythema or rash.   Neurological:      General: No focal deficit present.      Mental Status: She is alert and oriented to person, place, and time.   Psychiatric:         Mood and Affect: Mood normal.         Behavior: Behavior normal.         Results Review:  I reviewed the patient's new clinical results.  I reviewed the patient's new imaging results and agree with the interpretation.  I reviewed the patient's other test results and agree with the interpretation  I personally viewed and interpreted the patient's EKG/Telemetry data  Discussed with ED provider.    Lab Results (last 24 hours)       Procedure Component Value Units Date/Time    CBC & Differential [[id number]]  (Abnormal) Collected: 10/29/23 1957    Specimen: Blood Updated: 10/29/23 2010    Narrative:      The following orders were created for panel order CBC & Differential.  Procedure                               Abnormality         Status                     ---------                               -----------         ------                     CBC Auto Differential[[id number]]        Abnormal            Final result                  Please view results for these tests on the individual orders.    Comprehensive Metabolic Panel [[id number]]  (Abnormal) Collected: 10/29/23 1957    Specimen: Blood Updated: 10/29/23 2028     Glucose 165 mg/dL      BUN 15 mg/dL      Creatinine 0.61 mg/dL      Sodium 140 mmol/L      Potassium 4.0 mmol/L      Chloride 104 mmol/L      CO2 26.0 mmol/L      Calcium 9.0 mg/dL      Total Protein 6.1 g/dL      Albumin 4.0 g/dL      ALT (SGPT) 18 U/L      AST (SGOT) 19 U/L      Alkaline Phosphatase 62 U/L      Total Bilirubin 0.2 mg/dL      Globulin 2.1 gm/dL      A/G Ratio 1.9 g/dL      BUN/Creatinine Ratio 24.6     Anion Gap 10.0 mmol/L      eGFR 85.6 mL/min/1.73     Narrative:      GFR Normal >60  Chronic Kidney Disease <60  Kidney Failure <15    The GFR formula is only valid for adults with stable renal function between ages 18 and 70.    Protime-INR [[id number]]  (Normal) Collected: 10/29/23 1957    Specimen: Blood Updated: 10/29/23 2026     Protime 13.8 Seconds      INR 1.05    aPTT [[id number]]  (Normal) Collected: 10/29/23 1957    Specimen: Blood Updated: 10/29/23 2026     PTT 25.7 seconds     CBC Auto Differential [[id number]]  (Abnormal) Collected: 10/29/23 1957    Specimen: Blood Updated: 10/29/23 2010     WBC 11.16 10*3/mm3      RBC 4.26 10*6/mm3      Hemoglobin 13.8 g/dL      Hematocrit 40.2 %      MCV 94.4 fL      MCH 32.4 pg      MCHC 34.3 g/dL      RDW 12.5 %      RDW-SD 42.9 fl      MPV 10.1 fL      Platelets 229 10*3/mm3      Neutrophil % 80.8 %      Lymphocyte % 9.5 %      Monocyte % 7.8 %      Eosinophil % 1.0 %      Basophil % 0.5 %      Immature Grans % 0.4 %      Neutrophils, Absolute 9.01 10*3/mm3      Lymphocytes, Absolute 1.06 10*3/mm3      Monocytes, Absolute 0.87 10*3/mm3      Eosinophils, Absolute 0.11 10*3/mm3      Basophils, Absolute 0.06 10*3/mm3      Immature Grans, Absolute 0.05 10*3/mm3      nRBC 0.0 /100 WBC             Imaging Results (Last 24 Hours)       Procedure Component Value Units  Date/Time    XR Hip With or Without Pelvis 2 - 3 View Left [[id number]] Collected: 10/29/23 2103     Updated: 10/29/23 2108    Narrative:      AP VIEW THE PELVIS +2 VIEWS LEFT HIP     HISTORY: Left hip injury     COMPARISON: None available.     FINDINGS:  There is a comminuted intertrochanteric fracture of the left femur. No  additional fractures are seen. There are symmetric degenerative changes  involving the hips and SI joints bilaterally.       Impression:      Comminuted intertrochanteric left femoral fracture.     This report was finalized on 10/29/2023 9:05 PM by Dr. Estefania Garcia M.D on Workstation: [id number]       XR Chest 1 View [[id number]] Collected: 10/29/23 2102     Updated: 10/29/23 2106    Narrative:      SINGLE VIEW OF THE CHEST     HISTORY: Preoperative exam     COMPARISON: May 11, 2023     FINDINGS:  There is cardiomegaly. There is calcification of the aorta. No  pneumothorax is seen. No displaced rib fractures are identified. There  is a nonspecific left basilar consolidation. This is favored to be  atelectasis and scarring, although correlation with any evidence of  infection is recommended. There is also some right basilar atelectasis.       Impression:      Nonspecific left basilar consolidation.     This report was finalized on 10/29/2023 9:03 PM by Dr. Estefania Garcia M.D on Workstation: [id number]               Results for orders placed during the hospital encounter of 05/11/23    Adult transthoracic echo complete    Interpretation Summary  •  Left ventricular systolic function is normal. Left ventricular ejection fraction appears to be 61 - 65%.  •  Left ventricular diastolic function is consistent with (grade Ia w/high LAP) impaired relaxation.  •  Normal right ventricular cavity size and systolic function noted.  •  The left atrial cavity is mildly dilated.  •  Saline test results are negative.  •  Aortic valve mean pressure gradient is 8.0 mmHg. The aortic valve leaflets are  moderately to heavily calcified (aortic sclerosis)  •  The mitral valve leaflets are thickened. There is a redundant chordae noted  •  Mild to moderate tricuspid valve regurgitation is present.  •  Calculated right ventricular systolic pressure from tricuspid regurgitation is 49 mmHg.  •  There is no evidence of pericardial effusion      ECG 12 Lead Other; pre-op   Preliminary Result   HEART RATE= 79  bpm   RR Interval= 759  ms   IL Interval= 161  ms   P Horizontal Axis= 34  deg   P Front Axis= 24  deg   QRSD Interval= 108  ms   QT Interval= 396  ms   QTcB= 455  ms   QRS Axis= -64  deg   T Wave Axis= 50  deg   - ABNORMAL ECG -   Sinus rhythm   Left anterior fascicular block   Abnormal R-wave progression, late transition   Probable left ventricular hypertrophy   Electronically Signed By:    Date and Time of Study: 2023-10-29 19:33:08           Assessment/Plan     Active Hospital Problems    Diagnosis  POA   • **Hip fracture [S72.009A]  Yes   • Left anterior fascicular block [I44.4]  Yes   • Essential hypertension [I10]  Yes   • Hyperlipidemia [E78.5]  Yes      Resolved Hospital Problems   No resolved problems to display.       Ms. Damon is a 89 y.o. woman with hypertension, hyperlipidemia, bppv, chronic lower extremity venous insufficiency, LAFB, osteoporosis, history of colon cancer who presents after a mechanical fall with a left femur fracture.    Left hip fracture-RCRI is zero. METS >4. Ortho consultation. NPO after midnight. Pain control. Bowel regimen.   Non-specific left basilar consolidation seen on xray-no pneumonia symptoms. Doesn't appear to be clinically significant.  Hyperglycemia-check an a1c  Restart home medications once reconciled.   I discussed the patient's findings and my recommendations with patient, family, and ED provider.    VTE Prophylaxis - SCDs.  Code Status - DNR.       Stew Bird MD  [facility]  10/29/23  22:13 EDT

## 2023-10-30 NOTE — CONSULTS
Orthopaedic Surgery  Hip Fracture Consult Note  Dr. OSMAN Reyes II  [phone]    HPI:  Patient is a 89 y.o. Not  or  female who presents with hip pain after a fall from standing.  They presented to the ER for further workup where a left Intertrochanteric Hip Fracture was found. I was consulted for further management.  She complains of sharp pain to the left hip worse with any activity.  I have treated her previous family member and as such I was requested to consult for orthopedics.    MEDICAL HISTORY  Past Medical History:   Diagnosis Date    Anxiety     Colon carcinoma 2015    Stage IIIB, T4N1a; underwent radiation therapy and colon resection by Dr. Mcneil    Deep vein thrombosis 11/24/2017    right leg    History of edema     LE    History of rheumatic fever     Hx of radiation therapy 2015    for adenocarcinoma of the colon Stage IIIB, T4N1a    Hyperlipidemia     Hypertension     MEDS PRN    Infectious mononucleosis     Infectious viral hepatitis     Left anterior fascicular block     Mitral regurgitation     8/2010: mild per echo    OA (osteoarthritis)     Palpitations     Pulmonary hypertension     8/2010- mild per echo; 8/2012 - normal RVSP per echo    Tricuspid regurgitation     8/2010: Mild to moderate per echo; 8/2012: Trace to mild per echo    Venous insufficiency     Ventricular septal defect     Per echocardiogram 2012    Vitamin D deficiency      Past Surgical History:   Procedure Laterality Date    APPENDECTOMY      COLECTOMY PARTIAL / TOTAL  02/02/2015 2/2015 due to adenocarcinoma    COLONOSCOPY      JAN 2015    COLONOSCOPY N/A 5/25/2016    Procedure: COLONOSCOPY to ANASTAMOSIS AND TERMINAL ILEUM;  Surgeon: Yfn Mcneil MD;  Location: [facility] ENDOSCOPY;  Service:     COLONOSCOPY N/A 7/11/2019    Procedure: COLONOSCOPY to cecum:;  Surgeon: Yfn Mcneil MD;  Location: [facility] ENDOSCOPY;  Service: General    HYSTERECTOMY      INTRAOCULAR LENS EXCHANGE Bilateral      JOINT MANIPULATION Right 1/9/2018    Procedure: MANIPULATION OF RT KNEE;  Surgeon: Andrea Caballero MD;  Location: [facility] OR;  Service:     JOINT REPLACEMENT Right 11/09/2017    KNEE SURGERY Left 2006    ARTHROSCOPY    VA ARTHRP KNE CONDYLE&PLATU MEDIAL&LAT COMPARTMENTS Right 11/9/2017    Procedure: RIGHT TOTAL KNEE ARTHROPLASTY;  Surgeon: Andrea Caballero MD;  Location: [facility];  Service: Orthopedics    TONSILLECTOMY       Prior to Admission medications    Medication Sig Start Date End Date Taking? Authorizing Provider   aspirin 81 MG EC tablet Take 1 tablet by mouth Every Other Day.   Yes Ceasar Zuniga MD   Calcium Citrate (CITRACAL PO) Take 200 mg by mouth 3 (Three) Times a Week. Monday, Wednesday, friday   Yes Ceasar Zuniga MD   docusate sodium 100 MG capsule Take 1 capsule by mouth 2 (Two) Times a Day As Needed (constipation). 5/23/23  Yes Phoenix Velazquez MD   losartan (COZAAR) 25 MG tablet TAKE 1 TABLET EVERY DAY 8/30/23  Yes Phoenix Velazquez MD   magnesium chloride ER 64 MG DR tablet Take 1 tablet by mouth 3 (Three) Times a Week. Monday, Wednesday, Friday   Yes Ceasar Zuniga MD   melatonin 1 MG tablet Take 5 tablets by mouth Every Night.   Yes Ceasar Zuniga MD   meloxicam (MOBIC) 15 MG tablet Take 1 tablet by mouth Daily.   Yes Ceasar Zuniga MD   simvastatin (ZOCOR) 40 MG tablet TAKE 1 TABLET EVERY NIGHT 8/14/23  Yes Phoenix Vleazquez MD   Multiple Vitamins-Minerals (MULTIVITAMIN ADULT PO) Take 1 tablet by mouth Daily.    Ceasar Zuniga MD       No Known Allergies  Most Recent Immunizations   Administered Date(s) Administered    COVID-19 (PFIZER) BIVALENT 12+YRS 11/15/2022    COVID-19 (PFIZER) Purple Cap Monovalent 10/19/2021    FLUAD TRI 65YR+ 10/18/2019    FluMist 2-49yrs 10/05/2015    Fluad Quad 65+ 10/27/2021    Fluzone High Dose =>65 Years ([employer] ONLY) 10/18/2019    Fluzone High-Dose 65+yrs 10/18/2022    Pneumococcal Conjugate 13-Valent (PCV13)  "11/28/2016    Pneumococcal Polysaccharide (PPSV23) 05/17/2018    Pneumococcal, Unspecified 10/29/2014    Tdap 10/29/2014     Social History     Tobacco Use    Smoking status: Never     Passive exposure: Never    Smokeless tobacco: Never   Substance Use Topics    Alcohol use: No     Comment: Caffeine use: 3-4 cups half and half daily      Social History     Substance and Sexual Activity   Drug Use Never    Types: Oxycodone       VITALS: /60 (BP Location: Right arm, Patient Position: Lying)   Pulse 70   Temp 98 °F (36.7 °C) (Oral)   Resp 16   Ht 165.1 cm (65\")   Wt 61.7 kg (136 lb)   LMP  (LMP Unknown)   SpO2 95%   BMI 22.63 kg/m²  Body mass index is 22.63 kg/m².    PHYSICAL EXAM:   CONSTITUTIONAL: No acute distress  LUNGS: Equal chest rise, no shortness of air  CARDIOVASCULAR: palpable peripheral pulses  SKIN: no skin lesions in the area examined  LYMPH: no lymphadenopathy in the area examined  Left Lower Extremity  Tenderness to Palpation: Tenderness to palpation at the hip  Pulses:  Palpable DP/PT pulses  Sensation: Sensation intact to light touch to saphenous/sural/deep peroneal/superficial peroneal/tibial nerves  Motor: 5 out of 5 EHL/FHL/TA/GS motor complexes  Range of Motion: Range of motion of hip deferred secondary to pain.  Positive pain with passive leg roll    RADIOLOGY REVIEW:   XR Hip With or Without Pelvis 2 - 3 View Left    Result Date: 10/29/2023  Comminuted intertrochanteric left femoral fracture.  This report was finalized on 10/29/2023 9:05 PM by Dr. Estefania Garcia M.D on Workstation: [id number]      XR Chest 1 View    Result Date: 10/29/2023  Nonspecific left basilar consolidation.  This report was finalized on 10/29/2023 9:03 PM by Dr. Estefania Garcia M.D on Workstation: [id number]       LABS:   Recent Results (from the past 24 hour(s))   ECG 12 Lead Other; pre-op    Collection Time: 10/29/23  7:33 PM   Result Value Ref Range    QT Interval 396 ms    QTC Interval 455 ms "   Comprehensive Metabolic Panel    Collection Time: 10/29/23  7:57 PM    Specimen: Blood   Result Value Ref Range    Glucose 165 (H) 65 - 99 mg/dL    BUN 15 8 - 23 mg/dL    Creatinine 0.61 0.57 - 1.00 mg/dL    Sodium 140 136 - 145 mmol/L    Potassium 4.0 3.5 - 5.2 mmol/L    Chloride 104 98 - 107 mmol/L    CO2 26.0 22.0 - 29.0 mmol/L    Calcium 9.0 8.6 - 10.5 mg/dL    Total Protein 6.1 6.0 - 8.5 g/dL    Albumin 4.0 3.5 - 5.2 g/dL    ALT (SGPT) 18 1 - 33 U/L    AST (SGOT) 19 1 - 32 U/L    Alkaline Phosphatase 62 39 - 117 U/L    Total Bilirubin 0.2 0.0 - 1.2 mg/dL    Globulin 2.1 gm/dL    A/G Ratio 1.9 g/dL    BUN/Creatinine Ratio 24.6 7.0 - 25.0    Anion Gap 10.0 5.0 - 15.0 mmol/L    eGFR 85.6 >60.0 mL/min/1.73   Protime-INR    Collection Time: 10/29/23  7:57 PM    Specimen: Blood   Result Value Ref Range    Protime 13.8 11.7 - 14.2 Seconds    INR 1.05 0.90 - 1.10   aPTT    Collection Time: 10/29/23  7:57 PM    Specimen: Blood   Result Value Ref Range    PTT 25.7 22.7 - 35.4 seconds   CBC Auto Differential    Collection Time: 10/29/23  7:57 PM    Specimen: Blood   Result Value Ref Range    WBC 11.16 (H) 3.40 - 10.80 10*3/mm3    RBC 4.26 3.77 - 5.28 10*6/mm3    Hemoglobin 13.8 12.0 - 15.9 g/dL    Hematocrit 40.2 34.0 - 46.6 %    MCV 94.4 79.0 - 97.0 fL    MCH 32.4 26.6 - 33.0 pg    MCHC 34.3 31.5 - 35.7 g/dL    RDW 12.5 12.3 - 15.4 %    RDW-SD 42.9 37.0 - 54.0 fl    MPV 10.1 6.0 - 12.0 fL    Platelets 229 140 - 450 10*3/mm3    Neutrophil % 80.8 (H) 42.7 - 76.0 %    Lymphocyte % 9.5 (L) 19.6 - 45.3 %    Monocyte % 7.8 5.0 - 12.0 %    Eosinophil % 1.0 0.3 - 6.2 %    Basophil % 0.5 0.0 - 1.5 %    Immature Grans % 0.4 0.0 - 0.5 %    Neutrophils, Absolute 9.01 (H) 1.70 - 7.00 10*3/mm3    Lymphocytes, Absolute 1.06 0.70 - 3.10 10*3/mm3    Monocytes, Absolute 0.87 0.10 - 0.90 10*3/mm3    Eosinophils, Absolute 0.11 0.00 - 0.40 10*3/mm3    Basophils, Absolute 0.06 0.00 - 0.20 10*3/mm3    Immature Grans, Absolute 0.05 0.00 -  0.05 10*3/mm3    nRBC 0.0 0.0 - 0.2 /100 WBC   Basic Metabolic Panel    Collection Time: 10/30/23  4:37 AM    Specimen: Blood   Result Value Ref Range    Glucose 149 (H) 65 - 99 mg/dL    BUN 14 8 - 23 mg/dL    Creatinine 0.57 0.57 - 1.00 mg/dL    Sodium 137 136 - 145 mmol/L    Potassium 4.2 3.5 - 5.2 mmol/L    Chloride 104 98 - 107 mmol/L    CO2 25.0 22.0 - 29.0 mmol/L    Calcium 8.4 (L) 8.6 - 10.5 mg/dL    BUN/Creatinine Ratio 24.6 7.0 - 25.0    Anion Gap 8.0 5.0 - 15.0 mmol/L    eGFR 87.0 >60.0 mL/min/1.73   CBC (No Diff)    Collection Time: 10/30/23  4:37 AM    Specimen: Blood   Result Value Ref Range    WBC 13.00 (H) 3.40 - 10.80 10*3/mm3    RBC 3.74 (L) 3.77 - 5.28 10*6/mm3    Hemoglobin 12.0 12.0 - 15.9 g/dL    Hematocrit 34.3 34.0 - 46.6 %    MCV 91.7 79.0 - 97.0 fL    MCH 32.1 26.6 - 33.0 pg    MCHC 35.0 31.5 - 35.7 g/dL    RDW 12.1 (L) 12.3 - 15.4 %    RDW-SD 40.2 37.0 - 54.0 fl    MPV 10.4 6.0 - 12.0 fL    Platelets 221 140 - 450 10*3/mm3   Hemoglobin A1c    Collection Time: 10/30/23  4:37 AM    Specimen: Blood   Result Value Ref Range    Hemoglobin A1C 5.40 4.80 - 5.60 %       IMPRESSION:  Patient is a 89 y.o. Not  or  female with left Intertrochanteric Hip Fracture    PLAN:   Admited to: Stew Bird MD  Diet: NPO after midnight, Regular for Now  Weight Bearing:Left Lower Extremity Non Weight Bearing until after surgery  Labs: None additional needed  Imaging: None additional needed  Surgery: Intramedullary nailing for intertrochanteric hip fracture  Hip Nail Consent: The risks and benefits of operative versus nonoperative treatment were discussed. They have elected to undergo operative treatment of the  injury. The general risks discussed included but were not limited to the risk of anesthesia, medical complications, infection, the need for further procedures, damage to neurovascular structures, blood clots, and continued pain.  No guarantees were made. Specifically, for this fracture I  "had a thorough discussion with the patient about the operative risks of intramedullary nailing.  These risks included nonunion/malunion, continued pain, advancement of arthritis and hardware irritation.  The patient and/or family wished to proceed with surgery.  The surgical consent was signed.  Disposition: Plan surgery tomorrow at 7 AM.  Okay to eat today and n.p.o. after midnight.    R \"Kenney\" Eric FERRO MD  Orthopaedic Surgery  [facility]  [phone] - [place] [facility]  [phone] - [place] [facility]          "

## 2023-10-30 NOTE — PLAN OF CARE
The pt is a new admit, left hip fx s/p fall at home. A/Ox4. VSS. No s/s distress. Instructed and kept on NPO after midnight, can have sips w/ meds. PRN Norco and Morphine for pain. Pt c/o moderate-severe right calf cramps, not new for her. On-call notified w/ orders given for Lidoderm patch. IVF NS at 50ml/hr. SCD's on BLE. Accumax attached to bed. TTS. Voiding per PW. Last BM on 10/29/23.

## 2023-10-30 NOTE — PLAN OF CARE
Goal Outcome Evaluation:   Patient admitted from ED on 10/29 after a fall at home causing a left femur fracture. Aox4. VSS on RA. Bedrest, voiding via purewick. Plan is for a left hip IM nialing with Dr. Reyes tomorrow (10/31). NPO at midnight. Will continue to monitor.

## 2023-10-31 ENCOUNTER — ANESTHESIA (OUTPATIENT)
Dept: PERIOP | Facility: HOSPITAL | Age: 88
End: 2023-10-31
Payer: MEDICARE

## 2023-10-31 ENCOUNTER — APPOINTMENT (OUTPATIENT)
Dept: GENERAL RADIOLOGY | Facility: HOSPITAL | Age: 88
End: 2023-10-31
Payer: MEDICARE

## 2023-10-31 ENCOUNTER — ANESTHESIA EVENT (OUTPATIENT)
Dept: PERIOP | Facility: HOSPITAL | Age: 88
End: 2023-10-31
Payer: MEDICARE

## 2023-10-31 LAB
ALBUMIN SERPL-MCNC: 3.4 G/DL (ref 3.5–5.2)
ANION GAP SERPL CALCULATED.3IONS-SCNC: 6.5 MMOL/L (ref 5–15)
BUN SERPL-MCNC: 11 MG/DL (ref 8–23)
BUN/CREAT SERPL: 22 (ref 7–25)
CALCIUM SPEC-SCNC: 8 MG/DL (ref 8.6–10.5)
CHLORIDE SERPL-SCNC: 105 MMOL/L (ref 98–107)
CO2 SERPL-SCNC: 24.5 MMOL/L (ref 22–29)
CREAT SERPL-MCNC: 0.5 MG/DL (ref 0.57–1)
DEPRECATED RDW RBC AUTO: 40.3 FL (ref 37–54)
EGFRCR SERPLBLD CKD-EPI 2021: 89.8 ML/MIN/1.73
ERYTHROCYTE [DISTWIDTH] IN BLOOD BY AUTOMATED COUNT: 12.2 % (ref 12.3–15.4)
GLUCOSE SERPL-MCNC: 124 MG/DL (ref 65–99)
HCT VFR BLD AUTO: 31.8 % (ref 34–46.6)
HGB BLD-MCNC: 11.2 G/DL (ref 12–15.9)
MCH RBC QN AUTO: 32.4 PG (ref 26.6–33)
MCHC RBC AUTO-ENTMCNC: 35.2 G/DL (ref 31.5–35.7)
MCV RBC AUTO: 91.9 FL (ref 79–97)
PLATELET # BLD AUTO: 167 10*3/MM3 (ref 140–450)
PMV BLD AUTO: 10.1 FL (ref 6–12)
POTASSIUM SERPL-SCNC: 3.8 MMOL/L (ref 3.5–5.2)
RBC # BLD AUTO: 3.46 10*6/MM3 (ref 3.77–5.28)
SODIUM SERPL-SCNC: 136 MMOL/L (ref 136–145)
WBC NRBC COR # BLD: 10.42 10*3/MM3 (ref 3.4–10.8)

## 2023-10-31 PROCEDURE — 25010000002 PROPOFOL 200 MG/20ML EMULSION: Performed by: NURSE ANESTHETIST, CERTIFIED REGISTERED

## 2023-10-31 PROCEDURE — C1713 ANCHOR/SCREW BN/BN,TIS/BN: HCPCS | Performed by: ORTHOPAEDIC SURGERY

## 2023-10-31 PROCEDURE — 76000 FLUOROSCOPY <1 HR PHYS/QHP: CPT

## 2023-10-31 PROCEDURE — 73502 X-RAY EXAM HIP UNI 2-3 VIEWS: CPT

## 2023-10-31 PROCEDURE — 25010000002 SUGAMMADEX 200 MG/2ML SOLUTION: Performed by: NURSE ANESTHETIST, CERTIFIED REGISTERED

## 2023-10-31 PROCEDURE — 85027 COMPLETE CBC AUTOMATED: CPT | Performed by: STUDENT IN AN ORGANIZED HEALTH CARE EDUCATION/TRAINING PROGRAM

## 2023-10-31 PROCEDURE — 25010000002 DEXAMETHASONE SODIUM PHOSPHATE 20 MG/5ML SOLUTION: Performed by: NURSE ANESTHETIST, CERTIFIED REGISTERED

## 2023-10-31 PROCEDURE — 25010000002 MORPHINE PER 10 MG: Performed by: NURSE PRACTITIONER

## 2023-10-31 PROCEDURE — 82040 ASSAY OF SERUM ALBUMIN: CPT | Performed by: STUDENT IN AN ORGANIZED HEALTH CARE EDUCATION/TRAINING PROGRAM

## 2023-10-31 PROCEDURE — 0QS736Z REPOSITION LEFT UPPER FEMUR WITH INTRAMEDULLARY INTERNAL FIXATION DEVICE, PERCUTANEOUS APPROACH: ICD-10-PCS | Performed by: ORTHOPAEDIC SURGERY

## 2023-10-31 PROCEDURE — 25010000002 ONDANSETRON PER 1 MG: Performed by: NURSE ANESTHETIST, CERTIFIED REGISTERED

## 2023-10-31 PROCEDURE — 25810000003 LACTATED RINGERS PER 1000 ML: Performed by: NURSE ANESTHETIST, CERTIFIED REGISTERED

## 2023-10-31 PROCEDURE — 25010000002 MORPHINE PER 10 MG: Performed by: STUDENT IN AN ORGANIZED HEALTH CARE EDUCATION/TRAINING PROGRAM

## 2023-10-31 PROCEDURE — 25810000003 SODIUM CHLORIDE 0.9 % SOLUTION: Performed by: ORTHOPAEDIC SURGERY

## 2023-10-31 PROCEDURE — 25010000002 HYDROMORPHONE PER 4 MG: Performed by: NURSE ANESTHETIST, CERTIFIED REGISTERED

## 2023-10-31 PROCEDURE — 80048 BASIC METABOLIC PNL TOTAL CA: CPT | Performed by: STUDENT IN AN ORGANIZED HEALTH CARE EDUCATION/TRAINING PROGRAM

## 2023-10-31 PROCEDURE — 25810000003 LACTATED RINGERS PER 1000 ML: Performed by: STUDENT IN AN ORGANIZED HEALTH CARE EDUCATION/TRAINING PROGRAM

## 2023-10-31 PROCEDURE — 25010000002 FENTANYL CITRATE (PF) 50 MCG/ML SOLUTION: Performed by: NURSE ANESTHETIST, CERTIFIED REGISTERED

## 2023-10-31 PROCEDURE — 25010000002 CEFAZOLIN IN DEXTROSE 2-4 GM/100ML-% SOLUTION: Performed by: ORTHOPAEDIC SURGERY

## 2023-10-31 DEVICE — TRIGEN LOW PROFILE SCREW 5.0MM X 32.5MM
Type: IMPLANTABLE DEVICE | Site: FEMUR | Status: FUNCTIONAL
Brand: TRIGEN

## 2023-10-31 DEVICE — TRIGEN INTERTAN 10S 10MM X 18CM 125DEGREE
Type: IMPLANTABLE DEVICE | Site: FEMUR | Status: FUNCTIONAL
Brand: TRIGEN

## 2023-10-31 DEVICE — INTERTAN LAG/COMPRESSION SCREW KIT                                    105MM / 100MM
Type: IMPLANTABLE DEVICE | Site: FEMUR | Status: FUNCTIONAL
Brand: TRIGEN

## 2023-10-31 RX ORDER — HYDRALAZINE HYDROCHLORIDE 20 MG/ML
5 INJECTION INTRAMUSCULAR; INTRAVENOUS
Status: DISCONTINUED | OUTPATIENT
Start: 2023-10-31 | End: 2023-10-31 | Stop reason: HOSPADM

## 2023-10-31 RX ORDER — MORPHINE SULFATE 2 MG/ML
2 INJECTION, SOLUTION INTRAMUSCULAR; INTRAVENOUS ONCE
Status: COMPLETED | OUTPATIENT
Start: 2023-10-31 | End: 2023-10-31

## 2023-10-31 RX ORDER — ASPIRIN 81 MG/1
81 TABLET, CHEWABLE ORAL 2 TIMES DAILY
Status: DISCONTINUED | OUTPATIENT
Start: 2023-10-31 | End: 2023-11-08 | Stop reason: HOSPADM

## 2023-10-31 RX ORDER — MIDAZOLAM HYDROCHLORIDE 1 MG/ML
0.5 INJECTION INTRAMUSCULAR; INTRAVENOUS
Status: DISCONTINUED | OUTPATIENT
Start: 2023-10-31 | End: 2023-10-31 | Stop reason: HOSPADM

## 2023-10-31 RX ORDER — FENTANYL CITRATE 50 UG/ML
50 INJECTION, SOLUTION INTRAMUSCULAR; INTRAVENOUS ONCE AS NEEDED
Status: DISCONTINUED | OUTPATIENT
Start: 2023-10-31 | End: 2023-10-31 | Stop reason: HOSPADM

## 2023-10-31 RX ORDER — FENTANYL CITRATE 50 UG/ML
INJECTION, SOLUTION INTRAMUSCULAR; INTRAVENOUS AS NEEDED
Status: DISCONTINUED | OUTPATIENT
Start: 2023-10-31 | End: 2023-10-31 | Stop reason: SURG

## 2023-10-31 RX ORDER — HYDROCODONE BITARTRATE AND ACETAMINOPHEN 7.5; 325 MG/1; MG/1
1 TABLET ORAL EVERY 4 HOURS PRN
Status: DISCONTINUED | OUTPATIENT
Start: 2023-10-31 | End: 2023-10-31 | Stop reason: HOSPADM

## 2023-10-31 RX ORDER — EPHEDRINE SULFATE 50 MG/ML
5 INJECTION, SOLUTION INTRAVENOUS ONCE AS NEEDED
Status: DISCONTINUED | OUTPATIENT
Start: 2023-10-31 | End: 2023-10-31 | Stop reason: HOSPADM

## 2023-10-31 RX ORDER — PHENYLEPHRINE HCL IN 0.9% NACL 1 MG/10 ML
SYRINGE (ML) INTRAVENOUS AS NEEDED
Status: DISCONTINUED | OUTPATIENT
Start: 2023-10-31 | End: 2023-10-31 | Stop reason: SURG

## 2023-10-31 RX ORDER — LABETALOL HYDROCHLORIDE 5 MG/ML
5 INJECTION, SOLUTION INTRAVENOUS
Status: DISCONTINUED | OUTPATIENT
Start: 2023-10-31 | End: 2023-10-31 | Stop reason: HOSPADM

## 2023-10-31 RX ORDER — DROPERIDOL 2.5 MG/ML
0.62 INJECTION, SOLUTION INTRAMUSCULAR; INTRAVENOUS
Status: DISCONTINUED | OUTPATIENT
Start: 2023-10-31 | End: 2023-10-31 | Stop reason: HOSPADM

## 2023-10-31 RX ORDER — FAMOTIDINE 10 MG/ML
20 INJECTION, SOLUTION INTRAVENOUS ONCE
Status: COMPLETED | OUTPATIENT
Start: 2023-10-31 | End: 2023-10-31

## 2023-10-31 RX ORDER — PROMETHAZINE HYDROCHLORIDE 25 MG/1
25 SUPPOSITORY RECTAL ONCE AS NEEDED
Status: DISCONTINUED | OUTPATIENT
Start: 2023-10-31 | End: 2023-10-31 | Stop reason: HOSPADM

## 2023-10-31 RX ORDER — BUPIVACAINE HYDROCHLORIDE AND EPINEPHRINE 5; 5 MG/ML; UG/ML
INJECTION, SOLUTION EPIDURAL; INTRACAUDAL; PERINEURAL AS NEEDED
Status: DISCONTINUED | OUTPATIENT
Start: 2023-10-31 | End: 2023-10-31 | Stop reason: HOSPADM

## 2023-10-31 RX ORDER — SODIUM CHLORIDE 0.9 % (FLUSH) 0.9 %
3-10 SYRINGE (ML) INJECTION AS NEEDED
Status: DISCONTINUED | OUTPATIENT
Start: 2023-10-31 | End: 2023-10-31 | Stop reason: HOSPADM

## 2023-10-31 RX ORDER — SODIUM CHLORIDE, SODIUM LACTATE, POTASSIUM CHLORIDE, CALCIUM CHLORIDE 600; 310; 30; 20 MG/100ML; MG/100ML; MG/100ML; MG/100ML
9 INJECTION, SOLUTION INTRAVENOUS CONTINUOUS
Status: DISCONTINUED | OUTPATIENT
Start: 2023-10-31 | End: 2023-11-04

## 2023-10-31 RX ORDER — PROPOFOL 10 MG/ML
INJECTION, EMULSION INTRAVENOUS AS NEEDED
Status: DISCONTINUED | OUTPATIENT
Start: 2023-10-31 | End: 2023-10-31 | Stop reason: SURG

## 2023-10-31 RX ORDER — FENTANYL CITRATE 50 UG/ML
25 INJECTION, SOLUTION INTRAMUSCULAR; INTRAVENOUS
Status: DISCONTINUED | OUTPATIENT
Start: 2023-10-31 | End: 2023-10-31 | Stop reason: HOSPADM

## 2023-10-31 RX ORDER — TRANEXAMIC ACID 10 MG/ML
1000 INJECTION, SOLUTION INTRAVENOUS ONCE
Status: COMPLETED | OUTPATIENT
Start: 2023-10-31 | End: 2023-10-31

## 2023-10-31 RX ORDER — DEXAMETHASONE SODIUM PHOSPHATE 4 MG/ML
INJECTION, SOLUTION INTRA-ARTICULAR; INTRALESIONAL; INTRAMUSCULAR; INTRAVENOUS; SOFT TISSUE AS NEEDED
Status: DISCONTINUED | OUTPATIENT
Start: 2023-10-31 | End: 2023-10-31 | Stop reason: SURG

## 2023-10-31 RX ORDER — FLUMAZENIL 0.1 MG/ML
0.2 INJECTION INTRAVENOUS AS NEEDED
Status: DISCONTINUED | OUTPATIENT
Start: 2023-10-31 | End: 2023-10-31 | Stop reason: HOSPADM

## 2023-10-31 RX ORDER — CEFAZOLIN SODIUM 2 G/100ML
2000 INJECTION, SOLUTION INTRAVENOUS
Status: COMPLETED | OUTPATIENT
Start: 2023-10-31 | End: 2023-10-31

## 2023-10-31 RX ORDER — ONDANSETRON 2 MG/ML
INJECTION INTRAMUSCULAR; INTRAVENOUS AS NEEDED
Status: DISCONTINUED | OUTPATIENT
Start: 2023-10-31 | End: 2023-10-31 | Stop reason: SURG

## 2023-10-31 RX ORDER — CHOLECALCIFEROL (VITAMIN D3) 125 MCG
5 CAPSULE ORAL NIGHTLY PRN
Status: DISCONTINUED | OUTPATIENT
Start: 2023-10-31 | End: 2023-11-03

## 2023-10-31 RX ORDER — MAGNESIUM HYDROXIDE 1200 MG/15ML
LIQUID ORAL AS NEEDED
Status: DISCONTINUED | OUTPATIENT
Start: 2023-10-31 | End: 2023-10-31 | Stop reason: HOSPADM

## 2023-10-31 RX ORDER — PROMETHAZINE HYDROCHLORIDE 25 MG/1
25 TABLET ORAL ONCE AS NEEDED
Status: DISCONTINUED | OUTPATIENT
Start: 2023-10-31 | End: 2023-10-31 | Stop reason: HOSPADM

## 2023-10-31 RX ORDER — DIPHENHYDRAMINE HYDROCHLORIDE 50 MG/ML
12.5 INJECTION INTRAMUSCULAR; INTRAVENOUS
Status: DISCONTINUED | OUTPATIENT
Start: 2023-10-31 | End: 2023-10-31 | Stop reason: HOSPADM

## 2023-10-31 RX ORDER — CEFAZOLIN SODIUM 2 G/100ML
2000 INJECTION, SOLUTION INTRAVENOUS EVERY 8 HOURS
Status: COMPLETED | OUTPATIENT
Start: 2023-10-31 | End: 2023-11-01

## 2023-10-31 RX ORDER — LIDOCAINE HYDROCHLORIDE 20 MG/ML
INJECTION, SOLUTION INFILTRATION; PERINEURAL AS NEEDED
Status: DISCONTINUED | OUTPATIENT
Start: 2023-10-31 | End: 2023-10-31 | Stop reason: SURG

## 2023-10-31 RX ORDER — ROCURONIUM BROMIDE 10 MG/ML
INJECTION, SOLUTION INTRAVENOUS AS NEEDED
Status: DISCONTINUED | OUTPATIENT
Start: 2023-10-31 | End: 2023-10-31 | Stop reason: SURG

## 2023-10-31 RX ORDER — LIDOCAINE HYDROCHLORIDE 10 MG/ML
0.5 INJECTION, SOLUTION INFILTRATION; PERINEURAL ONCE AS NEEDED
Status: DISCONTINUED | OUTPATIENT
Start: 2023-10-31 | End: 2023-10-31 | Stop reason: HOSPADM

## 2023-10-31 RX ORDER — HYDROCODONE BITARTRATE AND ACETAMINOPHEN 5; 325 MG/1; MG/1
1 TABLET ORAL ONCE AS NEEDED
Status: DISCONTINUED | OUTPATIENT
Start: 2023-10-31 | End: 2023-10-31 | Stop reason: HOSPADM

## 2023-10-31 RX ORDER — HYDROMORPHONE HYDROCHLORIDE 1 MG/ML
0.25 INJECTION, SOLUTION INTRAMUSCULAR; INTRAVENOUS; SUBCUTANEOUS
Status: DISCONTINUED | OUTPATIENT
Start: 2023-10-31 | End: 2023-10-31 | Stop reason: HOSPADM

## 2023-10-31 RX ORDER — SODIUM CHLORIDE 0.9 % (FLUSH) 0.9 %
3 SYRINGE (ML) INJECTION EVERY 12 HOURS SCHEDULED
Status: DISCONTINUED | OUTPATIENT
Start: 2023-10-31 | End: 2023-10-31 | Stop reason: HOSPADM

## 2023-10-31 RX ORDER — IPRATROPIUM BROMIDE AND ALBUTEROL SULFATE 2.5; .5 MG/3ML; MG/3ML
3 SOLUTION RESPIRATORY (INHALATION) ONCE AS NEEDED
Status: DISCONTINUED | OUTPATIENT
Start: 2023-10-31 | End: 2023-10-31 | Stop reason: HOSPADM

## 2023-10-31 RX ORDER — ONDANSETRON 2 MG/ML
4 INJECTION INTRAMUSCULAR; INTRAVENOUS ONCE AS NEEDED
Status: DISCONTINUED | OUTPATIENT
Start: 2023-10-31 | End: 2023-10-31 | Stop reason: HOSPADM

## 2023-10-31 RX ORDER — NALOXONE HCL 0.4 MG/ML
0.2 VIAL (ML) INJECTION AS NEEDED
Status: DISCONTINUED | OUTPATIENT
Start: 2023-10-31 | End: 2023-10-31 | Stop reason: HOSPADM

## 2023-10-31 RX ORDER — SODIUM CHLORIDE, SODIUM LACTATE, POTASSIUM CHLORIDE, CALCIUM CHLORIDE 600; 310; 30; 20 MG/100ML; MG/100ML; MG/100ML; MG/100ML
INJECTION, SOLUTION INTRAVENOUS CONTINUOUS PRN
Status: DISCONTINUED | OUTPATIENT
Start: 2023-10-31 | End: 2023-10-31 | Stop reason: SURG

## 2023-10-31 RX ADMIN — LIDOCAINE 1 PATCH: 50 PATCH CUTANEOUS at 21:30

## 2023-10-31 RX ADMIN — DOCUSATE SODIUM 50MG AND SENNOSIDES 8.6MG 2 TABLET: 8.6; 5 TABLET, FILM COATED ORAL at 21:32

## 2023-10-31 RX ADMIN — DEXAMETHASONE SODIUM PHOSPHATE 8 MG: 4 INJECTION, SOLUTION INTRAMUSCULAR; INTRAVENOUS at 07:10

## 2023-10-31 RX ADMIN — HYDROMORPHONE HYDROCHLORIDE 0.25 MG: 1 INJECTION, SOLUTION INTRAMUSCULAR; INTRAVENOUS; SUBCUTANEOUS at 07:56

## 2023-10-31 RX ADMIN — ONDANSETRON 4 MG: 2 INJECTION INTRAMUSCULAR; INTRAVENOUS at 07:25

## 2023-10-31 RX ADMIN — CEFAZOLIN SODIUM 2000 MG: 2 INJECTION, SOLUTION INTRAVENOUS at 06:51

## 2023-10-31 RX ADMIN — FENTANYL CITRATE 25 MCG: 50 INJECTION, SOLUTION INTRAMUSCULAR; INTRAVENOUS at 07:27

## 2023-10-31 RX ADMIN — ASPIRIN 81 MG: 81 TABLET, CHEWABLE ORAL at 12:04

## 2023-10-31 RX ADMIN — Medication 200 MCG: at 07:20

## 2023-10-31 RX ADMIN — SODIUM CHLORIDE 50 ML/HR: 9 INJECTION, SOLUTION INTRAVENOUS at 21:51

## 2023-10-31 RX ADMIN — FAMOTIDINE 20 MG: 10 INJECTION INTRAVENOUS at 06:50

## 2023-10-31 RX ADMIN — HYDROMORPHONE HYDROCHLORIDE 0.25 MG: 1 INJECTION, SOLUTION INTRAMUSCULAR; INTRAVENOUS; SUBCUTANEOUS at 08:02

## 2023-10-31 RX ADMIN — CEFAZOLIN SODIUM 2000 MG: 2 INJECTION, SOLUTION INTRAVENOUS at 18:50

## 2023-10-31 RX ADMIN — ROCURONIUM BROMIDE 50 MG: 10 INJECTION, SOLUTION INTRAVENOUS at 07:04

## 2023-10-31 RX ADMIN — HYDROCODONE BITARTRATE AND ACETAMINOPHEN 1 TABLET: 5; 325 TABLET ORAL at 17:27

## 2023-10-31 RX ADMIN — SODIUM CHLORIDE, POTASSIUM CHLORIDE, SODIUM LACTATE AND CALCIUM CHLORIDE 9 ML/HR: 600; 310; 30; 20 INJECTION, SOLUTION INTRAVENOUS at 06:51

## 2023-10-31 RX ADMIN — Medication 5 MG: at 21:42

## 2023-10-31 RX ADMIN — HYDROCODONE BITARTRATE AND ACETAMINOPHEN 1 TABLET: 7.5; 325 TABLET ORAL at 08:32

## 2023-10-31 RX ADMIN — SUGAMMADEX 200 MG: 100 INJECTION, SOLUTION INTRAVENOUS at 07:37

## 2023-10-31 RX ADMIN — MORPHINE SULFATE 1 MG: 2 INJECTION, SOLUTION INTRAMUSCULAR; INTRAVENOUS at 02:36

## 2023-10-31 RX ADMIN — HYDROMORPHONE HYDROCHLORIDE 0.25 MG: 1 INJECTION, SOLUTION INTRAMUSCULAR; INTRAVENOUS; SUBCUTANEOUS at 08:35

## 2023-10-31 RX ADMIN — Medication 200 MCG: at 07:13

## 2023-10-31 RX ADMIN — HYDROMORPHONE HYDROCHLORIDE 0.25 MG: 1 INJECTION, SOLUTION INTRAMUSCULAR; INTRAVENOUS; SUBCUTANEOUS at 08:28

## 2023-10-31 RX ADMIN — SODIUM CHLORIDE, POTASSIUM CHLORIDE, SODIUM LACTATE AND CALCIUM CHLORIDE: 600; 310; 30; 20 INJECTION, SOLUTION INTRAVENOUS at 07:00

## 2023-10-31 RX ADMIN — PROPOFOL 100 MG: 10 INJECTION, EMULSION INTRAVENOUS at 07:04

## 2023-10-31 RX ADMIN — CEFAZOLIN SODIUM 2000 MG: 2 INJECTION, SOLUTION INTRAVENOUS at 12:05

## 2023-10-31 RX ADMIN — MORPHINE SULFATE 2 MG: 2 INJECTION, SOLUTION INTRAMUSCULAR; INTRAVENOUS at 06:04

## 2023-10-31 RX ADMIN — LIDOCAINE HYDROCHLORIDE 100 MG: 20 INJECTION, SOLUTION INFILTRATION; PERINEURAL at 07:04

## 2023-10-31 RX ADMIN — TRANEXAMIC ACID 1000 MG: 10 INJECTION, SOLUTION INTRAVENOUS at 07:24

## 2023-10-31 RX ADMIN — HYDROCODONE BITARTRATE AND ACETAMINOPHEN 1 TABLET: 5; 325 TABLET ORAL at 21:42

## 2023-10-31 RX ADMIN — ASPIRIN 81 MG: 81 TABLET, CHEWABLE ORAL at 21:32

## 2023-10-31 NOTE — PROGRESS NOTES
Name: Riri Damon ADMIT: 10/29/2023   : 1934  PCP: Phoenix Velazquez MD    MRN: [id number] LOS: 2 days   AGE/SEX: 89 y.o. female  ROOM: [facility]     Subjective   Subjective   Seen postoperatively. Asleep on exam. Appears comfortable. D/W RN, no medical concerns. Tolerated small amount po       Objective   Objective   Vital Signs  Temp:  [98 °F (36.7 °C)-99.8 °F (37.7 °C)] 98 °F (36.7 °C)  Heart Rate:  [] 81  Resp:  [16-18] 17  BP: ()/(59-90) 113/66  SpO2:  [92 %-100 %] 97 %  on  Flow (L/min):  [2] 2;   Device (Oxygen Therapy): nasal cannula  Body mass index is 22.63 kg/m².  Physical Exam  Vitals and nursing note reviewed.   Constitutional:       General: She is sleeping. She is not in acute distress.     Comments: Appears comfortable   HENT:      Head: Normocephalic.      Mouth/Throat:      Lips: Pink.   Eyes:      General: Lids are normal.   Cardiovascular:      Rate and Rhythm: Normal rate.   Pulmonary:      Effort: Pulmonary effort is normal. No respiratory distress.      Comments: Sats 96% O2 2L NC  Abdominal:      Palpations: Abdomen is soft.   Musculoskeletal:      Cervical back: Neck supple.      Right lower leg: No edema.      Left lower leg: No edema.   Skin:     General: Skin is dry.   Neurological:      Comments: Sleeping       Results Review     I reviewed the patient's new clinical results.  Results from last 7 days   Lab Units 10/31/23  0437 10/30/23  0437 10/29/23  1957   WBC 10*3/mm3 10.42 13.00* 11.16*   HEMOGLOBIN g/dL 11.2* 12.0 13.8   PLATELETS 10*3/mm3 167 221 229     Results from last 7 days   Lab Units 10/31/23  0437 10/30/23  0437 10/29/23  1957   SODIUM mmol/L 136 137 140   POTASSIUM mmol/L 3.8 4.2 4.0   CHLORIDE mmol/L 105 104 104   CO2 mmol/L 24.5 25.0 26.0   BUN mg/dL 11 14 15   CREATININE mg/dL 0.50* 0.57 0.61   GLUCOSE mg/dL 124* 149* 165*   EGFR mL/min/1.73 89.8 87.0 85.6     Results from last 7 days   Lab Units 10/31/23  0437 10/29/23  195   ALBUMIN g/dL  3.4* 4.0   BILIRUBIN mg/dL  --  0.2   ALK PHOS U/L  --  62   AST (SGOT) U/L  --  19   ALT (SGPT) U/L  --  18     Results from last 7 days   Lab Units 10/31/23  0437 10/30/23  0437 10/29/23  1957   CALCIUM mg/dL 8.0* 8.4* 9.0   ALBUMIN g/dL 3.4*  --  4.0       Hemoglobin A1C   Date/Time Value Ref Range Status   10/30/2023 0437 5.40 4.80 - 5.60 % Final       XR Hip With or Without Pelvis 2 - 3 View Left    Result Date: 10/31/2023  3 spot fluoroscopic images of the left hip. Please see operative note for further details. Fluoroscopy time 24 seconds. Cumulative air kerma 3.6 mGy.    This report was finalized on 10/31/2023 8:52 AM by Dr. Jesus Morfin M.D on Workstation: [id number]      XR Hip With or Without Pelvis 2 - 3 View Left    Result Date: 10/31/2023  Proximal left femoral intramedullary jacqui with interlocking screw transversing a comminuted left femoral intertrochanteric fracture. Hardware is well-positioned and intact.    This report was finalized on 10/31/2023 8:35 AM by Dr. Jesus Morfin M.D on Workstation: [id number]      XR Hip With or Without Pelvis 2 - 3 View Left    Result Date: 10/29/2023  Comminuted intertrochanteric left femoral fracture.  This report was finalized on 10/29/2023 9:05 PM by Dr. Estefania Garcia M.D on Workstation: [id number]      XR Chest 1 View    Result Date: 10/29/2023  Nonspecific left basilar consolidation.  This report was finalized on 10/29/2023 9:03 PM by Dr. Estefania Garcia M.D on Workstation: [id number]       I have personally reviewed all medications:  Scheduled Medications  aspirin, 81 mg, Oral, BID  atorvastatin, 20 mg, Oral, Daily  ceFAZolin, 2,000 mg, Intravenous, Q8H  lidocaine, 1 patch, Transdermal, Nightly  losartan, 25 mg, Oral, Daily  senna-docusate sodium, 2 tablet, Oral, BID    Infusions  lactated ringers, 9 mL/hr, Last Rate: 9 mL/hr (10/31/23 0651)  sodium chloride, 50 mL/hr, Last Rate: 50 mL/hr (10/30/23 2029)    Diet  Diet: Regular/House Diet; Texture:  Regular Texture (IDDSI 7); Fluid Consistency: Thin (IDDSI 0)    I have personally reviewed:  [x]  Laboratory   [x]  Microbiology   [x]  Radiology   [x]  EKG/Telemetry  [x]  Cardiology/Vascular   []  Pathology    []  Records       Assessment/Plan     Active Hospital Problems    Diagnosis  POA    **Hip fracture [S72.009A]  Yes    Leukocytosis [D72.829]  Unknown    Hyperglycemia [R73.9]  Unknown    Left anterior fascicular block [I44.4]  Yes    Essential hypertension [I10]  Yes    Hyperlipidemia [E78.5]  Yes      Resolved Hospital Problems   No resolved problems to display.       89 y.o. female admitted with Hip fracture.    Left femoral fracture  -S/p mechanical fall and left hip fracture  -Appreciate Ortho assistance  -S/P Lt IM nailing 10/31  -Pain control and bowel regimen postoperatively  -PT/OT consult  -ASA BID for VTE ppx per Ortho     Left basilar consolidation  Leukocytosis  -CXR with evidence of left-sided consolidation, most likely atelectasis though infection cannot be ruled out  -WBC increased 10 13K now normalized  -Requiring O2 postop, wean as tolerated  -Very low suspicion for pneumonia at this time.  Leukocytosis seems to be most likely reactive at this point.  Continue to closely monitor.  Can pursue additional infectious work-up and start antibiotics should the patient change clinically  -Use of IS postoperatively     HTN  Hyperlipidemia  -Continue home losartan  -Continue home statin  -Monitor for postop hypotension     Hyperglycemia  -Hgb A1c 5.4  -Glucoses have been somewhat elevated, but overall okay for hospitalization  -Closely monitor    ASA  for DVT prophylaxis.  Limited code (no CPR, no intubation).  Discussed with nursing staff.  Anticipate discharge  TBD        PADMINI Sanchez  [facility]  10/31/23  14:14 EDT

## 2023-10-31 NOTE — PLAN OF CARE
Goal Outcome Evaluation:           Progress: improving  Outcome Evaluation: 89 y/o s/POD 0 L hip intertrochanteric nailing. AOX4. Pt has not been up OOB this shift, encouraged to turn. Voiding intact via PW. Neuro checks WNL, no c/o numbness/tingling. Bordered dsg c/d/i. Pain managed via PO pills. PIV running Nacl at 50cc/hr. Needs met. VSS. 2 L O2. Educated pt on falls precautions. Plan to d/c to rehab. WIll CTM.

## 2023-10-31 NOTE — ANESTHESIA PREPROCEDURE EVALUATION
Anesthesia Evaluation     Patient summary reviewed   no history of anesthetic complications:                Airway   Mallampati: II  TM distance: >3 FB  Neck ROM: limited  Small opening  Dental - normal exam     Pulmonary - negative pulmonary ROS   Cardiovascular     ECG reviewed    (+) hypertension, DVT, hyperlipidemia    ROS comment: TTE:  ·  Left ventricular systolic function is normal. Left ventricular ejection fraction appears to be 61 - 65%.  ·  Left ventricular diastolic function is consistent with (grade Ia w/high LAP) impaired relaxation.  ·  Normal right ventricular cavity size and systolic function noted.  ·  The left atrial cavity is mildly dilated.  ·  Saline test results are negative.  ·  Aortic valve mean pressure gradient is 8.0 mmHg. The aortic valve leaflets are moderately to heavily calcified (aortic sclerosis)  ·  The mitral valve leaflets are thickened. There is a redundant chordae noted  ·  Mild to moderate tricuspid valve regurgitation is present.  ·  Calculated right ventricular systolic pressure from tricuspid regurgitation is 49 mmHg.  ·  There is no evidence of pericardial effusion      Neuro/Psych  (+) psychiatric history Anxiety  GI/Hepatic/Renal/Endo    (+) liver disease    Musculoskeletal     Abdominal    Substance History      OB/GYN          Other                      Anesthesia Plan    ASA 3     general     (I have reviewed the patient's history with the patient and the chart, including all pertinent laboratory results and imaging. I have explained the risks of anesthesia including but not limited to dental damage, corneal abrasion, nerve injury, MI, stroke, and death. Questions asked and answered. Anesthetic plan discussed with patient and team as indicated. Patient expressed understanding of the above.    FULL code  Lives with )  intravenous induction     Anesthetic plan, risks, benefits, and alternatives have been provided, discussed and informed consent has been obtained  with: patient.      CODE STATUS:    Level Of Support Discussed With: Patient  Code Status (Patient has no pulse and is not breathing): No CPR (Do Not Attempt to Resuscitate)  Medical Interventions (Patient has pulse or is breathing): Full Support

## 2023-10-31 NOTE — PLAN OF CARE
Goal Outcome Evaluation:  Plan of Care Reviewed With: patient        Progress: improving  Outcome Evaluation: Pt pleasant and compliant with care. Signed her consent for surgery. Able to make needs known, call for pain medicine as needed. Pt was able to talk to family and daughter will be here today

## 2023-10-31 NOTE — ANESTHESIA PROCEDURE NOTES
Airway  Urgency: elective    Date/Time: 10/31/2023 7:08 AM  Airway not difficult    General Information and Staff    Patient location during procedure: OR  CRNA/CAA: Manasa Ceja CRNA    Indications and Patient Condition  Indications for airway management: airway protection    Preoxygenated: yes  Mask difficulty assessment: 1 - vent by mask    Final Airway Details  Final airway type: endotracheal airway      Successful airway: ETT  Cuffed: yes   Successful intubation technique: direct laryngoscopy  Facilitating devices/methods: intubating stylet  Endotracheal tube insertion site: oral  Blade: Kandy  ETT size (mm): 7.0  Cormack-Lehane Classification: grade I - full view of glottis  Placement verified by: chest auscultation and capnometry   Cuff volume (mL): 8  Measured from: lips  Number of attempts at approach: 1  Assessment: lips, teeth, and gum same as pre-op and atraumatic intubation

## 2023-10-31 NOTE — OP NOTE
Intertrochanteric/Subtrochanteric Fracture Operative Note  Dr. OSMAN Reyes II  [phone]    PATIENT NAME: Riri Damon  MRN: [id number]  : 1934 AGE: 89 y.o. GENDER: female  DATE OF OPERATION: 10/31/2023  PREOPERATIVE DIAGNOSIS: Hip Fracture  POSTOPERATIVE DIAGNOSIS: Hip Fracture  OPERATION PERFORMED: Left Intramedullary Nailing of the Intertrochanteric Hip Fracture.  SURGEON: Yfn Reyes MD  Circulator: Luis Butterfield RN  Vendor Representative: Kaleb Guevara  Other: Tana Mercado RN  ANESTHESIA: General  ASSISTANT: None   ESTIMATED BLOOD LOSS: Minimal  SPONGE AND NEEDLE COUNT: Correct  INDICATIONS: This patient was found to have an Intertrochanteric Hip Fracture after evaluation in the ER. An orthopaedic consult was placed for management. A discussion of operative versus nonoperative treatment was had with the patient and/or family. They elected to undergo intramedullary nailing of the hip fracture. The risks of surgery were discussed and included the risk of anesthesia, nonunion, malunion, infection, damage to neurovascular structures, implant loosening/fialure, fracture, hardware prominence, the need for further procedures, medical complications, and others. No guarantees were made. The patient wished to proceed with surgery and a surgical consent was signed.  COMPONENTS:   [employer] InterTAN nail    PERTINENT FINDINGS: Hip Fracture    DETAILS OF PROCEDURE:   The patient was met in the preoperative area. The site was marked. The consent and H&P were reviewed. The patient was then wheeled back to the operative suite and underwent anesthesia. The [place] table boots were secured to the patients’ feet. The patient was moved onto the Kirby table and secured in the supine position. The perineal post was inserted and the boots were secured into the leg holders. Surgical alcohol was used to thoroughly clean the operative area.    REDUCTION  Fluoroscopy was used to visualize the  fracture on both an AP and lateral.  Traction, rotation, flexion/extension, abduction/abduction was used as needed to adequately reduce the hip fracture.    The hip and leg were then prepped in the normal sterile fashion, which included Chloroprep and multiple layers of sterile drapes. A surgical timeout was performed in which administration of preoperative antibiotics and the surgical site were confirmed.    A small incision was made just proximal to the greater trochanter and a starting pin was drilled into the tip of the greater trochanter using fluoroscopy to confirm proper location. The opening reamer was then used to open the canal down to the lesser trochanter, visualizing propper position again using fluoroscopy.     OPEN REDUCTION  As fluoroscopic images on both an AP and lateral demonstrated adequate reduction of the fracture, no open techniques needed to be utilized for this case.     NAIL INSERTION  SHORT NAIL: A short nail was then inserted into the femur through the hole made by the opening reamer. The lag screw and compression screw were placed into the femoral head after being drilled and measured, again using fluoroscopy to place the screws in optimal position within the femoral head on both the AP and Lateral views, close to center/center position. Traction was released at this point and the fracture was compressed through the nail. Finally the short nail was secured with one distal interlocking screw which was placed using the jig assembly for the short nail. Final X-Rays showed the fracture reduction to have been maintained and the implant in optimal position.     The wounds were irrigated and closed in layers.  The wound was injected with an analgesic coctail.  A sterile dressing was then placed over the incisions. The patient was moved from the [place] table to the [facility] where the boots were removed. The patient was taken to the recovery room in stable condition. There were no complications and  "the patient tolerated the procedure well.    R \"Kenney\" Eric FERRO MD  Orthopaedic Surgery  [facility]  [phone]    "

## 2023-10-31 NOTE — ANESTHESIA POSTPROCEDURE EVALUATION
"Patient: Riri Damon    Procedure Summary       Date: 10/31/23 Room / Location: [facility] OR [address] [place] [address] MAIN OR    Anesthesia Start: 0700 Anesthesia Stop: 0748    Procedure: HIP INTERTROCHANTERIC NAILING (Left: Thigh) Diagnosis:       Closed fracture of left hip, initial encounter      (Closed fracture of left hip, initial encounter [S72.002A])    Surgeons: Yfn Reyes II, MD Provider: Gerhard Sewell MD    Anesthesia Type: general ASA Status: 3            Anesthesia Type: general    Vitals  Vitals Value Taken Time   /85 10/31/23 0815   Temp     Pulse 88 10/31/23 0820   Resp     SpO2 100 % 10/31/23 0820   Vitals shown include unfiled device data.        Post Anesthesia Care and Evaluation    Patient location during evaluation: PACU  Patient participation: complete - patient participated  Level of consciousness: awake and alert  Pain management: adequate    Airway patency: patent  Anesthetic complications: No anesthetic complications  PONV Status: controlled  Cardiovascular status: acceptable and hemodynamically stable  Respiratory status: acceptable  Hydration status: acceptable    Comments: /70   Pulse 88   Temp 37.7 °C (99.8 °F) (Oral)   Resp 16   Ht 165.1 cm (65\")   Wt 61.7 kg (136 lb)   LMP  (LMP Unknown)   SpO2 99%   BMI 22.63 kg/m²     "

## 2023-11-01 PROBLEM — D64.9 ANEMIA: Status: ACTIVE | Noted: 2023-11-01

## 2023-11-01 LAB
ALBUMIN SERPL-MCNC: 3 G/DL (ref 3.5–5.2)
ANION GAP SERPL CALCULATED.3IONS-SCNC: 6.1 MMOL/L (ref 5–15)
BUN SERPL-MCNC: 8 MG/DL (ref 8–23)
BUN/CREAT SERPL: 16 (ref 7–25)
CALCIUM SPEC-SCNC: 7.9 MG/DL (ref 8.6–10.5)
CHLORIDE SERPL-SCNC: 109 MMOL/L (ref 98–107)
CO2 SERPL-SCNC: 23.9 MMOL/L (ref 22–29)
CREAT SERPL-MCNC: 0.5 MG/DL (ref 0.57–1)
DEPRECATED RDW RBC AUTO: 39.6 FL (ref 37–54)
EGFRCR SERPLBLD CKD-EPI 2021: 89.8 ML/MIN/1.73
ERYTHROCYTE [DISTWIDTH] IN BLOOD BY AUTOMATED COUNT: 12.1 % (ref 12.3–15.4)
GLUCOSE SERPL-MCNC: 105 MG/DL (ref 65–99)
HCT VFR BLD AUTO: 28.7 % (ref 34–46.6)
HGB BLD-MCNC: 9.8 G/DL (ref 12–15.9)
MCH RBC QN AUTO: 31.1 PG (ref 26.6–33)
MCHC RBC AUTO-ENTMCNC: 34.1 G/DL (ref 31.5–35.7)
MCV RBC AUTO: 91.1 FL (ref 79–97)
PLATELET # BLD AUTO: 162 10*3/MM3 (ref 140–450)
PMV BLD AUTO: 10.4 FL (ref 6–12)
POTASSIUM SERPL-SCNC: 3.8 MMOL/L (ref 3.5–5.2)
RBC # BLD AUTO: 3.15 10*6/MM3 (ref 3.77–5.28)
SODIUM SERPL-SCNC: 139 MMOL/L (ref 136–145)
WBC NRBC COR # BLD: 12.95 10*3/MM3 (ref 3.4–10.8)

## 2023-11-01 PROCEDURE — 25010000002 CEFAZOLIN IN DEXTROSE 2-4 GM/100ML-% SOLUTION: Performed by: ORTHOPAEDIC SURGERY

## 2023-11-01 PROCEDURE — 85027 COMPLETE CBC AUTOMATED: CPT | Performed by: ORTHOPAEDIC SURGERY

## 2023-11-01 PROCEDURE — 25010000002 MORPHINE PER 10 MG: Performed by: ORTHOPAEDIC SURGERY

## 2023-11-01 PROCEDURE — 97110 THERAPEUTIC EXERCISES: CPT | Performed by: PHYSICAL THERAPIST

## 2023-11-01 PROCEDURE — 97530 THERAPEUTIC ACTIVITIES: CPT | Performed by: PHYSICAL THERAPIST

## 2023-11-01 PROCEDURE — 80048 BASIC METABOLIC PNL TOTAL CA: CPT | Performed by: ORTHOPAEDIC SURGERY

## 2023-11-01 PROCEDURE — 82040 ASSAY OF SERUM ALBUMIN: CPT | Performed by: ORTHOPAEDIC SURGERY

## 2023-11-01 PROCEDURE — 97162 PT EVAL MOD COMPLEX 30 MIN: CPT | Performed by: PHYSICAL THERAPIST

## 2023-11-01 RX ADMIN — ASPIRIN 81 MG: 81 TABLET, CHEWABLE ORAL at 22:40

## 2023-11-01 RX ADMIN — HYDROCODONE BITARTRATE AND ACETAMINOPHEN 1 TABLET: 5; 325 TABLET ORAL at 12:59

## 2023-11-01 RX ADMIN — CEFAZOLIN SODIUM 2000 MG: 2 INJECTION, SOLUTION INTRAVENOUS at 06:16

## 2023-11-01 RX ADMIN — DOCUSATE SODIUM 50MG AND SENNOSIDES 8.6MG 2 TABLET: 8.6; 5 TABLET, FILM COATED ORAL at 09:02

## 2023-11-01 RX ADMIN — HYDROCODONE BITARTRATE AND ACETAMINOPHEN 1 TABLET: 5; 325 TABLET ORAL at 09:05

## 2023-11-01 RX ADMIN — Medication 5 MG: at 22:40

## 2023-11-01 RX ADMIN — ASPIRIN 81 MG: 81 TABLET, CHEWABLE ORAL at 09:03

## 2023-11-01 RX ADMIN — HYDROCODONE BITARTRATE AND ACETAMINOPHEN 1 TABLET: 5; 325 TABLET ORAL at 22:40

## 2023-11-01 RX ADMIN — MORPHINE SULFATE 1 MG: 2 INJECTION, SOLUTION INTRAMUSCULAR; INTRAVENOUS at 11:02

## 2023-11-01 RX ADMIN — LOSARTAN POTASSIUM 25 MG: 25 TABLET, FILM COATED ORAL at 09:02

## 2023-11-01 RX ADMIN — LIDOCAINE 1 PATCH: 50 PATCH CUTANEOUS at 22:42

## 2023-11-01 RX ADMIN — DOCUSATE SODIUM 50MG AND SENNOSIDES 8.6MG 2 TABLET: 8.6; 5 TABLET, FILM COATED ORAL at 22:41

## 2023-11-01 RX ADMIN — ATORVASTATIN CALCIUM 20 MG: 20 TABLET, FILM COATED ORAL at 09:03

## 2023-11-01 RX ADMIN — HYDROCODONE BITARTRATE AND ACETAMINOPHEN 1 TABLET: 5; 325 TABLET ORAL at 17:12

## 2023-11-01 NOTE — PLAN OF CARE
Goal Outcome Evaluation:  Plan of Care Reviewed With: patient           Outcome Evaluation: Pt is s/p L femur IM nail and is WBAT. Pt was independently mobile prior to admission. Today she presents with pain, limited ROM and difficulty walking. Pt was in bed when PT arrived for eval. Pain was in a lot of pain and demosntrated limited toelrance for hip exercises even with assistance. Rn was notified and pt was medicated. PT returned later to complete exercises and mobilize. Pt demonstrated improved activity tolerance. She required mod A for bed mobilty. She was able to stand and take a few steps with min A. Pt had difficulty advancing L foot and required assistance from PT to advance L LE each step. Chair was brought up behind pt as she fatiuged quickly and WB tolerance on LLE was limited by pain. Pt would benefit from PT to addess ROM and mobility. Pt will need SNF placement to improve mobiltiy and increase independence before pt can d/c home alone.      Anticipated Discharge Disposition (PT): skilled nursing facility

## 2023-11-01 NOTE — PLAN OF CARE
Goal Outcome Evaluation:  Plan of Care Reviewed With: patient        Progress: no change  Outcome Evaluation: VSS. Pt cont voiding per purewick and PRN pain meds given as per order. Dressing is c/d/i. Educated on falls precaution, medication management and b/p monitoring. Plan to d/c to a SNF. Will take note of any changes.

## 2023-11-01 NOTE — DISCHARGE PLACEMENT REQUEST
"Riri Damon (89 y.o. Female)       Date of Birth   1934    Social Security Number       Address   [address] [address]    Home Phone   [phone]    MRN   [id number]       Denominational   Mormon    Marital Status                               Admission Date   10/29/23    Admission Type   Emergency    Admitting Provider   Stew Bird MD    Attending Provider   Ion Pfeiffer MD    Department, Room/Bed   [address], P896/1       Discharge Date       Discharge Disposition       Discharge Destination                                 Attending Provider: Ion Pfeiffer MD    Allergies: No Known Allergies    Isolation: None   Infection: None   Code Status: No CPR    Ht: 165.1 cm (65\")   Wt: 61.7 kg (136 lb)    Admission Cmt: None   Principal Problem: Hip fracture [S72.009A]                   Active Insurance as of 10/29/2023       Primary Coverage       Payor Plan Insurance Group Employer/Plan Group    HUMANA MEDICARE REPLACEMENT HUMANA MEDICARE REPLACEMENT [id number]       Payor Plan Address Payor Plan Phone Number Payor Plan Fax Number Effective Dates    [address] [address] [phone]  1/1/2014 - None Entered    [facility] [address]         Subscriber Name Subscriber Birth Date Member ID       RIRI DAMON 1934 [id number]                     Emergency Contacts        (Rel.) Home Phone Work Phone Mobile Phone    Matt Damon (Son) [phone] -- [phone]    Aye Damon (Daughter) [phone] -- --              "

## 2023-11-01 NOTE — THERAPY TREATMENT NOTE
Patient Name: Riri Damon  : 1934    MRN: [id number]                              Today's Date: 2023       Admit Date: 10/29/2023    Visit Dx:     ICD-10-CM ICD-9-CM   1. Closed fracture of left hip, initial encounter  S72.002A 820.8     Patient Active Problem List   Diagnosis    Hyperlipidemia    Low back pain    Mitral valve insufficiency    Palpitations    Knee pain    Tricuspid valve insufficiency    Arthritis    Medicare annual wellness visit, subsequent    Screening for osteoporosis    Orthostatic lightheadedness    Essential hypertension    OA (osteoarthritis) of knee    Ventricular septal defect    Left anterior fascicular block    Malignant neoplasm of ascending colon    Family history of colon cancer    Osteopenia of left hip    Acute pain of left shoulder    Wellness examination    Anxiety    Cyst of right ovary    Primary insomnia    Vertigo    Chronic idiopathic constipation    Hip fracture    Leukocytosis    Hyperglycemia    Anemia     Past Medical History:   Diagnosis Date    Anxiety     Colon carcinoma     Stage IIIB, T4N1a; underwent radiation therapy and colon resection by Dr. Mcneil    Deep vein thrombosis 2017    right leg    History of edema     LE    History of rheumatic fever     Hx of radiation therapy     for adenocarcinoma of the colon Stage IIIB, T4N1a    Hyperlipidemia     Hypertension     MEDS PRN    Infectious mononucleosis     Infectious viral hepatitis     Left anterior fascicular block     Mitral regurgitation     2010: mild per echo    OA (osteoarthritis)     Palpitations     Pulmonary hypertension     2010- mild per echo; 2012 - normal RVSP per echo    Spinal headache     Tricuspid regurgitation     2010: Mild to moderate per echo; 2012: Trace to mild per echo    Venous insufficiency     Ventricular septal defect     Per echocardiogram     Vitamin D deficiency      Past Surgical History:   Procedure Laterality Date    APPENDECTOMY       COLECTOMY PARTIAL / TOTAL  02/02/2015 2/2015 due to adenocarcinoma    COLONOSCOPY      JAN 2015    COLONOSCOPY N/A 5/25/2016    Procedure: COLONOSCOPY to ANASTAMOSIS AND TERMINAL ILEUM;  Surgeon: Yfn Mcneil MD;  Location: [facility] ENDOSCOPY;  Service:     COLONOSCOPY N/A 7/11/2019    Procedure: COLONOSCOPY to cecum:;  Surgeon: Yfn Mcneil MD;  [facility];  Service: General    HIP INTERTROCHANTERIC NAILING Left 10/31/2023    Procedure: HIP INTERTROCHANTERIC NAILING;  Surgeon: Yfn Reyes II, MD;  Location: [facility] MAIN OR;  Service: Orthopedics;  Laterality: Left;    HYSTERECTOMY      INTRAOCULAR LENS EXCHANGE Bilateral     JOINT MANIPULATION Right 1/9/2018    Procedure: MANIPULATION OF RT KNEE;  Surgeon: Andrea Caballero MD;  Location: [facility] MAIN OR;  Service:     JOINT REPLACEMENT Right 11/09/2017    KNEE SURGERY Left 2006    ARTHROSCOPY    ME ARTHRP KNE CONDYLE&PLATU MEDIAL&LAT COMPARTMENTS Right 11/9/2017    Procedure: RIGHT TOTAL KNEE ARTHROPLASTY;  Surgeon: Andrea Caballero MD;  Location: [facility] MAIN OR;  Service: Orthopedics    TONSILLECTOMY        General Information       Row Name 11/01/23 1233          Physical Therapy Time and Intention    Document Type evaluation;therapy note (daily note)  -     Mode of Treatment individual therapy;physical therapy  -       Row Name 11/01/23 1411          General Information    Patient Profile Reviewed yes  -     Prior Level of Function independent:  -     Existing Precautions/Restrictions fall  -     Barriers to Rehab none identified  -       Row Name 11/01/23 1411          Living Environment    People in Home alone  spouse in palliative care on 4 P  -       Row Name 11/01/23 1411          Cognition    Orientation Status (Cognition) oriented x 4  -KH               User Key  (r) = Recorded By, (t) = Taken By, (c) = Cosigned By      Initials Name Provider Type    Lorrie Lafleur, PT Physical Therapist                    Mobility       [facility] Name 11/01/23 1411          Bed Mobility    Bed Mobility supine-sit;sit-supine  -     Supine-Sit [place] (Bed Mobility) moderate assist (50% patient effort)  -     Assistive Device (Bed Mobility) bed rails;head of bed elevated  -[facility] Name 11/01/23 1411          Sit-Stand Transfer    Sit-Stand [place] (Transfers) minimum assist (75% patient effort)  -     Assistive Device (Sit-Stand Transfers) walker, front-wheeled  -[facility] Name 11/01/23 1411          Gait/Stairs (Locomotion)    [place] Level (Gait) minimum assist (75% patient effort)  -     Assistive Device (Gait) walker, front-wheeled  -     Distance in Feet (Gait) 3 steps to chair  -     Deviations/Abnormal Patterns (Gait) antalgic;gait speed decreased;stride length decreased  -     Bilateral Gait Deviations weight shift ability decreased  -     Comment, (Gait/Stairs) therapist assisting to advance L foot  -               User Key  (r) = Recorded By, (t) = Taken By, (c) = Cosigned By      Initials Name Provider Type     Lorrie Medina, PT Physical Therapist                   Obj/Interventions       [facility] Name 11/01/23 1412          Range of Motion Comprehensive    Comment, General Range of Motion WFL except L hip,knee  -KH       Row Name 11/01/23 1412          Strength Comprehensive (MMT)    Comment, General Manual Muscle Testing (MMT) Assessment WFL  -KH       Row Name 11/01/23 1412          Motor Skills    Therapeutic Exercise --  L hip protocol x 10 reps with asistance, limited by pain  -KH       Row Name 11/01/23 1412          Balance    Balance Assessment sitting static balance;sitting dynamic balance;standing static balance;standing dynamic balance  -     Static Sitting Balance contact guard  -     Dynamic Sitting Balance minimal assist  -     Position, Sitting Balance unsupported;sitting edge of bed  -     Static Standing Balance minimal assist;contact guard   -KH     Dynamic Standing Balance minimal assist  -KH     Position/Device Used, Standing Balance walker, front-wheeled  -KH               User Key  (r) = Recorded By, (t) = Taken By, (c) = Cosigned By      Initials Name Provider Type    Lorrie Lafleur, PT Physical Therapist                   Goals/Plan       Row Name 11/01/23 1419          Bed Mobility Goal 1 (PT)    Activity/Assistive Device (Bed Mobility Goal 1, PT) bed mobility activities, all  -KH     [place] Level/Cues Needed (Bed Mobility Goal 1, PT) contact guard required  -KH     Time Frame (Bed Mobility Goal 1, PT) 10 days  -       Row Name 11/01/23 1419          Transfer Goal 1 (PT)    Activity/Assistive Device (Transfer Goal 1, PT) transfers, all;walker, rolling  -KH     [place] Level/Cues Needed (Transfer Goal 1, PT) contact guard required  -KH     Time Frame (Transfer Goal 1, PT) 10 days  -       Row Name 11/01/23 1419          Gait Training Goal 1 (PT)    Activity/Assistive Device (Gait Training Goal 1, PT) gait (walking locomotion);walker, rolling  -KH     [place] Level (Gait Training Goal 1, PT) contact guard required  -KH     Distance (Gait Training Goal 1, PT) 75 ft  -KH     Time Frame (Gait Training Goal 1, PT) 10 days  -       Row Name 11/01/23 1419          Patient Education Goal (PT)    Activity (Patient Education Goal, PT) HEP  -KH     [place]/Cues/Accuracy (Memory Goal 2, PT) demonstrates adequately  -KH     Time Frame (Patient Education Goal, PT) 10 days  -       Row Name 11/01/23 1419          Therapy Assessment/Plan (PT)    Planned Therapy Interventions (PT) balance training;bed mobility training;gait training;home exercise program;transfer training;stair training;patient/family education;ROM (range of motion);strengthening  -KH               User Key  (r) = Recorded By, (t) = Taken By, (c) = Cosigned By      Initials Name Provider Type    Lorrie Lafleur, PT Physical Therapist                    Clinical Impression       Row Name 11/01/23 1413          Pain    Pretreatment Pain Rating 8/10  -     Posttreatment Pain Rating 8/10  -     Pain Location - Side/Orientation Left  -     Pain Location - hip  -     Pain Intervention(s) Repositioned;Cold applied;Rest  -       Row Name 11/01/23 1413          Plan of Care Review    Plan of Care Reviewed With patient  -     Outcome Evaluation Pt is s/p L femur IM nail and is WBAT. Pt was independently mobile prior to admission. Today she presents with pain, limited ROM and difficulty walking. Pt was in bed when PT arrived for eval. Pain was in a lot of pain and demosntrated limited toelrance for hip exercises even with assistance. Rn was notified and pt was medicated. PT returned later to complete exercises and mobilize. Pt demonstrated improved activity tolerance. She required mod A for bed mobilty. She was able to stand and take a few steps with min A. Pt had difficulty advancing L foot and required assistance from PT to advance L LE each step. Chair was brought up behind pt as she fatiuged quickly and WB tolerance on LLE was limited by pain. Pt would benefit from PT to addess ROM and mobility. Pt will need SNF placement to improve mobiltiy and increase independence before pt can d/c home alone.  -       Row Name 11/01/23 1413          Therapy Assessment/Plan (PT)    Patient/Family Therapy Goals Statement (PT) return to PLOF  -     Rehab Potential (PT) good, to achieve stated therapy goals  -     Criteria for Skilled Interventions Met (PT) yes  -     Therapy Frequency (PT) daily  -       Row Name 11/01/23 1413          Positioning and Restraints    Pre-Treatment Position in bed  -     Post Treatment Position chair  -     In Chair reclined;call light within reach;encouraged to call for assist;exit alarm on;with family/caregiver;notified [facility]  -               User Key  (r) = Recorded By, (t) = Taken By, (c) = Cosigned By      Initials  Name Provider Type    Lorrie Lafleur, PT Physical Therapist                   Outcome Measures       Row Name 11/01/23 1419 11/01/23 0859       How much help from another person do you currently need...    Turning from your back to your side while in flat bed without using bedrails? 3  -KH 3  -BAKARI    Moving from lying on back to sitting on the side of a flat bed without bedrails? 2  -KH 2  -BAKARI    Moving to and from a bed to a chair (including a wheelchair)? 3  -KH 2  -BAKARI    Standing up from a chair using your arms (e.g., wheelchair, bedside chair)? 3  -KH 2  -BAKARI    Climbing 3-5 steps with a railing? 1  -KH 1  -BAKARI    To walk in hospital room? 2  -KH 2  -BAKRAI    AM-PAC 6 Clicks Score (PT) 14  -KH 12  -BAKARI    Highest level of mobility 4 --> Transferred to chair/commode  -KH 4 --> Transferred to chair/commode  -BAKARI              User Key  (r) = Recorded By, (t) = Taken By, (c) = Cosigned By      Initials Name Provider Type    Lorrie Lafleur, PT Physical Therapist    Irving Leung, RN Registered Nurse                                 Physical Therapy Education       Title: PT OT SLP Therapies (In Progress)       Topic: Physical Therapy (Done)       Point: Mobility training (Done)       Learning Progress Summary             Patient Acceptance, E, VU,NR by  at 11/1/2023 1420                         Point: Home exercise program (Done)       Learning Progress Summary             Patient Acceptance, E, VU,NR by  at 11/1/2023 1420                         Point: Body mechanics (Done)       Learning Progress Summary             Patient Acceptance, E, VU,NR by  at 11/1/2023 1420                         Point: Precautions (Done)       Learning Progress Summary             Patient Acceptance, E, VU,NR by  at 11/1/2023 1420                                         User Key       Initials Effective Dates Name Provider Type Discipline     07/11/23 -  Lorrie Medina, PT Physical Therapist PT                   PT Recommendation and Plan  Planned Therapy Interventions (PT): balance training, bed mobility training, gait training, home exercise program, transfer training, stair training, patient/family education, ROM (range of motion), strengthening  Plan of Care Reviewed With: patient  Outcome Evaluation: Pt is s/p L femur IM nail and is WBAT. Pt was independently mobile prior to admission. Today she presents with pain, limited ROM and difficulty walking. Pt was in bed when PT arrived for eval. Pain was in a lot of pain and demosntrated limited toelrance for hip exercises even with assistance. Rn was notified and pt was medicated. PT returned later to complete exercises and mobilize. Pt demonstrated improved activity tolerance. She required mod A for bed mobilty. She was able to stand and take a few steps with min A. Pt had difficulty advancing L foot and required assistance from PT to advance L LE each step. Chair was brought up behind pt as she fatiuged quickly and WB tolerance on LLE was limited by pain. Pt would benefit from PT to addess ROM and mobility. Pt will need SNF placement to improve mobiltiy and increase independence before pt can d/c home alone.     Time Calculation:         PT Charges       Row Name 11/01/23 1422 11/01/23 1421          Time Calculation    Start Time 1255  -KH 1042  -KH     Stop Time 1310  -KH 1058  -KH     Time Calculation (min) 15 min  -KH 16 min  -KH     PT Received On -- 11/01/23  -     PT - Next Appointment -- 11/02/23  -     PT Goal Re-Cert Due Date -- 11/11/23  -        Time Calculation- PT    Total Timed Code Minutes- PT 15 minute(s)  -KH 8 minute(s)  -KH        Timed Charges    60619 - PT Therapeutic Exercise Minutes -- 8  -KH     80348 - PT Therapeutic Activity Minutes 15  -KH --        Untimed Charges    PT Eval/Re-eval Minutes -- 8  -KH        Total Minutes    Timed Charges Total Minutes 15  -KH 8  -KH     Untimed Charges Total Minutes -- 8  -KH      Total Minutes  15  -KH 16  -KH               User Key  (r) = Recorded By, (t) = Taken By, (c) = Cosigned By      Initials Name Provider Type    Lorrie Lafleur, PT Physical Therapist                  Therapy Charges for Today       Code Description Service Date Service Provider Modifiers Qty    46395794713 HC PT EVAL MOD COMPLEXITY 2 11/1/2023 Lorrie Medina, PT GP 1    55006328897 HC PT THER PROC EA 15 MIN 11/1/2023 Lorrie Medina, PT GP 1    53055345996 HC PT THERAPEUTIC ACT EA 15 MIN 11/1/2023 Lorrie Medina, PT GP 1            PT G-Codes  AM-PAC 6 Clicks Score (PT): 14  PT Discharge Summary  Anticipated Discharge Disposition (PT): skilled nursing facility    Lorrie Medina, PT  11/1/2023

## 2023-11-01 NOTE — PLAN OF CARE
Goal Outcome Evaluation:           Progress: improving  Outcome Evaluation: 89 y/o s/POD 1 L hip intertrochanteric nailing. AOX4. Pt up to chair w/ assist x2. Voiding function intact via PW. Neuro checks WNL, no c/o numbness/tingling. Bordered dsg c/d/i. Pain managed via PO pills. PIV running Nacl at 50cc/hr. Needs met. VSS. 1 L O2. Educated pt on SCD pumps. Plan to d/c to rehab, referrals sent. WIll CTM.

## 2023-11-01 NOTE — CASE MANAGEMENT/SOCIAL WORK
Discharge Planning Assessment  [facility]     Patient Name: Riri Damon  MRN: [id number]  Today's Date: 11/1/2023    Admit Date: 10/29/2023    Plan: SNF -- Referrals Pending.   Discharge Needs Assessment       Row Name 11/01/23 1714       Living Environment    People in Home alone    Current Living Arrangements home    Primary Care Provided by self    Provides Primary Care For no one    Family Caregiver if Needed child(otilia), adult    Quality of Family Relationships helpful;involved;supportive    Able to Return to Prior Arrangements yes       Transition Planning    Patient/Family Anticipates Transition to home with family    Patient/Family Anticipated Services at Transition     Transportation Anticipated family or friend will provide;health plan transportation       Discharge Needs Assessment    Readmission Within the Last 30 Days no previous admission in last 30 days    Equipment Currently Used at Home none    Discharge Facility/Level of Care Needs nursing facility, skilled    Provided Post Acute Provider List? Yes    Post Acute Provider List Nursing Home  SNF.    Patient's Choice of Community Agency(s) [facility].                   Discharge Plan       Row Name 11/01/23 1715       Plan    Plan SNF -- Referrals Pending.    Patient/Family in Agreement with Plan yes    Plan Comments Spoke with the patient, verified current information and explained the role of the CCP. Patient said she lives alone and has some family support. She's IADL and has no history with DME/RH. She has worked with [facility] in the past. Patient plans to d/c home with family support. Physical Therapy eval discussed with the patient and she's agreeable with SNF. She requests referrals to [facility]. Referrals sent in [facility]. CCP will follow.                  Continued Care and Services - Admitted Since 10/29/2023       Destination       Service Provider Request  Status Selected Services Address Phone Fax Patient Preferred    [facility] Pending - Request Sent N/A [address] [facility] [address] [phone] --    [facility] Pending - Request Sent N/A [address] [facility] [address] [facility] [address] [phone] --                  Expected Discharge Date and Time       Expected Discharge Date Expected Discharge Time    Nov 2, 2023            Demographic Summary       Row Name 11/01/23 1712       General Information    Admission Type inpatient    Reason for Consult discharge planning    Preferred Language English       Contact Information    Permission Granted to Share Info With ;family/designee                   Functional Status       Row Name 11/01/23 1712       Functional Status    Usual Activity Tolerance good       Functional Status, IADL    Medications independent    Meal Preparation independent    Housekeeping independent    Laundry independent    Shopping independent       Mental Status Summary    Recent Changes in Mental Status/Cognitive Functioning no changes                   Psychosocial       Row Name 11/01/23 1713       Intellectual Performance WDL    Level of Consciousness Alert       Coping/Stress    Patient Personal Strengths able to adapt    Sources of Support adult child(otilia)    Reaction to Health Status accepting    Understanding of Condition and Treatment adequate understanding of medical condition;adequate understanding of treatment       Developmental Stage (Eriksson's)    Developmental Stage Stage 8 (65 years-death/Late Adulthood) Integrity vs. Marley DEGROOT, RN

## 2023-11-01 NOTE — PROGRESS NOTES
"/67 (BP Location: Right arm, Patient Position: Lying)   Pulse 77   Temp 98.2 °F (36.8 °C) (Oral)   Resp 16   Ht 165.1 cm (65\")   Wt 61.7 kg (136 lb)   LMP  (LMP Unknown)   SpO2 97%   BMI 22.63 kg/m²     Results from last 7 days   Lab Units 11/01/23  0504   WBC 10*3/mm3 12.95*   HEMOGLOBIN g/dL 9.8*   HEMATOCRIT % 28.7*   PLATELETS 10*3/mm3 162       Results from last 7 days   Lab Units 11/01/23  0504   SODIUM mmol/L 139   POTASSIUM mmol/L 3.8   CHLORIDE mmol/L 109*   CO2 mmol/L 23.9   BUN mg/dL 8   CREATININE mg/dL 0.50*   GLUCOSE mg/dL 105*   CALCIUM mg/dL 7.9*       Imaging Results (Last 24 Hours)       ** No results found for the last 24 hours. **            Patient Care Team:  Phoenix Velazquez MD as PCP - General  Yfn Mcneil MD as Referring Physician (General Surgery)  Asiya Hinton MD as Consulting Physician (Hematology and Oncology)  Rowan Serrano MD as Consulting Physician (Cardiology)    SUBJECTIVE  Doing well.  Depressed mood because of her  on 8P  PHYSICAL EXAM  Nv intact     Hip fracture    Hyperlipidemia    Essential hypertension    Left anterior fascicular block    Leukocytosis    Hyperglycemia      PLAN / DISPOSITION:  Mobilize.  WBAT with walker.  Plan rehab at D/C  HARSHIL Quezada  11/01/23  09:20 EDT    "

## 2023-11-01 NOTE — PROGRESS NOTES
Name: Riri Damon ADMIT: 10/29/2023   : 1934  PCP: Phoenix Velazquez MD    MRN: [id number] LOS: 3 days   AGE/SEX: 89 y.o. female  ROOM: [facility]     Subjective   Subjective   Increased pain when attempted to work with PT. Appetite wnl. Urinating without difficulty. Has not had BM since , denies nausea, abd pain/distention. Tearful re:  on palliative unit       Objective   Objective   Vital Signs  Temp:  [97.4 °F (36.3 °C)-98.3 °F (36.8 °C)] 98.2 °F (36.8 °C)  Heart Rate:  [74-95] 82  Resp:  [16] 16  BP: (105-137)/(60-80) 122/73  SpO2:  [94 %-97 %] 94 %  on  Flow (L/min):  [2] 2;   Device (Oxygen Therapy): nasal cannula  Body mass index is 22.63 kg/m².  Physical Exam  Vitals and nursing note reviewed.   Constitutional:       General: She is not in acute distress.  HENT:      Head: Normocephalic.      Mouth/Throat:      Mouth: Mucous membranes are moist.   Eyes:      Conjunctiva/sclera: Conjunctivae normal.   Cardiovascular:      Rate and Rhythm: Normal rate and regular rhythm.   Pulmonary:      Effort: Pulmonary effort is normal. No respiratory distress.      Breath sounds: No wheezing or rales.   Abdominal:      General: Bowel sounds are normal.      Palpations: Abdomen is soft.   Musculoskeletal:      Cervical back: Neck supple.      Right lower leg: No edema.      Left lower leg: No edema.   Skin:     General: Skin is warm and dry.   Neurological:      Mental Status: She is alert and oriented to person, place, and time.   Psychiatric:         Mood and Affect: Affect is tearful.       Results Review     I reviewed the patient's new clinical results.  Results from last 7 days   Lab Units 23  0504 10/31/23  0437 10/30/23  0437 10/29/23  1957   WBC 10*3/mm3 12.95* 10.42 13.00* 11.16*   HEMOGLOBIN g/dL 9.8* 11.2* 12.0 13.8   PLATELETS 10*3/mm3 162 167 221 229     Results from last 7 days   Lab Units 23  0504 10/31/23  0437 10/30/23  0437 10/29/23  1957   SODIUM mmol/L 139 136 137  140   POTASSIUM mmol/L 3.8 3.8 4.2 4.0   CHLORIDE mmol/L 109* 105 104 104   CO2 mmol/L 23.9 24.5 25.0 26.0   BUN mg/dL 8 11 14 15   CREATININE mg/dL 0.50* 0.50* 0.57 0.61   GLUCOSE mg/dL 105* 124* 149* 165*   EGFR mL/min/1.73 89.8 89.8 87.0 85.6     Results from last 7 days   Lab Units 11/01/23  0504 10/31/23  0437 10/29/23  1957   ALBUMIN g/dL 3.0* 3.4* 4.0   BILIRUBIN mg/dL  --   --  0.2   ALK PHOS U/L  --   --  62   AST (SGOT) U/L  --   --  19   ALT (SGPT) U/L  --   --  18     Results from last 7 days   Lab Units 11/01/23  0504 10/31/23  0437 10/30/23  0437 10/29/23  1957   CALCIUM mg/dL 7.9* 8.0* 8.4* 9.0   ALBUMIN g/dL 3.0* 3.4*  --  4.0       Hemoglobin A1C   Date/Time Value Ref Range Status   10/30/2023 0437 5.40 4.80 - 5.60 % Final       XR Hip With or Without Pelvis 2 - 3 View Left    Result Date: 10/31/2023  3 spot fluoroscopic images of the left hip. Please see operative note for further details. Fluoroscopy time 24 seconds. Cumulative air kerma 3.6 mGy.    This report was finalized on 10/31/2023 8:52 AM by Dr. Jesus Morfin M.D on Workstation: [id number]      XR Hip With or Without Pelvis 2 - 3 View Left    Result Date: 10/31/2023  Proximal left femoral intramedullary jacqui with interlocking screw transversing a comminuted left femoral intertrochanteric fracture. Hardware is well-positioned and intact.    This report was finalized on 10/31/2023 8:35 AM by Dr. Jesus Morfin M.D on Workstation: [id number]       I have personally reviewed all medications:  Scheduled Medications  aspirin, 81 mg, Oral, BID  atorvastatin, 20 mg, Oral, Daily  lidocaine, 1 patch, Transdermal, Nightly  losartan, 25 mg, Oral, Daily  senna-docusate sodium, 2 tablet, Oral, BID    Infusions  lactated ringers, 9 mL/hr, Last Rate: 9 mL/hr (10/31/23 0604)  sodium chloride, 50 mL/hr, Last Rate: 50 mL/hr (10/31/23 0910)    Diet  Diet: Regular/House Diet; Texture: Regular Texture (IDDSI 7); Fluid Consistency: Thin (IDDSI 0)    I have  personally reviewed:  [x]  Laboratory   [x]  Microbiology   [x]  Radiology   [x]  EKG/Telemetry  [x]  Cardiology/Vascular   []  Pathology    []  Records       Assessment/Plan     Active Hospital Problems    Diagnosis  POA   • **Hip fracture [S72.009A]  Yes   • Anemia [D64.9]  Yes   • Leukocytosis [D72.829]  Yes   • Hyperglycemia [R73.9]  Yes   • Left anterior fascicular block [I44.4]  Yes   • Essential hypertension [I10]  Yes   • Hyperlipidemia [E78.5]  Yes      Resolved Hospital Problems   No resolved problems to display.       89 y.o. female admitted with Hip fracture.    Left femoral fracture  -S/p mechanical fall and left hip fracture  -Appreciate Ortho assistance  -S/P Lt IM nailing 10/31  -Pain control and bowel regimen postoperatively  -PT/OT consult  -ASA BID for VTE ppx per Ortho     Left basilar consolidation  Leukocytosis  -CXR with evidence of left-sided consolidation, most likely atelectasis though infection cannot be ruled out  -WBC normalized now up to 12.95  -Requiring O2 postop, wean as tolerated  -Very low suspicion for pneumonia at this time.  Leukocytosis seems to be most likely reactive at this point.  Continue to closely monitor.  Can pursue additional infectious work-up and start antibiotics should the patient change clinically  -Use of IS postoperatively     HTN  Hyperlipidemia  -Continue home losartan  -Continue home statin  -Monitor for postop hypotension; BP acceptable on review     Hyperglycemia  -Hgb A1c 5.4  -Glucoses have been somewhat elevated, but overall okay for hospitalization  -Closely monitor     Anemia:  -Hgb 13.8-->12-->11.2-->9.8, likely dilutional along w/blood loss w/surgery  -Check iron panel, b12, folate  -Monitor daily      ASA  for DVT prophylaxis.  Limited code (no CPR, no intubation).  Discussed with patient and family.  Anticipate discharge to SNU facility later this week..      PADMINI Sanchez  [facility]  11/01/23  12:35 EDT

## 2023-11-02 ENCOUNTER — APPOINTMENT (OUTPATIENT)
Dept: GENERAL RADIOLOGY | Facility: HOSPITAL | Age: 88
End: 2023-11-02
Payer: MEDICARE

## 2023-11-02 LAB
ALBUMIN SERPL-MCNC: 3 G/DL (ref 3.5–5.2)
ANION GAP SERPL CALCULATED.3IONS-SCNC: 6.5 MMOL/L (ref 5–15)
BUN SERPL-MCNC: 11 MG/DL (ref 8–23)
BUN/CREAT SERPL: 32.4 (ref 7–25)
CALCIUM SPEC-SCNC: 7.8 MG/DL (ref 8.6–10.5)
CHLORIDE SERPL-SCNC: 111 MMOL/L (ref 98–107)
CO2 SERPL-SCNC: 23.5 MMOL/L (ref 22–29)
CREAT SERPL-MCNC: 0.34 MG/DL (ref 0.57–1)
DEPRECATED RDW RBC AUTO: 40.3 FL (ref 37–54)
EGFRCR SERPLBLD CKD-EPI 2021: 98.5 ML/MIN/1.73
ERYTHROCYTE [DISTWIDTH] IN BLOOD BY AUTOMATED COUNT: 12.1 % (ref 12.3–15.4)
FERRITIN SERPL-MCNC: 321 NG/ML (ref 13–150)
FOLATE SERPL-MCNC: 12.8 NG/ML (ref 4.78–24.2)
GLUCOSE SERPL-MCNC: 102 MG/DL (ref 65–99)
HCT VFR BLD AUTO: 27.9 % (ref 34–46.6)
HGB BLD-MCNC: 9.6 G/DL (ref 12–15.9)
IRON 24H UR-MRATE: 45 MCG/DL (ref 37–145)
IRON SATN MFR SERPL: 22 % (ref 20–50)
MCH RBC QN AUTO: 31.6 PG (ref 26.6–33)
MCHC RBC AUTO-ENTMCNC: 34.4 G/DL (ref 31.5–35.7)
MCV RBC AUTO: 91.8 FL (ref 79–97)
PLATELET # BLD AUTO: 174 10*3/MM3 (ref 140–450)
PMV BLD AUTO: 10.7 FL (ref 6–12)
POTASSIUM SERPL-SCNC: 3.5 MMOL/L (ref 3.5–5.2)
RBC # BLD AUTO: 3.04 10*6/MM3 (ref 3.77–5.28)
RETICS # AUTO: 0.06 10*6/MM3 (ref 0.02–0.13)
RETICS/RBC NFR AUTO: 2 % (ref 0.7–1.9)
SODIUM SERPL-SCNC: 141 MMOL/L (ref 136–145)
TIBC SERPL-MCNC: 201 MCG/DL (ref 298–536)
TRANSFERRIN SERPL-MCNC: 135 MG/DL (ref 200–360)
VIT B12 BLD-MCNC: 517 PG/ML (ref 211–946)
WBC NRBC COR # BLD: 9.37 10*3/MM3 (ref 3.4–10.8)

## 2023-11-02 PROCEDURE — 82607 VITAMIN B-12: CPT | Performed by: NURSE PRACTITIONER

## 2023-11-02 PROCEDURE — 74018 RADEX ABDOMEN 1 VIEW: CPT

## 2023-11-02 PROCEDURE — 80048 BASIC METABOLIC PNL TOTAL CA: CPT | Performed by: ORTHOPAEDIC SURGERY

## 2023-11-02 PROCEDURE — 82746 ASSAY OF FOLIC ACID SERUM: CPT | Performed by: NURSE PRACTITIONER

## 2023-11-02 PROCEDURE — 85027 COMPLETE CBC AUTOMATED: CPT | Performed by: ORTHOPAEDIC SURGERY

## 2023-11-02 PROCEDURE — 82040 ASSAY OF SERUM ALBUMIN: CPT | Performed by: ORTHOPAEDIC SURGERY

## 2023-11-02 PROCEDURE — 84466 ASSAY OF TRANSFERRIN: CPT | Performed by: NURSE PRACTITIONER

## 2023-11-02 PROCEDURE — 83540 ASSAY OF IRON: CPT | Performed by: NURSE PRACTITIONER

## 2023-11-02 PROCEDURE — 82728 ASSAY OF FERRITIN: CPT | Performed by: NURSE PRACTITIONER

## 2023-11-02 PROCEDURE — 85045 AUTOMATED RETICULOCYTE COUNT: CPT | Performed by: NURSE PRACTITIONER

## 2023-11-02 RX ORDER — AMOXICILLIN 250 MG
2 CAPSULE ORAL 2 TIMES DAILY
Status: DISCONTINUED | OUTPATIENT
Start: 2023-11-02 | End: 2023-11-03

## 2023-11-02 RX ORDER — BISACODYL 5 MG/1
5 TABLET, DELAYED RELEASE ORAL DAILY PRN
Status: DISCONTINUED | OUTPATIENT
Start: 2023-11-02 | End: 2023-11-03

## 2023-11-02 RX ORDER — POLYETHYLENE GLYCOL 3350 17 G/17G
17 POWDER, FOR SOLUTION ORAL DAILY
Status: DISCONTINUED | OUTPATIENT
Start: 2023-11-02 | End: 2023-11-03

## 2023-11-02 RX ORDER — BISACODYL 10 MG
10 SUPPOSITORY, RECTAL RECTAL DAILY PRN
Status: DISCONTINUED | OUTPATIENT
Start: 2023-11-02 | End: 2023-11-03

## 2023-11-02 RX ADMIN — ASPIRIN 81 MG: 81 TABLET, CHEWABLE ORAL at 22:54

## 2023-11-02 RX ADMIN — ASPIRIN 81 MG: 81 TABLET, CHEWABLE ORAL at 09:13

## 2023-11-02 RX ADMIN — ATORVASTATIN CALCIUM 20 MG: 20 TABLET, FILM COATED ORAL at 09:13

## 2023-11-02 RX ADMIN — LIDOCAINE 1 PATCH: 50 PATCH CUTANEOUS at 22:53

## 2023-11-02 RX ADMIN — DOCUSATE SODIUM 50MG AND SENNOSIDES 8.6MG 2 TABLET: 8.6; 5 TABLET, FILM COATED ORAL at 09:13

## 2023-11-02 RX ADMIN — LOSARTAN POTASSIUM 25 MG: 25 TABLET, FILM COATED ORAL at 09:13

## 2023-11-02 NOTE — PROGRESS NOTES
Continued Stay Note  [facility]     Patient Name: Riri Damon  MRN: [id number]  Today's Date: 11/2/2023    Admit Date: 10/29/2023    Plan: [facility] pending inusrance precert and bed available 11/4/23   Discharge Plan       Row Name 11/02/23 1337       Plan    Plan [facility] pending inusrance precert and bed available 11/4/23    Plan Comments Followed up with pt and her son to  discuss pt's DCP/needs.  Pt and son wanting 1: [facility] 2: [facility].  Spoke to Teresa with [place] who stated that [facility] can accpet pt pending insurnace precert aand bed available 11/4/23. Insurance precert  will be started  11/3/23 in order to cover pt for possible weekend transfer.  Pt's son requesting phone call about how pt will transfer to [facility].  Pt's son agreeable to provide payment for either wheelcair or stretcher transport for pt.                   Discharge Codes    No documentation.                 Expected Discharge Date and Time       Expected Discharge Date Expected Discharge Time    Nov 2, 2023               VERONICA [facility]

## 2023-11-02 NOTE — PLAN OF CARE
Goal Outcome Evaluation:           Progress: improving  Outcome Evaluation: A&Ox4. Voiding via RAJESH castañeda this shift. Minimal complaints of pain, educated on pain regimen and call light use. x2 to chair. NS infusing at 50ml/hr. Plan to discharge to [facility] possibly saturday if deemed stable.

## 2023-11-02 NOTE — PROGRESS NOTES
Name: Riri Damon ADMIT: 10/29/2023   : 1934  PCP: Phoenix Velazquez MD    MRN: [id number] LOS: 4 days   AGE/SEX: 89 y.o. female  ROOM: [facility]     Subjective   Subjective   No new issues. 2nd attempt w/PT was better yesterday. Still no BM       Objective   Objective   Vital Signs  Temp:  [98 °F (36.7 °C)-98.9 °F (37.2 °C)] 98 °F (36.7 °C)  Heart Rate:  [82-89] 89  Resp:  [16] 16  BP: (118-132)/(66-72) 132/72  SpO2:  [94 %-99 %] 99 %  on  Flow (L/min):  [1-2] 1;   Device (Oxygen Therapy): nasal cannula  Body mass index is 22.63 kg/m².  Physical Exam  Vitals and nursing note reviewed.   Constitutional:       General: She is not in acute distress.  HENT:      Head: Normocephalic.      Mouth/Throat:      Mouth: Mucous membranes are moist.   Eyes:      Conjunctiva/sclera: Conjunctivae normal.   Cardiovascular:      Rate and Rhythm: Normal rate and regular rhythm.   Pulmonary:      Effort: Pulmonary effort is normal. No respiratory distress.      Breath sounds: No wheezing or rales.   Abdominal:      General: Bowel sounds are normal. There is no distension.      Palpations: Abdomen is soft.   Musculoskeletal:      Cervical back: Neck supple.      Right lower leg: No edema.      Left lower leg: No edema.   Skin:     General: Skin is warm and dry.   Neurological:      Mental Status: She is alert and oriented to person, place, and time.   Psychiatric:         Mood and Affect: Mood normal.         Behavior: Behavior normal.       Results Review     I reviewed the patient's new clinical results.  Results from last 7 days   Lab Units 23  0500 23  0504 10/31/23  0437 10/30/23  0437   WBC 10*3/mm3 9.37 12.95* 10.42 13.00*   HEMOGLOBIN g/dL 9.6* 9.8* 11.2* 12.0   PLATELETS 10*3/mm3 174 162 167 221     Results from last 7 days   Lab Units 23  0500 23  0504 10/31/23  0437 10/30/23  0437   SODIUM mmol/L 141 139 136 137   POTASSIUM mmol/L 3.5 3.8 3.8 4.2   CHLORIDE mmol/L 111* 109* 105 104  "  CO2 mmol/L 23.5 23.9 24.5 25.0   BUN mg/dL 11 8 11 14   CREATININE mg/dL 0.34* 0.50* 0.50* 0.57   GLUCOSE mg/dL 102* 105* 124* 149*   EGFR mL/min/1.73 98.5 89.8 89.8 87.0     Results from last 7 days   Lab Units 11/02/23  0500 11/01/23  0504 10/31/23  0437 10/29/23  1957   ALBUMIN g/dL 3.0* 3.0* 3.4* 4.0   BILIRUBIN mg/dL  --   --   --  0.2   ALK PHOS U/L  --   --   --  62   AST (SGOT) U/L  --   --   --  19   ALT (SGPT) U/L  --   --   --  18     Results from last 7 days   Lab Units 11/02/23  0500 11/01/23  0504 10/31/23  0437 10/30/23  0437 10/29/23  1957   CALCIUM mg/dL 7.8* 7.9* 8.0* 8.4* 9.0   ALBUMIN g/dL 3.0* 3.0* 3.4*  --  4.0       No results found for: \"HGBA1C\", \"POCGLU\"    No radiology results for the last day    I have personally reviewed all medications:  Scheduled Medications  aspirin, 81 mg, Oral, BID  atorvastatin, 20 mg, Oral, Daily  lidocaine, 1 patch, Transdermal, Nightly  losartan, 25 mg, Oral, Daily  senna-docusate sodium, 2 tablet, Oral, BID   And  polyethylene glycol, 17 g, Oral, Daily    Infusions  lactated ringers, 9 mL/hr, Last Rate: 9 mL/hr (10/31/23 0651)  sodium chloride, 50 mL/hr, Last Rate: 50 mL/hr (10/31/23 2151)    Diet  Diet: Regular/House Diet; Texture: Regular Texture (IDDSI 7); Fluid Consistency: Thin (IDDSI 0)    I have personally reviewed:  [x]  Laboratory   [x]  Microbiology   [x]  Radiology   [x]  EKG/Telemetry  [x]  Cardiology/Vascular   []  Pathology    []  Records       Assessment/Plan     Active Hospital Problems    Diagnosis  POA   • **Hip fracture [S72.009A]  Yes   • Anemia [D64.9]  Yes   • Leukocytosis [D72.829]  Yes   • Hyperglycemia [R73.9]  Yes   • Left anterior fascicular block [I44.4]  Yes   • Essential hypertension [I10]  Yes   • Hyperlipidemia [E78.5]  Yes      Resolved Hospital Problems   No resolved problems to display.       89 y.o. female admitted with Hip fracture.    Left femoral fracture  -S/p mechanical fall and left hip fracture  -Appreciate Ortho " assistance  -S/P Lt IM nailing 10/31  -Pain control and bowel regimen postoperatively; schedule miralax  -PT/OT following  -ASA BID for VTE ppx per Ortho     Left basilar consolidation  Leukocytosis  -CXR with evidence of left-sided consolidation, most likely atelectasis though infection cannot be ruled out  -WBC normalized   -Requiring O2 postop, wean as tolerated  -Very low suspicion for pneumonia at this time.  Leukocytosis seems to be most likely reactive at this point.  Continue to closely monitor.  Can pursue additional infectious work-up and start antibiotics should the patient change clinically  -Use of IS postoperatively     HTN  Hyperlipidemia  -Continue home losartan  -Continue home statin  -Monitor for postop hypotension; BP acceptable on review     Hyperglycemia  -Hgb A1c 5.4  -Glucoses have been somewhat elevated, but overall okay for hospitalization  -Closely monitor     Anemia:  -Hgb 13.8-->12-->11.2-->9.6, likely dilutional along w/blood loss w/surgery  -Iron, iron sat wnl.   -Monitor daily      ASA  for DVT prophylaxis.  Limited code (no CPR, no intubation).  Discussed with patient.  Anticipate discharge  likely snf  once arrangements have been made..      PADMINI Sanchez  [facility]  11/02/23  12:33 EDT

## 2023-11-02 NOTE — PLAN OF CARE
Pt presenting with no new acute issues this shift, no complaints of acute pain or discomfort to surgical site.  No loss of pulse, sensation, or motor function. Pt resting well in bed, no further acute issues, will continue to monitor and observe     Goal Outcome Evaluation:  Plan of Care Reviewed With: patient        Progress: improving      Problem: Adult Inpatient Plan of Care  Goal: Plan of Care Review  Outcome: Ongoing, Progressing  Flowsheets (Taken 11/2/2023 0803)  Progress: improving  Plan of Care Reviewed With: patient     Problem: Pain Acute  Goal: Acceptable Pain Control and Functional Ability  Outcome: Ongoing, Progressing     Problem: Hypertension Comorbidity  Goal: Blood Pressure in Desired Range  Outcome: Ongoing, Progressing     Problem: Skin Injury Risk Increased  Goal: Skin Health and Integrity  Outcome: Ongoing, Progressing     Problem: Fall Injury Risk  Goal: Absence of Fall and Fall-Related Injury  Outcome: Ongoing, Progressing

## 2023-11-03 ENCOUNTER — APPOINTMENT (OUTPATIENT)
Dept: GENERAL RADIOLOGY | Facility: HOSPITAL | Age: 88
End: 2023-11-03
Payer: MEDICARE

## 2023-11-03 PROBLEM — E87.6 HYPOKALEMIA: Status: ACTIVE | Noted: 2023-11-03

## 2023-11-03 LAB
ALBUMIN SERPL-MCNC: 2.9 G/DL (ref 3.5–5.2)
ANION GAP SERPL CALCULATED.3IONS-SCNC: 7.4 MMOL/L (ref 5–15)
BACTERIA UR QL AUTO: ABNORMAL /HPF
BILIRUB UR QL STRIP: NEGATIVE
BUN SERPL-MCNC: 7 MG/DL (ref 8–23)
BUN/CREAT SERPL: 21.2 (ref 7–25)
CALCIUM SPEC-SCNC: 7.9 MG/DL (ref 8.6–10.5)
CHLORIDE SERPL-SCNC: 106 MMOL/L (ref 98–107)
CLARITY UR: ABNORMAL
CO2 SERPL-SCNC: 23.6 MMOL/L (ref 22–29)
COLOR UR: YELLOW
CREAT SERPL-MCNC: 0.33 MG/DL (ref 0.57–1)
DEPRECATED RDW RBC AUTO: 39.9 FL (ref 37–54)
EGFRCR SERPLBLD CKD-EPI 2021: 99.2 ML/MIN/1.73
ERYTHROCYTE [DISTWIDTH] IN BLOOD BY AUTOMATED COUNT: 12 % (ref 12.3–15.4)
GLUCOSE SERPL-MCNC: 98 MG/DL (ref 65–99)
GLUCOSE UR STRIP-MCNC: NEGATIVE MG/DL
HCT VFR BLD AUTO: 28.3 % (ref 34–46.6)
HGB BLD-MCNC: 9.8 G/DL (ref 12–15.9)
HGB UR QL STRIP.AUTO: ABNORMAL
HYALINE CASTS UR QL AUTO: ABNORMAL /LPF
KETONES UR QL STRIP: NEGATIVE
LEUKOCYTE ESTERASE UR QL STRIP.AUTO: ABNORMAL
MCH RBC QN AUTO: 31.6 PG (ref 26.6–33)
MCHC RBC AUTO-ENTMCNC: 34.6 G/DL (ref 31.5–35.7)
MCV RBC AUTO: 91.3 FL (ref 79–97)
NITRITE UR QL STRIP: NEGATIVE
PH UR STRIP.AUTO: 7 [PH] (ref 5–8)
PLATELET # BLD AUTO: 200 10*3/MM3 (ref 140–450)
PMV BLD AUTO: 10.3 FL (ref 6–12)
POTASSIUM SERPL-SCNC: 3.3 MMOL/L (ref 3.5–5.2)
PROT UR QL STRIP: ABNORMAL
RBC # BLD AUTO: 3.1 10*6/MM3 (ref 3.77–5.28)
RBC # UR STRIP: ABNORMAL /HPF
REF LAB TEST METHOD: ABNORMAL
SODIUM SERPL-SCNC: 137 MMOL/L (ref 136–145)
SP GR UR STRIP: 1.01 (ref 1–1.03)
SQUAMOUS #/AREA URNS HPF: ABNORMAL /HPF
UROBILINOGEN UR QL STRIP: ABNORMAL
WBC # UR STRIP: ABNORMAL /HPF
WBC NRBC COR # BLD: 12.15 10*3/MM3 (ref 3.4–10.8)

## 2023-11-03 PROCEDURE — 87186 SC STD MICRODIL/AGAR DIL: CPT | Performed by: NURSE PRACTITIONER

## 2023-11-03 PROCEDURE — 97110 THERAPEUTIC EXERCISES: CPT | Performed by: PHYSICAL THERAPIST

## 2023-11-03 PROCEDURE — 71045 X-RAY EXAM CHEST 1 VIEW: CPT

## 2023-11-03 PROCEDURE — 97530 THERAPEUTIC ACTIVITIES: CPT | Performed by: PHYSICAL THERAPIST

## 2023-11-03 PROCEDURE — 81001 URINALYSIS AUTO W/SCOPE: CPT | Performed by: NURSE PRACTITIONER

## 2023-11-03 PROCEDURE — 87086 URINE CULTURE/COLONY COUNT: CPT | Performed by: NURSE PRACTITIONER

## 2023-11-03 PROCEDURE — 82040 ASSAY OF SERUM ALBUMIN: CPT | Performed by: ORTHOPAEDIC SURGERY

## 2023-11-03 PROCEDURE — 85027 COMPLETE CBC AUTOMATED: CPT | Performed by: ORTHOPAEDIC SURGERY

## 2023-11-03 PROCEDURE — 80048 BASIC METABOLIC PNL TOTAL CA: CPT | Performed by: ORTHOPAEDIC SURGERY

## 2023-11-03 RX ORDER — POTASSIUM CHLORIDE 750 MG/1
40 TABLET, FILM COATED, EXTENDED RELEASE ORAL ONCE
Status: COMPLETED | OUTPATIENT
Start: 2023-11-03 | End: 2023-11-03

## 2023-11-03 RX ORDER — POLYETHYLENE GLYCOL 3350 17 G/17G
17 POWDER, FOR SOLUTION ORAL DAILY
Status: DISCONTINUED | OUTPATIENT
Start: 2023-11-04 | End: 2023-11-04

## 2023-11-03 RX ORDER — BISACODYL 10 MG
10 SUPPOSITORY, RECTAL RECTAL DAILY PRN
Status: DISCONTINUED | OUTPATIENT
Start: 2023-11-03 | End: 2023-11-08 | Stop reason: HOSPADM

## 2023-11-03 RX ORDER — BISACODYL 5 MG/1
5 TABLET, DELAYED RELEASE ORAL DAILY PRN
Status: DISCONTINUED | OUTPATIENT
Start: 2023-11-03 | End: 2023-11-08 | Stop reason: HOSPADM

## 2023-11-03 RX ORDER — CHOLECALCIFEROL (VITAMIN D3) 125 MCG
5 CAPSULE ORAL NIGHTLY
Status: DISCONTINUED | OUTPATIENT
Start: 2023-11-03 | End: 2023-11-08 | Stop reason: HOSPADM

## 2023-11-03 RX ORDER — AMOXICILLIN 250 MG
2 CAPSULE ORAL 2 TIMES DAILY PRN
Status: DISCONTINUED | OUTPATIENT
Start: 2023-11-03 | End: 2023-11-08 | Stop reason: HOSPADM

## 2023-11-03 RX ADMIN — ASPIRIN 81 MG: 81 TABLET, CHEWABLE ORAL at 20:46

## 2023-11-03 RX ADMIN — LIDOCAINE 1 PATCH: 50 PATCH CUTANEOUS at 20:46

## 2023-11-03 RX ADMIN — LOSARTAN POTASSIUM 25 MG: 25 TABLET, FILM COATED ORAL at 08:53

## 2023-11-03 RX ADMIN — ASPIRIN 81 MG: 81 TABLET, CHEWABLE ORAL at 08:52

## 2023-11-03 RX ADMIN — ATORVASTATIN CALCIUM 20 MG: 20 TABLET, FILM COATED ORAL at 08:52

## 2023-11-03 RX ADMIN — POTASSIUM CHLORIDE 40 MEQ: 750 TABLET, EXTENDED RELEASE ORAL at 13:36

## 2023-11-03 RX ADMIN — Medication 5 MG: at 20:46

## 2023-11-03 NOTE — PLAN OF CARE
Goal Outcome Evaluation:  Plan of Care Reviewed With: patient           Outcome Evaluation: Pt was up in chair and agreeable to therapy. Pt tolerated L hip exercicses with assistance needed for HS and hipabd/add. Pt demonstrated improved activity and WB tolerance through LLE. Pt waas able to advance LLE to take steps today. Pt remains unsteady and fatigues quickly. Pt would benefit from SNF placement to increase mobility and improve independence before returning home.      Anticipated Discharge Disposition (PT): skilled nursing facility

## 2023-11-03 NOTE — SIGNIFICANT NOTE
11/03/23 1141   Post Acute Pre-Cert Documentation   Request Submitted by Facility - Type: Hospital   Post-Acute Authorization Type Submitted: SNF   Date Post Acute Pre-Cert Inititated per Facility 11/03/23   Verification from Payer Yes   Date Post Acute Pre-Cert Completed 11/03/23   Accepting Facility [facility] Discharge Date Requested 11/04/23   All Clinicals Submitted? Yes   Had Accepting Facility at Time of Submission Yes   Response Received from Insurance? Approval   Response Communicated to: ;Accepting Facility Liaison   Authorization Number: [id number]  [id number]   Post Acute Pre-Cert Initiated Comment Maria T LANGLEY

## 2023-11-03 NOTE — THERAPY TREATMENT NOTE
Patient Name: Riri Damon  : 1934    MRN: [id number]                              Today's Date: 11/3/2023       Admit Date: 10/29/2023    Visit Dx:     ICD-10-CM ICD-9-CM   1. Closed fracture of left hip, initial encounter  S72.002A 820.8     Patient Active Problem List   Diagnosis    Hyperlipidemia    Low back pain    Mitral valve insufficiency    Palpitations    Knee pain    Tricuspid valve insufficiency    Arthritis    Medicare annual wellness visit, subsequent    Screening for osteoporosis    Orthostatic lightheadedness    Essential hypertension    OA (osteoarthritis) of knee    Ventricular septal defect    Left anterior fascicular block    Malignant neoplasm of ascending colon    Family history of colon cancer    Osteopenia of left hip    Acute pain of left shoulder    Wellness examination    Anxiety    Cyst of right ovary    Primary insomnia    Vertigo    Chronic idiopathic constipation    Hip fracture    Leukocytosis    Hyperglycemia    Anemia     Past Medical History:   Diagnosis Date    Anxiety     Colon carcinoma     Stage IIIB, T4N1a; underwent radiation therapy and colon resection by Dr. Mcneil    Deep vein thrombosis 2017    right leg    History of edema     LE    History of rheumatic fever     Hx of radiation therapy     for adenocarcinoma of the colon Stage IIIB, T4N1a    Hyperlipidemia     Hypertension     MEDS PRN    Infectious mononucleosis     Infectious viral hepatitis     Left anterior fascicular block     Mitral regurgitation     2010: mild per echo    OA (osteoarthritis)     Palpitations     Pulmonary hypertension     2010- mild per echo; 2012 - normal RVSP per echo    Spinal headache     Tricuspid regurgitation     2010: Mild to moderate per echo; 2012: Trace to mild per echo    Venous insufficiency     Ventricular septal defect     Per echocardiogram     Vitamin D deficiency      Past Surgical History:   Procedure Laterality Date    APPENDECTOMY       COLECTOMY PARTIAL / TOTAL  02/02/2015 2/2015 due to adenocarcinoma    COLONOSCOPY      JAN 2015    COLONOSCOPY N/A 5/25/2016    Procedure: COLONOSCOPY to ANASTAMOSIS AND TERMINAL ILEUM;  Surgeon: Yfn Mcneil MD;  Location: [facility] ENDOSCOPY;  Service:     COLONOSCOPY N/A 7/11/2019    Procedure: COLONOSCOPY to cecum:;  Surgeon: Yfn Mcniel MD;  [facility] END[facility];  Service: General    HIP INTERTROCHANTERIC NAILING Left 10/31/2023    Procedure: HIP INTERTROCHANTERIC NAILING;  Surgeon: Yfn Reyes II, MD;  Location: [facility] MAIN OR;  Service: Orthopedics;  Laterality: Left;    HYSTERECTOMY      INTRAOCULAR LENS EXCHANGE Bilateral     JOINT MANIPULATION Right 1/9/2018    Procedure: MANIPULATION OF RT KNEE;  Surgeon: Andrea Caballero MD;  Location: [facility] MAIN OR;  Service:     JOINT REPLACEMENT Right 11/09/2017    KNEE SURGERY Left 2006    ARTHROSCOPY    AK ARTHRP KNE CONDYLE&PLATU MEDIAL&LAT COMPARTMENTS Right 11/9/2017    Procedure: RIGHT TOTAL KNEE ARTHROPLASTY;  Surgeon: Andrea Caballero MD;  Location: [facility] MAIN OR;  Service: Orthopedics    TONSILLECTOMY        General Information       Row Name 11/03/23 1042          Physical Therapy Time and Intention    Document Type therapy note (daily note)  -     Mode of Treatment individual therapy;physical therapy  -               User Key  (r) = Recorded By, (t) = Taken By, (c) = Cosigned By      Initials Name Provider Type    Lorrie Lafleur, PT Physical Therapist                   Mobility       Row Name 11/03/23 1042          Bed Mobility    Comment, (Bed Mobility) UIC  -       Row Name 11/03/23 1042          Sit-Stand Transfer    Sit-Stand [place] (Transfers) minimum assist (75% patient effort)  -     Assistive Device (Sit-Stand Transfers) walker, front-wheeled  -       Row Name 11/03/23 1042          Gait/Stairs (Locomotion)    [place] Level (Gait) minimum assist (75% patient effort)  -      Assistive Device (Gait) walker, front-wheeled  -     Distance in Feet (Gait) 10 ft x 2  -     Deviations/Abnormal Patterns (Gait) antalgic;gait speed decreased;stride length decreased  -     Bilateral Gait Deviations weight shift ability decreased;forward flexed posture  -               User Key  (r) = Recorded By, (t) = Taken By, (c) = Cosigned By      Initials Name Provider Type    Lorrie Lafleur PT Physical Therapist                   Obj/Interventions       Row Name 11/03/23 1042          Motor Skills    Therapeutic Exercise --  L hip protocol x 15 reps  -               User Key  (r) = Recorded By, (t) = Taken By, (c) = Cosigned By      Initials Name Provider Type    Lorrie Lafleur PT Physical Therapist                   Goals/Plan    No documentation.                  Clinical Impression       [facility] Name 11/03/23 1043          Pain    Pretreatment Pain Rating 5/10  -     Posttreatment Pain Rating 6/10  -     Pain Location - Side/Orientation Left  -     Pain Location - hip  -     Pain Intervention(s) Repositioned;Cold applied;Rest  -       Row Name 11/03/23 1043          Plan of Care Review    Plan of Care Reviewed With patient  -     Outcome Evaluation Pt was up in chair and agreeable to therapy. Pt tolerated L hip exercicses with assistance needed for HS and hipabd/add. Pt demonstrated improved activity and WB tolerance through LLE. Pt waas able to advance LLE to take steps today. Pt remains unsteady and fatigues quickly. Pt would benefit from SNF placement to increase mobility and improve independence before returning home.  -       Row Name 11/03/23 1043          Positioning and Restraints    Pre-Treatment Position sitting in chair/recliner  -     Post Treatment Position chair  -KH     In Chair reclined;call light within reach;encouraged to call for assist;exit alarm on;notified nsg  -               User Key  (r) = Recorded By, (t) = Taken By, (c) =  Cosigned By      Initials Name Provider Type    Lorrie Lafleur, PT Physical Therapist                   Outcome Measures       Row Name 11/03/23 1100 11/03/23 0400       How much help from another person do you currently need...    Turning from your back to your side while in flat bed without using bedrails? 3  -KH 3  -MB    Moving from lying on back to sitting on the side of a flat bed without bedrails? 2  -KH 2  -MB    Moving to and from a bed to a chair (including a wheelchair)? 3  -KH 2  -MB    Standing up from a chair using your arms (e.g., wheelchair, bedside chair)? 3  -KH 3  -MB    Climbing 3-5 steps with a railing? 1  -KH 1  -MB    To walk in hospital room? 3  -KH 2  -MB    AM-PAC 6 Clicks Score (PT) 15  -KH 13  -MB    Highest level of mobility 4 --> Transferred to chair/commode  -KH 4 --> Transferred to chair/commode  -MB      Row Name 11/03/23 1100          Functional Assessment    Outcome Measure Options AM-PAC 6 Clicks Basic Mobility (PT)  -               User Key  (r) = Recorded By, (t) = Taken By, (c) = Cosigned By      Initials Name Provider Type    Lorrie Lafleur, PT Physical Therapist    Nolvia Castellon, RN Registered Nurse                                 Physical Therapy Education       Title: PT OT SLP Therapies (In Progress)       Topic: Physical Therapy (Done)       Point: Mobility training (Done)       Learning Progress Summary             Patient Acceptance, E, VU,NR by TANNA at 11/1/2023 1420                         Point: Home exercise program (Done)       Learning Progress Summary             Patient Acceptance, E, VU,NR by TANNA at 11/1/2023 1420                         Point: Body mechanics (Done)       Learning Progress Summary             Patient Acceptance, E, VU,NR by TANNA at 11/1/2023 1420                         Point: Precautions (Done)       Learning Progress Summary             Patient Acceptance, E, VU,NR by  at 11/1/2023 1420                                          User Key       Initials Effective Dates Name Provider Type Discipline     07/11/23 -  Lorrie Medina PT Physical Therapist PT                  PT Recommendation and Plan  Planned Therapy Interventions (PT): balance training, bed mobility training, gait training, home exercise program, transfer training, stair training, patient/family education, ROM (range of motion), strengthening  Plan of Care Reviewed With: patient  Outcome Evaluation: Pt was up in chair and agreeable to therapy. Pt tolerated L hip exercicses with assistance needed for HS and hipabd/add. Pt demonstrated improved activity and WB tolerance through LLE. Pt waas able to advance LLE to take steps today. Pt remains unsteady and fatigues quickly. Pt would benefit from SNF placement to increase mobility and improve independence before returning home.     Time Calculation:         PT Charges       Row Name 11/03/23 1111             Time Calculation    Start Time 1035  -      Stop Time 1110  -KH      Time Calculation (min) 35 min  -KH      PT Received On 11/03/23  -      PT - Next Appointment 11/04/23  -         Time Calculation- PT    Total Timed Code Minutes- PT 30 minute(s)  -         Timed Charges    55134 - PT Therapeutic Exercise Minutes 15  -KH      88325 - PT Therapeutic Activity Minutes 15  -KH         Total Minutes    Timed Charges Total Minutes 30  -KH       Total Minutes 30  -KH                User Key  (r) = Recorded By, (t) = Taken By, (c) = Cosigned By      Initials Name Provider Type    Lorrie Lafleur PT Physical Therapist                  Therapy Charges for Today       Code Description Service Date Service Provider Modifiers Qty    90216493446 HC PT THER PROC EA 15 MIN 11/3/2023 Lorrie Medina, PT GP 1    92593261051 HC PT THERAPEUTIC ACT EA 15 MIN 11/3/2023 Lorrie Medina, PT GP 1            PT G-Codes  Outcome Measure Options: AM-PAC 6 Clicks Basic Mobility (PT)  AM-PAC 6  Clicks Score (PT): 15  PT Discharge Summary  Anticipated Discharge Disposition (PT): skilled nursing facility    Lorrie Medina, PT  11/3/2023

## 2023-11-03 NOTE — PROGRESS NOTES
Name: Riri Damon ADMIT: 10/29/2023   : 1934  PCP: Phoenix Velazquez MD    MRN: [id number] LOS: 5 days   AGE/SEX: 89 y.o. female  ROOM: [facility]     Subjective   Subjective   Multiple loose bowel movements overnight. Still not sleeping well; has not been taking prn melatonin. Pain improving, up in chair on exam. Reports she required IC x 1 yesterday        Objective   Objective   Vital Signs  Temp:  [97.8 °F (36.6 °C)-99.9 °F (37.7 °C)] 98.3 °F (36.8 °C)  Heart Rate:  [87-97] 96  Resp:  [16] 16  BP: (115-165)/(75-83) 115/75  SpO2:  [95 %-99 %] 99 %  on  Flow (L/min):  [1] 1;   Device (Oxygen Therapy): nasal cannula  Body mass index is 22.63 kg/m².  Physical Exam  Vitals and nursing note reviewed.   Constitutional:       General: She is not in acute distress.  HENT:      Head: Normocephalic.      Mouth/Throat:      Mouth: Mucous membranes are moist.   Eyes:      Conjunctiva/sclera: Conjunctivae normal.   Cardiovascular:      Rate and Rhythm: Normal rate and regular rhythm.   Pulmonary:      Effort: Pulmonary effort is normal. No respiratory distress.      Breath sounds: No wheezing or rales.   Abdominal:      General: Bowel sounds are normal.      Palpations: Abdomen is soft.   Musculoskeletal:      Cervical back: Neck supple.      Right lower leg: Edema present.      Left lower leg: Edema present.      Comments: 1+   Skin:     General: Skin is warm and dry.   Neurological:      Mental Status: She is alert and oriented to person, place, and time.   Psychiatric:         Mood and Affect: Mood normal.         Behavior: Behavior normal.       Results Review     I reviewed the patient's new clinical results.  Results from last 7 days   Lab Units 23  0500 23  0504 10/31/23  0437   WBC 10*3/mm3 12.15* 9.37 12.95* 10.42   HEMOGLOBIN g/dL 9.8* 9.6* 9.8* 11.2*   PLATELETS 10*3/mm3 200 174 162 167     Results from last 7 days   Lab Units 23  0500 23  0504  "10/31/23  0437   SODIUM mmol/L 137 141 139 136   POTASSIUM mmol/L 3.3* 3.5 3.8 3.8   CHLORIDE mmol/L 106 111* 109* 105   CO2 mmol/L 23.6 23.5 23.9 24.5   BUN mg/dL 7* 11 8 11   CREATININE mg/dL 0.33* 0.34* 0.50* 0.50*   GLUCOSE mg/dL 98 102* 105* 124*   EGFR mL/min/1.73 99.2 98.5 89.8 89.8     Results from last 7 days   Lab Units 11/03/23 0448 11/02/23  0500 11/01/23  0504 10/31/23  0437 10/29/23  1957   ALBUMIN g/dL 2.9* 3.0* 3.0* 3.4* 4.0   BILIRUBIN mg/dL  --   --   --   --  0.2   ALK PHOS U/L  --   --   --   --  62   AST (SGOT) U/L  --   --   --   --  19   ALT (SGPT) U/L  --   --   --   --  18     Results from last 7 days   Lab Units 11/03/23 0448 11/02/23  0500 11/01/23  0504 10/31/23  0437   CALCIUM mg/dL 7.9* 7.8* 7.9* 8.0*   ALBUMIN g/dL 2.9* 3.0* 3.0* 3.4*       No results found for: \"HGBA1C\", \"POCGLU\"    XR Abdomen KUB    Result Date: 11/2/2023  Nonobstructive bowel gas pattern.  This report was finalized on 11/2/2023 9:26 PM by Dr. Estefania Garcia M.D on Workstation: [id number]       I have personally reviewed all medications:  Scheduled Medications  aspirin, 81 mg, Oral, BID  atorvastatin, 20 mg, Oral, Daily  lidocaine, 1 patch, Transdermal, Nightly  losartan, 25 mg, Oral, Daily  melatonin, 5 mg, Oral, Nightly  [START ON 11/4/2023] polyethylene glycol, 17 g, Oral, Daily  potassium chloride, 40 mEq, Oral, Once    Infusions  lactated ringers, 9 mL/hr, Last Rate: 9 mL/hr (10/31/23 0651)  sodium chloride, 50 mL/hr, Last Rate: 50 mL/hr (10/31/23 2151)    Diet  Diet: Regular/House Diet; Texture: Regular Texture (IDDSI 7); Fluid Consistency: Thin (IDDSI 0)    I have personally reviewed:  [x]  Laboratory   [x]  Microbiology   [x]  Radiology   [x]  EKG/Telemetry  [x]  Cardiology/Vascular   []  Pathology    []  Records       Assessment/Plan     Active Hospital Problems    Diagnosis  POA    **Hip fracture [S72.009A]  Yes    Hypokalemia [E87.6]  Yes    Anemia [D64.9]  Yes    Leukocytosis [D72.829]  Yes    " Hyperglycemia [R73.9]  Yes    Left anterior fascicular block [I44.4]  Yes    Essential hypertension [I10]  Yes    Hyperlipidemia [E78.5]  Yes      Resolved Hospital Problems   No resolved problems to display.       89 y.o. female admitted with Hip fracture.    Left femoral fracture  -S/p mechanical fall and left hip fracture  -Appreciate Ortho assistance  -S/P Lt IM nailing 10/31  -Pain control and bowel regimen postoperatively; had multiple loose stools overnight. Change bowel regimen to PRN  -PT/OT following  -ASA BID for VTE ppx per Ortho     Left basilar consolidation  Leukocytosis  -CXR with evidence of left-sided consolidation, most likely atelectasis though infection cannot be ruled out  -WBC normalized, back up to 12 today  -O2 weaned down to 1L NC  -With recurrent leukocytosis, will repeat CXR and check UA. Required IC yesterday per pt; check PVR's  -Use of IS postoperatively     HTN  Hyperlipidemia  -Continue home losartan  -Continue home statin  -Monitor for postop hypotension; BP acceptable on review     Hyperglycemia  -Hgb A1c 5.4  -Glucoses have been somewhat elevated, but overall okay for hospitalization  -Closely monitor     Anemia:  -Hgb 13.8-->12-->11.2-->remaining stable around 9.6-9.8, likely dilutional along w/blood loss w/surgery  -Iron, iron sat wnl.   -Monitor daily    Hypokalemia:  -K 3.3, replete x 1         ASA  for DVT prophylaxis.  Limited code (no CPR, no intubation).  Discussed with patient, nursing staff, and Dr. Pfeiffer .  Anticipate discharge to SNU facility tomorrow.; pending precert.      PADMINI Sanchez  [facility]  11/03/23  11:57 EDT

## 2023-11-03 NOTE — PLAN OF CARE
Goal Outcome Evaluation:      A/ox4, assist x1, need urine sample      Problem: Adult Inpatient Plan of Care  Goal: Absence of Hospital-Acquired Illness or Injury  Intervention: Identify and Manage Fall Risk  Recent Flowsheet Documentation  Taken 11/3/2023 1830 by Heather Cooper RN  Safety Promotion/Fall Prevention:   assistive device/personal items within reach   safety round/check completed  Taken 11/3/2023 1632 by Heather Cooper RN  Safety Promotion/Fall Prevention:   assistive device/personal items within reach   safety round/check completed  Taken 11/3/2023 1400 by Heather Cooper RN  Safety Promotion/Fall Prevention:   assistive device/personal items within reach   safety round/check completed  Taken 11/3/2023 1200 by Heather Cooper RN  Safety Promotion/Fall Prevention:   assistive device/personal items within reach   safety round/check completed  Taken 11/3/2023 1000 by Heather Cooper RN  Safety Promotion/Fall Prevention:   assistive device/personal items within reach   safety round/check completed  Taken 11/3/2023 0800 by Heather Cooper RN  Safety Promotion/Fall Prevention:   assistive device/personal items within reach   activity supervised   clutter free environment maintained   fall prevention program maintained   gait belt   lighting adjusted   mobility aid in reach   nonskid shoes/slippers when out of bed   safety round/check completed  Intervention: Prevent Skin Injury  Recent Flowsheet Documentation  Taken 11/3/2023 1830 by Heather Cooper RN  Body Position:   position changed independently   lower extremity elevated   neutral body alignment  Taken 11/3/2023 1632 by Heather Cooper RN  Body Position:   lower extremity elevated   neutral body alignment  Taken 11/3/2023 1400 by Heather Cooper RN  Body Position:   lower extremity elevated   neutral body alignment  Taken 11/3/2023 1200 by Heather Cooper RN  Body Position:   neutral body alignment   lower extremity elevated  Taken 11/3/2023 1000 by Heather Cooper RN  Body  Position: lower extremity elevated  Taken 11/3/2023 0800 by Heather Cooper RN  Body Position:   lower extremity elevated   dangle, side of bed  Skin Protection:   adhesive use limited   incontinence pads utilized   protective footwear used  Intervention: Prevent and Manage VTE (Venous Thromboembolism) Risk  Recent Flowsheet Documentation  Taken 11/3/2023 0800 by Heather Cooper RN  Activity Management:   ambulated in room   dorsiflexion/plantar flexion performed   sitting, edge of bed   standing at bedside   up in chair  VTE Prevention/Management:   bilateral   sequential compression devices on  Range of Motion: active ROM (range of motion) encouraged  Goal: Optimal Comfort and Wellbeing  Intervention: Provide Person-Centered Care  Recent Flowsheet Documentation  Taken 11/3/2023 0800 by Heather Cooper RN  Trust Relationship/Rapport:   care explained   thoughts/feelings acknowledged     Problem: Pain Acute  Goal: Acceptable Pain Control and Functional Ability  Intervention: Prevent or Manage Pain  Recent Flowsheet Documentation  Taken 11/3/2023 1830 by Heather Cooper RN  Medication Review/Management: medications reviewed  Taken 11/3/2023 1632 by Heather Cooper RN  Medication Review/Management: medications reviewed  Taken 11/3/2023 1400 by Heather Cooper RN  Medication Review/Management: medications reviewed  Taken 11/3/2023 1200 by Heather Cooper RN  Medication Review/Management: medications reviewed  Taken 11/3/2023 1000 by Heather Cooper RN  Medication Review/Management: medications reviewed  Taken 11/3/2023 0800 by Heather Cooper RN  Sensory Stimulation Regulation: quiet environment promoted  Sleep/Rest Enhancement:   regular sleep/rest pattern promoted   natural light exposure provided  Medication Review/Management: medications reviewed  Intervention: Optimize Psychosocial Wellbeing  Recent Flowsheet Documentation  Taken 11/3/2023 0800 by Heather Cooper RN  Supportive Measures:   active listening utilized   self-care  encouraged  Diversional Activities: television     Problem: Hypertension Comorbidity  Goal: Blood Pressure in Desired Range  Intervention: Maintain Blood Pressure Management  Recent Flowsheet Documentation  Taken 11/3/2023 1830 by Heather Cooper RN  Medication Review/Management: medications reviewed  Taken 11/3/2023 1632 by Heather Cooper RN  Medication Review/Management: medications reviewed  Taken 11/3/2023 1400 by Heather Cooper RN  Medication Review/Management: medications reviewed  Taken 11/3/2023 1200 by Heather Cooper RN  Medication Review/Management: medications reviewed  Taken 11/3/2023 1000 by Heather Cooper RN  Medication Review/Management: medications reviewed  Taken 11/3/2023 0800 by Heather Cooper RN  Syncope Management: position changed slowly  Medication Review/Management: medications reviewed     Problem: Osteoarthritis Comorbidity  Goal: Maintenance of Osteoarthritis Symptom Control  Intervention: Maintain Osteoarthritis Symptom Control  Recent Flowsheet Documentation  Taken 11/3/2023 1830 by Heather Cooper RN  Medication Review/Management: medications reviewed  Taken 11/3/2023 1632 by Heather Cooper RN  Medication Review/Management: medications reviewed  Taken 11/3/2023 1400 by Heather Cooper RN  Medication Review/Management: medications reviewed  Taken 11/3/2023 1200 by Heather Cooper RN  Medication Review/Management: medications reviewed  Taken 11/3/2023 1000 by Heather Cooper RN  Medication Review/Management: medications reviewed  Taken 11/3/2023 0800 by Heather Cooper RN  Activity Management:   ambulated in room   dorsiflexion/plantar flexion performed   sitting, edge of bed   standing at bedside   up in chair  Assistive Device Utilized:   gait belt   walker  Medication Review/Management: medications reviewed     Problem: Skin Injury Risk Increased  Goal: Skin Health and Integrity  Intervention: Optimize Skin Protection  Recent Flowsheet Documentation  Taken 11/3/2023 1830 by Heather Cooper RN  Head of Bed (HOB)  Positioning: HOB lowered  Taken 11/3/2023 1632 by Heather Cooper RN  Head of Bed (HOB) Positioning: HOB lowered  Taken 11/3/2023 1400 by Heather Cooper RN  Head of Bed (HOB) Positioning: HOB flat  Taken 11/3/2023 1200 by Heather Cooper RN  Head of Bed (HOB) Positioning: HOB lowered  Taken 11/3/2023 1000 by Heather Cooper RN  Head of Bed (HOB) Positioning: HOB lowered  Taken 11/3/2023 0800 by Heather Cooper RN  Pressure Reduction Techniques:   frequent weight shift encouraged   weight shift assistance provided  Head of Bed (HOB) Positioning: HOB elevated  Pressure Reduction Devices: alternating pressure pump (ADD)  Skin Protection:   adhesive use limited   incontinence pads utilized   protective footwear used     Problem: Fall Injury Risk  Goal: Absence of Fall and Fall-Related Injury  Intervention: Identify and Manage Contributors  Recent Flowsheet Documentation  Taken 11/3/2023 1830 by Heather Cooper RN  Medication Review/Management: medications reviewed  Taken 11/3/2023 1632 by Heather Cooper RN  Medication Review/Management: medications reviewed  Taken 11/3/2023 1400 by Heather Cooper RN  Medication Review/Management: medications reviewed  Taken 11/3/2023 1200 by Heather Cooper RN  Medication Review/Management: medications reviewed  Taken 11/3/2023 1000 by Heather Cooper RN  Medication Review/Management: medications reviewed  Taken 11/3/2023 0800 by Heather Cooper RN  Medication Review/Management: medications reviewed  Self-Care Promotion:   independence encouraged   BADL personal objects within reach  Intervention: Promote Injury-Free Environment  Recent Flowsheet Documentation  Taken 11/3/2023 1830 by Heather Cooper RN  Safety Promotion/Fall Prevention:   assistive device/personal items within reach   safety round/check completed  Taken 11/3/2023 1632 by Heather Cooper RN  Safety Promotion/Fall Prevention:   assistive device/personal items within reach   safety round/check completed  Taken 11/3/2023 1400 by Heather Cooper RN  Safety  Promotion/Fall Prevention:   assistive device/personal items within reach   safety round/check completed  Taken 11/3/2023 1200 by Heather Cooper RN  Safety Promotion/Fall Prevention:   assistive device/personal items within reach   safety round/check completed  Taken 11/3/2023 1000 by Heather Cooper RN  Safety Promotion/Fall Prevention:   assistive device/personal items within reach   safety round/check completed  Taken 11/3/2023 0800 by Heather Cooper RN  Safety Promotion/Fall Prevention:   assistive device/personal items within reach   activity supervised   clutter free environment maintained   fall prevention program maintained   gait belt   lighting adjusted   mobility aid in reach   nonskid shoes/slippers when out of bed   safety round/check completed     Problem: Embolism (Orthopaedic Fracture)  Goal: Absence of Embolism Signs and Symptoms  Intervention: Prevent or Manage Embolism Risk  Recent Flowsheet Documentation  Taken 11/3/2023 0800 by Heather Cooper RN  VTE Prevention/Management:   bilateral   sequential compression devices on     Problem: Functional Ability Impaired (Orthopaedic Fracture)  Goal: Optimal Functional Ability  Intervention: Optimize Functional Ability  Recent Flowsheet Documentation  Taken 11/3/2023 1830 by Heather Cooper RN  Positioning/Transfer Devices:   pillows   in use  Taken 11/3/2023 1632 by Heather Cooper RN  Positioning/Transfer Devices:   pillows   in use  Taken 11/3/2023 1400 by Heather Cooper RN  Positioning/Transfer Devices:   pillows   in use  Taken 11/3/2023 1200 by Heather Cooper RN  Positioning/Transfer Devices:   pillows   in use  Taken 11/3/2023 1000 by Heather Cooper RN  Positioning/Transfer Devices:   pillows   in use  Taken 11/3/2023 0800 by Heather Cooper RN  Activity Management:   ambulated in room   dorsiflexion/plantar flexion performed   sitting, edge of bed   standing at bedside   up in chair  Activity Assistance Provided: assistance, 1 person  Positioning/Transfer Devices:    pillows   in use  Self-Care Promotion:   independence encouraged   BADL personal objects within reach  Range of Motion: active ROM (range of motion) encouraged     Problem: Pain (Orthopaedic Fracture)  Goal: Acceptable Pain Control  Intervention: Manage Acute Orthopaedic-Related Pain  Recent Flowsheet Documentation  Taken 11/3/2023 0800 by Heather Cooper, RN  Diversional Activities: television

## 2023-11-03 NOTE — PROGRESS NOTES
Continued Stay Note  [facility]     Patient Name: Riri Damon  MRN: [id number]  Today's Date: 11/3/2023    Admit Date: 10/29/2023    Plan: [facility] pending inusrance precert and bed available 11/4/23   Discharge Plan       Row Name 11/03/23 1517       Plan    Plan Comments Precert approved to d/c to [facility]. Pharmacy has been updated and packet will be on chart. CCP to assist with arranging transport if needed.                   Discharge Codes    No documentation.                 Expected Discharge Date and Time       Expected Discharge Date Expected Discharge Time    Nov 4, 2023               Yesika Borrero RN

## 2023-11-04 LAB
ALBUMIN SERPL-MCNC: 2.5 G/DL (ref 3.5–5.2)
ANION GAP SERPL CALCULATED.3IONS-SCNC: 6 MMOL/L (ref 5–15)
BUN SERPL-MCNC: 11 MG/DL (ref 8–23)
BUN/CREAT SERPL: 28.9 (ref 7–25)
CALCIUM SPEC-SCNC: 7.9 MG/DL (ref 8.6–10.5)
CHLORIDE SERPL-SCNC: 111 MMOL/L (ref 98–107)
CO2 SERPL-SCNC: 23 MMOL/L (ref 22–29)
CREAT SERPL-MCNC: 0.38 MG/DL (ref 0.57–1)
DEPRECATED RDW RBC AUTO: 43.2 FL (ref 37–54)
EGFRCR SERPLBLD CKD-EPI 2021: 95.9 ML/MIN/1.73
ERYTHROCYTE [DISTWIDTH] IN BLOOD BY AUTOMATED COUNT: 12.4 % (ref 12.3–15.4)
GLUCOSE SERPL-MCNC: 104 MG/DL (ref 65–99)
HCT VFR BLD AUTO: 27.5 % (ref 34–46.6)
HGB BLD-MCNC: 9.5 G/DL (ref 12–15.9)
MCH RBC QN AUTO: 32.4 PG (ref 26.6–33)
MCHC RBC AUTO-ENTMCNC: 34.5 G/DL (ref 31.5–35.7)
MCV RBC AUTO: 93.9 FL (ref 79–97)
PLATELET # BLD AUTO: 205 10*3/MM3 (ref 140–450)
PMV BLD AUTO: 10.8 FL (ref 6–12)
POTASSIUM SERPL-SCNC: 3.7 MMOL/L (ref 3.5–5.2)
RBC # BLD AUTO: 2.93 10*6/MM3 (ref 3.77–5.28)
SODIUM SERPL-SCNC: 140 MMOL/L (ref 136–145)
WBC NRBC COR # BLD: 10.83 10*3/MM3 (ref 3.4–10.8)

## 2023-11-04 PROCEDURE — 25010000002 CEFTRIAXONE PER 250 MG: Performed by: HOSPITALIST

## 2023-11-04 PROCEDURE — 85027 COMPLETE CBC AUTOMATED: CPT | Performed by: ORTHOPAEDIC SURGERY

## 2023-11-04 PROCEDURE — 80048 BASIC METABOLIC PNL TOTAL CA: CPT | Performed by: ORTHOPAEDIC SURGERY

## 2023-11-04 PROCEDURE — 82040 ASSAY OF SERUM ALBUMIN: CPT | Performed by: ORTHOPAEDIC SURGERY

## 2023-11-04 RX ADMIN — Medication 5 MG: at 21:08

## 2023-11-04 RX ADMIN — ASPIRIN 81 MG: 81 TABLET, CHEWABLE ORAL at 08:37

## 2023-11-04 RX ADMIN — LIDOCAINE 1 PATCH: 50 PATCH CUTANEOUS at 22:31

## 2023-11-04 RX ADMIN — CEFTRIAXONE SODIUM 1000 MG: 1 INJECTION, POWDER, FOR SOLUTION INTRAMUSCULAR; INTRAVENOUS at 12:17

## 2023-11-04 RX ADMIN — ATORVASTATIN CALCIUM 20 MG: 20 TABLET, FILM COATED ORAL at 08:37

## 2023-11-04 RX ADMIN — LOSARTAN POTASSIUM 25 MG: 25 TABLET, FILM COATED ORAL at 08:37

## 2023-11-04 RX ADMIN — ASPIRIN 81 MG: 81 TABLET, CHEWABLE ORAL at 21:08

## 2023-11-04 NOTE — PLAN OF CARE
Problem: Adult Inpatient Plan of Care  Goal: Absence of Hospital-Acquired Illness or Injury  Intervention: Identify and Manage Fall Risk  Recent Flowsheet Documentation  Taken 11/4/2023 1845 by Heather Cooper RN  Safety Promotion/Fall Prevention:   assistive device/personal items within reach   safety round/check completed  Taken 11/4/2023 1644 by Heather Cooper RN  Safety Promotion/Fall Prevention:   assistive device/personal items within reach   safety round/check completed   activity supervised   clutter free environment maintained   fall prevention program maintained   gait belt   lighting adjusted   mobility aid in reach   nonskid shoes/slippers when out of bed  Taken 11/4/2023 1431 by Heather Cooper RN  Safety Promotion/Fall Prevention:   assistive device/personal items within reach   safety round/check completed   clutter free environment maintained   activity supervised   fall prevention program maintained   gait belt   lighting adjusted   mobility aid in reach   nonskid shoes/slippers when out of bed  Taken 11/4/2023 1240 by Heather Cooper RN  Safety Promotion/Fall Prevention:   assistive device/personal items within reach   safety round/check completed   activity supervised   clutter free environment maintained   fall prevention program maintained   gait belt   lighting adjusted   mobility aid in reach   nonskid shoes/slippers when out of bed  Taken 11/4/2023 1005 by Heather Cooper RN  Safety Promotion/Fall Prevention:   assistive device/personal items within reach   safety round/check completed  Taken 11/4/2023 0800 by Heather Cooper RN  Safety Promotion/Fall Prevention:   activity supervised   assistive device/personal items within reach   clutter free environment maintained   fall prevention program maintained   gait belt   lighting adjusted   mobility aid in reach   nonskid shoes/slippers when out of bed   safety round/check completed  Intervention: Prevent Skin Injury  Recent Flowsheet Documentation  Taken  11/4/2023 1845 by Heather Cooper RN  Body Position:   position changed independently   lower extremity elevated   neutral body alignment  Taken 11/4/2023 1644 by Heather Cooper RN  Body Position:   position changed independently   supine, legs elevated  Taken 11/4/2023 1431 by Heather Cooper RN  Body Position:   neutral body alignment   position changed independently  Taken 11/4/2023 1240 by Heather Cooper RN  Body Position:   position changed independently   lower extremity elevated   neutral body alignment  Taken 11/4/2023 0800 by Heather Cooper RN  Body Position:   position changed independently   lower extremity elevated   neutral body alignment  Skin Protection:   adhesive use limited   incontinence pads utilized   protective footwear used  Intervention: Prevent and Manage VTE (Venous Thromboembolism) Risk  Recent Flowsheet Documentation  Taken 11/4/2023 1845 by Heather Cooper RN  Activity Management:   ambulated in room   back to bed   sitting, edge of bed  Taken 11/4/2023 1644 by Heather Cooper RN  Activity Management:   ambulated in room   ambulated to bathroom   up in chair   sitting, edge of bed  Taken 11/4/2023 1431 by Heather Cooper RN  Activity Management:   ambulated to bathroom   sitting, edge of bed   up ad aura   up in chair   back to bed  Taken 11/4/2023 1240 by Heather Cooper RN  Activity Management: up in chair  Taken 11/4/2023 0800 by Heather Cooper RN  Activity Management:   ambulated in room   ambulated to bathroom   dorsiflexion/plantar flexion performed   sitting, edge of bed   up in chair   activity encouraged  VTE Prevention/Management:   bilateral   sequential compression devices off     Problem: Pain Acute  Goal: Acceptable Pain Control and Functional Ability  Intervention: Prevent or Manage Pain  Recent Flowsheet Documentation  Taken 11/4/2023 1845 by Heather Cooper RN  Medication Review/Management: medications reviewed  Taken 11/4/2023 1644 by Heather Cooper RN  Medication Review/Management: medications  reviewed  Taken 11/4/2023 1431 by Heather Cooper RN  Medication Review/Management: medications reviewed  Taken 11/4/2023 1240 by Heather Cooper RN  Medication Review/Management: medications reviewed  Taken 11/4/2023 1005 by Heather Cooper RN  Medication Review/Management: medications reviewed  Taken 11/4/2023 0800 by Heather Cooper RN  Sensory Stimulation Regulation: television on  Sleep/Rest Enhancement:   regular sleep/rest pattern promoted   natural light exposure provided  Medication Review/Management: medications reviewed  Intervention: Optimize Psychosocial Wellbeing  Recent Flowsheet Documentation  Taken 11/4/2023 0800 by Heather Cooper RN  Supportive Measures:   active listening utilized   self-care encouraged  Diversional Activities: television     Problem: Hypertension Comorbidity  Goal: Blood Pressure in Desired Range  Intervention: Maintain Blood Pressure Management  Recent Flowsheet Documentation  Taken 11/4/2023 1845 by Heather Cooper RN  Medication Review/Management: medications reviewed  Taken 11/4/2023 1644 by Heather Cooper RN  Medication Review/Management: medications reviewed  Taken 11/4/2023 1431 by Heather Cooper RN  Medication Review/Management: medications reviewed  Taken 11/4/2023 1240 by Heather Cooper RN  Medication Review/Management: medications reviewed  Taken 11/4/2023 1005 by Heather Cooper RN  Medication Review/Management: medications reviewed  Taken 11/4/2023 0800 by Heather Cooper RN  Syncope Management:   position changed slowly   legs elevated  Medication Review/Management: medications reviewed     Problem: Osteoarthritis Comorbidity  Goal: Maintenance of Osteoarthritis Symptom Control  Intervention: Maintain Osteoarthritis Symptom Control  Recent Flowsheet Documentation  Taken 11/4/2023 1845 by Heather Cooper RN  Activity Management:   ambulated in room   back to bed   sitting, edge of bed  Assistive Device Utilized:   gait belt   walker  Medication Review/Management: medications reviewed  Taken  11/4/2023 1644 by Heather Cooper RN  Activity Management:   ambulated in room   ambulated to bathroom   up in chair   sitting, edge of bed  Assistive Device Utilized:   gait belt   walker  Medication Review/Management: medications reviewed  Taken 11/4/2023 1431 by Heather Cooper RN  Activity Management:   ambulated to bathroom   sitting, edge of bed   up ad aura   up in chair   back to bed  Assistive Device Utilized:   gait belt   walker  Medication Review/Management: medications reviewed  Taken 11/4/2023 1240 by Heather Cooper RN  Activity Management: up in chair  Assistive Device Utilized:   gait belt   walker  Medication Review/Management: medications reviewed  Taken 11/4/2023 1005 by Heather Cooper RN  Medication Review/Management: medications reviewed  Taken 11/4/2023 0800 by Heather Cooper RN  Activity Management:   ambulated in room   ambulated to bathroom   dorsiflexion/plantar flexion performed   sitting, edge of bed   up in chair   activity encouraged  Assistive Device Utilized:   gait belt   walker  Medication Review/Management: medications reviewed     Problem: Skin Injury Risk Increased  Goal: Skin Health and Integrity  Intervention: Optimize Skin Protection  Recent Flowsheet Documentation  Taken 11/4/2023 1845 by Heather Cooper RN  Head of Bed (HOB) Positioning: HOB elevated  Taken 11/4/2023 1644 by Heather Cooper RN  Head of Bed (HOB) Positioning: HOB elevated  Taken 11/4/2023 1431 by Heather Cooper RN  Head of Bed (HOB) Positioning: HOB elevated  Taken 11/4/2023 1240 by Heather Cooper RN  Head of Bed (HOB) Positioning: HOB elevated  Taken 11/4/2023 0800 by Heather Cooper RN  Head of Bed (HOB) Positioning: HOB lowered  Pressure Reduction Devices: alternating pressure pump (ADD)  Skin Protection:   adhesive use limited   incontinence pads utilized   protective footwear used     Problem: Fall Injury Risk  Goal: Absence of Fall and Fall-Related Injury  Intervention: Identify and Manage Contributors  Recent Flowsheet  Documentation  Taken 11/4/2023 1845 by Heather Cooper RN  Medication Review/Management: medications reviewed  Taken 11/4/2023 1644 by Heather Cooper RN  Medication Review/Management: medications reviewed  Taken 11/4/2023 1431 by Heather Cooper RN  Medication Review/Management: medications reviewed  Taken 11/4/2023 1240 by Heather Cooper RN  Medication Review/Management: medications reviewed  Taken 11/4/2023 1005 by Heather Cooper RN  Medication Review/Management: medications reviewed  Taken 11/4/2023 0800 by Heather Cooper RN  Medication Review/Management: medications reviewed  Self-Care Promotion:   independence encouraged   BADL personal objects within reach  Intervention: Promote Injury-Free Environment  Recent Flowsheet Documentation  Taken 11/4/2023 1845 by Heather Cooper RN  Safety Promotion/Fall Prevention:   assistive device/personal items within reach   safety round/check completed  Taken 11/4/2023 1644 by Heather Cooper RN  Safety Promotion/Fall Prevention:   assistive device/personal items within reach   safety round/check completed   activity supervised   clutter free environment maintained   fall prevention program maintained   gait belt   lighting adjusted   mobility aid in reach   nonskid shoes/slippers when out of bed  Taken 11/4/2023 1431 by Heather Cooper RN  Safety Promotion/Fall Prevention:   assistive device/personal items within reach   safety round/check completed   clutter free environment maintained   activity supervised   fall prevention program maintained   gait belt   lighting adjusted   mobility aid in reach   nonskid shoes/slippers when out of bed  Taken 11/4/2023 1240 by Heather Cooper RN  Safety Promotion/Fall Prevention:   assistive device/personal items within reach   safety round/check completed   activity supervised   clutter free environment maintained   fall prevention program maintained   gait belt   lighting adjusted   mobility aid in reach   nonskid shoes/slippers when out of bed  Taken  11/4/2023 1005 by Heather Cooper RN  Safety Promotion/Fall Prevention:   assistive device/personal items within reach   safety round/check completed  Taken 11/4/2023 0800 by Heather Cooper RN  Safety Promotion/Fall Prevention:   activity supervised   assistive device/personal items within reach   clutter free environment maintained   fall prevention program maintained   gait belt   lighting adjusted   mobility aid in reach   nonskid shoes/slippers when out of bed   safety round/check completed     Problem: Bleeding (Orthopaedic Fracture)  Goal: Absence of Bleeding  Intervention: Monitor and Manage Fracture Bleeding  Recent Flowsheet Documentation  Taken 11/4/2023 0800 by Heather Cooper RN  Bleeding Management: dressing monitored     Problem: Embolism (Orthopaedic Fracture)  Goal: Absence of Embolism Signs and Symptoms  Intervention: Prevent or Manage Embolism Risk  Recent Flowsheet Documentation  Taken 11/4/2023 0800 by Heather Cooper RN  VTE Prevention/Management:   bilateral   sequential compression devices off     Problem: Functional Ability Impaired (Orthopaedic Fracture)  Goal: Optimal Functional Ability  Intervention: Optimize Functional Ability  Recent Flowsheet Documentation  Taken 11/4/2023 1845 by Heather Cooper RN  Activity Management:   ambulated in room   back to bed   sitting, edge of bed  Activity Assistance Provided: assistance, 1 person  Positioning/Transfer Devices:   pillows   in use  Taken 11/4/2023 1644 by Heather Cooper RN  Activity Management:   ambulated in room   ambulated to bathroom   up in chair   sitting, edge of bed  Activity Assistance Provided: assistance, 1 person  Positioning/Transfer Devices:   pillows   in use  Taken 11/4/2023 1431 by Heather Cooper RN  Activity Management:   ambulated to bathroom   sitting, edge of bed   up ad aura   up in chair   back to bed  Activity Assistance Provided: assistance, 1 person  Positioning/Transfer Devices:   pillows   in use  Taken 11/4/2023 1240 by Kenneth  NAWAF Romero  Activity Management: up in chair  Activity Assistance Provided: assistance, 1 person  Positioning/Transfer Devices:   pillows   in use  Taken 11/4/2023 0800 by Heather Cooper RN  Activity Management:   ambulated in room   ambulated to bathroom   dorsiflexion/plantar flexion performed   sitting, edge of bed   up in chair   activity encouraged  Activity Assistance Provided: assistance, 1 person  Positioning/Transfer Devices:   pillows   in use  Self-Care Promotion:   independence encouraged   BADL personal objects within reach     Problem: Infection (Orthopaedic Fracture)  Goal: Absence of Infection Signs and Symptoms  Intervention: Prevent or Manage Infection  Recent Flowsheet Documentation  Taken 11/4/2023 0800 by Heather Cooper, NAWAF  Fever Reduction/Comfort Measures: ice pack(s) applied     Problem: Pain (Orthopaedic Fracture)  Goal: Acceptable Pain Control  Intervention: Manage Acute Orthopaedic-Related Pain  Recent Flowsheet Documentation  Taken 11/4/2023 0800 by Heather Cooper, RN  Diversional Activities: television   Goal Outcome Evaluation:

## 2023-11-04 NOTE — PROGRESS NOTES
[facility]     LOS: 6 days     Subjective:    CC:Fall and Hip Injury    DIET:  Diet Order   Procedures    Diet: Regular/House Diet; Texture: Regular Texture (IDDSI 7); Fluid Consistency: Thin (IDDSI 0)     No new complaints    Objective:    Vital Signs:  Temp:  [97.8 °F (36.6 °C)-98.2 °F (36.8 °C)] 98.2 °F (36.8 °C)  Heart Rate:  [84-96] 84  Resp:  [16-17] 16  BP: (121-133)/(71-77) 121/73    SpO2:  [93 %-99 %] 93 %  on  Flow (L/min):  [1] 1;   Device (Oxygen Therapy): room air  Body mass index is 22.63 kg/m².    Physical Exam  Constitutional:       Appearance: Normal appearance.   HENT:      Head: Normocephalic and atraumatic.   Cardiovascular:      Rate and Rhythm: Normal rate and regular rhythm.      Heart sounds: No murmur heard.     No friction rub.   Pulmonary:      Effort: Pulmonary effort is normal.      Breath sounds: Normal breath sounds.   Abdominal:      General: Bowel sounds are normal. There is no distension.      Palpations: Abdomen is soft.      Tenderness: There is no abdominal tenderness.   Skin:     General: Skin is warm and dry.   Neurological:      Mental Status: She is alert.   Psychiatric:         Mood and Affect: Mood normal.         Behavior: Behavior normal.         Results Review:    Glucose   Date Value Ref Range Status   11/04/2023 104 (H) 65 - 99 mg/dL Final   11/03/2023 98 65 - 99 mg/dL Final   11/02/2023 102 (H) 65 - 99 mg/dL Final     Results from last 7 days   Lab Units 11/04/23 0452   WBC 10*3/mm3 10.83*   HEMOGLOBIN g/dL 9.5*   HEMATOCRIT % 27.5*   PLATELETS 10*3/mm3 205     Results from last 7 days   Lab Units 11/04/23  0452 10/30/23  0437 10/29/23  1957   SODIUM mmol/L 140   < > 140   POTASSIUM mmol/L 3.7   < > 4.0   CHLORIDE mmol/L 111*   < > 104   CO2 mmol/L 23.0   < > 26.0   BUN mg/dL 11   < > 15   CREATININE mg/dL 0.38*   < > 0.61   CALCIUM mg/dL 7.9*   < > 9.0   BILIRUBIN mg/dL  --   --  0.2   ALK PHOS U/L  --   --  62   ALT (SGPT) U/L  --   --   18   AST (SGOT) U/L  --   --  19   GLUCOSE mg/dL 104*   < > 165*    < > = values in this interval not displayed.     Results from last 7 days   Lab Units 10/29/23  1957   INR  1.05   APTT seconds 25.7             Cultures:  Urine Culture   Date Value Ref Range Status   11/03/2023 Culture in progress  Preliminary       I have reviewed daily medications and changes in CPOE    Scheduled meds  aspirin, 81 mg, Oral, BID  atorvastatin, 20 mg, Oral, Daily  cefTRIAXone, 1,000 mg, Intravenous, Q24H  lidocaine, 1 patch, Transdermal, Nightly  losartan, 25 mg, Oral, Daily  melatonin, 5 mg, Oral, Nightly  polyethylene glycol, 17 g, Oral, Daily           PRN meds    acetaminophen    senna-docusate sodium **AND** polyethylene glycol **AND** bisacodyl **AND** bisacodyl    HYDROcodone-acetaminophen    Morphine **AND** naloxone    ondansetron **OR** ondansetron    [COMPLETED] Insert Peripheral IV **AND** sodium chloride        Hip fracture    Hyperlipidemia    Essential hypertension    Left anterior fascicular block    Leukocytosis    Hyperglycemia    Anemia    Hypokalemia        Assessment/Plan:    89 y.o. female admitted with Hip fracture.     Left femoral fracture  -S/p mechanical fall and left hip fracture  -Appreciate Ortho assistance  -S/P Lt IM nailing 10/31  -Pain control and bowel regimen postoperatively; had multiple loose stools overnight. Change bowel regimen to PRN  -PT/OT following  -ASA BID for VTE ppx per Ortho     Left basilar consolidation  Leukocytosis  -CXR with evidence of left-sided consolidation, most likely atelectasis though infection cannot be ruled out  -WBC normalized, back up to 12 today  -O2 weaned down to 1L NC  -With recurrent leukocytosis  -Use of IS postoperatively     HTN  Hyperlipidemia  -Continue home losartan  -Continue home statin  -Monitor for postop hypotension; BP acceptable on review     Hyperglycemia  -Hgb A1c 5.4  -Glucoses have been somewhat elevated, but overall okay for  hospitalization  -Closely monitor     Anemia:  -Hgb 13.8-->12-->11.2-->remaining stable around 9.6-9.8, likely dilutional along w/blood loss w/surgery  -Iron, iron sat wnl.   -Monitor daily     Hypokalemia:  -K 3.3, replete x 1      UTI- monitor the urine cultures and wbc   -rocephin  -pelvic discomfort yesterday    ASA  for DVT prophylaxis.  Limited code (no CPR, no intubation).             Ion Boyd MD  11/04/23  11:02 EDT

## 2023-11-05 ENCOUNTER — APPOINTMENT (OUTPATIENT)
Dept: CT IMAGING | Facility: HOSPITAL | Age: 88
End: 2023-11-05
Payer: MEDICARE

## 2023-11-05 LAB
ALBUMIN SERPL-MCNC: 3.3 G/DL (ref 3.5–5.2)
ALP SERPL-CCNC: 56 U/L (ref 39–117)
ALT SERPL W P-5'-P-CCNC: 15 U/L (ref 1–33)
ANION GAP SERPL CALCULATED.3IONS-SCNC: 8 MMOL/L (ref 5–15)
AST SERPL-CCNC: 23 U/L (ref 1–32)
BACTERIA SPEC AEROBE CULT: ABNORMAL
BILIRUB CONJ SERPL-MCNC: <0.2 MG/DL (ref 0–0.3)
BILIRUB INDIRECT SERPL-MCNC: ABNORMAL MG/DL
BILIRUB SERPL-MCNC: 0.9 MG/DL (ref 0–1.2)
BUN SERPL-MCNC: 12 MG/DL (ref 8–23)
BUN/CREAT SERPL: 26.1 (ref 7–25)
CALCIUM SPEC-SCNC: 8.3 MG/DL (ref 8.6–10.5)
CHLORIDE SERPL-SCNC: 107 MMOL/L (ref 98–107)
CO2 SERPL-SCNC: 25 MMOL/L (ref 22–29)
CREAT SERPL-MCNC: 0.46 MG/DL (ref 0.57–1)
DEPRECATED RDW RBC AUTO: 43.9 FL (ref 37–54)
EGFRCR SERPLBLD CKD-EPI 2021: 91.6 ML/MIN/1.73
ERYTHROCYTE [DISTWIDTH] IN BLOOD BY AUTOMATED COUNT: 12.7 % (ref 12.3–15.4)
GLUCOSE SERPL-MCNC: 114 MG/DL (ref 65–99)
HCT VFR BLD AUTO: 31.7 % (ref 34–46.6)
HGB BLD-MCNC: 10.8 G/DL (ref 12–15.9)
LIPASE SERPL-CCNC: 33 U/L (ref 13–60)
MCH RBC QN AUTO: 32 PG (ref 26.6–33)
MCHC RBC AUTO-ENTMCNC: 34.1 G/DL (ref 31.5–35.7)
MCV RBC AUTO: 93.8 FL (ref 79–97)
PLATELET # BLD AUTO: 269 10*3/MM3 (ref 140–450)
PMV BLD AUTO: 10.4 FL (ref 6–12)
POTASSIUM SERPL-SCNC: 3.7 MMOL/L (ref 3.5–5.2)
PROT SERPL-MCNC: 5.4 G/DL (ref 6–8.5)
RBC # BLD AUTO: 3.38 10*6/MM3 (ref 3.77–5.28)
SODIUM SERPL-SCNC: 140 MMOL/L (ref 136–145)
WBC NRBC COR # BLD: 12.11 10*3/MM3 (ref 3.4–10.8)

## 2023-11-05 PROCEDURE — 83690 ASSAY OF LIPASE: CPT | Performed by: HOSPITALIST

## 2023-11-05 PROCEDURE — 74176 CT ABD & PELVIS W/O CONTRAST: CPT

## 2023-11-05 PROCEDURE — 80048 BASIC METABOLIC PNL TOTAL CA: CPT | Performed by: ORTHOPAEDIC SURGERY

## 2023-11-05 PROCEDURE — 97110 THERAPEUTIC EXERCISES: CPT

## 2023-11-05 PROCEDURE — 25010000002 CEFTRIAXONE PER 250 MG: Performed by: HOSPITALIST

## 2023-11-05 PROCEDURE — 80076 HEPATIC FUNCTION PANEL: CPT | Performed by: HOSPITALIST

## 2023-11-05 PROCEDURE — 85027 COMPLETE CBC AUTOMATED: CPT | Performed by: ORTHOPAEDIC SURGERY

## 2023-11-05 RX ADMIN — ACETAMINOPHEN 650 MG: 325 TABLET, FILM COATED ORAL at 21:11

## 2023-11-05 RX ADMIN — ACETAMINOPHEN 650 MG: 325 TABLET, FILM COATED ORAL at 10:51

## 2023-11-05 RX ADMIN — CEFTRIAXONE SODIUM 1000 MG: 1 INJECTION, POWDER, FOR SOLUTION INTRAMUSCULAR; INTRAVENOUS at 11:08

## 2023-11-05 RX ADMIN — Medication 5 MG: at 21:07

## 2023-11-05 RX ADMIN — ASPIRIN 81 MG: 81 TABLET, CHEWABLE ORAL at 10:51

## 2023-11-05 RX ADMIN — LOSARTAN POTASSIUM 25 MG: 25 TABLET, FILM COATED ORAL at 10:51

## 2023-11-05 RX ADMIN — ASPIRIN 81 MG: 81 TABLET, CHEWABLE ORAL at 21:07

## 2023-11-05 RX ADMIN — ATORVASTATIN CALCIUM 20 MG: 20 TABLET, FILM COATED ORAL at 11:08

## 2023-11-05 NOTE — THERAPY TREATMENT NOTE
Patient Name: Riri Damon  : 1934    MRN: [id number]                              Today's Date: 2023       Admit Date: 10/29/2023    Visit Dx:     ICD-10-CM ICD-9-CM   1. Closed fracture of left hip, initial encounter  S72.002A 820.8     Patient Active Problem List   Diagnosis    Hyperlipidemia    Low back pain    Mitral valve insufficiency    Palpitations    Knee pain    Tricuspid valve insufficiency    Arthritis    Medicare annual wellness visit, subsequent    Screening for osteoporosis    Orthostatic lightheadedness    Essential hypertension    OA (osteoarthritis) of knee    Ventricular septal defect    Left anterior fascicular block    Malignant neoplasm of ascending colon    Family history of colon cancer    Osteopenia of left hip    Acute pain of left shoulder    Wellness examination    Anxiety    Cyst of right ovary    Primary insomnia    Vertigo    Chronic idiopathic constipation    Hip fracture    Leukocytosis    Hyperglycemia    Anemia    Hypokalemia     Past Medical History:   Diagnosis Date    Anxiety     Colon carcinoma     Stage IIIB, T4N1a; underwent radiation therapy and colon resection by Dr. Mcneil    Deep vein thrombosis 2017    right leg    History of edema     LE    History of rheumatic fever     Hx of radiation therapy     for adenocarcinoma of the colon Stage IIIB, T4N1a    Hyperlipidemia     Hypertension     MEDS PRN    Infectious mononucleosis     Infectious viral hepatitis     Left anterior fascicular block     Mitral regurgitation     2010: mild per echo    OA (osteoarthritis)     Palpitations     Pulmonary hypertension     2010- mild per echo; 2012 - normal RVSP per echo    Spinal headache     Tricuspid regurgitation     2010: Mild to moderate per echo; 2012: Trace to mild per echo    Venous insufficiency     Ventricular septal defect     Per echocardiogram     Vitamin D deficiency      Past Surgical History:   Procedure Laterality Date     APPENDECTOMY      COLECTOMY PARTIAL / TOTAL  02/02/2015 2/2015 due to adenocarcinoma    COLONOSCOPY      JAN 2015    COLONOSCOPY N/A 5/25/2016    Procedure: COLONOSCOPY to ANASTAMOSIS AND TERMINAL ILEUM;  Surgeon: Yfn Mcneil MD;  Location: [facility] ENDOSCOPY;  Service:     COLONOSCOPY N/A 7/11/2019    Procedure: COLONOSCOPY to cecum:;  Surgeon: Yfn Mcneil MD;  [facility];  Service: General    HIP INTERTROCHANTERIC NAILING Left 10/31/2023    Procedure: HIP INTERTROCHANTERIC NAILING;  Surgeon: Yfn Reyes II, MD;  Location: [facility] MAIN OR;  Service: Orthopedics;  Laterality: Left;    HYSTERECTOMY      INTRAOCULAR LENS EXCHANGE Bilateral     JOINT MANIPULATION Right 1/9/2018    Procedure: MANIPULATION OF RT KNEE;  Surgeon: Andrea Caballero MD;  Location: [facility] MAIN OR;  Service:     JOINT REPLACEMENT Right 11/09/2017    KNEE SURGERY Left 2006    ARTHROSCOPY    RI ARTHRP KNE CONDYLE&PLATU MEDIAL&LAT COMPARTMENTS Right 11/9/2017    Procedure: RIGHT TOTAL KNEE ARTHROPLASTY;  Surgeon: Andrea Caballero MD;  Location: [facility] MAIN OR;  Service: Orthopedics    TONSILLECTOMY        General Information       Row Name 11/05/23 1408          Physical Therapy Time and Intention    Document Type therapy note (daily note)  -     Mode of Treatment physical therapy  -       Row Name 11/05/23 1408          Cognition    Orientation Status (Cognition) oriented x 4  -               User Key  (r) = Recorded By, (t) = Taken By, (c) = Cosigned By      Initials Name Provider Type    PC Delores Mckinney PT Physical Therapist                   Mobility       Row Name 11/05/23 1408          Bed Mobility    Comment, (Bed Mobility) up in chair  -       Row Name 11/05/23 1408          Sit-Stand Transfer    Sit-Stand [place] (Transfers) minimum assist (75% patient effort)  -     Assistive Device (Sit-Stand Transfers) walker, front-wheeled  -       Row Name 11/05/23 1408          Gait/Stairs  (Locomotion)    [place] Level (Gait) minimum assist (75% patient effort)  -PC     Assistive Device (Gait) walker, front-wheeled  -PC     Distance in Feet (Gait) 25 ft  -PC     Deviations/Abnormal Patterns (Gait) antalgic;gait speed decreased;stride length decreased  -PC     Bilateral Gait Deviations weight shift ability decreased;forward flexed posture  -PC     Comment, (Gait/Stairs) pt able to advance her LLE on her own, she does hike her hip to do so  -PC               User Key  (r) = Recorded By, (t) = Taken By, (c) = Cosigned By      Initials Name Provider Type    PC Delores Mckinney, PT Physical Therapist                   Obj/Interventions       Row Name 11/05/23 1409          Motor Skills    Therapeutic Exercise --  10 reps hip protocol  -PC               User Key  (r) = Recorded By, (t) = Taken By, (c) = Cosigned By      Initials Name Provider Type    PC Delores Mckinney, PT Physical Therapist                   Goals/Plan    No documentation.                  Clinical Impression       Row Name 11/05/23 1410          Pain    Pretreatment Pain Rating 5/10  -PC     Pain Location - Side/Orientation Left  -PC     Pain Location - hip  -PC     Pain Intervention(s) Medication (See MAR);Repositioned;Cold applied  -       Row Name 11/05/23 1410          Plan of Care Review    Plan of Care Reviewed With patient  -PC     Progress improving  -PC     Outcome Evaluation Pt showing steady improvement with mobility and toleration of activity, able to walk 25 ft today with min assist, able to advance her LLE by hiking her hip. Cont to require some assist for exercises. Pt cont to be appropriate to go to SNF at [facility]  -       Row Name 11/05/23 1410          Positioning and Restraints    Pre-Treatment Position sitting in chair/recliner  -PC     Post Treatment Position chair  -PC     In Chair reclined;call light within reach;encouraged to call for assist;exit alarm on  -PC               User Key  (r) = Recorded By, (t)  = Taken By, (c) = Cosigned By      Initials Name Provider Type    PC Delores Mckinney PT Physical Therapist                   Outcome Measures       Row Name 11/05/23 1413          How much help from another person do you currently need...    Turning from your back to your side while in flat bed without using bedrails? 3  -PC     Moving from lying on back to sitting on the side of a flat bed without bedrails? 3  -PC     Moving to and from a bed to a chair (including a wheelchair)? 3  -PC     Standing up from a chair using your arms (e.g., wheelchair, bedside chair)? 3  -PC     Climbing 3-5 steps with a railing? 2  -PC     To walk in hospital room? 3  -PC     AM-PAC 6 Clicks Score (PT) 17  -PC     Highest level of mobility 5 --> Static standing  -PC               User Key  (r) = Recorded By, (t) = Taken By, (c) = Cosigned By      Initials Name Provider Type    PC Delores Mckinney PT Physical Therapist                                 Physical Therapy Education       Title: PT OT SLP Therapies (Done)       Topic: Physical Therapy (Done)       Point: Mobility training (Done)       Learning Progress Summary             Patient Acceptance, E,D, DU by PC at 11/5/2023 1413    Acceptance, E,TB, VU by RHONDA at 11/4/2023 1008    Acceptance, E,TB, VU by RHONDA at 11/3/2023 1542    Acceptance, E, VU,NR by  at 11/1/2023 1420                         Point: Home exercise program (Done)       Learning Progress Summary             Patient Acceptance, E,D, DU by PC at 11/5/2023 1413    Acceptance, E,TB, VU by RHONDA at 11/4/2023 1008    Acceptance, E,TB, VU by RHONDA at 11/3/2023 1542    Acceptance, E, VU,NR by  at 11/1/2023 1420                         Point: Body mechanics (Done)       Learning Progress Summary             Patient Acceptance, E,D, DU by PC at 11/5/2023 1413    Acceptance, E,TB, VU by RHONDA at 11/4/2023 1008    Acceptance, E,TB, VU by RHONDA at 11/3/2023 1542    Acceptance, E, VU,NR by  at 11/1/2023 1420                          Point: Precautions (Done)       Learning Progress Summary             Patient Acceptance, E,D, DU by PC at 11/5/2023 1413    Acceptance, E,TB, VU by RHONDA at 11/4/2023 1008    Acceptance, E,TB, VU by RHONDA at 11/3/2023 1542    Acceptance, E, VU,NR by  at 11/1/2023 1420                                         User Key       Initials Effective Dates Name Provider Type Discipline     07/11/23 -  Lorrie Medina, PT Physical Therapist PT    PC 06/16/21 -  Delores Mckinney PT Physical Therapist PT    RHONDA 06/09/23 -  Heather Cooper RN Registered Nurse Nurse                  PT Recommendation and Plan     Plan of Care Reviewed With: patient  Progress: improving  Outcome Evaluation: Pt showing steady improvement with mobility and toleration of activity, able to walk 25 ft today with min assist, able to advance her LLE by hiking her hip. Cont to require some assist for exercises. Pt cont to be appropriate to go to SNF at [facility]     Time Calculation:         PT Charges       Row Name 11/05/23 1414             Time Calculation    Start Time 1136  -PC      Stop Time 1152  -PC      Time Calculation (min) 16 min  -PC      PT Received On 11/05/23  -PC      PT - Next Appointment 11/06/23  -PC                User Key  (r) = Recorded By, (t) = Taken By, (c) = Cosigned By      Initials Name Provider Type    PC Delores Mckinney, PT Physical Therapist                  Therapy Charges for Today       Code Description Service Date Service Provider Modifiers Qty    [id number] HC PT THER PROC EA 15 MIN 11/5/2023 Delores Mckinney, PT GP 1            PT G-Codes  Outcome Measure Options: AM-PAC 6 Clicks Basic Mobility (PT)  AM-PAC 6 Clicks Score (PT): 17  PT Discharge Summary  Anticipated Discharge Disposition (PT): skilled nursing facility    Delores Mckinney PT  11/5/2023

## 2023-11-05 NOTE — PROGRESS NOTES
[facility]     LOS: 7 days     Subjective:    CC:Fall and Hip Injury    DIET:  Diet Order   Procedures    Diet: Regular/House Diet; Texture: Regular Texture (IDDSI 7); Fluid Consistency: Thin (IDDSI 0)     Patient doing well  No new complaints    Objective:    Vital Signs:  Temp:  [97.4 °F (36.3 °C)-98.1 °F (36.7 °C)] 98 °F (36.7 °C)  Heart Rate:  [] 96  Resp:  [16-18] 18  BP: (122-137)/(65-72) 133/66    SpO2:  [93 %-96 %] 95 %  on   ;   Device (Oxygen Therapy): room air  Body mass index is 22.63 kg/m².    Physical Exam  Constitutional:       Appearance: Normal appearance.   HENT:      Head: Normocephalic and atraumatic.   Cardiovascular:      Rate and Rhythm: Normal rate and regular rhythm.      Heart sounds: No murmur heard.     No friction rub.   Pulmonary:      Effort: Pulmonary effort is normal.      Breath sounds: Normal breath sounds.   Abdominal:      General: Bowel sounds are normal. There is no distension.      Palpations: Abdomen is soft.      Tenderness: There is no abdominal tenderness.   Skin:     General: Skin is warm and dry.   Neurological:      Mental Status: She is alert.   Psychiatric:         Mood and Affect: Mood normal.         Behavior: Behavior normal.         Results Review:    Glucose   Date Value Ref Range Status   11/05/2023 114 (H) 65 - 99 mg/dL Final   11/04/2023 104 (H) 65 - 99 mg/dL Final   11/03/2023 98 65 - 99 mg/dL Final     Results from last 7 days   Lab Units 11/05/23 0432   WBC 10*3/mm3 12.11*   HEMOGLOBIN g/dL 10.8*   HEMATOCRIT % 31.7*   PLATELETS 10*3/mm3 269     Results from last 7 days   Lab Units 11/05/23 0432   SODIUM mmol/L 140   POTASSIUM mmol/L 3.7   CHLORIDE mmol/L 107   CO2 mmol/L 25.0   BUN mg/dL 12   CREATININE mg/dL 0.46*   CALCIUM mg/dL 8.3*   BILIRUBIN mg/dL 0.9   ALK PHOS U/L 56   ALT (SGPT) U/L 15   AST (SGOT) U/L 23   GLUCOSE mg/dL 114*     Results from last 7 days   Lab Units 10/29/23  1957   INR  1.05   APTT seconds  25.7             Cultures:  Urine Culture   Date Value Ref Range Status   11/03/2023 Culture in progress  Preliminary       I have reviewed daily medications and changes in CPOE    Scheduled meds  aspirin, 81 mg, Oral, BID  atorvastatin, 20 mg, Oral, Daily  cefTRIAXone, 1,000 mg, Intravenous, Q24H  lidocaine, 1 patch, Transdermal, Nightly  losartan, 25 mg, Oral, Daily  melatonin, 5 mg, Oral, Nightly           PRN meds    acetaminophen    senna-docusate sodium **AND** [DISCONTINUED] polyethylene glycol **AND** bisacodyl **AND** bisacodyl    HYDROcodone-acetaminophen    influenza vaccine    Morphine **AND** naloxone    ondansetron **OR** ondansetron    [COMPLETED] Insert Peripheral IV **AND** sodium chloride        Hip fracture    Hyperlipidemia    Essential hypertension    Left anterior fascicular block    Leukocytosis    Hyperglycemia    Anemia    Hypokalemia        Assessment/Plan:    89 y.o. female admitted with Hip fracture.     Proctitis / possible pancreatitis by CT scan but no symptoms of pancreatitis hepatitis we will check a lipase.  -We will ask GI to see the patient for the proctitis, she did have some diarrhea a few days ago but this has resolved    Left femoral fracture  -S/p mechanical fall and left hip fracture  -Appreciate Ortho assistance  -S/P Lt IM nailing 10/31  -Pain control and bowel regimen postoperatively; had multiple loose stools overnight. Change bowel regimen to PRN  -PT/OT following  -ASA BID for VTE ppx per Ortho     Left basilar consolidation  Leukocytosis  -CXR with evidence of left-sided consolidation, most likely atelectasis though infection cannot be ruled out  -WBC normalized, back up to 12 today  -O2 weaned down to 1L NC  -Use of IS postoperatively     HTN  Hyperlipidemia  -Continue home losartan  -Continue home statin  -Monitor for postop hypotension; BP acceptable on review     Hyperglycemia  -Hgb A1c 5.4  -Glucoses have been somewhat elevated, but overall okay for  hospitalization  -Closely monitor     Anemia:  -Hgb 13.8-->12-->11.2-->remaining stable around 9.6-9.8, likely dilutional along w/blood loss w/surgery  -Iron, iron sat wnl.   -Monitor daily     Hypokalemia:  -K 3.3, replete x 1      UTI- monitor the urine cultures show E. coli 50k pansensitive and wbc   -rocephin  -pelvic discomfort yesterday    ASA  for DVT prophylaxis.  Limited code (no CPR, no intubation).    Discussed with the patient's son         Ion Boyd MD  11/05/23  17:25 EST

## 2023-11-05 NOTE — PLAN OF CARE
Goal Outcome Evaluation:   POD 5. VSS. Minimal complaints of pain. Ambulating Ax1. Voiding adequately. CT ordered but yet to be completed. Plans to DC to [place] once medically stable. WCTM.

## 2023-11-05 NOTE — PLAN OF CARE
Goal Outcome Evaluation:  Plan of Care Reviewed With: patient        Progress: improving  Outcome Evaluation: Pt showing steady improvement with mobility and toleration of activity, able to walk 25 ft today with min assist, able to advance her LLE by hiking her hip. Cont to require some assist for exercises. Pt cont to be appropriate to go to SNF at [facility]      Anticipated Discharge Disposition (PT): skilled nursing facility

## 2023-11-06 LAB
ANION GAP SERPL CALCULATED.3IONS-SCNC: 5 MMOL/L (ref 5–15)
BUN SERPL-MCNC: 8 MG/DL (ref 8–23)
BUN/CREAT SERPL: 20.5 (ref 7–25)
CALCIUM SPEC-SCNC: 8.2 MG/DL (ref 8.6–10.5)
CHLORIDE SERPL-SCNC: 110 MMOL/L (ref 98–107)
CO2 SERPL-SCNC: 27 MMOL/L (ref 22–29)
CREAT SERPL-MCNC: 0.39 MG/DL (ref 0.57–1)
DEPRECATED RDW RBC AUTO: 43.6 FL (ref 37–54)
EGFRCR SERPLBLD CKD-EPI 2021: 95.3 ML/MIN/1.73
ERYTHROCYTE [DISTWIDTH] IN BLOOD BY AUTOMATED COUNT: 12.7 % (ref 12.3–15.4)
GLUCOSE SERPL-MCNC: 103 MG/DL (ref 65–99)
HCT VFR BLD AUTO: 27.7 % (ref 34–46.6)
HGB BLD-MCNC: 9.4 G/DL (ref 12–15.9)
MCH RBC QN AUTO: 31.8 PG (ref 26.6–33)
MCHC RBC AUTO-ENTMCNC: 33.9 G/DL (ref 31.5–35.7)
MCV RBC AUTO: 93.6 FL (ref 79–97)
PLATELET # BLD AUTO: 229 10*3/MM3 (ref 140–450)
PMV BLD AUTO: 10.3 FL (ref 6–12)
POTASSIUM SERPL-SCNC: 4.1 MMOL/L (ref 3.5–5.2)
RBC # BLD AUTO: 2.96 10*6/MM3 (ref 3.77–5.28)
SODIUM SERPL-SCNC: 142 MMOL/L (ref 136–145)
WBC NRBC COR # BLD: 7.73 10*3/MM3 (ref 3.4–10.8)

## 2023-11-06 PROCEDURE — 85027 COMPLETE CBC AUTOMATED: CPT | Performed by: ORTHOPAEDIC SURGERY

## 2023-11-06 PROCEDURE — 97530 THERAPEUTIC ACTIVITIES: CPT

## 2023-11-06 PROCEDURE — 99222 1ST HOSP IP/OBS MODERATE 55: CPT | Performed by: PHYSICIAN ASSISTANT

## 2023-11-06 PROCEDURE — 97110 THERAPEUTIC EXERCISES: CPT

## 2023-11-06 PROCEDURE — 80048 BASIC METABOLIC PNL TOTAL CA: CPT | Performed by: ORTHOPAEDIC SURGERY

## 2023-11-06 PROCEDURE — 25010000002 CEFTRIAXONE PER 250 MG: Performed by: HOSPITALIST

## 2023-11-06 RX ORDER — HYDROCORTISONE 25 MG/G
CREAM TOPICAL 2 TIMES DAILY
Status: DISCONTINUED | OUTPATIENT
Start: 2023-11-06 | End: 2023-11-08 | Stop reason: HOSPADM

## 2023-11-06 RX ADMIN — LIDOCAINE 1 PATCH: 50 PATCH CUTANEOUS at 00:20

## 2023-11-06 RX ADMIN — ATORVASTATIN CALCIUM 20 MG: 20 TABLET, FILM COATED ORAL at 08:02

## 2023-11-06 RX ADMIN — ACETAMINOPHEN 650 MG: 325 TABLET, FILM COATED ORAL at 13:59

## 2023-11-06 RX ADMIN — HYDROCORTISONE: 25 CREAM TOPICAL at 21:05

## 2023-11-06 RX ADMIN — CEFTRIAXONE SODIUM 1000 MG: 1 INJECTION, POWDER, FOR SOLUTION INTRAMUSCULAR; INTRAVENOUS at 12:04

## 2023-11-06 RX ADMIN — LOSARTAN POTASSIUM 25 MG: 25 TABLET, FILM COATED ORAL at 08:02

## 2023-11-06 RX ADMIN — Medication 5 MG: at 21:04

## 2023-11-06 RX ADMIN — ASPIRIN 81 MG: 81 TABLET, CHEWABLE ORAL at 21:04

## 2023-11-06 RX ADMIN — LIDOCAINE 1 PATCH: 50 PATCH CUTANEOUS at 21:07

## 2023-11-06 RX ADMIN — ASPIRIN 81 MG: 81 TABLET, CHEWABLE ORAL at 08:02

## 2023-11-06 RX ADMIN — HYDROCORTISONE: 25 CREAM TOPICAL at 12:04

## 2023-11-06 NOTE — PROGRESS NOTES
Name: Riri Damon ADMIT: 10/29/2023   : 1934  PCP: Phoenix Velazquez MD    MRN: [id number] LOS: 8 days   AGE/SEX: 89 y.o. female  ROOM: [facility]     Subjective   Subjective   Feeling better overall. Formed BM yesterday. Afebrile.        Objective   Objective   Vital Signs  Temp:  [97.6 °F (36.4 °C)-98.6 °F (37 °C)] 98 °F (36.7 °C)  Heart Rate:  [80-96] 89  Resp:  [16-18] 16  BP: (125-135)/(66-76) 128/73  SpO2:  [94 %-97 %] 94 %  on   ;   Device (Oxygen Therapy): room air  Body mass index is 22.63 kg/m².  Physical Exam  Vitals and nursing note reviewed.   Constitutional:       General: She is not in acute distress.  HENT:      Head: Normocephalic.      Mouth/Throat:      Mouth: Mucous membranes are moist.   Eyes:      Conjunctiva/sclera: Conjunctivae normal.   Cardiovascular:      Rate and Rhythm: Normal rate and regular rhythm.   Pulmonary:      Effort: Pulmonary effort is normal. No respiratory distress.      Breath sounds: No wheezing or rales.   Abdominal:      General: Bowel sounds are normal. There is no distension.      Palpations: Abdomen is soft.   Musculoskeletal:      Cervical back: Neck supple.      Right lower leg: No edema.      Left lower leg: No edema.   Skin:     General: Skin is warm and dry.   Neurological:      Mental Status: She is alert and oriented to person, place, and time.   Psychiatric:         Mood and Affect: Mood normal.         Behavior: Behavior normal.       Results Review     I reviewed the patient's new clinical results.  Results from last 7 days   Lab Units 238   WBC 10*3/mm3 7.73 12.11* 10.83* 12.15*   HEMOGLOBIN g/dL 9.4* 10.8* 9.5* 9.8*   PLATELETS 10*3/mm3 229 269 205 200     Results from last 7 days   Lab Units 23  0432 238   SODIUM mmol/L 142 140 140 137   POTASSIUM mmol/L 4.1 3.7 3.7 3.3*   CHLORIDE mmol/L 110* 107 111* 106   CO2 mmol/L 27.0 25.0 23.0 23.6  "  BUN mg/dL 8 12 11 7*   CREATININE mg/dL 0.39* 0.46* 0.38* 0.33*   GLUCOSE mg/dL 103* 114* 104* 98   EGFR mL/min/1.73 95.3 91.6 95.9 99.2     Results from last 7 days   Lab Units 11/05/23  0432 11/04/23  0452 11/03/23  0448 11/02/23  0500   ALBUMIN g/dL 3.3* 2.5* 2.9* 3.0*   BILIRUBIN mg/dL 0.9  --   --   --    ALK PHOS U/L 56  --   --   --    AST (SGOT) U/L 23  --   --   --    ALT (SGPT) U/L 15  --   --   --      Results from last 7 days   Lab Units 11/06/23  0512 11/05/23 0432 11/04/23  0452 11/03/23  0448 11/02/23  0500   CALCIUM mg/dL 8.2* 8.3* 7.9* 7.9* 7.8*   ALBUMIN g/dL  --  3.3* 2.5* 2.9* 3.0*       No results found for: \"HGBA1C\", \"POCGLU\"    CT Abdomen Pelvis Without Contrast    Result Date: 11/5/2023    1. Slight haziness in the fat around the pancreas may be evidence of acute pancreatitis, correlate clinically. Cholelithiasis, further evaluation of the gallbladder can be obtained as indicated.  2. Evidence of proctitis.  3. Small nonobstructive left nephrolithiasis. No hydronephrosis. Right adnexal cysts, as described.  4. Subcutaneous fluid collection in the region of the left hip surgery may represent ordinary postsurgical change, correlate clinically to exclude possibility of abscess.  This report was finalized on 11/5/2023 1:57 PM by Dr. Singh Ho M.D on Workstation: [facility]       I have personally reviewed all medications:  Scheduled Medications  aspirin, 81 mg, Oral, BID  atorvastatin, 20 mg, Oral, Daily  cefTRIAXone, 1,000 mg, Intravenous, Q24H  Hydrocortisone (Perianal), , Rectal, BID  lidocaine, 1 patch, Transdermal, Nightly  losartan, 25 mg, Oral, Daily  melatonin, 5 mg, Oral, Nightly    Infusions   Diet  Diet: Regular/House Diet; Texture: Regular Texture (IDDSI 7); Fluid Consistency: Thin (IDDSI 0)    I have personally reviewed:  [x]  Laboratory   [x]  Microbiology   [x]  Radiology   [x]  EKG/Telemetry  [x]  Cardiology/Vascular   []  Pathology    []  Records       Assessment/Plan "     Active Hospital Problems    Diagnosis  POA    **Hip fracture [S72.009A]  Yes    Hypokalemia [E87.6]  Yes    Anemia [D64.9]  Yes    Leukocytosis [D72.829]  Yes    Hyperglycemia [R73.9]  Yes    Left anterior fascicular block [I44.4]  Yes    Essential hypertension [I10]  Yes    Hyperlipidemia [E78.5]  Yes      Resolved Hospital Problems   No resolved problems to display.       89 y.o. female admitted with Hip fracture.    Proctitis / possible pancreatitis by CT scan but no symptoms of pancreatitis hepatitis   -Lipase wnl  -Appreciate GI assistance. Treating hemorrhoids. As long as asymptomatic, no plan for treatment/evaluation of reported proctitis; if symptomatic, can plan flex sig  -Had formed BM yesterday     Left femoral fracture  -S/p mechanical fall and left hip fracture  -Appreciate Ortho assistance  -S/P Lt IM nailing 10/31  -Pain control   -Changed bowel regimen to PRN due to diarrhea w/meds  -PT/OT following  -ASA BID for VTE ppx per Ortho     Left basilar consolidation  Leukocytosis  -CXR with evidence of left-sided consolidation, most likely atelectasis though infection cannot be ruled out  -WBC normalized, back up to 12 today  -O2 weaned down to 1L NC  -Use of IS postoperatively     HTN  Hyperlipidemia  -Continue home losartan  -Continue home statin  -Monitor for postop hypotension; BP acceptable on review     Hyperglycemia  -Hgb A1c 5.4  -Glucoses have been somewhat elevated, but overall okay for hospitalization  -Closely monitor     Anemia:  -Hgb 13.8-->12-->11.2-->remaining stable around 9.6-9.8, likely dilutional along w/blood loss w/surgery  -Iron, iron sat wnl.   -Monitor daily     Hypokalemia:  -resoved      UTI- monitor the urine cultures show E. coli 50k pansensitive and wbc   -rocephin x 3 doses, last dose today         ASA  for DVT prophylaxis.  Limited code (no CPR, no intubation).  Discussed with patient and CCP.  Anticipate discharge to SNU facility in 1-2 days..      Staci Patel  PADMINI Anna  [facility]  11/06/23  11:55 EST

## 2023-11-06 NOTE — CONSULTS
[facility]  Initial Inpatient Consult Note    Referring Provider: Dr. Boyd    Reason for Consultation: Proctitis    Subjective     History of present illness:    89 y.o. female w/ PMHx of colon cancer s/p resection in 2015, HTN, HLD, osteoporosis is currently admitted after a fall causing L hip pain. At presentation, is noted to have fracture of the L femur s/p surgery 10/31.     GI consulted for imaging showing proctitis. Imaging also showing pancreatitis. Pt reports she had profuse diarrhea 4 days ago, but currently having normal, nonbloody stools. She was having scant bright red blood in stool after the profuse diarrhea that she attributes to hemorrhoids. She is c/o some rectal burning from hemorrhoids. She denies abd pain, nausea/vomiting, weight loss. Denies hx of pancreatitis.     In regards to her colon cancer, she had surgery with Dr. Mcneil in 2015 followed by chemotherapy and radiation at the time. She had a colonoscopy a year after which was negative.     No other GI complaints at this time.     Past Medical History:  Past Medical History:   Diagnosis Date    Anxiety     Colon carcinoma 2015    Stage IIIB, T4N1a; underwent radiation therapy and colon resection by Dr. Mcneil    Deep vein thrombosis 11/24/2017    right leg    History of edema     LE    History of rheumatic fever     Hx of radiation therapy 2015    for adenocarcinoma of the colon Stage IIIB, T4N1a    Hyperlipidemia     Hypertension     MEDS PRN    Infectious mononucleosis     Infectious viral hepatitis     Left anterior fascicular block     Mitral regurgitation     8/2010: mild per echo    OA (osteoarthritis)     Palpitations     Pulmonary hypertension     8/2010- mild per echo; 8/2012 - normal RVSP per echo    Spinal headache     Tricuspid regurgitation     8/2010: Mild to moderate per echo; 8/2012: Trace to mild per echo    Venous insufficiency     Ventricular septal defect     Per echocardiogram 2012     Vitamin D deficiency      Past Surgical History:  Past Surgical History:   Procedure Laterality Date    APPENDECTOMY      COLECTOMY PARTIAL / TOTAL  02/02/2015 2/2015 due to adenocarcinoma    COLONOSCOPY      JAN 2015    COLONOSCOPY N/A 5/25/2016    Procedure: COLONOSCOPY to ANASTAMOSIS AND TERMINAL ILEUM;  Surgeon: Yfn Mcneil MD;  Location:  [facility];  Service:     COLONOSCOPY N/A 7/11/2019    Procedure: COLONOSCOPY to cecum:;  Surgeon: Yfn Mcneil MD;  Location:  [facility];  Service: General    HIP INTERTROCHANTERIC NAILING Left 10/31/2023    Procedure: HIP INTERTROCHANTERIC NAILING;  Surgeon: Yfn Reyes II, MD;  Location: [facility] MAIN OR;  Service: Orthopedics;  Laterality: Left;    HYSTERECTOMY      INTRAOCULAR LENS EXCHANGE Bilateral     JOINT MANIPULATION Right 1/9/2018    Procedure: MANIPULATION OF RT KNEE;  Surgeon: Andrea Caballero MD;  Location: [facility] MAIN OR;  Service:     JOINT REPLACEMENT Right 11/09/2017    KNEE SURGERY Left 2006    ARTHROSCOPY    DE ARTHRP KNE CONDYLE&PLATU MEDIAL&LAT COMPARTMENTS Right 11/9/2017    Procedure: RIGHT TOTAL KNEE ARTHROPLASTY;  Surgeon: Andrea Caballero MD;  Location: [facility] MAIN OR;  Service: Orthopedics    TONSILLECTOMY        Social History:   Social History     Tobacco Use    Smoking status: Never     Passive exposure: Never    Smokeless tobacco: Never   Substance Use Topics    Alcohol use: No     Comment: Caffeine use: 3-4 cups half and half daily      Family History:  Family History   Problem Relation Age of Onset    Alzheimer's disease Mother     Heart disease Mother     Heart attack Father     Heart disease Father     Heart disease Other     Cancer Sister 60        Colon    Malig Hyperthermia Neg Hx     Breast cancer Neg Hx        Home Meds:  Medications Prior to Admission   Medication Sig Dispense Refill Last Dose    aspirin 81 MG EC tablet Take 1 tablet by mouth Every Other Day.   10/28/2023    Calcium Citrate (CITRACAL  PO) Take 200 mg by mouth 3 (Three) Times a Week. Monday, Wednesday, friday   10/28/2023    docusate sodium 100 MG capsule Take 1 capsule by mouth 2 (Two) Times a Day As Needed (constipation). 40 capsule 1 Past Week    losartan (COZAAR) 25 MG tablet TAKE 1 TABLET EVERY DAY 90 tablet 2 10/29/2023    magnesium chloride ER 64 MG DR tablet Take 1 tablet by mouth 3 (Three) Times a Week. Monday, Wednesday, Friday   10/27/2023    melatonin 1 MG tablet Take 5 tablets by mouth Every Night.   10/28/2023    meloxicam (MOBIC) 15 MG tablet Take 1 tablet by mouth Daily.   10/29/2023    simvastatin (ZOCOR) 40 MG tablet TAKE 1 TABLET EVERY NIGHT 90 tablet 2 10/28/2023    Multiple Vitamins-Minerals (MULTIVITAMIN ADULT PO) Take 1 tablet by mouth Daily.        Current Meds:   aspirin, 81 mg, Oral, BID  atorvastatin, 20 mg, Oral, Daily  cefTRIAXone, 1,000 mg, Intravenous, Q24H  lidocaine, 1 patch, Transdermal, Nightly  losartan, 25 mg, Oral, Daily  melatonin, 5 mg, Oral, Nightly      Allergies:  No Known Allergies    Objective     Vital Signs  Temp:  [97.6 °F (36.4 °C)-98.6 °F (37 °C)] 98.6 °F (37 °C)  Heart Rate:  [] 83  Resp:  [16-18] 16  BP: (123-135)/(66-76) 135/76  Physical Exam:  General Appearance:     Alert, cooperative, in no acute distress   Abdomen:     Normal bowel sounds, no masses, no organomegaly, soft     nontender, nondistended, no guarding, no rebound                 tenderness   Rectal:     Nonthrombosed 2 external hemorrhoids. No pain w/ BRAYDON. No blood noted. No fecal impaction.        Results Review:   I reviewed the patient's new clinical results.    Results from last 7 days   Lab Units 11/06/23  0512 11/05/23  0432 11/04/23  0452   WBC 10*3/mm3 7.73 12.11* 10.83*   HEMOGLOBIN g/dL 9.4* 10.8* 9.5*   HEMATOCRIT % 27.7* 31.7* 27.5*   PLATELETS 10*3/mm3 229 269 205     Results from last 7 days   Lab Units 11/06/23  0512 11/05/23  0432 11/04/23  0452   SODIUM mmol/L 142 140 140   POTASSIUM mmol/L 4.1 3.7 3.7    CHLORIDE mmol/L 110* 107 111*   CO2 mmol/L 27.0 25.0 23.0   BUN mg/dL 8 12 11   CREATININE mg/dL 0.39* 0.46* 0.38*   CALCIUM mg/dL 8.2* 8.3* 7.9*   BILIRUBIN mg/dL  --  0.9  --    ALK PHOS U/L  --  56  --    ALT (SGPT) U/L  --  15  --    AST (SGOT) U/L  --  23  --    GLUCOSE mg/dL 103* 114* 104*         Lab Results   Lab Value Date/Time    LIPASE 33 11/05/2023 0432       Radiology:  CT Abdomen Pelvis Without Contrast   Final Result           1. Slight haziness in the fat around the pancreas may be evidence of   acute pancreatitis, correlate clinically. Cholelithiasis, further   evaluation of the gallbladder can be obtained as indicated.       2. Evidence of proctitis.       3. Small nonobstructive left nephrolithiasis. No hydronephrosis. Right   adnexal cysts, as described.       4. Subcutaneous fluid collection in the region of the left hip surgery   may represent ordinary postsurgical change, correlate clinically to   exclude possibility of abscess.       This report was finalized on 11/5/2023 1:57 PM by Dr. Singh Ho M.D on Workstation: [id number]          XR Chest 1 View   Final Result   No focal pulmonary consolidation. Borderline heart size.   Follow-up as clinical indications persist.       This report was finalized on 11/3/2023 4:20 PM by Dr. Singh Ho M.D on Workstation: [id number]          XR Abdomen KUB   Final Result   Nonobstructive bowel gas pattern.       This report was finalized on 11/2/2023 9:26 PM by Dr. Estefania Garcia M.D on Workstation: [id number]          XR Hip With or Without Pelvis 2 - 3 View Left   Final Result   Proximal left femoral intramedullary jacqui with interlocking   screw transversing a comminuted left femoral intertrochanteric fracture.   Hardware is well-positioned and intact.               This report was finalized on 10/31/2023 8:35 AM by Dr. Jesus Morfin M.D on Workstation: [id number]          XR Hip With or Without Pelvis 2 - 3 View Left   Final  Result   3 spot fluoroscopic images of the left hip. Please see   operative note for further details. Fluoroscopy time 24 seconds.   Cumulative air kerma 3.6 mGy.               This report was finalized on 10/31/2023 8:52 AM by Dr. Jesus Morfin M.D on Workstation: [id number]          FL C Arm During Surgery   Final Result      XR Chest 1 View   Final Result   Nonspecific left basilar consolidation.       This report was finalized on 10/29/2023 9:03 PM by Dr. Estefania Garcia M.D on Workstation: [id number]          XR Hip With or Without Pelvis 2 - 3 View Left   Final Result   Comminuted intertrochanteric left femoral fracture.       This report was finalized on 10/29/2023 9:05 PM by Dr. Estefania Garcia M.D on Workstation: [id number]                Assessment:   Fall causing R hip fracture s/p surgery 10/31  Proctitis on CT scan- mild stool retention; question if resolving infectious etiology given acute diarrhea few days ago; currently resolved  Pancreatitis on CT scan- however no abd pain and lipase WNL.   UTI- on abx  Hx of colon cancer s/p colon resection in 2015  CT scan in 2022 w/o any evidence of recurrent or metastatic disease. CEA 08/2023 WNL.     Plan:   Hydrocortisone cream for symptomatic external hemorrhoids  If there is significant rectal bleeding and/or abd pain, can plan flex sig, otherwise would defer as she seems completely asymptomatic   Diet as tolerated otherwise  In regards to pancreatitis on imaging, she does not meet the criteria for true pancreatitis.     I discussed the patients findings and my recommendations with patient.         Shweta Herrera PA-C  [facility]  [address] [place] [address] [address] [place] [address]  Office: [phone]

## 2023-11-06 NOTE — PLAN OF CARE
Goal Outcome Evaluation:  Plan of Care Reviewed With: patient        Progress: improving  Outcome Evaluation: Patient stable during shift, VSS and voiding function is intact. Patient is ambulating using walker and stand by assist. Pain managed with prn tyelnol, patient requests to try to limit narcotics d/t constipation concerns. CT obtained this afternoon, see report, and GI consulted. Plan for d/c to snf, possibly tomorrow if stable.

## 2023-11-06 NOTE — THERAPY TREATMENT NOTE
Patient Name: Riri Damon  : 1934    MRN: [id number]                              Today's Date: 2023       Admit Date: 10/29/2023    Visit Dx:     ICD-10-CM ICD-9-CM   1. Closed fracture of left hip, initial encounter  S72.002A 820.8     Patient Active Problem List   Diagnosis    Hyperlipidemia    Low back pain    Mitral valve insufficiency    Palpitations    Knee pain    Tricuspid valve insufficiency    Arthritis    Medicare annual wellness visit, subsequent    Screening for osteoporosis    Orthostatic lightheadedness    Essential hypertension    OA (osteoarthritis) of knee    Ventricular septal defect    Left anterior fascicular block    Malignant neoplasm of ascending colon    Family history of colon cancer    Osteopenia of left hip    Acute pain of left shoulder    Wellness examination    Anxiety    Cyst of right ovary    Primary insomnia    Vertigo    Chronic idiopathic constipation    Hip fracture    Leukocytosis    Hyperglycemia    Anemia    Hypokalemia     Past Medical History:   Diagnosis Date    Anxiety     Colon carcinoma     Stage IIIB, T4N1a; underwent radiation therapy and colon resection by Dr. Mcneil    Deep vein thrombosis 2017    right leg    History of edema     LE    History of rheumatic fever     Hx of radiation therapy     for adenocarcinoma of the colon Stage IIIB, T4N1a    Hyperlipidemia     Hypertension     MEDS PRN    Infectious mononucleosis     Infectious viral hepatitis     Left anterior fascicular block     Mitral regurgitation     2010: mild per echo    OA (osteoarthritis)     Palpitations     Pulmonary hypertension     2010- mild per echo; 2012 - normal RVSP per echo    Spinal headache     Tricuspid regurgitation     2010: Mild to moderate per echo; 2012: Trace to mild per echo    Venous insufficiency     Ventricular septal defect     Per echocardiogram     Vitamin D deficiency      Past Surgical History:   Procedure Laterality Date     APPENDECTOMY      COLECTOMY PARTIAL / TOTAL  02/02/2015 2/2015 due to adenocarcinoma    COLONOSCOPY      JAN 2015    COLONOSCOPY N/A 5/25/2016    Procedure: COLONOSCOPY to ANASTAMOSIS AND TERMINAL ILEUM;  Surgeon: Yfn Mcneil MD;  [facility];  Service:     COLONOSCOPY N/A 7/11/2019    Procedure: COLONOSCOPY to cecum:;  Surgeon: Yfn Mcneil MD;  [facility];  Service: General    HIP INTERTROCHANTERIC NAILING Left 10/31/2023    Procedure: HIP INTERTROCHANTERIC NAILING;  Surgeon: Yfn Reyes II, MD;  Location: [facility] MAIN OR;  Service: Orthopedics;  Laterality: Left;    HYSTERECTOMY      INTRAOCULAR LENS EXCHANGE Bilateral     JOINT MANIPULATION Right 1/9/2018    Procedure: MANIPULATION OF RT KNEE;  Surgeon: Andrea Caballero MD;  Location: [facility] MAIN OR;  Service:     JOINT REPLACEMENT Right 11/09/2017    KNEE SURGERY Left 2006    ARTHROSCOPY    TN ARTHRP KNE CONDYLE&PLATU MEDIAL&LAT COMPARTMENTS Right 11/9/2017    Procedure: RIGHT TOTAL KNEE ARTHROPLASTY;  Surgeon: Andrea Caballero MD;  Location: [facility] MAIN OR;  Service: Orthopedics    TONSILLECTOMY        General Information       Row Name 11/06/23 1436          Physical Therapy Time and Intention    Document Type therapy note (daily note)  -DB     Mode of Treatment physical therapy;individual therapy  -DB       Row Name 11/06/23 1436          General Information    Patient Profile Reviewed yes  -DB               User Key  (r) = Recorded By, (t) = Taken By, (c) = Cosigned By      Initials Name Provider Type    DB Niru Mayes PT Physical Therapist                   Mobility       Row Name 11/06/23 1436          Sit-Stand Transfer    Sit-Stand [place] (Transfers) contact guard;verbal cues  -DB     Assistive Device (Sit-Stand Transfers) walker, front-wheeled  -DB       Row Name 11/06/23 1436          Gait/Stairs (Locomotion)    [place] Level (Gait) contact guard;verbal cues  -DB     Assistive  Device (Gait) walker, front-wheeled  -DB     Distance in Feet (Gait) 30  3x with seated and standing rest breaks  -DB     Deviations/Abnormal Patterns (Gait) antalgic;gait speed decreased;stride length decreased  -DB     Bilateral Gait Deviations weight shift ability decreased;forward flexed posture  -DB               User Key  (r) = Recorded By, (t) = Taken By, (c) = Cosigned By      Initials Name Provider Type    Niru Alexander PT Physical Therapist                   Obj/Interventions       Row Name 11/06/23 1437          Motor Skills    Therapeutic Exercise other (see comments)  LE ther ex 15  -DB       Row Name 11/06/23 1437          Balance    Balance Assessment sitting static balance;sitting dynamic balance;standing static balance;standing dynamic balance  -DB     Static Sitting Balance supervision  -DB     Dynamic Sitting Balance supervision  -DB     Position, Sitting Balance sitting in chair  -DB     Static Standing Balance contact guard  -DB     Dynamic Standing Balance contact guard  -DB     Position/Device Used, Standing Balance supported;walker, front-wheeled  -DB     Balance Interventions sitting;standing;sit to stand  -DB               User Key  (r) = Recorded By, (t) = Taken By, (c) = Cosigned By      Initials Name Provider Type    Niru Alexander PT Physical Therapist                   Goals/Plan    No documentation.                  Clinical Impression       Row Name 11/06/23 1439          Pain    Pain Intervention(s) Ambulation/increased activity;Repositioned  -DB       Row Name 11/06/23 1439          Plan of Care Review    Plan of Care Reviewed With patient  -DB     Progress improving  -DB     Outcome Evaluation Pt seated UIC at start of the session and agreeable to work with PT. Pt able to perform seated LE ther ex and ambulates with RW and CGA. Pt continues to demo difficulty with advancing LLE, demo's hip hiking gait pattern, but overall is improving. She continues to benefit from  skilled PT.  -DB       Row Name 11/06/23 1439          Vital Signs    O2 Delivery Pre Treatment room air  -DB     O2 Delivery Intra Treatment room air  -DB     O2 Delivery Post Treatment room air  -DB     Pre Patient Position Sitting  -DB     Intra Patient Position Standing  -DB     Post Patient Position Sitting  -DB       Row Name 11/06/23 1439          Positioning and Restraints    Pre-Treatment Position sitting in chair/recliner  -DB     Post Treatment Position chair  -DB     In Chair reclined;call light within reach;encouraged to call for assist;exit alarm on;with family/caregiver  -DB               User Key  (r) = Recorded By, (t) = Taken By, (c) = Cosigned By      Initials Name Provider Type    Niru Alexander PT Physical Therapist                   Outcome Measures       Row Name 11/06/23 1441 11/06/23 0802       How much help from another person do you currently need...    Turning from your back to your side while in flat bed without using bedrails? 4  -DB 3  -ST    Moving from lying on back to sitting on the side of a flat bed without bedrails? 3  -DB 3  -ST    Moving to and from a bed to a chair (including a wheelchair)? 3  -DB 3  -ST    Standing up from a chair using your arms (e.g., wheelchair, bedside chair)? 3  -DB 3  -ST    Climbing 3-5 steps with a railing? 2  -DB 2  -ST    To walk in hospital room? 3  -DB 3  -ST    AM-PAC 6 Clicks Score (PT) 18  -DB 17  -ST    Highest level of mobility 6 --> Walked 10 steps or more  -DB 5 --> Static standing  -ST      Row Name 11/06/23 1441          Functional Assessment    Outcome Measure Options AM-PAC 6 Clicks Basic Mobility (PT)  -DB               User Key  (r) = Recorded By, (t) = Taken By, (c) = Cosigned By      Initials Name Provider Type    Niru Alexander PT Physical Therapist    Lillian Phan, RN Registered Nurse                                 Physical Therapy Education       Title: PT OT SLP Therapies (Done)       Topic: Physical Therapy  (Done)       Point: Mobility training (Done)       Learning Progress Summary             Patient Acceptance, E, VU by DB at 11/6/2023 1441    Acceptance, E,D, DU by PC at 11/5/2023 1413    Acceptance, E,TB, VU by RHONDA at 11/4/2023 1008    Acceptance, E,TB, VU by RHONDA at 11/3/2023 1542    Acceptance, E, VU,NR by  at 11/1/2023 1420                         Point: Home exercise program (Done)       Learning Progress Summary             Patient Acceptance, E, VU by DB at 11/6/2023 1441    Acceptance, E,D, DU by PC at 11/5/2023 1413    Acceptance, E,TB, VU by RHONDA at 11/4/2023 1008    Acceptance, E,TB, VU by RHONDA at 11/3/2023 1542    Acceptance, E, VU,NR by  at 11/1/2023 1420                         Point: Body mechanics (Done)       Learning Progress Summary             Patient Acceptance, E, VU by DB at 11/6/2023 1441    Acceptance, E,D, DU by PC at 11/5/2023 1413    Acceptance, E,TB, VU by RHONDA at 11/4/2023 1008    Acceptance, E,TB, VU by RHONDA at 11/3/2023 1542    Acceptance, E, VU,NR by  at 11/1/2023 1420                         Point: Precautions (Done)       Learning Progress Summary             Patient Acceptance, E, VU by DB at 11/6/2023 1441    Acceptance, E,D, DU by PC at 11/5/2023 1413    Acceptance, E,TB, VU by RHONDA at 11/4/2023 1008    Acceptance, E,TB, VU by RHONDA at 11/3/2023 1542    Acceptance, E, VU,NR by  at 11/1/2023 1420                                         User Key       Initials Effective Dates Name Provider Type Discipline     07/11/23 -  Lorrie Medina, PT Physical Therapist PT    PC 06/16/21 -  Delores Mckinney PT Physical Therapist PT    DB 06/16/21 -  Niru Mayes, PT Physical Therapist PT    RHONDA 06/09/23 -  Heather Cooper, RN Registered Nurse Nurse                  PT Recommendation and Plan     Plan of Care Reviewed With: patient  Progress: improving  Outcome Evaluation: Pt seated UIC at start of the session and agreeable to work with PT. Pt able to perform seated LE ther ex and ambulates  with RW and CGA. Pt continues to demo difficulty with advancing LLE, gurdeep's hip hiking gait pattern, but overall is improving. She continues to benefit from skilled PT.     Time Calculation:         PT Charges       Row Name 11/06/23 1441             Time Calculation    Start Time 1354  -DB      Stop Time 1418  -DB      Time Calculation (min) 24 min  -DB      PT Received On 11/06/23  -DB      PT - Next Appointment 11/07/23  -DB         Time Calculation- PT    Total Timed Code Minutes- PT 24 minute(s)  -DB                User Key  (r) = Recorded By, (t) = Taken By, (c) = Cosigned By      Initials Name Provider Type    DB Niru Mayes, PT Physical Therapist                  Therapy Charges for Today       Code Description Service Date Service Provider Modifiers Qty    12889546060  PT THER PROC EA 15 MIN 11/6/2023 Niru Mayes, PT GP 1    [id number]  PT THERAPEUTIC ACT EA 15 MIN 11/6/2023 Niru Mayes, PT GP 1            PT G-Codes  Outcome Measure Options: AM-PAC 6 Clicks Basic Mobility (PT)  AM-PAC 6 Clicks Score (PT): 18       Niru Mayes PT  11/6/2023

## 2023-11-06 NOTE — PLAN OF CARE
Goal Outcome Evaluation:  Plan of Care Reviewed With: patient        Progress: improving  Outcome Evaluation: Pt seated UIC at start of the session and agreeable to work with PT. Pt able to perform seated LE ther ex and ambulates with RW and CGA. Pt continues to demo difficulty with advancing LLE, demo's hip hiking gait pattern, but overall is improving. She continues to benefit from skilled PT.

## 2023-11-06 NOTE — PLAN OF CARE
Goal Outcome Evaluation:           Progress: improving  Outcome Evaluation: Patient ambulating and voiding without difficulties. Pain managed with PRN tylenol. Abx given as ordered. Awaiting discharge to rehab when medically stable.

## 2023-11-06 NOTE — PLAN OF CARE
Goal Outcome Evaluation:  Plan of Care Reviewed With: patient        Progress: improving  Outcome Evaluation: Pt has been ambulating with a walker and assist x1 and been voiding per brp. PRN meds given as per order. Dressings c/d/i. Educated on medication management. b/p monitoring and falls precaution. Plan to d/c to a SNF. Will take note of any changes.

## 2023-11-06 NOTE — CASE MANAGEMENT/SOCIAL WORK
Continued Stay Note  [facility]     Patient Name: Riri Damon  MRN: [id number]  Today's Date: 11/6/2023    Admit Date: 10/29/2023    Plan: [facility]-pre-cert obtained and bed available Tuesday ( 11/7)   Discharge Plan       Row Name 11/06/23 1612       Plan    [facility] Tuesday ( [facility])    Patient/Family in Agreement with Plan yes    Plan Comments Spoke with [facility] and they can accept tomorrow. Patient updated at bedside and agreeable. Packet in cubbie. Will likely need transportation.                   Discharge Codes    No documentation.                 Expected Discharge Date and Time       Expected Discharge Date Expected Discharge Time    Nov 4, 2023               Lynne Oropeza RN

## 2023-11-07 LAB
ANION GAP SERPL CALCULATED.3IONS-SCNC: 7.6 MMOL/L (ref 5–15)
BUN SERPL-MCNC: 9 MG/DL (ref 8–23)
BUN/CREAT SERPL: 21.4 (ref 7–25)
C DIFF TOX GENS STL QL NAA+PROBE: NEGATIVE
CALCIUM SPEC-SCNC: 8 MG/DL (ref 8.6–10.5)
CHLORIDE SERPL-SCNC: 108 MMOL/L (ref 98–107)
CO2 SERPL-SCNC: 25.4 MMOL/L (ref 22–29)
CREAT SERPL-MCNC: 0.42 MG/DL (ref 0.57–1)
DEPRECATED RDW RBC AUTO: 41.2 FL (ref 37–54)
EGFRCR SERPLBLD CKD-EPI 2021: 93.6 ML/MIN/1.73
ERYTHROCYTE [DISTWIDTH] IN BLOOD BY AUTOMATED COUNT: 12.5 % (ref 12.3–15.4)
GLUCOSE SERPL-MCNC: 99 MG/DL (ref 65–99)
HCT VFR BLD AUTO: 27.7 % (ref 34–46.6)
HGB BLD-MCNC: 9.5 G/DL (ref 12–15.9)
MCH RBC QN AUTO: 31.6 PG (ref 26.6–33)
MCHC RBC AUTO-ENTMCNC: 34.3 G/DL (ref 31.5–35.7)
MCV RBC AUTO: 92 FL (ref 79–97)
PLATELET # BLD AUTO: 256 10*3/MM3 (ref 140–450)
PMV BLD AUTO: 10.2 FL (ref 6–12)
POTASSIUM SERPL-SCNC: 3.7 MMOL/L (ref 3.5–5.2)
RBC # BLD AUTO: 3.01 10*6/MM3 (ref 3.77–5.28)
SODIUM SERPL-SCNC: 141 MMOL/L (ref 136–145)
WBC NRBC COR # BLD: 8.23 10*3/MM3 (ref 3.4–10.8)

## 2023-11-07 PROCEDURE — 99232 SBSQ HOSP IP/OBS MODERATE 35: CPT | Performed by: PHYSICIAN ASSISTANT

## 2023-11-07 PROCEDURE — 97116 GAIT TRAINING THERAPY: CPT

## 2023-11-07 PROCEDURE — 80048 BASIC METABOLIC PNL TOTAL CA: CPT | Performed by: ORTHOPAEDIC SURGERY

## 2023-11-07 PROCEDURE — 85027 COMPLETE CBC AUTOMATED: CPT | Performed by: ORTHOPAEDIC SURGERY

## 2023-11-07 PROCEDURE — 87493 C DIFF AMPLIFIED PROBE: CPT | Performed by: NURSE PRACTITIONER

## 2023-11-07 RX ORDER — LOPERAMIDE HYDROCHLORIDE 2 MG/1
2 CAPSULE ORAL ONCE
Status: COMPLETED | OUTPATIENT
Start: 2023-11-07 | End: 2023-11-07

## 2023-11-07 RX ADMIN — LOSARTAN POTASSIUM 25 MG: 25 TABLET, FILM COATED ORAL at 09:38

## 2023-11-07 RX ADMIN — ACETAMINOPHEN 650 MG: 325 TABLET, FILM COATED ORAL at 00:15

## 2023-11-07 RX ADMIN — Medication 5 MG: at 22:03

## 2023-11-07 RX ADMIN — ASPIRIN 81 MG: 81 TABLET, CHEWABLE ORAL at 22:03

## 2023-11-07 RX ADMIN — ACETAMINOPHEN 650 MG: 325 TABLET, FILM COATED ORAL at 15:09

## 2023-11-07 RX ADMIN — LOPERAMIDE HYDROCHLORIDE 2 MG: 2 CAPSULE ORAL at 15:09

## 2023-11-07 RX ADMIN — ASPIRIN 81 MG: 81 TABLET, CHEWABLE ORAL at 09:38

## 2023-11-07 RX ADMIN — ATORVASTATIN CALCIUM 20 MG: 20 TABLET, FILM COATED ORAL at 09:38

## 2023-11-07 RX ADMIN — LIDOCAINE 1 PATCH: 50 PATCH CUTANEOUS at 22:04

## 2023-11-07 RX ADMIN — HYDROCORTISONE: 25 CREAM TOPICAL at 09:00

## 2023-11-07 RX ADMIN — HYDROCORTISONE: 25 CREAM TOPICAL at 22:04

## 2023-11-07 NOTE — PROGRESS NOTES
[facility]  Inpatient Progress Note    Reason for Follow Up:  Proctitis    Subjective     Interval History:   Pt reports she is feeling fine.  Had formed, nonbloody BM this morning and yesterday.  No abd pain, nausea/vomiting.       Current Facility-Administered Medications:     acetaminophen (TYLENOL) tablet 650 mg, 650 mg, Oral, Q4H PRN, Yfn Reyes II, MD, 650 mg at 23 0015    aspirin chewable tablet 81 mg, 81 mg, Oral, BID, Yfn Reyes II, MD, 81 mg at 23    atorvastatin (LIPITOR) tablet 20 mg, 20 mg, Oral, Daily, Yfn Reyes II, MD, 20 mg at 23    sennosides-docusate (PERICOLACE) 8.6-50 MG per tablet 2 tablet, 2 tablet, Oral, BID PRN **AND** [DISCONTINUED] polyethylene glycol (MIRALAX) packet 17 g, 17 g, Oral, Daily **AND** bisacodyl (DULCOLAX) EC tablet 5 mg, 5 mg, Oral, Daily PRN **AND** bisacodyl (DULCOLAX) suppository 10 mg, 10 mg, Rectal, Daily PRN, Staci Anna APRN    Hydrocortisone (Perianal) (ANUSOL-HC) 2.5 % rectal cream, , Rectal, BID, Shweta Herrera PA-C, Given at 23    Influenza Vac High-Dose Quad (FLUZONE HIGH DOSE) injection 0.7 mL, 0.7 mL, Intramuscular, During Hospitalization, Ion Boyd MD    lidocaine (LIDODERM) 5 % 1 patch, 1 patch, Transdermal, Nightly, Yfn Reyes II, MD, 1 patch at 23    losartan (COZAAR) tablet 25 mg, 25 mg, Oral, Daily, Yfn Reyes II, MD, 25 mg at 23    melatonin tablet 5 mg, 5 mg, Oral, Nightly, Staci Anna APRN, 5 mg at 23    [] morphine injection 1 mg, 1 mg, Intravenous, Q4H PRN, 1 mg at 23 1102 **AND** naloxone (NARCAN) injection 0.4 mg, 0.4 mg, Intravenous, Q5 Min PRN, Yfn Reyes II, MD    ondansetron (ZOFRAN) tablet 4 mg, 4 mg, Oral, Q6H PRN **OR** ondansetron (ZOFRAN) injection 4 mg, 4 mg, Intravenous, Q6H PRN, Yfn Reyes II, MD     [COMPLETED] Insert Peripheral IV, , , Once **AND** sodium chloride 0.9 % flush 10 mL, 10 mL, Intravenous, PRN, Yfn Reyes II, MD        Objective     Vital Signs  Temp:  [98 °F (36.7 °C)-98.3 °F (36.8 °C)] 98.1 °F (36.7 °C)  Heart Rate:  [77-99] 77  Resp:  [16] 16  BP: (123-168)/(71-78) 123/71  Body mass index is 22.63 kg/m².    Intake/Output Summary (Last 24 hours) at 11/7/2023 0916  Last data filed at 11/7/2023 0900  Gross per 24 hour   Intake 960 ml   Output --   Net 960 ml     I/O this shift:  In: 360 [P.O.:360]  Out: -      Physical Exam:   General: patient awake, alert and cooperative   Eyes: Normal lids and lashes, no scleral icterus   Abdomen: soft, nontender, nondistended; normal bowel sounds      Results Review:     I reviewed the patient's new clinical results.    Results from last 7 days   Lab Units 11/07/23  0456 11/06/23 0512 11/05/23 0432   WBC 10*3/mm3 8.23 7.73 12.11*   HEMOGLOBIN g/dL 9.5* 9.4* 10.8*   HEMATOCRIT % 27.7* 27.7* 31.7*   PLATELETS 10*3/mm3 256 229 269     Results from last 7 days   Lab Units 11/07/23 0456 11/06/23 0512 11/05/23 0432   SODIUM mmol/L 141 142 140   POTASSIUM mmol/L 3.7 4.1 3.7   CHLORIDE mmol/L 108* 110* 107   CO2 mmol/L 25.4 27.0 25.0   BUN mg/dL 9 8 12   CREATININE mg/dL 0.42* 0.39* 0.46*   CALCIUM mg/dL 8.0* 8.2* 8.3*   BILIRUBIN mg/dL  --   --  0.9   ALK PHOS U/L  --   --  56   ALT (SGPT) U/L  --   --  15   AST (SGOT) U/L  --   --  23   GLUCOSE mg/dL 99 103* 114*         Lab Results   Lab Value Date/Time    LIPASE 33 11/05/2023 0432       Radiology:  CT Abdomen Pelvis Without Contrast   Final Result           1. Slight haziness in the fat around the pancreas may be evidence of   acute pancreatitis, correlate clinically. Cholelithiasis, further   evaluation of the gallbladder can be obtained as indicated.       2. Evidence of proctitis.       3. Small nonobstructive left nephrolithiasis. No hydronephrosis. Right   adnexal cysts, as described.        4. Subcutaneous fluid collection in the region of the left hip surgery   may represent ordinary postsurgical change, correlate clinically to   exclude possibility of abscess.       This report was finalized on 11/5/2023 1:57 PM by Dr. Singh Ho M.D on Workstation: [id number]          XR Chest 1 View   Final Result   No focal pulmonary consolidation. Borderline heart size.   Follow-up as clinical indications persist.       This report was finalized on 11/3/2023 4:20 PM by Dr. Singh Ho M.D on Workstation: [id number]          XR Abdomen KUB   Final Result   Nonobstructive bowel gas pattern.       This report was finalized on 11/2/2023 9:26 PM by Dr. Estefania Garcia M.D on Workstation: [id number]          XR Hip With or Without Pelvis 2 - 3 View Left   Final Result   Proximal left femoral intramedullary jacqui with interlocking   screw transversing a comminuted left femoral intertrochanteric fracture.   Hardware is well-positioned and intact.               This report was finalized on 10/31/2023 8:35 AM by Dr. Jesus Morfin M.D on Workstation: [id number]          XR Hip With or Without Pelvis 2 - 3 View Left   Final Result   3 spot fluoroscopic images of the left hip. Please see   operative note for further details. Fluoroscopy time 24 seconds.   Cumulative air kerma 3.6 mGy.               This report was finalized on 10/31/2023 8:52 AM by Dr. Jesus Morfin M.D on Workstation: [id number]          FL C Arm During Surgery   Final Result      XR Chest 1 View   Final Result   Nonspecific left basilar consolidation.       This report was finalized on 10/29/2023 9:03 PM by Dr. Estefania Garcia M.D on Workstation: [id number]          XR Hip With or Without Pelvis 2 - 3 View Left   Final Result   Comminuted intertrochanteric left femoral fracture.       This report was finalized on 10/29/2023 9:05 PM by Dr. Estefania Garcia M.D on Workstation: [id number]              Assessment & Plan      Active Hospital Problems    Diagnosis     **Hip fracture     Hypokalemia     Anemia     Leukocytosis     Hyperglycemia     Left anterior fascicular block     Essential hypertension     Hyperlipidemia          Assessment:   Fall causing R hip fracture s/p surgery 10/31  Proctitis on CT scan- mild stool retention; question if resolving infectious etiology given acute diarrhea few days ago; currently resolved  Pancreatitis on CT scan- however no abd pain and lipase WNL. She does not meet the criteria for acute pancreatitis.   UTI- on abx  Hx of colon cancer s/p colon resection in 2015  CT scan in 2022 w/o any evidence of recurrent or metastatic disease. CEA 08/2023 WNL.     Plan:   Continue hydrocortisone cream BID x 14 days given symptomatic external hemorrhoids.  Will defer any endoscopic intervention at this time as patient is w/o diarrhea, rectal bleeding, and/or abdominal pain.  Diet as tolerated  If diarrhea returns, recommend r/o infectious etiology.  No further recs from GI, will s/o, please call with any questions or concerns.     I discussed the patients findings and my recommendations with patient.         Shweta Herrera PA-C  [facility]  [address] [place] [address]  Office: [phone]

## 2023-11-07 NOTE — THERAPY TREATMENT NOTE
Patient Name: Riri Damon  : 1934    MRN: [id number]                              Today's Date: 2023       Admit Date: 10/29/2023    Visit Dx:     ICD-10-CM ICD-9-CM   1. Closed fracture of left hip, initial encounter  S72.002A 820.8     Patient Active Problem List   Diagnosis    Hyperlipidemia    Low back pain    Mitral valve insufficiency    Palpitations    Knee pain    Tricuspid valve insufficiency    Arthritis    Medicare annual wellness visit, subsequent    Screening for osteoporosis    Orthostatic lightheadedness    Essential hypertension    OA (osteoarthritis) of knee    Ventricular septal defect    Left anterior fascicular block    Malignant neoplasm of ascending colon    Family history of colon cancer    Osteopenia of left hip    Acute pain of left shoulder    Wellness examination    Anxiety    Cyst of right ovary    Primary insomnia    Vertigo    Chronic idiopathic constipation    Hip fracture    Leukocytosis    Hyperglycemia    Anemia    Hypokalemia     Past Medical History:   Diagnosis Date    Anxiety     Colon carcinoma     Stage IIIB, T4N1a; underwent radiation therapy and colon resection by Dr. Mcneil    Deep vein thrombosis 2017    right leg    History of edema     LE    History of rheumatic fever     Hx of radiation therapy     for adenocarcinoma of the colon Stage IIIB, T4N1a    Hyperlipidemia     Hypertension     MEDS PRN    Infectious mononucleosis     Infectious viral hepatitis     Left anterior fascicular block     Mitral regurgitation     2010: mild per echo    OA (osteoarthritis)     Palpitations     Pulmonary hypertension     2010- mild per echo; 2012 - normal RVSP per echo    Spinal headache     Tricuspid regurgitation     2010: Mild to moderate per echo; 2012: Trace to mild per echo    Venous insufficiency     Ventricular septal defect     Per echocardiogram     Vitamin D deficiency      Past Surgical History:   Procedure Laterality Date     APPENDECTOMY      COLECTOMY PARTIAL / TOTAL  02/02/2015 2/2015 due to adenocarcinoma    COLONOSCOPY      JAN 2015    COLONOSCOPY N/A 5/25/2016    Procedure: COLONOSCOPY to ANASTAMOSIS AND TERMINAL ILEUM;  Surgeon: Yfn Mcneil MD;  Location: [facility] ENDOSCOPY;  Service:     COLONOSCOPY N/A 7/11/2019    Procedure: COLONOSCOPY to cecum:;  Surgeon: Yfn Mcneil MD;  [facility];  Service: General    HIP INTERTROCHANTERIC NAILING Left 10/31/2023    Procedure: HIP INTERTROCHANTERIC NAILING;  Surgeon: Yfn Reyes II, MD;  Location: [facility] MAIN OR;  Service: Orthopedics;  Laterality: Left;    HYSTERECTOMY      INTRAOCULAR LENS EXCHANGE Bilateral     JOINT MANIPULATION Right 1/9/2018    Procedure: MANIPULATION OF RT KNEE;  Surgeon: Andrea Caballero MD;  Location: [facility] MAIN OR;  Service:     JOINT REPLACEMENT Right 11/09/2017    KNEE SURGERY Left 2006    ARTHROSCOPY    [place] ARTHRP KNE CONDYLE&PLATU MEDIAL&LAT COMPARTMENTS Right 11/9/2017    Procedure: RIGHT TOTAL KNEE ARTHROPLASTY;  Surgeon: Andrea Caballero MD;  Location: [facility] MAIN OR;  Service: Orthopedics    TONSILLECTOMY        General Information       [facility] Name 11/07/23 1735          Physical Therapy Time and Intention    Document Type therapy note (daily note)  -     Mode of Treatment physical therapy;individual therapy  -[facility] Name 11/07/23 1735          General Information    Patient Profile Reviewed yes  -     Existing Precautions/Restrictions fall  -       Row Name 11/07/23 1735          Cognition    Orientation Status (Cognition) oriented x 4  -       Row Name 11/07/23 1735          Safety Issues, Functional Mobility    Impairments Affecting Function (Mobility) balance;endurance/activity tolerance;strength;pain;range of motion (ROM)  -               User Key  (r) = Recorded By, (t) = Taken By, (c) = Cosigned By      Initials Name Provider Type     Philly Reyez PT Physical Therapist                    Mobility       Row Name 11/07/23 1735          Bed Mobility    Supine-Sit [place] (Bed Mobility) not tested  -     Sit-Supine [place] (Bed Mobility) not tested  -     Comment, (Bed Mobility) NT - UIC  -       Row Name 11/07/23 1735          Sit-Stand Transfer    Sit-Stand [place] (Transfers) verbal cues;standby assist  -     Assistive Device (Sit-Stand Transfers) walker, front-wheeled  -     Comment, (Sit-Stand Transfer) 10x STS from chair - cued for hand placement  -       Row Name 11/07/23 1735          Gait/Stairs (Locomotion)    [place] Level (Gait) verbal cues;standby assist;contact guard  -     Assistive Device (Gait) walker, front-wheeled  -     Distance in Feet (Gait) 30ft x2  -     Deviations/Abnormal Patterns (Gait) antalgic;gait speed decreased;stride length decreased  -     Bilateral Gait Deviations weight shift ability decreased;forward flexed posture  -     Left Sided Gait Deviations weight shift ability decreased;hip hiking  -               User Key  (r) = Recorded By, (t) = Taken By, (c) = Cosigned By      Initials Name Provider Type     Philly Reyez, PT Physical Therapist                   Obj/Interventions    No documentation.                  Goals/Plan    No documentation.                  Clinical Impression       [facility] Name 11/07/23 1738          Pain    Pre/Posttreatment Pain Comment C/o generalized L knee pain with WBing, did not rate  -[facility] Name 11/07/23 1738          Plan of Care Review    Plan of Care Reviewed With patient  [facility]     Progress improving  -     Outcome Evaluation Pt seen for PT this PM and tolerated mobility well, sitting UI on arrival. Pt stood with SBA and ambulated 30ft x2 with rwx. Pt cued for equal step length and keeping rwx close to her. Noted impaired L knee flexion with hip hiking to compensate. PT demo'd increasing L knee flexion to improve foot clearance and landing on heel first - pt improved following but  still with overall very stiff gait mechanics. Pt returned to chair and completed 10x STS with cues for hand placement/sequencing. PT discussed DC plans - pt plans SNF as she lives alone and reports 12 steps to get up to her bedroom. Pt reports she has 2 children in town but they would be unable to assist her consistently at DC. Pt does not feel safe to DC home alone at this time. PT will continue to follow to progress mobility as tolerated.  -       Row Name 11/07/23 1738          Vital Signs    O2 Delivery Pre Treatment room air  -     O2 Delivery Intra Treatment room air  -     O2 Delivery Post Treatment room air  -       Row Name 11/07/23 1738          Positioning and Restraints    Pre-Treatment Position sitting in chair/recliner  -     Post Treatment Position chair  -     In Chair reclined;call light within reach;encouraged to call for assist  No alarm on arrival  -               User Key  (r) = Recorded By, (t) = Taken By, (c) = Cosigned By      Initials Name Provider Type     Philly Reyez, PT Physical Therapist                   Outcome Measures       Row Name 11/07/23 1741 11/07/23 1008       How much help from another person do you currently need...    Turning from your back to your side while in flat bed without using bedrails? 4  - 4  -BHA    Moving from lying on back to sitting on the side of a flat bed without bedrails? 3  - 3  -BHA    Moving to and from a bed to a chair (including a wheelchair)? 3  - 3  -BHA    Standing up from a chair using your arms (e.g., wheelchair, bedside chair)? 3  - 3  -BHA    Climbing 3-5 steps with a railing? 2  - 2  -BHA    To walk in hospital room? 3  - 3  -BHA    AM-PAC 6 Clicks Score (PT) 18  - 18  -BHA    Highest level of mobility 6 --> Walked 10 steps or more  - 6 --> Walked 10 steps or more  -BHA      Row Name 11/07/23 1741          Functional Assessment    Outcome Measure Options AM-PAC 6 Clicks Basic Mobility (PT)  -                User Key  (r) = Recorded By, (t) = Taken By, (c) = Cosigned By      Initials Name Provider Type     Philly Reyez PT Physical Therapist    Luis A Severino, RN Registered Nurse                                 Physical Therapy Education       Title: PT OT SLP Therapies (Done)       Topic: Physical Therapy (Done)       Point: Mobility training (Done)       Learning Progress Summary             Patient Acceptance, E,TB,D, VU,NR by  at 11/7/2023 1741    Acceptance, E, VU by DB at 11/6/2023 1441    Acceptance, E,D, DU by PC at 11/5/2023 1413    Acceptance, E,TB, VU by RHONDA at 11/4/2023 1008    Acceptance, E,TB, VU by RHONDA at 11/3/2023 1542    Acceptance, E, VU,NR by  at 11/1/2023 1420                         Point: Home exercise program (Done)       Learning Progress Summary             Patient Acceptance, E,TB,D, VU,NR by  at 11/7/2023 1741    Acceptance, E, VU by DB at 11/6/2023 1441    Acceptance, E,D, DU by PC at 11/5/2023 1413    Acceptance, E,TB, VU by RHONDA at 11/4/2023 1008    Acceptance, E,TB, VU by RHONDA at 11/3/2023 1542    Acceptance, E, VU,NR by  at 11/1/2023 1420                         Point: Body mechanics (Done)       Learning Progress Summary             Patient Acceptance, E,TB,D, VU,NR by  at 11/7/2023 1741    Acceptance, E, VU by DB at 11/6/2023 1441    Acceptance, E,D, DU by PC at 11/5/2023 1413    Acceptance, E,TB, VU by RHONDA at 11/4/2023 1008    Acceptance, E,TB, VU by RHONDA at 11/3/2023 1542    Acceptance, E, VU,NR by  at 11/1/2023 1420                         Point: Precautions (Done)       Learning Progress Summary             Patient Acceptance, E,TB,D, VU,NR by  at 11/7/2023 1741    Acceptance, E, VU by DB at 11/6/2023 1441    Acceptance, E,D, DU by PC at 11/5/2023 1413    Acceptance, E,TB, VU by RHONDA at 11/4/2023 1008    Acceptance, E,TB, VU by RHONDA at 11/3/2023 1542    Acceptance, E, VU,NR by  at 11/1/2023 1420                                         User Key       Initials Effective  Dates Name Provider Type Discipline     07/11/23 -  Lorrie Medina, PT Physical Therapist PT    PC 06/16/21 -  Delores Mckinney, PT Physical Therapist PT    DB 06/16/21 -  Niru Mayes, PT Physical Therapist PT     04/08/22 -  Philly Reyez, PT Physical Therapist PT    RHONDA 06/09/23 -  Heather Cooper, RN Registered Nurse Nurse                  PT Recommendation and Plan     Plan of Care Reviewed With: patient  Progress: improving  Outcome Evaluation: Pt seen for PT this PM and tolerated mobility well, sitting UIC on arrival. Pt stood with SBA and ambulated 30ft x2 with rwx. Pt cued for equal step length and keeping rwx close to her. Noted impaired L knee flexion with hip hiking to compensate. PT demo'd increasing L knee flexion to improve foot clearance and landing on heel first - pt improved following but still with overall very stiff gait mechanics. Pt returned to chair and completed 10x STS with cues for hand placement/sequencing. PT discussed DC plans - pt plans SNF as she lives alone and reports 12 steps to get up to her bedroom. Pt reports she has 2 children in town but they would be unable to assist her consistently at [place]. Pt does not feel safe to DC home alone at this time. PT will continue to follow to progress mobility as tolerated.     Time Calculation:         PT Charges       Row Name 11/07/23 1742             Time Calculation    Start Time 1644  -      Stop Time 1703  -      Time Calculation (min) 19 min  -      PT Received On 11/07/23  -      PT - Next Appointment 11/08/23  -         Time Calculation- PT    Total Timed Code Minutes- PT 19 minute(s)  -         Timed Charges    38999 - Gait Training Minutes  10  -      62795 - PT Therapeutic Activity Minutes 9  -         Total Minutes    Timed Charges Total Minutes 19  -       Total Minutes 19  -                User Key  (r) = Recorded By, (t) = Taken By, (c) = Cosigned By      Initials Name Provider Type     Dereje  Philly ROSS, PT Physical Therapist                  Therapy Charges for Today       Code Description Service Date Service Provider Modifiers Qty    [id number] HC GAIT TRAINING EA 15 MIN 11/7/2023 Philly Reyez, PT GP 1            PT G-Codes  Outcome Measure Options: AM-PAC 6 Clicks Basic Mobility (PT)  AM-PAC 6 Clicks Score (PT): 18  PT Discharge Summary  Anticipated Discharge Disposition (PT): skilled nursing facility    Philly Reyez PT  11/7/2023

## 2023-11-07 NOTE — PLAN OF CARE
Goal Outcome Evaluation:  Plan of Care Reviewed With: patient        Progress: improving  Outcome Evaluation: PO tylenol given for pain. Ambulating well. Large bruise and swelling noted on leg.

## 2023-11-07 NOTE — PLAN OF CARE
Goal Outcome Evaluation:  Plan of Care Reviewed With: patient        Progress: improving  Outcome Evaluation: Pt seen for PT this PM and tolerated mobility well, sitting UIC on arrival. Pt stood with SBA and ambulated 30ft x2 with rwx. Pt cued for equal step length and keeping rwx close to her. Noted impaired L knee flexion with hip hiking to compensate. PT demo'd increasing L knee flexion to improve foot clearance and landing on heel first - pt improved following but still with overall very stiff gait mechanics. Pt returned to chair and completed 10x STS with cues for hand placement/sequencing. PT discussed DC plans - pt plans SNF as she lives alone and reports 12 steps to get up to her bedroom. Pt reports she has 2 children in town but they would be unable to assist her consistently at DC. Pt does not feel safe to DC home alone at this time. PT will continue to follow to progress mobility as tolerated.      Anticipated Discharge Disposition (PT): skilled nursing facility

## 2023-11-07 NOTE — PROGRESS NOTES
Name: Riri Damon ADMIT: 10/29/2023   : 1934  PCP: Phoenix Velazquez MD    MRN: [id number] LOS: 9 days   AGE/SEX: 89 y.o. female  ROOM: [facility]     Subjective   Subjective   Pt tearful. Has had return of very loose stools. Feels weak. Son at bedside, concerned about mild swelling of LLE compared to RLE due to pt hx of DVT       Objective   Objective   Vital Signs  Temp:  [98 °F (36.7 °C)-98.2 °F (36.8 °C)] 98.2 °F (36.8 °C)  Heart Rate:  [77-97] 91  Resp:  [16] 16  BP: (123-168)/(71-78) 146/74  SpO2:  [96 %-97 %] 96 %  on   ;   Device (Oxygen Therapy): room air  Body mass index is 22.63 kg/m².  Physical Exam  Vitals and nursing note reviewed.   Constitutional:       General: She is not in acute distress.  HENT:      Head: Normocephalic.      Mouth/Throat:      Mouth: Mucous membranes are moist.   Eyes:      Conjunctiva/sclera: Conjunctivae normal.   Cardiovascular:      Rate and Rhythm: Normal rate and regular rhythm.   Pulmonary:      Effort: Pulmonary effort is normal. No respiratory distress.      Breath sounds: No wheezing or rales.   Abdominal:      General: Bowel sounds are normal. There is no distension.      Palpations: Abdomen is soft.      Tenderness: There is no abdominal tenderness.   Musculoskeletal:      Cervical back: Neck supple.      Right lower leg: Edema present.      Left lower leg: Edema present.      Comments: Trace-1+  Mild tenderness lt lower leg; no calf pain   Skin:     General: Skin is warm and dry.   Neurological:      Mental Status: She is alert and oriented to person, place, and time.   Psychiatric:         Mood and Affect: Affect is tearful.       Results Review     I reviewed the patient's new clinical results.  Results from last 7 days   Lab Units 23  0456 23  0512 23  0432 23  0452   WBC 10*3/mm3 8.23 7.73 12.11* 10.83*   HEMOGLOBIN g/dL 9.5* 9.4* 10.8* 9.5*   PLATELETS 10*3/mm3 256 229 269 205     Results from last 7 days   Lab Units  "11/07/23 0456 11/06/23 0512 11/05/23 0432 11/04/23 0452   SODIUM mmol/L 141 142 140 140   POTASSIUM mmol/L 3.7 4.1 3.7 3.7   CHLORIDE mmol/L 108* 110* 107 111*   CO2 mmol/L 25.4 27.0 25.0 23.0   BUN mg/dL 9 8 12 11   CREATININE mg/dL 0.42* 0.39* 0.46* 0.38*   GLUCOSE mg/dL 99 103* 114* 104*   EGFR mL/min/1.73 93.6 95.3 91.6 95.9     Results from last 7 days   Lab Units 11/05/23 0432 11/04/23 0452 11/03/23 0448 11/02/23  0500   ALBUMIN g/dL 3.3* 2.5* 2.9* 3.0*   BILIRUBIN mg/dL 0.9  --   --   --    ALK PHOS U/L 56  --   --   --    AST (SGOT) U/L 23  --   --   --    ALT (SGPT) U/L 15  --   --   --      Results from last 7 days   Lab Units 11/07/23 0456 11/06/23 0512 11/05/23 0432 11/04/23 0452 11/03/23 0448 11/02/23  0500   CALCIUM mg/dL 8.0* 8.2* 8.3* 7.9* 7.9* 7.8*   ALBUMIN g/dL  --   --  3.3* 2.5* 2.9* 3.0*       No results found for: \"HGBA1C\", \"POCGLU\"    No radiology results for the last day    I have personally reviewed all medications:  Scheduled Medications  aspirin, 81 mg, Oral, BID  atorvastatin, 20 mg, Oral, Daily  Hydrocortisone (Perianal), , Rectal, BID  lidocaine, 1 patch, Transdermal, Nightly  loperamide, 2 mg, Oral, Once  losartan, 25 mg, Oral, Daily  melatonin, 5 mg, Oral, Nightly    Infusions   Diet  Diet: Regular/House Diet; Texture: Regular Texture (IDDSI 7); Fluid Consistency: Thin (IDDSI 0)    I have personally reviewed:  [x]  Laboratory   [x]  Microbiology   [x]  Radiology   [x]  EKG/Telemetry  [x]  Cardiology/Vascular   []  Pathology    []  Records       Assessment/Plan     Active Hospital Problems    Diagnosis  POA    **Hip fracture [S72.009A]  Yes    Hypokalemia [E87.6]  Yes    Anemia [D64.9]  Yes    Leukocytosis [D72.829]  Yes    Hyperglycemia [R73.9]  Yes    Left anterior fascicular block [I44.4]  Yes    Essential hypertension [I10]  Yes    Hyperlipidemia [E78.5]  Yes      Resolved Hospital Problems   No resolved problems to display.       89 y.o. female admitted with Hip " fracture.      Proctitis / possible pancreatitis by CT scan but no symptoms of pancreatitis hepatitis   -Lipase wnl  -Appreciate GI assistance. Treating hemorrhoids. As long as asymptomatic, no plan for treatment/evaluation of reported proctitis; if symptomatic, can plan flex sig  -Had formed BM yesterday, now with several loose stools today. Will check for C diff given recent antibiotic use     Left femoral fracture  -S/p mechanical fall and left hip fracture  -Appreciate Ortho assistance  -S/P Lt IM nailing 10/31  -Pain control   -Changed bowel regimen to PRN due to diarrhea w/meds  -PT/OT following  -ASA BID for VTE ppx per Ortho  -Check doppler LLE     Left basilar consolidation  Leukocytosis  -CXR with evidence of left-sided consolidation, most likely atelectasis though infection cannot be ruled out  -WBC normalized  -O2 weaned down to 1L NC  -Use of IS postoperatively     HTN  Hyperlipidemia  -Continue home losartan  -Continue home statin  -Monitor for postop hypotension; BP acceptable on review     Hyperglycemia  -Hgb A1c 5.4  -Glucoses have been somewhat elevated, but overall okay for hospitalization  -Closely monitor     Anemia:  -Hgb 13.8-->12-->11.2-->remaining stable around 9.6-9.8, likely dilutional along w/blood loss w/surgery  -Iron, iron sat wnl.   -Monitor daily     Hypokalemia:  -resoved      UTI- monitor the urine cultures show E. coli 50k pansensitive and wbc   -rocephin x 3 doses, completed    ASA  for DVT prophylaxis.  Limited code (no CPR, no intubation).  Discussed with patient, family, nursing staff, CCP, and Dr. Boyd .  Anticipate discharge to SNU facility tomorrow..      PADMINI Sanchez  [facility]  11/07/23  14:29 EST

## 2023-11-07 NOTE — CASE MANAGEMENT/SOCIAL WORK
Continued Stay Note  [facility]     Patient Name: Riri Damon  MRN: [id number]  Today's Date: 2023    Admit Date: 10/29/2023    Plan: Shamika Armando- pre-cert  today so will initiate new cert   Discharge Plan       Row Name 23 1400       Plan    Plan Shamika Roberts- pre-cert  today so will initiate new cert    Patient/Family in Agreement with Plan yes    Plan Comments Discharge canceled for today. Spoke with Teresa and they can accept tomorrow, but will need updated pre-cert. E-mail sent asking that new pre-cert be initiated. Transfer packet in [facility]. Will need to follow up with son on transportation needs.                   Discharge Codes    No documentation.                 Expected Discharge Date and Time       Expected Discharge Date Expected Discharge Time    2023               Lynne Oropeza RN

## 2023-11-08 ENCOUNTER — APPOINTMENT (OUTPATIENT)
Dept: CARDIOLOGY | Facility: HOSPITAL | Age: 88
End: 2023-11-08
Payer: MEDICARE

## 2023-11-08 VITALS
DIASTOLIC BLOOD PRESSURE: 71 MMHG | TEMPERATURE: 98.6 F | RESPIRATION RATE: 18 BRPM | OXYGEN SATURATION: 97 % | WEIGHT: 136 LBS | SYSTOLIC BLOOD PRESSURE: 138 MMHG | HEART RATE: 86 BPM | BODY MASS INDEX: 22.66 KG/M2 | HEIGHT: 65 IN

## 2023-11-08 LAB
ANION GAP SERPL CALCULATED.3IONS-SCNC: 7.7 MMOL/L (ref 5–15)
BH CV LOWER VASCULAR LEFT COMMON FEMORAL AUGMENT: NORMAL
BH CV LOWER VASCULAR LEFT COMMON FEMORAL COMPETENT: NORMAL
BH CV LOWER VASCULAR LEFT COMMON FEMORAL COMPRESS: NORMAL
BH CV LOWER VASCULAR LEFT COMMON FEMORAL PHASIC: NORMAL
BH CV LOWER VASCULAR LEFT COMMON FEMORAL SPONT: NORMAL
BH CV LOWER VASCULAR LEFT GASTRONEMIUS COMPRESS: NORMAL
BH CV LOWER VASCULAR LEFT GREATER SAPH AK COMPRESS: NORMAL
BH CV LOWER VASCULAR LEFT GREATER SAPH BK COMPRESS: NORMAL
BH CV LOWER VASCULAR LEFT LESSER SAPH COMPRESS: NORMAL
BH CV LOWER VASCULAR LEFT MID FEMORAL AUGMENT: NORMAL
BH CV LOWER VASCULAR LEFT MID FEMORAL COMPETENT: NORMAL
BH CV LOWER VASCULAR LEFT MID FEMORAL PHASIC: NORMAL
BH CV LOWER VASCULAR LEFT MID FEMORAL SPONT: NORMAL
BH CV LOWER VASCULAR LEFT PERONEAL COMPRESS: NORMAL
BH CV LOWER VASCULAR LEFT POPLITEAL AUGMENT: NORMAL
BH CV LOWER VASCULAR LEFT POPLITEAL COMPETENT: NORMAL
BH CV LOWER VASCULAR LEFT POPLITEAL COMPRESS: NORMAL
BH CV LOWER VASCULAR LEFT POPLITEAL PHASIC: NORMAL
BH CV LOWER VASCULAR LEFT POPLITEAL SPONT: NORMAL
BH CV LOWER VASCULAR LEFT POSTERIOR TIBIAL COMPRESS: NORMAL
BH CV LOWER VASCULAR LEFT PROFUNDA FEMORAL COMPRESS: NORMAL
BH CV LOWER VASCULAR LEFT PROXIMAL FEMORAL COMPRESS: NORMAL
BH CV LOWER VASCULAR LEFT SAPHENOFEMORAL JUNCTION COMPRESS: NORMAL
BH CV LOWER VASCULAR RIGHT COMMON FEMORAL AUGMENT: NORMAL
BH CV LOWER VASCULAR RIGHT COMMON FEMORAL COMPETENT: NORMAL
BH CV LOWER VASCULAR RIGHT COMMON FEMORAL COMPRESS: NORMAL
BH CV LOWER VASCULAR RIGHT COMMON FEMORAL PHASIC: NORMAL
BH CV LOWER VASCULAR RIGHT COMMON FEMORAL SPONT: NORMAL
BUN SERPL-MCNC: 9 MG/DL (ref 8–23)
BUN/CREAT SERPL: 22 (ref 7–25)
CALCIUM SPEC-SCNC: 8.1 MG/DL (ref 8.6–10.5)
CHLORIDE SERPL-SCNC: 108 MMOL/L (ref 98–107)
CO2 SERPL-SCNC: 26.3 MMOL/L (ref 22–29)
CREAT SERPL-MCNC: 0.41 MG/DL (ref 0.57–1)
DEPRECATED RDW RBC AUTO: 43.4 FL (ref 37–54)
EGFRCR SERPLBLD CKD-EPI 2021: 94.2 ML/MIN/1.73
ERYTHROCYTE [DISTWIDTH] IN BLOOD BY AUTOMATED COUNT: 13 % (ref 12.3–15.4)
GLUCOSE SERPL-MCNC: 98 MG/DL (ref 65–99)
HCT VFR BLD AUTO: 28.7 % (ref 34–46.6)
HGB BLD-MCNC: 9.8 G/DL (ref 12–15.9)
MCH RBC QN AUTO: 31.9 PG (ref 26.6–33)
MCHC RBC AUTO-ENTMCNC: 34.1 G/DL (ref 31.5–35.7)
MCV RBC AUTO: 93.5 FL (ref 79–97)
PLATELET # BLD AUTO: 262 10*3/MM3 (ref 140–450)
PMV BLD AUTO: 10.2 FL (ref 6–12)
POTASSIUM SERPL-SCNC: 3.8 MMOL/L (ref 3.5–5.2)
RBC # BLD AUTO: 3.07 10*6/MM3 (ref 3.77–5.28)
SODIUM SERPL-SCNC: 142 MMOL/L (ref 136–145)
WBC NRBC COR # BLD: 9.65 10*3/MM3 (ref 3.4–10.8)

## 2023-11-08 PROCEDURE — 97110 THERAPEUTIC EXERCISES: CPT

## 2023-11-08 PROCEDURE — 93971 EXTREMITY STUDY: CPT

## 2023-11-08 PROCEDURE — G0008 ADMIN INFLUENZA VIRUS VAC: HCPCS | Performed by: HOSPITALIST

## 2023-11-08 PROCEDURE — 90662 IIV NO PRSV INCREASED AG IM: CPT | Performed by: HOSPITALIST

## 2023-11-08 PROCEDURE — 80048 BASIC METABOLIC PNL TOTAL CA: CPT | Performed by: ORTHOPAEDIC SURGERY

## 2023-11-08 PROCEDURE — 85027 COMPLETE CBC AUTOMATED: CPT | Performed by: ORTHOPAEDIC SURGERY

## 2023-11-08 PROCEDURE — 25010000002 INFLUENZA VAC HIGH-DOSE QUAD 0.7 ML SUSPENSION PREFILLED SYRINGE: Performed by: HOSPITALIST

## 2023-11-08 RX ORDER — ACETAMINOPHEN 325 MG/1
650 TABLET ORAL EVERY 4 HOURS PRN
Start: 2023-11-08

## 2023-11-08 RX ORDER — ASPIRIN 81 MG/1
81 TABLET, CHEWABLE ORAL 2 TIMES DAILY
Start: 2023-11-08

## 2023-11-08 RX ORDER — MELOXICAM 7.5 MG/1
7.5 TABLET ORAL DAILY
Qty: 90 TABLET | Refills: 1 | OUTPATIENT
Start: 2023-11-08

## 2023-11-08 RX ORDER — HYDROCORTISONE 25 MG/G
CREAM TOPICAL 2 TIMES DAILY
Start: 2023-11-08

## 2023-11-08 RX ADMIN — ACETAMINOPHEN 650 MG: 325 TABLET, FILM COATED ORAL at 08:44

## 2023-11-08 RX ADMIN — HYDROCORTISONE: 25 CREAM TOPICAL at 08:44

## 2023-11-08 RX ADMIN — ACETAMINOPHEN 650 MG: 325 TABLET, FILM COATED ORAL at 01:52

## 2023-11-08 RX ADMIN — ACETAMINOPHEN 650 MG: 325 TABLET, FILM COATED ORAL at 16:14

## 2023-11-08 RX ADMIN — INFLUENZA A VIRUS A/VICTORIA/4897/2022 IVR-238 (H1N1) ANTIGEN (FORMALDEHYDE INACTIVATED), INFLUENZA A VIRUS A/DARWIN/9/2021 SAN-010 (H3N2) ANTIGEN (FORMALDEHYDE INACTIVATED), INFLUENZA B VIRUS B/PHUKET/3073/2013 ANTIGEN (FORMALDEHYDE INACTIVATED), AND INFLUENZA B VIRUS B/MICHIGAN/01/2021 ANTIGEN (FORMALDEHYDE INACTIVATED) 0.7 ML: 60; 60; 60; 60 INJECTION, SUSPENSION INTRAMUSCULAR at 16:14

## 2023-11-08 RX ADMIN — LOSARTAN POTASSIUM 25 MG: 25 TABLET, FILM COATED ORAL at 08:44

## 2023-11-08 RX ADMIN — ASPIRIN 81 MG: 81 TABLET, CHEWABLE ORAL at 08:43

## 2023-11-08 RX ADMIN — ATORVASTATIN CALCIUM 20 MG: 20 TABLET, FILM COATED ORAL at 08:43

## 2023-11-08 NOTE — PLAN OF CARE
Goal Outcome Evaluation:  Patient stable, VSS and voiding function is intact. Pain managed with prn tylenol. Doppler negative and patient deemed stable for d/c to [place] home. Educated on bp monitoring and med management.

## 2023-11-08 NOTE — PROGRESS NOTES
Continued Stay Note  [facility]     Patient Name: Riri Damon  MRN: [id number]  Today's Date: 2023    Admit Date: 10/29/2023    Plan: Shamika Siuands- pre-cert  today so will initiate new cert   Discharge Plan       Row Name 23 1412       Plan    Plan Comments Spoke with pt and son regarding pts husbands  today and if she is wanting to go. Per patient she is adamant and states she does not want to go. Informed patient if she does wish to go we would help arrange, patient denied. Plan will be to d/c to [facility] via [id number] van at 5pm. Son/Anurag aware and patient agreeable with d/c plan.    Final Discharge Disposition Code 03 - skilled nursing facility (SNF)    Final Note [facility] SNF                   Discharge Codes    No documentation.                 Expected Discharge Date and Time       Expected Discharge Date Expected Discharge Time    2023               Yesika Borrero RN

## 2023-11-08 NOTE — DISCHARGE INSTRUCTIONS
Intertrochanteric/Subtrochanteric   Hip Fracture Discharge Instructions  Dr. OSMAN Ag” [place]  [phone]    INCISION CARE  Wash your hands prior to dressing changes  The postoperative dressings should be changed starting on postoperative day #2. This will be started by nursing in the hospital. New dry dressings can then be changed daily.   No creams or ointments to the incisions until 4 weeks post op.  May remove dressing once the incision is completely free of drainage. But need to wait at least a week after surgery.  Do not touch or pick at the incision  Check incision every day and notify surgeon immediately if any of the following signs or symptoms are seen:  Increase in redness  Increase in swelling around the incision and of the entire extremity  Increase in pain  NEW drainage or oozing from the incision  Pulling apart of the edges of the incision  Increase in overall body temperature (greater than 100.4 degrees)  Your surgeon will instruct you regarding suture or staple removal. In general, this happens at 2-week post op. If you are discharged to an SNF/SNU facility, they can and should remove your staples at 14 days.     ACTIVITIES  Exercises:  Physical therapy will begin immediately while in the hospital. Patients going to a nursing home will get therapy as part of their care at the SNU/SNF facility. Patients going home may also have a therapist come to the house to help them mobilize until they can safely get to an outpatient therapy facility.   Elevate the affected leg most of the day during the first week post operatively. Caution must be taken to avoid pillow placement directly under the heel of the leg, as this can cause pressure ulcers even with a soft pillow. All pillows and blankets should be placed underneath of the thigh and calf so that the heel is free-floating.  Use cold packs for 20-30 minutes approximately 5 times per day.  You should perform the daily stretching and strengthening  exercises as taught by the therapist as often as possible. This can be done many times a day.   Full weight bearing is allowed after surgery. It will be sore/painful to put weight on the leg, but this will help the bone to heal and prevent complications such as pneumonia, bed sores and blood clots. Mobilization is vital to the recovery process.   Activities of Daily Living:  No tub baths, hot tubs, or swimming pools for 4 weeks.   May shower and let water run over the incision on postoperative day #10 if there is no drainage and the wound is well healing. A new dry dressing can be applied after showering.    Restrictions  Weight: It is ok to allow full weight bearing after surgery. Weight on the leg actually stimulates the bone to heal and quickens the recovery process. While it will be sore/painful to put weight on the leg, it is safe to do so.   Driving: Many patients have questions about when it is safe to return to driving. The answer is that this is extremely variable. It depends on the extent of the surgery, as well as how quickly you heal. Certainly left leg surgeries make returning to driving easier while right leg surgeries require more extensive rehabilitation before driving can be safe. Until you can press down on the brake hard, and are off of all narcotics, driving is not permitted. Your surgeon cannot “clear” you to return to driving, only you can make the decision when you feel it is safe.    Medications  Anticoagulants: After upper extremity surgery most patients do not require an anticoagulant unless you have another injury that will be keeping you from mobilizing. Lower extremity surgery typically does require use of an anticoagulation medicine.   IF YOU HAD LOWER EXTREMITY SURGERY AND ARE NOT DISCHARGED HOME WITH ANY ANTICOAGULANT MEDICINE YOU SHOULD TAKE ASPIRIN 325mg DAILY FOR 30 DAYS POSTOPERATIVELY.  If you are discharged home with an anticoagulant such as Aspirin, Xarelto, Eliquis, Coumadin,  or Lovenox, follow these simple instructions:   Notify surgeon immediately if any rizwana bleeding is noted in the urine, stool, emesis, or from the nose or the incision. Blood in the stool will often appear as black rather than red. Blood in urine may appear as pink. Blood in emesis may appear as brown/black like coffee grounds.  You will need to apply pressure for longer periods of time to any cuts or abrasions to stop bleeding  Avoid alcohol while taking anticoagulants  Most anticoagulants are to be taken for 30 days postoperatively. After this time, you may stop using them unless instructed otherwise.   If you were already taking an anticoagulant (commonly Aspirin, Coumadin, or Plavix) you will likely be resuming your normal dose postoperatively and will be continuing that medication at the discretion of the prescribing physician.  Stool Softeners: You will be at greater risk of constipation after surgery due to being less mobile and the pain medications.  Take stool softeners as needed. Over the counter Colace 100 mg 1-2 capsules twice daily can be taken.  If stools become too loose or too frequent, please decreases the dosage or stop the stool softener.  If constipation occurs despite use of stool softeners, you are to continue the stool softeners and add a laxative (Milk of Magnesia 1 ounce daily as needed)  Drink plenty of fluids, and eat fruits and vegetables during your recovery time. Getting up and mobilizing will help the bowels to recover their regular function, as will weaning off of all narcotics when the pain becomes tolerable.  Pain Medications utilized after surgery are narcotics. This is some general information about these medications.  CLASSIFICATION: Pain medications are called Opioids and are narcotics  LEGALITIES: It is illegal to share narcotics with others  DRIVING: it is illegal to drive while under the influence of narcotics. Doing so is a DUI.  POTENTIAL SIDE EFFECTS: nausea, vomiting,  itching, dizziness, drowsiness, dry mouth, constipation, and difficulty urinating.  POTENTIAL ADVERSE EFFECTS:  Opioid tolerance can develop with use of pain medications and this simply means that it requires more and more of the medication to control pain. However, this is seen more in patients that use opioids for longer periods of time.  Opioid dependence can develop with use of Opioids. People with opioid dependence will experience withdrawal symptoms upon cessation of the medication.  Opioid addiction can develop with use of Opioids. The incidence of this is very unlikely in patients who take the medications as ordered and stop the medications as instructed.  Opioid overdose can be dangerous, but is unlikely when the medication is taken as ordered and stopped when ordered. It is important not to mix opioids with alcohol as this can lead to over sedation and respiratory difficulty.  DOSAGE:  After the initial surgical pain begins to resolve, you may begin to decrease the pain medication. By the end of a few weeks, you should be off of pain medications.  Refills will not be given by the office during evening hours, on weekends, or after 6 weeks post-op. You are responsible for weaning off of pain medication. You can increase the time between narcotic pills, taking one every 4 then 6 then 8 hours and so on.  To seek refills on pain medications during the initial 6-week post-operative period, you must call the office to request the refill. The office will then notify you when to  the prescription. DO NOT wait until you are out of the medication to request a refill. Prescriptions will not be filled over the weekend and depending on the schedule, it may take a couple days for the prescription to be available. Someone will have to pick the prescription in person at the office.    FOLLOW-UP VISITS  You will need to follow up in the office with your surgeon in 4 weeks, or as instructed elsewhere in your discharge  paperwork. Please call this number [phone] to schedule this appointment. If you are going to an SNF/SNU facility, they will arrange for you to follow up in the office.  If you have any concerns or suspected complications prior to your follow up visit, please call the office. Do not wait until your appointment time if you suspect complications. These will need to be addressed in the office promptly.    Yfn Reyes II, MD  Orthopaedic Surgery  [facility]

## 2023-11-08 NOTE — PLAN OF CARE
Goal Outcome Evaluation:  Plan of Care Reviewed With: patient        Progress: improving       Pt a/o x4, RA, VSS. Multiple loose BM's today. DC cancelled. C diff test pending. WCTM.

## 2023-11-08 NOTE — PLAN OF CARE
Goal Outcome Evaluation:  Plan of Care Reviewed With: patient        Progress: improving  Outcome Evaluation: Patient is agreeable to PT this AM. Pt completed multiple STS to rwx requiring SBA and ambulated 40ft using rwx requiring SBA-CGA. Pt completed standing marching x10 reps as well. Will continue to progress as pt tolerates. Plan rehab at [place].

## 2023-11-08 NOTE — THERAPY TREATMENT NOTE
Patient Name: Riri Damon  : 1934    MRN: [id number]                              Today's Date: 2023       Admit Date: 10/29/2023    Visit Dx:     ICD-10-CM ICD-9-CM   1. Closed fracture of left hip, initial encounter  S72.002A 820.8     Patient Active Problem List   Diagnosis    Hyperlipidemia    Low back pain    Mitral valve insufficiency    Palpitations    Knee pain    Tricuspid valve insufficiency    Arthritis    Medicare annual wellness visit, subsequent    Screening for osteoporosis    Orthostatic lightheadedness    Essential hypertension    OA (osteoarthritis) of knee    Ventricular septal defect    Left anterior fascicular block    Malignant neoplasm of ascending colon    Family history of colon cancer    Osteopenia of left hip    Acute pain of left shoulder    Wellness examination    Anxiety    Cyst of right ovary    Primary insomnia    Vertigo    Chronic idiopathic constipation    Hip fracture    Leukocytosis    Hyperglycemia    Anemia    Hypokalemia     Past Medical History:   Diagnosis Date    Anxiety     Colon carcinoma     Stage IIIB, T4N1a; underwent radiation therapy and colon resection by Dr. Mcneil    Deep vein thrombosis 2017    right leg    History of edema     LE    History of rheumatic fever     Hx of radiation therapy     for adenocarcinoma of the colon Stage IIIB, T4N1a    Hyperlipidemia     Hypertension     MEDS PRN    Infectious mononucleosis     Infectious viral hepatitis     Left anterior fascicular block     Mitral regurgitation     2010: mild per echo    OA (osteoarthritis)     Palpitations     Pulmonary hypertension     2010- mild per echo; 2012 - normal RVSP per echo    Spinal headache     Tricuspid regurgitation     2010: Mild to moderate per echo; 2012: Trace to mild per echo    Venous insufficiency     Ventricular septal defect     Per echocardiogram     Vitamin D deficiency      Past Surgical History:   Procedure Laterality Date     APPENDECTOMY      COLECTOMY PARTIAL / TOTAL  02/02/2015 2/2015 due to adenocarcinoma    COLONOSCOPY      JAN 2015    COLONOSCOPY N/A 5/25/2016    Procedure: COLONOSCOPY to ANASTAMOSIS AND TERMINAL ILEUM;  Surgeon: Yfn Mcneil MD;  Location: [facility] ENDOSCOPY;  Service:     COLONOSCOPY N/A 7/11/2019    Procedure: COLONOSCOPY to cecum:;  Surgeon: Yfn Mcneil MD;  [facility];  Service: General    HIP INTERTROCHANTERIC NAILING Left 10/31/2023    Procedure: HIP INTERTROCHANTERIC NAILING;  Surgeon: Yfn Reyes II, MD;  Location: [facility] MAIN OR;  Service: Orthopedics;  Laterality: Left;    HYSTERECTOMY      INTRAOCULAR LENS EXCHANGE Bilateral     JOINT MANIPULATION Right 1/9/2018    Procedure: MANIPULATION OF RT KNEE;  Surgeon: Andrea Caballero MD;  Location: [facility] MAIN OR;  Service:     JOINT REPLACEMENT Right 11/09/2017    KNEE SURGERY Left 2006    ARTHROSCOPY    CT ARTHRP KNE CONDYLE&PLATU MEDIAL&LAT COMPARTMENTS Right 11/9/2017    Procedure: RIGHT TOTAL KNEE ARTHROPLASTY;  Surgeon: Andrea Caballero MD;  Location: [facility] MAIN OR;  Service: Orthopedics    TONSILLECTOMY        General Information       Row Name 11/08/23 1122          Physical Therapy Time and Intention    Document Type therapy note (daily note)  -CB     Mode of Treatment physical therapy;individual therapy  -CB       Row Name 11/08/23 1122          General Information    Existing Precautions/Restrictions fall  -CB       Row Name 11/08/23 1122          Cognition    Orientation Status (Cognition) oriented x 4  -CB       Row Name 11/08/23 1122          Safety Issues, Functional Mobility    Impairments Affecting Function (Mobility) balance;endurance/activity tolerance;strength;pain;range of motion (ROM)  -CB               User Key  (r) = Recorded By, (t) = Taken By, (c) = Cosigned By      Initials Name Provider Type    CB Ana Munoz PT Physical Therapist                   Mobility       Row Name 11/08/23 1123           Bed Mobility    Comment, (Bed Mobility) UIC  -CB       Row Name 11/08/23 1123          Sit-Stand Transfer    Sit-Stand [place] (Transfers) verbal cues;standby assist  -CB     Assistive Device (Sit-Stand Transfers) walker, front-wheeled  -CB     Comment, (Sit-Stand Transfer) x7 STS reps from chair  -CB       Row Name 11/08/23 1123          Gait/Stairs (Locomotion)    [place] Level (Gait) verbal cues;standby assist;contact guard  -CB     Assistive Device (Gait) walker, front-wheeled  -CB     Distance in Feet (Gait) 40ft  -CB     Deviations/Abnormal Patterns (Gait) antalgic;gait speed decreased;stride length decreased  -CB     Bilateral Gait Deviations weight shift ability decreased;forward flexed posture  -CB     Left Sided Gait Deviations weight shift ability decreased  -CB     Comment, (Gait/Stairs) no overt LOB noted  -CB               User Key  (r) = Recorded By, (t) = Taken By, (c) = Cosigned By      Initials Name Provider Type    Ana Nicole, MARTHA Physical Therapist                   Obj/Interventions       Row Name 11/08/23 1124          Motor Skills    Therapeutic Exercise --  AP, LAQ, marching, HS, x10 reps  -CB       Row Name 11/08/23 1124          Balance    Balance Assessment standing static balance;standing dynamic balance  -CB     Static Standing Balance standby assist;verbal cues  -CB     Dynamic Standing Balance contact guard;verbal cues  -CB     Position/Device Used, Standing Balance supported;walker, front-wheeled  -CB     Balance Interventions sitting;standing;sit to stand;supported;static;dynamic;minimal challenge  -CB     Comment, Balance standing marching x10 reps in standing  -CB               User Key  (r) = Recorded By, (t) = Taken By, (c) = Cosigned By      Initials Name Provider Type    Ana Nicole, PT Physical Therapist                   Goals/Plan    No documentation.                  Clinical Impression       Row Name 11/08/23 1125          Pain    Pretreatment  Pain Rating 6/10  -CB     Posttreatment Pain Rating 6/10  -CB     Pain Location - Side/Orientation Left  -CB     Pain Location - hip  -CB     Pain Intervention(s) Repositioned;Rest;Ambulation/increased activity  -CB       Row Name 11/08/23 1125          Plan of Care Review    Plan of Care Reviewed With patient  -CB     Progress improving  -CB     Outcome Evaluation Patient is agreeable to PT this AM. Pt completed multiple STS to rwx requiring SBA and ambulated 40ft using rwx requiring SBA-CGA. Pt completed standing marching x10 reps as well. Will continue to progress as pt tolerates. Plan rehab at [place].  -CB       Row Name 11/08/23 1125          Positioning and Restraints    Pre-Treatment Position sitting in chair/recliner  -CB     Post Treatment Position chair  -CB     In Chair notified nsg;reclined;call light within reach;encouraged to call for assist;LLE elevated  -CB               User Key  (r) = Recorded By, (t) = Taken By, (c) = Cosigned By      Initials Name Provider Type    Ana Nicole, PT Physical Therapist                   Outcome Measures       Row Name 11/08/23 1130          How much help from another person do you currently need...    Turning from your back to your side while in flat bed without using bedrails? 4  -CB     Moving from lying on back to sitting on the side of a flat bed without bedrails? 3  -CB     Moving to and from a bed to a chair (including a wheelchair)? 3  -CB     Standing up from a chair using your arms (e.g., wheelchair, bedside chair)? 3  -CB     Climbing 3-5 steps with a railing? 2  -CB     To walk in hospital room? 3  -CB     AM-PAC 6 Clicks Score (PT) 18  -CB     Highest level of mobility 6 --> Walked 10 steps or more  -CB       Row Name 11/08/23 1130          Functional Assessment    Outcome Measure Options AM-PAC 6 Clicks Basic Mobility (PT)  -CB               User Key  (r) = Recorded By, (t) = Taken By, (c) = Cosigned By      Initials Name Provider Type    DONATO Munoz  Ana PT Physical Therapist                                 Physical Therapy Education       Title: PT OT SLP Therapies (Done)       Topic: Physical Therapy (Done)       Point: Mobility training (Done)       Learning Progress Summary             Patient Acceptance, E,TB, VU,NR by CB at 11/8/2023 1130    Acceptance, E,TB,D, VU,NR by  at 11/7/2023 1741    Acceptance, E, VU by DB at 11/6/2023 1441    Acceptance, E,D, DU by PC at 11/5/2023 1413    Acceptance, E,TB, VU by RHONDA at 11/4/2023 1008    Acceptance, E,TB, VU by RHONDA at 11/3/2023 1542    Acceptance, E, VU,NR by KH at 11/1/2023 1420                         Point: Home exercise program (Done)       Learning Progress Summary             Patient Acceptance, E,TB, VU,NR by CB at 11/8/2023 1130    Acceptance, E,TB,D, VU,NR by  at 11/7/2023 1741    Acceptance, E, VU by DB at 11/6/2023 1441    Acceptance, E,D, DU by PC at 11/5/2023 1413    Acceptance, E,TB, VU by RHONDA at 11/4/2023 1008    Acceptance, E,TB, VU by RHONDA at 11/3/2023 1542    Acceptance, E, VU,NR by KH at 11/1/2023 1420                         Point: Body mechanics (Done)       Learning Progress Summary             Patient Acceptance, E,TB, VU,NR by CB at 11/8/2023 1130    Acceptance, E,TB,D, VU,NR by  at 11/7/2023 1741    Acceptance, E, VU by DB at 11/6/2023 1441    Acceptance, E,D, DU by PC at 11/5/2023 1413    Acceptance, E,TB, VU by RHONDA at 11/4/2023 1008    Acceptance, E,TB, VU by RHONDA at 11/3/2023 1542    Acceptance, E, VU,NR by KH at 11/1/2023 1420                         Point: Precautions (Done)       Learning Progress Summary             Patient Acceptance, E,TB, VU,NR by CB at 11/8/2023 1130    Acceptance, E,TB,D, VU,NR by  at 11/7/2023 1741    Acceptance, E, VU by DB at 11/6/2023 1441    Acceptance, E,D, DU by PC at 11/5/2023 1413    Acceptance, E,TB, VU by RHONDA at 11/4/2023 1008    Acceptance, E,TB, VU by RHONDA at 11/3/2023 1542    Acceptance, E, VU,NR by  at 11/1/2023 1420                                          User Key       Initials Effective Dates Name Provider Type Discipline    KH 07/11/23 -  Lorrie Medina, PT Physical Therapist PT    PC 06/16/21 -  Delores Mckinney, PT Physical Therapist PT    DB 06/16/21 -  Niru Mayes, PT Physical Therapist PT    CB 10/22/21 -  Ana Munoz, PT Physical Therapist PT    BH 04/08/22 -  Philly Reyez, PT Physical Therapist PT    RHONDA 06/09/23 -  Heather Cooper, RN Registered Nurse Nurse                  PT Recommendation and Plan     Plan of Care Reviewed With: patient  Progress: improving  Outcome Evaluation: Patient is agreeable to PT this AM. Pt completed multiple STS to rwx requiring SBA and ambulated 40ft using rwx requiring SBA-CGA. Pt completed standing marching x10 reps as well. Will continue to progress as pt tolerates. [facility].     Time Calculation:         PT Charges       Row Name 11/08/23 1130             Time Calculation    Start Time 1105  -CB      Stop Time 1117  -CB      Time Calculation (min) 12 min  -CB      PT Received On 11/08/23  -CB      PT - Next Appointment 11/09/23  -CB         Time Calculation- PT    Total Timed Code Minutes- PT 12 minute(s)  -CB         Timed Charges    24308 - PT Therapeutic Exercise Minutes 8  -CB      80315 - PT Therapeutic Activity Minutes 4  -CB         Total Minutes    Timed Charges Total Minutes 12  -CB       Total Minutes 12  -CB                User Key  (r) = Recorded By, (t) = Taken By, (c) = Cosigned By      Initials Name Provider Type    CB Ana Munoz, PT Physical Therapist                  Therapy Charges for Today       Code Description Service Date Service Provider Modifiers Qty    [id number] HC PT THER PROC EA 15 MIN 11/8/2023 Ana Munoz, PT GP 1            PT G-Codes  Outcome Measure Options: AM-PAC 6 Clicks Basic Mobility (PT)  AM-PAC 6 Clicks Score (PT): 18       Ana Munoz PT  11/8/2023

## 2023-11-08 NOTE — PLAN OF CARE
Goal Outcome Evaluation:  Plan of Care Reviewed With: patient        Progress: improving  Outcome Evaluation: Up with 1 asist and walker. Tylenol given x 1 for pain in hip. C-diff negative.

## 2023-11-08 NOTE — DISCHARGE SUMMARY
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        Hospital Course     Ms. Damon is a 89 y.o. female with a history of HTN, HLD, BPPV, chronic LE venous insufficiency, LAFB, osteoporosis, hx colon cancer who presented to [facility] initially complaining of mechanical fall w/lt hip pain.  Please see the admitting history and physical for further details.  She was found to have Lt intertrochanteric hip fracture  and was admitted to the hospital for further evaluation and treatment.  Ortho was consulted. Underwent lt intramedullary nailing on 10/31/23. WBAT LLE. ASA bid per Ortho for vte prophylaxis. Pain controlled w/Tylenol. Treated with 3 days IV ceftriaxone for low growth E coli UTI due to c/o bladder discomfort and dysuria. Had ~ 24 hours of diarrhea post antibiotics, C diff was negative. Diarrhea has improved. CT A/P was completed due to bladder complaints and voiding difficulty where there was concern for proctitis and pancreatitis. GI was consulted. Has had no symptoms of pancreatitis and lipase has been wnl. GI recommends hydrocortisone cream BID for 14 days for external hemorrhoids. Endoscopic intervention not warranted at this time. Tolerating current diet. C/O lt lower leg pain w/hx of prior DVT RLE; venous doppler was negative for DVT LLE. She unfortunately lost her , who was receiving end of life comfort care, while  admitted. She has had good support from her son. [facility] services were offered. Medically stable for discharge to SNF for continued OT/PT.     Day of Discharge     Subjective:  Feeling better. No further diarrhea. Denies abd pain. Tolerating current diet. Urinating without difficulty or discomfort.     Physical Exam:  Temp:  [97.4 °F (36.3 °C)-98.7 °F (37.1 °C)] 98.6 °F (37 °C)  Heart Rate:  [] 86  Resp:  [16-18] 18  BP: (117-161)/(65-78) 138/71  Body mass index is 22.63 kg/m².  Physical Exam  Vitals and nursing note reviewed.   Constitutional:       General: She is not in acute distress.  HENT:      Head: Normocephalic.      Mouth/Throat:      Mouth: Mucous membranes are moist.   Eyes:      Conjunctiva/sclera: Conjunctivae normal.   Cardiovascular:      Rate and Rhythm: Normal rate and regular rhythm.   Pulmonary:      Effort: Pulmonary effort is normal. No respiratory distress.      Breath sounds: No wheezing or rales.   Abdominal:      General: Bowel sounds are normal. There is no distension.      Palpations: Abdomen is soft.      Tenderness: There is no abdominal tenderness.   Musculoskeletal:      Cervical back: Neck supple.      Right lower leg: Edema present.      Left lower leg: Edema present.      Comments: Trace-1+   Skin:     General: Skin is warm and dry.   Neurological:      Mental Status: She is alert and oriented to person, place, and time.   Psychiatric:         Mood and Affect: Mood normal.         Behavior: Behavior normal.         Consultants     Consult Orders (all) (From admission, onward)       Start     Ordered    11/05/23 1727  Inpatient Gastroenterology Consult  Once        Specialty:  Gastroenterology  Provider:  Quentin Sepulveda MD    11/05/23 1727    10/29/23 2334  Inpatient Case Management  Consult  Once        Provider:  (Not yet assigned)    10/29/23 2334    10/29/23 2028  [facility] (on-call MD unless specified) Details  Once        Specialty:  Hospitalist  Provider:  (Not  yet assigned)    10/29/23 2027    10/29/23 2027  Ortho (on-call MD unless specified)  Once        Specialty:  Orthopedic Surgery  Provider:  Yfn Reyse II, MD    10/29/23 2026                  Procedures     HIP INTERTROCHANTERIC NAILING    Imaging Results (All)       Procedure Component Value Units Date/Time    CT Abdomen Pelvis Without Contrast [[id number]] Collected: 11/05/23 1347     Updated: 11/05/23 1400    Narrative:      CT ABDOMEN PELVIS WO CONTRAST-     INDICATIONS: Urinary tract infection     TECHNIQUE: Radiation dose reduction techniques were utilized, including  automated exposure control and exposure modulation based on body size.  Unenhanced ABDOMEN AND PELVIS CT     COMPARISON: 8/18/2022     FINDINGS:     Slight haziness is seen in the fat around the pancreas, correlate  clinically to exclude possibility of acute pancreatitis. Cholelithiasis  is noted; if further evaluation of the gallbladder is indicated,  ultrasound can be obtained.     The hyperdense lesion of the left kidney measures 6 mm on axial image  36, could be a hyperdense cyst, does not appear significantly changed in  size. No other slightly hyperdense focus of the right kidney, 1 cm on  image 24 is also stable in size. A 3 mm nonobstructive calcification is  noted in the left kidney. No other urolithiasis. No hydronephrosis.     Otherwise unremarkable unenhanced appearance of the liver, spleen,  adrenal glands, pancreas, kidneys, bladder.     A right adnexal cyst measures 3.1 cm, previously 2.7 cm. A second right  adnexal cyst measures 1.8 cm, stable. Possibility of cystic neoplasm is  not excluded, interval follow-up can characterize change.     No bowel obstruction. Presacral edema is present, with rectal wall  thickening, compatible with proctitis. Mild colonic fecal retention is  present.     No free intraperitoneal gas or free fluid.     Scattered small mesenteric and para-aortic lymph nodes are seen that are  not  significant by size criteria.     Abdominal aorta is not aneurysmal. Aortic and other arterial  calcifications are present.     The lung bases show mild atelectasis.     Degenerative changes are seen in the spine. Surgical fixation of left  intertrochanteric fracture is noted, with overlying skin staples. A  subcutaneous collection of fluid and gas in this region measures 3.5 x  1.7 cm on axial image 107, may represent ordinary postsurgical change,  possibility of abscess not excluded, correlate clinically, follow-up  evaluation can be obtained as indicated. Diffuse subcutaneous edema at  the lower levels suggests anasarca.                Impression:            1. Slight haziness in the fat around the pancreas may be evidence of  acute pancreatitis, correlate clinically. Cholelithiasis, further  evaluation of the gallbladder can be obtained as indicated.     2. Evidence of proctitis.     3. Small nonobstructive left nephrolithiasis. No hydronephrosis. Right  adnexal cysts, as described.     4. Subcutaneous fluid collection in the region of the left hip surgery  may represent ordinary postsurgical change, correlate clinically to  exclude possibility of abscess.     This report was finalized on 11/5/2023 1:57 PM by Dr. Singh Ho M.D on Workstation: [email or web address]       XR Chest 1 View [[id number]] Collected: 11/03/23 1620     Updated: 11/03/23 1624    Narrative:      XR CHEST 1 VW-     HISTORY: Female who is 89 years-old, hypoxia     TECHNIQUE: Frontal view of the chest     COMPARISON: 10/29/2023     FINDINGS: Heart size is borderline. Aorta is calcified. Pulmonary  vasculature is unremarkable. No focal pulmonary consolidation, pleural  effusion, or pneumothorax. No acute osseous process.       Impression:      No focal pulmonary consolidation. Borderline heart size.  Follow-up as clinical indications persist.     This report was finalized on 11/3/2023 4:20 PM by Dr. Singh Ho M.D on Workstation:  [id number]       XR Abdomen KUB [[id number]] Collected: 11/02/23 2123     Updated: 11/02/23 2129    Narrative:      KUB     HISTORY: Abdominal distention     COMPARISON: September 17, 2021     FINDINGS:  No free air is seen beneath the diaphragm. There is potentially  calcification within the right upper quadrant. This could represent  cholelithiasis. Patient did have cholelithiasis on prior CT from 2022.  There is intramedullary jacqui fixation of the proximal left femur. There  is some increased density noted within the pelvis. This may represent a  distended urinary bladder.          Impression:      Nonobstructive bowel gas pattern.     This report was finalized on 11/2/2023 9:26 PM by Dr. Estefania Garcia M.D on Workstation: [id number]       XR Hip With or Without Pelvis 2 - 3 View Left [[id number]] Collected: 10/31/23 0851     Updated: 10/31/23 0855    Narrative:      EXAM: XR HIP W OR WO PELVIS 2-3 VIEW LEFT-     INDICATION: Intraoperative guidance          Impression:      3 spot fluoroscopic images of the left hip. Please see  operative note for further details. Fluoroscopy time 24 seconds.  Cumulative air kerma 3.6 mGy.           This report was finalized on 10/31/2023 8:52 AM by Dr. Jesus Morfin M.D on Workstation: [id number]       XR Hip With or Without Pelvis 2 - 3 View Left [[id number]] Collected: 10/31/23 0832     Updated: 10/31/23 0838    Narrative:      EXAM: XR HIP W OR WO PELVIS 2-3 VIEW LEFT-     INDICATION: Postop evaluation     COMPARISON: 10/29/2023          Impression:      Proximal left femoral intramedullary jacqui with interlocking  screw transversing a comminuted left femoral intertrochanteric fracture.  Hardware is well-positioned and intact.           This report was finalized on 10/31/2023 8:35 AM by Dr. Jesus Morfin M.D on Workstation: [id number]       FL C Arm During Surgery [[id number]] Resulted: 10/31/23 0808     Updated: 10/31/23 0808    Narrative:      This procedure was  auto-finalized with no dictation required.    XR Hip With or Without Pelvis 2 - 3 View Left [[id number]] Collected: 10/29/23 2103     Updated: 10/29/23 2108    Narrative:      AP VIEW THE PELVIS +2 VIEWS LEFT HIP     HISTORY: Left hip injury     COMPARISON: None available.     FINDINGS:  There is a comminuted intertrochanteric fracture of the left femur. No  additional fractures are seen. There are symmetric degenerative changes  involving the hips and SI joints bilaterally.       Impression:      Comminuted intertrochanteric left femoral fracture.     This report was finalized on 10/29/2023 9:05 PM by Dr. Estefania Garcia M.D on Workstation: [id number]       XR Chest 1 View [[id number]] Collected: 10/29/23 2102     Updated: 10/29/23 2106    Narrative:      SINGLE VIEW OF THE CHEST     HISTORY: Preoperative exam     COMPARISON: May 11, 2023     FINDINGS:  There is cardiomegaly. There is calcification of the aorta. No  pneumothorax is seen. No displaced rib fractures are identified. There  is a nonspecific left basilar consolidation. This is favored to be  atelectasis and scarring, although correlation with any evidence of  infection is recommended. There is also some right basilar atelectasis.       Impression:      Nonspecific left basilar consolidation.     This report was finalized on 10/29/2023 9:03 PM by Dr. Estefania Garcia M.D on Workstation: [id number]             Results for orders placed during the hospital encounter of 10/29/23    Duplex Venous Lower Extremity - Left CAR    Interpretation Summary    Normal left lower extremity venous duplex scan.    Results for orders placed during the hospital encounter of 05/11/23    Adult transthoracic echo complete    Interpretation Summary    Left ventricular systolic function is normal. Left ventricular ejection fraction appears to be 61 - 65%.    Left ventricular diastolic function is consistent with (grade Ia w/high LAP) impaired relaxation.    Normal right  "ventricular cavity size and systolic function noted.    The left atrial cavity is mildly dilated.    Saline test results are negative.    Aortic valve mean pressure gradient is 8.0 mmHg. The aortic valve leaflets are moderately to heavily calcified (aortic sclerosis)    The mitral valve leaflets are thickened. There is a redundant chordae noted    Mild to moderate tricuspid valve regurgitation is present.    Calculated right ventricular systolic pressure from tricuspid regurgitation is 49 mmHg.    There is no evidence of pericardial effusion    Pertinent Labs     Results from last 7 days   Lab Units 11/08/23 0617 11/07/23 0456 11/06/23 0512 11/05/23 0432   WBC 10*3/mm3 9.65 8.23 7.73 12.11*   HEMOGLOBIN g/dL 9.8* 9.5* 9.4* 10.8*   PLATELETS 10*3/mm3 262 256 229 269     Results from last 7 days   Lab Units 11/08/23 0617 11/07/23 0456 11/06/23 0512 11/05/23 0432   SODIUM mmol/L 142 141 142 140   POTASSIUM mmol/L 3.8 3.7 4.1 3.7   CHLORIDE mmol/L 108* 108* 110* 107   CO2 mmol/L 26.3 25.4 27.0 25.0   BUN mg/dL 9 9 8 12   CREATININE mg/dL 0.41* 0.42* 0.39* 0.46*   GLUCOSE mg/dL 98 99 103* 114*   EGFR mL/min/1.73 94.2 93.6 95.3 91.6     Results from last 7 days   Lab Units 11/05/23 0432 11/04/23 0452 11/03/23 0448 11/02/23  0500   ALBUMIN g/dL 3.3* 2.5* 2.9* 3.0*   BILIRUBIN mg/dL 0.9  --   --   --    ALK PHOS U/L 56  --   --   --    AST (SGOT) U/L 23  --   --   --    ALT (SGPT) U/L 15  --   --   --      Results from last 7 days   Lab Units 11/08/23 0617 11/07/23 0456 11/06/23 0512 11/05/23 0432 11/04/23 0452 11/03/23 0448 11/02/23  0500   CALCIUM mg/dL 8.1* 8.0* 8.2* 8.3* 7.9* 7.9* 7.8*   ALBUMIN g/dL  --   --   --  3.3* 2.5* 2.9* 3.0*     Results from last 7 days   Lab Units 11/05/23  0432   LIPASE U/L 33             Invalid input(s): \"LDLCALC\"  Results from last 7 days   Lab Units 11/03/23  1307   URINECX  50,000 CFU/mL Escherichia coli*         Test Results Pending at Discharge       Discharge Details "        Discharge Medications        New Medications        Instructions Start Date   acetaminophen 325 MG tablet  Commonly known as: TYLENOL   650 mg, Oral, Every 4 Hours PRN      aspirin 81 MG chewable tablet  Replaces: aspirin 81 MG EC tablet   81 mg, Oral, 2 Times Daily      Hydrocortisone (Perianal) 2.5 % rectal cream  Commonly known as: ANUSOL-HC   Rectal, 2 Times Daily             Continue These Medications        Instructions Start Date   CITRACAL PO   200 mg, Oral, 3 Times Weekly, Monday, Wednesday, friday      docusate sodium 100 MG capsule   100 mg, Oral, 2 Times Daily PRN      losartan 25 MG tablet  Commonly known as: COZAAR   TAKE 1 TABLET EVERY DAY      magnesium chloride ER 64 MG DR tablet   64 mg, Oral, 3 Times Weekly, Monday, Wednesday, Friday       melatonin 1 MG tablet   5 mg, Oral, Nightly      meloxicam 15 MG tablet  Commonly known as: MOBIC   15 mg, Oral, Daily      multivitamin with minerals tablet tablet   1 tablet, Oral, Daily      simvastatin 40 MG tablet  Commonly known as: ZOCOR   TAKE 1 TABLET EVERY NIGHT             Stop These Medications      aspirin 81 MG EC tablet  Replaced by: aspirin 81 MG chewable tablet              No Known Allergies    Discharge Disposition:  Skilled Nursing Facility (DC - External)      Discharge Diet:  Diet Order   Procedures    Diet: Regular/House Diet; Texture: Regular Texture (IDDSI 7); Fluid Consistency: Thin (IDDSI 0)       Discharge Activity:   Activity Instructions       Activity as Tolerated              CODE STATUS:    Code Status and Medical Interventions:   Ordered at: 10/29/23 2234     Level Of Support Discussed With:    Patient     Code Status (Patient has no pulse and is not breathing):    No CPR (Do Not Attempt to Resuscitate)     Medical Interventions (Patient has pulse or is breathing):    Full Support       Future Appointments   Date Time Provider Department Center   4/11/2024 12:40 PM Rowan Serrano MD [facility]   9/9/2024  12:30 PM LAB CHAIR 5 [facility] JEANNIE  LAB [facility] [place]   9/9/2024  1:00 PM Asiya Hinton MD [facility] JEANETTES Tami      Contact information for follow-up providers       Phoenix Velazquez MD. Schedule an appointment as soon as possible for a visit in 1 week(s).    Specialty: Family Medicine  Why: Follow up 1 week following discharge from rehab  Contact information:  [address]  [address]  [facility] [id number] [phone]               Yfn Reyes II, MD. Schedule an appointment as soon as possible for a visit in 2 week(s).    Specialty: Orthopedic Surgery  Contact information:  [address]  [address]  [facility] [id number] [phone]                       Contact information for after-discharge care       Destination       [facility] .    Service: Skilled Nursing  Contact information:  [address] [id number]  [phone]                                   Time Spent on Discharge:  Greater than 30 minutes      PADMINI Sanchez  [facility]  11/08/23  12:33 EST

## 2023-11-22 ENCOUNTER — DOCUMENTATION (OUTPATIENT)
Dept: HOME HEALTH SERVICES | Facility: HOME HEALTHCARE | Age: 88
End: 2023-11-22
Payer: MEDICARE

## 2023-11-22 NOTE — PROGRESS NOTES
Received referral for home health from [facility].  Met with patient on 11/22, coordinated home health, confirmed address and phone number, patient agreeable to home health.  Spoke to [facility] Medina who confirmed that Dr. Eric FERRO will follow for home health.

## 2023-11-23 ENCOUNTER — READMISSION MANAGEMENT (OUTPATIENT)
Dept: CALL CENTER | Facility: HOSPITAL | Age: 88
End: 2023-11-23
Payer: MEDICARE

## 2023-11-23 ENCOUNTER — TRANSCRIBE ORDERS (OUTPATIENT)
Dept: HOME HEALTH SERVICES | Facility: HOME HEALTHCARE | Age: 88
End: 2023-11-23
Payer: MEDICARE

## 2023-11-23 ENCOUNTER — HOME HEALTH ADMISSION (OUTPATIENT)
Dept: HOME HEALTH SERVICES | Facility: HOME HEALTHCARE | Age: 88
End: 2023-11-23
Payer: MEDICARE

## 2023-11-23 DIAGNOSIS — S72.145A CLOSED NONDISPLACED INTERTROCHANTERIC FRACTURE OF LEFT FEMUR, INITIAL ENCOUNTER: Primary | ICD-10-CM

## 2023-11-23 DIAGNOSIS — Z47.89 UNSPECIFIED ORTHOPEDIC AFTERCARE: ICD-10-CM

## 2023-11-24 NOTE — OUTREACH NOTE
Prep Survey      Flowsheet Row Responses   [facility] facility patient discharged from? Non-BH   Is LACE score < 7 ? Non-BH Discharge   Eligibility \[facility]\"" SNF - Skilled   Date of Discharge 11/23/23   Discharge diagnosis Fracture of unspecified part of neck of left femur, subsequent encounter for closed fracture with routine healing   Does the patient have one of the following disease processes/diagnoses(primary or secondary)? Other   Prep survey completed? Yes            TERESITA THAPA - Registered Nurse

## 2023-11-25 ENCOUNTER — HOME CARE VISIT (OUTPATIENT)
Dept: HOME HEALTH SERVICES | Facility: HOME HEALTHCARE | Age: 88
End: 2023-11-25
Payer: MEDICARE

## 2023-11-25 PROCEDURE — G0151 HHCP-SERV OF PT,EA 15 MIN: HCPCS

## 2023-11-25 NOTE — HOME HEALTH
Routine Visit Note:    Skill/education provided: Ther ex, gait/transfer training, med education, fall risk/prevention education, rehab process    Patient/caregiver response: Well, repeats instructions back to therapist    Plan for next visit: Ther ex, gait/transfer training    Other pertinent info: None

## 2023-11-25 NOTE — HOME HEALTH
Mrs. Damon is homebound due to difficulty with walking, transfers and ADLs secondary to unsteadiness, pain and decrease functional strength following recent fall that caused a hip fracture and was hopsitalized and underwent a left intertrochanteric nailing surgery. PT and OT to focus on aftercare following left LE intertrochanteric nailing.

## 2023-11-26 ENCOUNTER — HOME CARE VISIT (OUTPATIENT)
Dept: HOME HEALTH SERVICES | Facility: HOME HEALTHCARE | Age: 88
End: 2023-11-26
Payer: MEDICARE

## 2023-11-26 VITALS
OXYGEN SATURATION: 98 % | HEART RATE: 68 BPM | RESPIRATION RATE: 17 BRPM | TEMPERATURE: 97.8 F | DIASTOLIC BLOOD PRESSURE: 70 MMHG | SYSTOLIC BLOOD PRESSURE: 128 MMHG

## 2023-11-27 ENCOUNTER — TRANSITIONAL CARE MANAGEMENT TELEPHONE ENCOUNTER (OUTPATIENT)
Dept: CALL CENTER | Facility: HOSPITAL | Age: 88
End: 2023-11-27
Payer: MEDICARE

## 2023-11-27 ENCOUNTER — HOME CARE VISIT (OUTPATIENT)
Dept: HOME HEALTH SERVICES | Facility: HOME HEALTHCARE | Age: 88
End: 2023-11-27
Payer: MEDICARE

## 2023-11-27 VITALS
HEART RATE: 80 BPM | TEMPERATURE: 98 F | OXYGEN SATURATION: 98 % | DIASTOLIC BLOOD PRESSURE: 78 MMHG | SYSTOLIC BLOOD PRESSURE: 144 MMHG

## 2023-11-27 PROCEDURE — G0152 HHCP-SERV OF OT,EA 15 MIN: HCPCS

## 2023-11-27 NOTE — HOME HEALTH
"CURRENT SITUATION: Pt fell at home on 10-29-23. She tripped over the foot rest of her recliner. She sustained a L-femur fx and was admitted 10-29-23 to 11-08-23. She is now s/p L-intertrochanteric IM nailing on 10-31-23. She is LLE WBAT. She was d/c'd to [facility] and now with  PT and OT.  PMHx: HTN, HLD, BPPV, chronic LE venous insufficiency, LAFB, osteoporosis, colon CA.   OT's FOCUS OF CARE: L-hip fx; ADL safety.  SUBJECTIVE: \"I can't go up all those steps yet.\" [referring to 2nd level of home.] Agreeable to OT evaluation.  SOCIAL & ENVIRONMENTAL SITUATION: Pt lives alone in a 2-story home, BR/BA upstairs, but her family has moved her bed down to main level while she is recovering. There is a 1/2-bath for her to use on main level. She most recently lost her spouse (who was receiving EOL comfort care) while she was in the hospital for this incident.  PATIENT'S &/OR CAREGIVER'S GOAL: \"Get back to being Independent and active like I used to be.\"  INTERVENTIONS: OT Eval, ADL training, Home Safety, Therapeutic Exercise/Activity, Transfer/Mobility training, Monitor vitals including SPO2 via pulse-oximeter (notifiy MD if resting O2 <90% on room air), Patient/CG education, Falls-risk prevention, Recommendations on AE, DME, AD, environmental adaptations.  ASSESSMENT: Pt is appropriate to receive skilled OT to help remediate and/or compensate for deficits caused by her current situation/diagnosis. OT services will help improve safety and independence with ADLs, functional transfers, home mobility, functional strength, activity tolerance/endurance, provide Pt/CG education, and give appropriate recommendations for AD, AE, and DME. OT is needed to lessen the pt's burden of care, improve pt/CG independence in pt's care, and maintain safety in order for pt to remain at home.   PLAN: OT will follow; 2w3  PLAN FOR NEXT VISIT: follow up w/AE use in dressing, TTB transfer training if can get upstairs. Teach BUE AROM HEP w/band."

## 2023-11-27 NOTE — Clinical Note
Home Health OT Evaluation completed 11-27-23. OT will follow 2w3 and address ADL independence and safety, use of AE and DME, and promotion of her returning to her PLOF for independent living w/intermittent assistance from family. Thank you for referral.

## 2023-11-28 ENCOUNTER — HOME CARE VISIT (OUTPATIENT)
Dept: HOME HEALTH SERVICES | Facility: HOME HEALTHCARE | Age: 88
End: 2023-11-28
Payer: MEDICARE

## 2023-11-28 PROCEDURE — G0151 HHCP-SERV OF PT,EA 15 MIN: HCPCS

## 2023-11-29 VITALS
DIASTOLIC BLOOD PRESSURE: 78 MMHG | HEART RATE: 80 BPM | OXYGEN SATURATION: 98 % | TEMPERATURE: 97.4 F | SYSTOLIC BLOOD PRESSURE: 134 MMHG | RESPIRATION RATE: 17 BRPM

## 2023-11-29 NOTE — HOME HEALTH
SUBJECTIVE: Patient son states that patient has not felt able to navigate to 2nd level of home yet. Patient verbalizes compliance with HEP and activity recommendations.  reports that pain is well managed    No new med changes.  No recent Falls.      ASSESSMENT patient is approximately 4 weeks post-op L intertrochanteric nailing of L femur. currently limited by excess edema which patient reports ortho diagnosed as hematoma.  able to transfer independently from standard chairs, recliner with aid of RW. able to navigate steps with step to pattern but fear of pain, falling leads to only partial completion of stairs to 2nd level of home. overall progressing well, pain well managed, and has adequate family support in home at this time     SKILL/EDUCATION PROVIDED: see interventions for details  PATIENT/CAREGIVER RESPONSE: see interventions for details      DATE OF NEXT APPOINTMENT WITH DOCTOR: 12/4 Dr Velazquez;   last saw Dr Reyes on 11/17, next scheduled appt with ortho is in 2/2024     ____________    PLAN FOR NEXT VISIT:   MEDICAL NECESSITY FOR ONGOING SKILLED THERAPY:  Skilled physical therapy is medically necessary for treatment of: L intertrochanteric nailing.  .Requires instruction in appropriate progression of exercises; education in fall prevention/pain/edema management; gait training to reduce reliance on assistive device; balance retraining to prevent falls.  Without skilled physical therapy, patient at risk for: falls, rehospitalization, increased reliance on caregiver, chronic pain,       SPECIFIC INTERVENTIONS AND GOALS TO ADDRESS ON NEXT VISIT:  - progress HEP   - gait training to restore normal mechanics- stair ambulation  - fall prevention education  - transfer training- shower transfer when able to navigate to 2nd level of home    FREQUENCY AND DURATION:  - 2 week 3; 1 week 1    ANY OTHER FOLLOW UP NEEDED: none    REASSESSMENT DUE DATE: 30 day: 12/24/23

## 2023-12-01 ENCOUNTER — HOME CARE VISIT (OUTPATIENT)
Dept: HOME HEALTH SERVICES | Facility: HOME HEALTHCARE | Age: 88
End: 2023-12-01
Payer: MEDICARE

## 2023-12-01 VITALS
TEMPERATURE: 98.4 F | HEART RATE: 81 BPM | DIASTOLIC BLOOD PRESSURE: 70 MMHG | OXYGEN SATURATION: 97 % | SYSTOLIC BLOOD PRESSURE: 130 MMHG

## 2023-12-01 PROCEDURE — G0152 HHCP-SERV OF OT,EA 15 MIN: HCPCS

## 2023-12-01 PROCEDURE — G0151 HHCP-SERV OF PT,EA 15 MIN: HCPCS

## 2023-12-01 NOTE — HOME HEALTH
"SUBJECTIVE: \"I slept really good last night.\"  PLAN FOR NEXT VISIT: follow up on HEP and LBD possibly w/o AE."

## 2023-12-02 VITALS — OXYGEN SATURATION: 97 % | DIASTOLIC BLOOD PRESSURE: 70 MMHG | HEART RATE: 81 BPM | SYSTOLIC BLOOD PRESSURE: 130 MMHG

## 2023-12-02 NOTE — HOME HEALTH
"SUBJECTIVE:  \"I still feel weak. I need to get stronger.\"  Son states that family is very fearful to allow patient to be \"too independent\" as they are worried she will fall again  No new med changes.   No recent Falls.   ASSESSMENT patient c/o overall feeling of fatigue, weakness.  agreeable and willing to participate in HEP , gait training . pain slowly improving.  still evidence of apparent L lateral thigh hematoma which is also likely contributing to pain.  slow reduction in L LE edema  SKILL/EDUCATION PROVIDED: see interventions for details   PATIENT/CAREGIVER RESPONSE: see interventions for details   DATE OF NEXT APPOINTMENT WITH DOCTOR: 12/4 Dr Velazquez; last saw Dr Reyes on 11/17, next scheduled appt with ortho is in 2/2024   ____________   PLAN FOR NEXT VISIT:   MEDICAL NECESSITY FOR ONGOING SKILLED THERAPY: Skilled physical therapy is medically necessary for treatment of: L intertrochanteric nailing. .Requires instruction in appropriate progression of exercises; education in fall prevention/pain/edema management; gait training to reduce reliance on assistive device; balance retraining to prevent falls. Without skilled physical therapy, patient at risk for: falls, rehospitalization, increased reliance on caregiver, chronic pain,   SPECIFIC INTERVENTIONS AND GOALS TO ADDRESS ON NEXT VISIT:   - progress HEP   - gait training to restore normal mechanics- stair ambulation   - fall prevention education   - transfer training- shower transfer when able to navigate to 2nd level of home   FREQUENCY AND DURATION:   - 2 week 3; 1 week 1   ANY OTHER FOLLOW UP NEEDED: none   REASSESSMENT DUE DATE: 30 day: 12/24/23"

## 2023-12-04 ENCOUNTER — HOME CARE VISIT (OUTPATIENT)
Dept: HOME HEALTH SERVICES | Facility: HOME HEALTHCARE | Age: 88
End: 2023-12-04
Payer: MEDICARE

## 2023-12-04 ENCOUNTER — OFFICE VISIT (OUTPATIENT)
Dept: INTERNAL MEDICINE | Facility: CLINIC | Age: 88
End: 2023-12-04
Payer: MEDICARE

## 2023-12-04 VITALS
TEMPERATURE: 98 F | OXYGEN SATURATION: 98 % | DIASTOLIC BLOOD PRESSURE: 74 MMHG | SYSTOLIC BLOOD PRESSURE: 150 MMHG | HEART RATE: 81 BPM

## 2023-12-04 VITALS
HEART RATE: 98 BPM | TEMPERATURE: 98.2 F | OXYGEN SATURATION: 98 % | SYSTOLIC BLOOD PRESSURE: 126 MMHG | DIASTOLIC BLOOD PRESSURE: 72 MMHG | HEIGHT: 65 IN | WEIGHT: 134.5 LBS | BODY MASS INDEX: 22.41 KG/M2

## 2023-12-04 DIAGNOSIS — E78.5 HYPERLIPIDEMIA, UNSPECIFIED HYPERLIPIDEMIA TYPE: ICD-10-CM

## 2023-12-04 DIAGNOSIS — E78.2 MIXED HYPERLIPIDEMIA: ICD-10-CM

## 2023-12-04 DIAGNOSIS — M25.552 PAIN OF LEFT HIP: ICD-10-CM

## 2023-12-04 DIAGNOSIS — M19.90 ARTHRITIS: ICD-10-CM

## 2023-12-04 DIAGNOSIS — I10 ESSENTIAL HYPERTENSION: Primary | ICD-10-CM

## 2023-12-04 PROCEDURE — G0152 HHCP-SERV OF OT,EA 15 MIN: HCPCS

## 2023-12-04 RX ORDER — SIMVASTATIN 40 MG
40 TABLET ORAL NIGHTLY
Qty: 90 TABLET | Refills: 3 | Status: SHIPPED | OUTPATIENT
Start: 2023-12-04

## 2023-12-04 RX ORDER — MECLIZINE HYDROCHLORIDE 25 MG/1
TABLET ORAL
COMMUNITY
End: 2023-12-04

## 2023-12-04 RX ORDER — LOSARTAN POTASSIUM 25 MG/1
25 TABLET ORAL DAILY
Qty: 90 TABLET | Refills: 3 | Status: SHIPPED | OUTPATIENT
Start: 2023-12-04

## 2023-12-04 RX ORDER — MELOXICAM 15 MG/1
15 TABLET ORAL 3 TIMES WEEKLY
Qty: 90 TABLET | Refills: 3 | Status: SHIPPED | OUTPATIENT
Start: 2023-12-04

## 2023-12-04 NOTE — HOME HEALTH
"SUBJECTIVE: \"I went to my PCP today. He looked at my leg cause it is still swelling. He seems to think it's all circulation-related and to keep wearing the knee compression sock if I think it helps.\"  PLAN FOR NEXT VISIT: TTB transfer training."

## 2023-12-04 NOTE — PROGRESS NOTES
"Chief Complaint  Hospital Follow Up Visit and Hip Pain (Left side )    Subjective        Riri Damon presents to [facility]  History of Present Illness  This is been follows up after recent hospitalization for left hip fracture and nailing  He has underlying hyperlipidemia and hypertension  Is doing well with rehabilitation does have a postsurgical nonerythematous nodule near the wound that is reducing in size  No chest pain no shortness of breath no increased fatigue needs refills of medications  Objective   Vital Signs:  /72   Pulse 98   Temp 98.2 °F (36.8 °C)   Ht 165.1 cm (65\")   Wt 61 kg (134 lb 8 oz)   SpO2 98%   BMI 22.38 kg/m²   Estimated body mass index is 22.38 kg/m² as calculated from the following:    Height as of this encounter: 165.1 cm (65\").    Weight as of this encounter: 61 kg (134 lb 8 oz).       BMI is within normal parameters. No other follow-up for BMI required.      Physical Exam   Result Review :    Common labs          11/6/2023    05:12 11/7/2023    04:56 11/8/2023    06:17   Common Labs   Glucose 103  99  98    BUN 8  9  9    Creatinine 0.39  0.42  0.41    Sodium 142  141  142    Potassium 4.1  3.7  3.8    Chloride 110  108  108    Calcium 8.2  8.0  8.1    WBC 7.73  8.23  9.65    Hemoglobin 9.4  9.5  9.8    Hematocrit 27.7  27.7  28.7    Platelets 229  256  262    Reviewed most recent blood work from nursing home revealing sodium 143 potassium 3.8 BUN 13 creatinine 1.5 WBCs 5.5 hemoglobin 11.8 hematocrit 34.4 platelets 248 these were obtained 11/23/2023               Assessment and Plan   Diagnoses and all orders for this visit:    1. Essential hypertension (Primary)    2. Mixed hyperlipidemia    3. Pain of left hip    4. Hyperlipidemia, unspecified hyperlipidemia type  -     simvastatin (ZOCOR) 40 MG tablet; Take 1 tablet by mouth Every Night. Indications: High Amount of Fats in the Blood  Dispense: 90 tablet; Refill: 3    5. Arthritis  -   "   meloxicam (MOBIC) 15 MG tablet; Take 1 tablet by mouth 3 (Three) Times a Week. Indications: Joint Damage causing Pain and Loss of Function  Dispense: 90 tablet; Refill: 3    Other orders  -     losartan (COZAAR) 25 MG tablet; Take 1 tablet by mouth Daily. Indications: High Blood Pressure Disorder  Dispense: 90 tablet; Refill: 3    Continue present management of hyperlipidemia hypertension and arthritis routine follow-up with orthopedics for hip pain as well otherwise         Follow Up   Return in about 3 months (around 3/4/2024), or if symptoms worsen or fail to improve, for Recheck.  Patient was given instructions and counseling regarding her condition or for health maintenance advice. Please see specific information pulled into the AVS if appropriate.

## 2023-12-05 ENCOUNTER — HOME CARE VISIT (OUTPATIENT)
Dept: HOME HEALTH SERVICES | Facility: HOME HEALTHCARE | Age: 88
End: 2023-12-05
Payer: MEDICARE

## 2023-12-05 PROCEDURE — G0151 HHCP-SERV OF PT,EA 15 MIN: HCPCS

## 2023-12-05 NOTE — HOME HEALTH
"SUBJECTIVE: \"Im a little sore today.  I had my follow up with Dr. Velazquez (PCP) yesterday and he thought things were going well.    No new med changes.   No recent Falls.     ASSESSMENT  patient still nervous, fearful about attempting full flight of stairs although ambulating well on stairs to enter/exit home. fair/good compliance with HEP. pain slowly improving.  discussed transition to outpatient PT.  patient prefers [facility] once no longer homebound     SKILL/EDUCATION PROVIDED: see interventions for details   PATIENT/CAREGIVER RESPONSE: see interventions for details     DATE OF NEXT APPOINTMENT WITH DOCTOR: 12/4 Dr Velazquez; last saw Dr Reyes on 11/17, next scheduled appt with ortho is in 2/2024   ____________   PLAN FOR NEXT VISIT:   MEDICAL NECESSITY FOR ONGOING SKILLED THERAPY: Skilled physical therapy is medically necessary for treatment of: L intertrochanteric nailing. .Requires instruction in appropriate progression of exercises; education in fall prevention/pain/edema management; gait training to reduce reliance on assistive device; balance retraining to prevent falls. Without skilled physical therapy, patient at risk for: falls, rehospitalization, increased reliance on caregiver, chronic pain,     SPECIFIC INTERVENTIONS AND GOALS TO ADDRESS ON NEXT VISIT:   - progress HEP   - gait training to restore normal mechanics- stair ambulation   - fall prevention education   - transfer training- shower transfer when able to navigate to 2nd level of home     FREQUENCY AND DURATION:   - 2 week 3; 1 week 1     ANY OTHER FOLLOW UP NEEDED: none   REASSESSMENT DUE DATE: 30 day: 12/24/23"

## 2023-12-08 ENCOUNTER — HOME CARE VISIT (OUTPATIENT)
Dept: HOME HEALTH SERVICES | Facility: HOME HEALTHCARE | Age: 88
End: 2023-12-08
Payer: MEDICARE

## 2023-12-08 VITALS — TEMPERATURE: 97.4 F

## 2023-12-08 PROCEDURE — G0152 HHCP-SERV OF OT,EA 15 MIN: HCPCS

## 2023-12-08 PROCEDURE — G0151 HHCP-SERV OF PT,EA 15 MIN: HCPCS

## 2023-12-10 VITALS — SYSTOLIC BLOOD PRESSURE: 120 MMHG | DIASTOLIC BLOOD PRESSURE: 80 MMHG | HEART RATE: 82 BPM | OXYGEN SATURATION: 97 %

## 2023-12-10 NOTE — HOME HEALTH
"SUBJECTIVE: \"I'm OK with using the bench for showering and taking off the doors.\"  PLAN FOR NEXT VISIT: upstairs, practice TTB transfers, shower if time."

## 2023-12-11 ENCOUNTER — HOME CARE VISIT (OUTPATIENT)
Dept: HOME HEALTH SERVICES | Facility: HOME HEALTHCARE | Age: 88
End: 2023-12-11
Payer: MEDICARE

## 2023-12-11 VITALS
RESPIRATION RATE: 17 BRPM | SYSTOLIC BLOOD PRESSURE: 134 MMHG | OXYGEN SATURATION: 98 % | TEMPERATURE: 97.8 F | DIASTOLIC BLOOD PRESSURE: 74 MMHG | HEART RATE: 78 BPM

## 2023-12-11 PROCEDURE — G0152 HHCP-SERV OF OT,EA 15 MIN: HCPCS

## 2023-12-11 NOTE — HOME HEALTH
"SUBJECTIVE: \"The upstairs is ready, he has it all up there.\" Pt is referring to TTB, shower doors removed and curtain up in its place. There is no hand-held shower head yet installed.  PLAN FOR NEXT VISIT: OT D/C. follow up on shower; pt was to take a shower w/daughter's help."

## 2023-12-12 ENCOUNTER — HOME CARE VISIT (OUTPATIENT)
Dept: HOME HEALTH SERVICES | Facility: HOME HEALTHCARE | Age: 88
End: 2023-12-12
Payer: MEDICARE

## 2023-12-12 PROCEDURE — G0151 HHCP-SERV OF PT,EA 15 MIN: HCPCS

## 2023-12-14 VITALS
TEMPERATURE: 97.4 F | SYSTOLIC BLOOD PRESSURE: 122 MMHG | DIASTOLIC BLOOD PRESSURE: 78 MMHG | OXYGEN SATURATION: 97 % | RESPIRATION RATE: 17 BRPM | HEART RATE: 80 BPM

## 2023-12-14 NOTE — HOME HEALTH
"SUBJECTIVE: \"I went upstairs twice yesterday and took a shower\"  Reports overall pain improving  No new med changes.   No recent Falls.   ASSESSMENT marked improvement in confidence with mobility. able to navigate out of home with Supervision with aid of RW. gait distances and quality improving.  limited by fear of chronic knee pain/instability more than hip pain  SKILL/EDUCATION PROVIDED: see interventions for details   PATIENT/CAREGIVER RESPONSE: see interventions for details   DATE OF NEXT APPOINTMENT WITH DOCTOR:  last saw Dr Reyes on 11/17, next scheduled appt with ortho is in 2/2024   ____________   PLAN FOR NEXT VISIT:   MEDICAL NECESSITY FOR ONGOING SKILLED THERAPY: Skilled physical therapy is medically necessary for treatment of: L intertrochanteric nailing. .Requires instruction in appropriate progression of exercises; education in fall prevention/pain/edema management; gait training to reduce reliance on assistive device; balance retraining to prevent falls. Without skilled physical therapy, patient at risk for: falls, rehospitalization, increased reliance on caregiver, chronic pain,     SPECIFIC INTERVENTIONS AND GOALS TO ADDRESS ON NEXT VISIT:   - progress HEP   - gait training to restore normal mechanics- trial of cane      FREQUENCY AND DURATION:   - 2 week 3; 1 week 1   ANY OTHER FOLLOW UP NEEDED: none   REASSESSMENT DUE DATE: 30 day: 12/24/23"

## 2023-12-15 ENCOUNTER — HOME CARE VISIT (OUTPATIENT)
Dept: HOME HEALTH SERVICES | Facility: HOME HEALTHCARE | Age: 88
End: 2023-12-15
Payer: MEDICARE

## 2023-12-15 PROCEDURE — G0151 HHCP-SERV OF PT,EA 15 MIN: HCPCS

## 2023-12-15 NOTE — HOME HEALTH
"SUBJECTIVE: \"my leg is so so.\" I went upstairs again yesterday.\" Reports is less scary to navigate uneven terrain/stairs    No new med changes.   No recent Falls.     ASSESSMENT continued improvement in confidence with mobility. patient more fearful of chronic knee instability than hip pain  .  beginning to transition to use of SPC      SKILL/EDUCATION PROVIDED: see interventions for details   PATIENT/CAREGIVER RESPONSE: see interventions for details     DATE OF NEXT APPOINTMENT WITH DOCTOR: last saw Dr Reyes on 11/17, next scheduled appt with ortho is in 2/2024   ____________   PLAN FOR NEXT VISIT:   MEDICAL NECESSITY FOR ONGOING SKILLED THERAPY: Skilled physical therapy is medically necessary for treatment of: L intertrochanteric nailing. .Requires instruction in appropriate progression of exercises; education in fall prevention/pain/edema management; gait training to reduce reliance on assistive device; balance retraining to prevent falls. Without skilled physical therapy, patient at risk for: falls, rehospitalization, increased reliance on caregiver, chronic pain,   SPECIFIC INTERVENTIONS AND GOALS TO ADDRESS ON NEXT VISIT:   - gait training to restore normal mechanics- trial of cane   - dc assessments    FREQUENCY AND DURATION:   - 2 week 3; 1 week 1     ANY OTHER FOLLOW UP NEEDED: none   REASSESSMENT DUE DATE: 30 day: 12/24/23"

## 2023-12-16 VITALS
SYSTOLIC BLOOD PRESSURE: 126 MMHG | OXYGEN SATURATION: 97 % | HEART RATE: 84 BPM | DIASTOLIC BLOOD PRESSURE: 78 MMHG | TEMPERATURE: 97.6 F

## 2023-12-18 ENCOUNTER — HOME CARE VISIT (OUTPATIENT)
Dept: HOME HEALTH SERVICES | Facility: HOME HEALTHCARE | Age: 88
End: 2023-12-18
Payer: MEDICARE

## 2023-12-18 VITALS
OXYGEN SATURATION: 97 % | HEART RATE: 78 BPM | DIASTOLIC BLOOD PRESSURE: 78 MMHG | TEMPERATURE: 97.3 F | RESPIRATION RATE: 18 BRPM | SYSTOLIC BLOOD PRESSURE: 130 MMHG

## 2023-12-18 PROCEDURE — G0151 HHCP-SERV OF PT,EA 15 MIN: HCPCS

## 2023-12-18 NOTE — Clinical Note
Patient discharged from  PT services on 12/18 due to goals met and plans to transition to outpatient PT at [facility].   Thank you for this referral.

## 2023-12-19 NOTE — HOME HEALTH
"SUBJECTIVE: patient states that she has been doing \"very well\". waas able to cook a meal yesterday. has spent more time home alone without supervision of children.  Has ambulated up/down stairs and practiced use of SPC  No new med changes.   No recent Falls.   ASSESSMENT ambulating with SPC on level surfaces with distant supervision. able to transfer independently.  will transition to outpatient PT after holidays  SKILL/EDUCATION PROVIDED: see interventions for details   PATIENT/CAREGIVER RESPONSE: see interventions for details   DATE OF NEXT APPOINTMENT WITH DOCTOR: last saw Dr Reyes on 11/17, next scheduled appt with ortho is in 2/2024"

## 2024-03-04 ENCOUNTER — OFFICE VISIT (OUTPATIENT)
Dept: INTERNAL MEDICINE | Facility: CLINIC | Age: 89
End: 2024-03-04
Payer: MEDICARE

## 2024-03-04 VITALS
BODY MASS INDEX: 22.34 KG/M2 | HEIGHT: 65 IN | DIASTOLIC BLOOD PRESSURE: 78 MMHG | SYSTOLIC BLOOD PRESSURE: 138 MMHG | TEMPERATURE: 98.2 F | HEART RATE: 91 BPM | WEIGHT: 134.1 LBS | OXYGEN SATURATION: 97 %

## 2024-03-04 DIAGNOSIS — D64.9 ANEMIA, UNSPECIFIED TYPE: ICD-10-CM

## 2024-03-04 DIAGNOSIS — E78.2 MIXED HYPERLIPIDEMIA: ICD-10-CM

## 2024-03-04 DIAGNOSIS — I10 ESSENTIAL HYPERTENSION: ICD-10-CM

## 2024-03-04 DIAGNOSIS — Z00.00 MEDICARE ANNUAL WELLNESS VISIT, SUBSEQUENT: Primary | ICD-10-CM

## 2024-03-04 RX ORDER — AMLODIPINE BESYLATE 2.5 MG/1
2.5 TABLET ORAL DAILY
Qty: 90 TABLET | Refills: 1 | Status: SHIPPED | OUTPATIENT
Start: 2024-03-04

## 2024-03-04 NOTE — PROGRESS NOTES
"Chief Complaint  Medicare Wellness-subsequent, Hypertension (3 month follow up ), and Hip Pain (Left hip; recent surgery )    Subjective        Riri Damon presents to [facility]  History of Present Illness  Patient appointment to discuss left hip pain she feels this is taking longer to recuperate from surgery than she would like she has regular follow-up with orthopedics as well as physical therapy and she is making progress  She has controlled her blood pressure although she has complaints of a intermittent cough that is nonproductive that will come and go and bothering her family more than her  No fever sweats or chills  Still taking meloxicam    Objective   Vital Signs:  /78   Pulse 91   Temp 98.2 °F (36.8 °C)   Ht 165.1 cm (65\")   Wt 60.8 kg (134 lb 1.6 oz)   SpO2 97%   BMI 22.32 kg/m²   Estimated body mass index is 22.32 kg/m² as calculated from the following:    Height as of this encounter: 165.1 cm (65\").    Weight as of this encounter: 60.8 kg (134 lb 1.6 oz).       BMI is within normal parameters. No other follow-up for BMI required.      Physical Exam  Vitals and nursing note reviewed.   Constitutional:       Appearance: Normal appearance. She is well-developed. She is not diaphoretic.   HENT:      Head: Normocephalic and atraumatic.      Right Ear: Tympanic membrane, ear canal and external ear normal.      Left Ear: Tympanic membrane, ear canal and external ear normal.   Eyes:      General: Lids are normal. No scleral icterus.     Extraocular Movements: Extraocular movements intact.      Conjunctiva/sclera: Conjunctivae normal.   Neck:      Thyroid: No thyroid mass or thyromegaly.      Vascular: No carotid bruit or JVD.   Cardiovascular:      Rate and Rhythm: Normal rate and regular rhythm.      Pulses: Normal pulses.           Radial pulses are 2+ on the right side and 2+ on the left side.      Heart sounds: Normal heart sounds. No murmur " heard.  Pulmonary:      Effort: Pulmonary effort is normal. No respiratory distress.      Breath sounds: Normal breath sounds. No rales.   Abdominal:      Palpations: Abdomen is soft.      Tenderness: There is no right CVA tenderness or left CVA tenderness.   Musculoskeletal:      Cervical back: Normal range of motion.      Right lower leg: No edema.      Left lower leg: No edema.   Skin:     General: Skin is warm and dry.      Coloration: Skin is not pale.      Findings: No erythema or rash.   Neurological:      General: No focal deficit present.      Mental Status: She is alert and oriented to person, place, and time.      Sensory: No sensory deficit.      Deep Tendon Reflexes: Reflexes are normal and symmetric.   Psychiatric:         Mood and Affect: Mood normal.         Behavior: Behavior normal. Behavior is cooperative.         Thought Content: Thought content normal.         Judgment: Judgment normal.        Result Review :      Common labs          11/6/2023    05:12 11/7/2023    04:56 11/8/2023    06:17   Common Labs   Glucose 103  99  98    BUN 8  9  9    Creatinine 0.39  0.42  0.41    Sodium 142  141  142    Potassium 4.1  3.7  3.8    Chloride 110  108  108    Calcium 8.2  8.0  8.1    WBC 7.73  8.23  9.65    Hemoglobin 9.4  9.5  9.8    Hematocrit 27.7  27.7  28.7    Platelets 229  256  262                   Assessment and Plan     Diagnoses and all orders for this visit:      1. Essential hypertension  -     Comprehensive Metabolic Panel  -     amLODIPine (NORVASC) 2.5 MG tablet; Take 1 tablet by mouth Daily.  Dispense: 90 tablet; Refill: 1    2. Mixed hyperlipidemia  -     Lipid Panel    3. Anemia, unspecified type  -     CBC & Differential    Discontinue simvastatin we will initiate atorvastatin as tolerance to amlodipine is realized discontinue losartan because of cough         Follow Up     Return in about 1 month (around 4/4/2024), or if symptoms worsen or fail to improve, for Recheck.  Patient was  given instructions and counseling regarding her condition or for health maintenance advice. Please see specific information pulled into the AVS if appropriate.

## 2024-03-04 NOTE — PROGRESS NOTES
The ABCs of the Annual Wellness Visit  Subsequent Medicare Wellness Visit    Subjective      Riri Damon is a 89 y.o. female who presents for a Subsequent Medicare Wellness Visit.    The following portions of the patient's history were reviewed and   updated as appropriate: allergies, current medications, past family history, past medical history, past social history, past surgical history, and problem list.    Compared to one year ago, the patient feels her physical   health is worse.    Compared to one year ago, the patient feels her mental   health is the same.    Recent Hospitalizations:  This patient has had a [facility] admission record on file within the last 365 days.    Current Medical Providers:  Patient Care Team:  Phoenix Velazquez MD as PCP - General  Yfn Mcneil MD as Referring Physician (General Surgery)  Asiya Hinton MD as Consulting Physician (Hematology and Oncology)  Rowan Serrano MD as Consulting Physician (Cardiology)    Outpatient Medications Prior to Visit   Medication Sig Dispense Refill    acetaminophen (TYLENOL) 325 MG tablet Take 2 tablets by mouth Every 4 (Four) Hours As Needed for Mild Pain.      aspirin 81 MG chewable tablet Chew 1 tablet 2 (Two) Times a Day.      Calcium Citrate (CITRACAL PO) Take 200 mg by mouth 3 (Three) Times a Week. Monday, Wednesday, friday      docusate sodium 100 MG capsule Take 1 capsule by mouth 2 (Two) Times a Day As Needed (constipation). 40 capsule 1    Hydrocortisone, Perianal, (ANUSOL-HC) 2.5 % rectal cream Insert  into the rectum 2 (Two) Times a Day.      magnesium chloride ER 64 MG DR tablet Take 1 tablet by mouth 3 (Three) Times a Week. Monday, Wednesday, Friday      melatonin 1 MG tablet Take 5 tablets by mouth Every Night. Indications: Trouble Sleeping      meloxicam (MOBIC) 15 MG tablet Take 1 tablet by mouth 3 (Three) Times a Week. Indications: Joint Damage causing Pain and Loss of Function 90 tablet 3    Multiple  "Vitamins-Minerals (MULTIVITAMIN ADULT PO) Take 1 tablet by mouth Daily.      simvastatin (ZOCOR) 40 MG tablet Take 1 tablet by mouth Every Night. Indications: High Amount of Fats in the Blood 90 tablet 3    losartan (COZAAR) 25 MG tablet Take 1 tablet by mouth Daily. Indications: High Blood Pressure Disorder 90 tablet 3     No facility-administered medications prior to visit.       No opioid medication identified on active medication list. I have reviewed chart for other potential  high risk medication/s and harmful drug interactions in the elderly.        Aspirin is on active medication list. Aspirin use is not indicated based on review of current medical condition/s. Risk of harm outweighs potential benefits. Patient instructed to discontinue this medication.  .      Patient Active Problem List   Diagnosis    Hyperlipidemia    Low back pain    Mitral valve insufficiency    Palpitations    Knee pain    Tricuspid valve insufficiency    Arthritis    Medicare annual wellness visit, subsequent    Screening for osteoporosis    Orthostatic lightheadedness    Essential hypertension    OA (osteoarthritis) of knee    Ventricular septal defect    Left anterior fascicular block    Malignant neoplasm of ascending colon    Family history of colon cancer    Osteopenia of left hip    Acute pain of left shoulder    Wellness examination    Anxiety    Cyst of right ovary    Primary insomnia    Vertigo    Chronic idiopathic constipation    Hip fracture    Leukocytosis    Hyperglycemia    Anemia    Hypokalemia    Pain of left hip     Advance Care Planning   Advance Care Planning     Advance Directive is on file.  ACP discussion was held with the patient during this visit. Patient has an advance directive in EMR which is still valid.      Objective    Vitals:    03/04/24 1022   BP: 138/78   Pulse: 91   Temp: 98.2 °F (36.8 °C)   SpO2: 97%   Weight: 60.8 kg (134 lb 1.6 oz)   Height: 165.1 cm (65\")     Estimated body mass index is 22.32 " "kg/m² as calculated from the following:    Height as of this encounter: 165.1 cm (65\").    Weight as of this encounter: 60.8 kg (134 lb 1.6 oz).    BMI is within normal parameters. No other follow-up for BMI required.      Does the patient have evidence of cognitive impairment?   No            HEALTH RISK ASSESSMENT    Smoking Status:  Social History     Tobacco Use   Smoking Status Never    Passive exposure: Never   Smokeless Tobacco Never     Alcohol Consumption:  Social History     Substance and Sexual Activity   Alcohol Use No    Comment: Caffeine use: 3-4 cups half and half daily     Fall Risk Screen:    STEADI Fall Risk Assessment was completed, and patient is at LOW risk for falls.Assessment completed on:3/4/2024    Depression Screenin/4/2023    11:18 AM   PHQ-2/PHQ-9 Depression Screening   Little Interest or Pleasure in Doing Things 0-->not at all   Feeling Down, Depressed or Hopeless 0-->not at all   PHQ-9: Brief Depression Severity Measure Score 0       Health Habits and Functional and Cognitive Screening:      3/4/2024    10:00 AM   Functional & Cognitive Status   Do you have difficulty preparing food and eating? Yes   Do you have difficulty bathing yourself, getting dressed or grooming yourself? No   Do you have difficulty using the toilet? No   Do you have difficulty moving around from place to place? No   Do you have trouble with steps or getting out of a bed or a chair? No   Current Diet Well Balanced Diet   Dental Exam Not up to date   Eye Exam Up to date   Exercise (times per week) 2 times per week   Do you need help using the phone?  No   Are you deaf or do you have serious difficulty hearing?  No   Do you need help to go to places out of walking distance? Yes   Do you need help shopping? Yes   Do you need help preparing meals?  No   Do you need help with housework?  Yes   Do you need help with laundry? Yes   Do you need help taking your medications? No   Do you need help managing money? " No   Do you ever drive or ride in a car without wearing a seat belt? No   Have you felt unusual stress, anger or loneliness in the last month? No   Who do you live with? Alone   If you need help, do you have trouble finding someone available to you? No   Have you been bothered in the last four weeks by sexual problems? No   Do you have difficulty concentrating, remembering or making decisions? No       Age-appropriate Screening Schedule:  Refer to the list below for future screening recommendations based on patient's age, sex and/or medical conditions. Orders for these recommended tests are listed in the plan section. The patient has been provided with a written plan.    Health Maintenance   Topic Date Due    RSV Vaccine - Adults (1 - 1-dose 60+ series) Never done    ZOSTER VACCINE (2 of 2) 02/26/2010    ANNUAL WELLNESS VISIT  12/13/2023    LIPID PANEL  05/12/2024    TDAP/TD VACCINES (2 - Td or Tdap) 10/29/2024    DXA SCAN  04/14/2025    COVID-19 Vaccine  Completed    INFLUENZA VACCINE  Completed    Pneumococcal Vaccine 65+  Completed                  CMS Preventative Services Quick Reference  Risk Factors Identified During Encounter:    Immunizations Discussed/Encouraged: Shingrix    The above risks/problems have been discussed with the patient.  Pertinent information has been shared with the patient in the After Visit Summary.    Diagnoses and all orders for this visit:    1. Medicare annual wellness visit, subsequent (Primary)    2. Essential hypertension    3. Mixed hyperlipidemia        Follow Up:   Next Medicare Wellness visit to be scheduled in 1 year.      An After Visit Summary and PPPS were made available to the patient.

## 2024-03-04 NOTE — PROGRESS NOTES
"The ABCs of the Annual Wellness Visit  Subsequent Medicare Wellness Visit    Subjective    {Wrapup  Review (Popup)  Advance Care Planning  Labs  CC  Problem List  Visit Diagnosis  Medications  Result Review  Imaging  [facility]  [facility]  [email or web address]s  SnapShot  Encounters  Notes  Media  Procedures :23}  Riri Damon is a 89 y.o. female who presents for a Subsequent Medicare Wellness Visit.    The following portions of the patient's history were reviewed and   updated as appropriate: {history reviewed:20406::\"allergies\",\"current medications\",\"past family history\",\"past medical history\",\"past social history\",\"past surgical history\",\"problem list\"}.    Compared to one year ago, the patient feels her physical   health is {better worse same:03028}.    Compared to one year ago, the patient feels her mental   health is {better worse same:03802}.    Recent Hospitalizations:  This patient has had a [facility] admission record on file within the last 365 days.    Current Medical Providers:  Patient Care Team:  Phoenix Velazquez MD as PCP - General  Yfn Mcneil MD as Referring Physician (General Surgery)  Asiya Hinton MD as Consulting Physician (Hematology and Oncology)  Rowan Serrano MD as Consulting Physician (Cardiology)    Outpatient Medications Prior to Visit   Medication Sig Dispense Refill    acetaminophen (TYLENOL) 325 MG tablet Take 2 tablets by mouth Every 4 (Four) Hours As Needed for Mild Pain.      aspirin 81 MG chewable tablet Chew 1 tablet 2 (Two) Times a Day.      Calcium Citrate (CITRACAL PO) Take 200 mg by mouth 3 (Three) Times a Week. Monday, Wednesday, friday      docusate sodium 100 MG capsule Take 1 capsule by mouth 2 (Two) Times a Day As Needed (constipation). 40 capsule 1    Hydrocortisone, Perianal, (ANUSOL-HC) 2.5 % rectal cream Insert  into the rectum 2 (Two) Times a Day.      losartan (COZAAR) 25 MG tablet Take 1 tablet by mouth " CVA    Relevant Problems   ANESTHESIA   (+) JAYASHREE (obstructive sleep apnea)      CARDIAC   (+) HTN (hypertension)      Other   (+) Depression   (+) Methamphetamine abuse (HCC)       Physical Exam    Airway   Mallampati: II  TM distance: >3 FB  Neck ROM: full       Cardiovascular - normal exam  Rhythm: regular  Rate: normal  (-) murmur     Dental       Very poor dentition   Pulmonary - normal exam  Breath sounds clear to auscultation     Abdominal    Neurological - abnormal exam         Other findings: H/O CVA; pt able to understand and communicate.            Anesthesia Plan    ASA 3   ASA physical status 3 criteria: CVA or TIA - history (> 3 months)    Plan - MAC             Induction: intravenous      Pertinent diagnostic labs and testing reviewed    Informed Consent:    Anesthetic plan and risks discussed with patient.         Daily. Indications: High Blood Pressure Disorder 90 tablet 3    magnesium chloride ER 64 MG DR tablet Take 1 tablet by mouth 3 (Three) Times a Week. Monday, Wednesday, Friday      melatonin 1 MG tablet Take 5 tablets by mouth Every Night. Indications: Trouble Sleeping      meloxicam (MOBIC) 15 MG tablet Take 1 tablet by mouth 3 (Three) Times a Week. Indications: Joint Damage causing Pain and Loss of Function 90 tablet 3    Multiple Vitamins-Minerals (MULTIVITAMIN ADULT PO) Take 1 tablet by mouth Daily.      simvastatin (ZOCOR) 40 MG tablet Take 1 tablet by mouth Every Night. Indications: High Amount of Fats in the Blood 90 tablet 3     No facility-administered medications prior to visit.       No opioid medication identified on active medication list. I have reviewed chart for other potential  high risk medication/s and harmful drug interactions in the elderly.        Aspirin is on active medication list. {ASPIRIN ON MEDICATION LIST INDICATED/NOT INDICATED:05677}.      Patient Active Problem List   Diagnosis    Hyperlipidemia    Low back pain    Mitral valve insufficiency    Palpitations    Knee pain    Tricuspid valve insufficiency    Arthritis    Medicare annual wellness visit, subsequent    Screening for osteoporosis    Orthostatic lightheadedness    Essential hypertension    OA (osteoarthritis) of knee    Ventricular septal defect    Left anterior fascicular block    Malignant neoplasm of ascending colon    Family history of colon cancer    Osteopenia of left hip    Acute pain of left shoulder    Wellness examination    Anxiety    Cyst of right ovary    Primary insomnia    Vertigo    Chronic idiopathic constipation    Hip fracture    Leukocytosis    Hyperglycemia    Anemia    Hypokalemia    Pain of left hip     Advance Care Planning   Advance Care Planning     Advance Directive is on file.  {ACP Discussion, Advance Directive in EMR:38423}     Objective    Vitals:    03/04/24 1022   BP: 138/78   Pulse: 91   Temp:  "98.2 °F (36.8 °C)   SpO2: 97%   Weight: 60.8 kg (134 lb 1.6 oz)   Height: 165.1 cm (65\")     Estimated body mass index is 22.32 kg/m² as calculated from the following:    Height as of this encounter: 165.1 cm (65\").    Weight as of this encounter: 60.8 kg (134 lb 1.6 oz).    BMI is within normal parameters. No other follow-up for BMI required.      Does the patient have evidence of cognitive impairment?   {Yes/No:68893}            HEALTH RISK ASSESSMENT    Smoking Status:  Social History     Tobacco Use   Smoking Status Never    Passive exposure: Never   Smokeless Tobacco Never     Alcohol Consumption:  Social History     Substance and Sexual Activity   Alcohol Use No    Comment: Caffeine use: 3-4 cups half and half daily     Fall Risk Screen:    EBONIE Fall Risk Assessment was completed, and patient is at LOW risk for falls.Assessment completed on:3/4/2024    Depression Screenin/4/2023    11:18 AM   PHQ-2/PHQ-9 Depression Screening   Little Interest or Pleasure in Doing Things 0-->not at all   Feeling Down, Depressed or Hopeless 0-->not at all   PHQ-9: Brief Depression Severity Measure Score 0       Health Habits and Functional and Cognitive Screening:      3/4/2024    10:00 AM   Functional & Cognitive Status   Do you have difficulty preparing food and eating? Yes   Do you have difficulty bathing yourself, getting dressed or grooming yourself? No   Do you have difficulty using the toilet? No   Do you have difficulty moving around from place to place? No   Do you have trouble with steps or getting out of a bed or a chair? No   Current Diet Well Balanced Diet   Dental Exam Not up to date   Eye Exam Up to date   Exercise (times per week) 2 times per week   Do you need help using the phone?  No   Are you deaf or do you have serious difficulty hearing?  No   Do you need help to go to places out of walking distance? Yes   Do you need help shopping? Yes   Do you need help preparing meals?  No   Do you need help " with housework?  Yes   Do you need help with laundry? Yes   Do you need help taking your medications? No   Do you need help managing money? No   Do you ever drive or ride in a car without wearing a seat belt? No   Have you felt unusual stress, anger or loneliness in the last month? No   Who do you live with? Alone   If you need help, do you have trouble finding someone available to you? No   Have you been bothered in the last four weeks by sexual problems? No   Do you have difficulty concentrating, remembering or making decisions? No       Age-appropriate Screening Schedule:  Refer to the list below for future screening recommendations based on patient's age, sex and/or medical conditions. Orders for these recommended tests are listed in the plan section. The patient has been provided with a written plan.    Health Maintenance   Topic Date Due    RSV Vaccine - Adults (1 - 1-dose 60+ series) Never done    ZOSTER VACCINE (2 of 2) 02/26/2010    ANNUAL WELLNESS VISIT  12/13/2023    LIPID PANEL  05/12/2024    TDAP/TD VACCINES (2 - Td or Tdap) 10/29/2024    DXA SCAN  04/14/2025    COVID-19 Vaccine  Completed    INFLUENZA VACCINE  Completed    Pneumococcal Vaccine 65+  Completed                  CMS Preventative Services Quick Reference  Risk Factors Identified During Encounter:    {Medicare Wellness Risk Factors:59951}    The above risks/problems have been discussed with the patient.  Pertinent information has been shared with the patient in the After Visit Summary.    Diagnoses and all orders for this visit:    1. Medicare annual wellness visit, subsequent (Primary)    2. Essential hypertension    3. Mixed hyperlipidemia        Follow Up:   Next Medicare Wellness visit to be scheduled in 1 year.      An After Visit Summary and PPPS were made available to the patient.

## 2024-03-05 RX ORDER — ATORVASTATIN CALCIUM 20 MG/1
20 TABLET, FILM COATED ORAL DAILY
Qty: 90 TABLET | Refills: 1 | Status: SHIPPED | OUTPATIENT
Start: 2024-03-05

## 2024-03-20 LAB
ALBUMIN SERPL-MCNC: 4.4 G/DL (ref 3.5–5.2)
ALBUMIN/GLOB SERPL: 1.8 G/DL
ALP SERPL-CCNC: 76 U/L (ref 39–117)
ALT SERPL-CCNC: 16 U/L (ref 1–33)
AST SERPL-CCNC: 20 U/L (ref 1–32)
BASOPHILS # BLD AUTO: 0.03 10*3/MM3 (ref 0–0.2)
BASOPHILS NFR BLD AUTO: 0.4 % (ref 0–1.5)
BILIRUB SERPL-MCNC: 0.3 MG/DL (ref 0–1.2)
BUN SERPL-MCNC: 14 MG/DL (ref 8–23)
BUN/CREAT SERPL: 16.7 (ref 7–25)
CALCIUM SERPL-MCNC: 9.6 MG/DL (ref 8.6–10.5)
CHLORIDE SERPL-SCNC: 101 MMOL/L (ref 98–107)
CHOLEST SERPL-MCNC: 159 MG/DL (ref 0–200)
CO2 SERPL-SCNC: 28.1 MMOL/L (ref 22–29)
CREAT SERPL-MCNC: 0.84 MG/DL (ref 0.57–1)
EGFRCR SERPLBLD CKD-EPI 2021: 66.5 ML/MIN/1.73
EOSINOPHIL # BLD AUTO: 0.09 10*3/MM3 (ref 0–0.4)
EOSINOPHIL NFR BLD AUTO: 1.2 % (ref 0.3–6.2)
ERYTHROCYTE [DISTWIDTH] IN BLOOD BY AUTOMATED COUNT: 13.6 % (ref 12.3–15.4)
GLOBULIN SER CALC-MCNC: 2.4 GM/DL
GLUCOSE SERPL-MCNC: 87 MG/DL (ref 65–99)
HCT VFR BLD AUTO: 39.8 % (ref 34–46.6)
HDLC SERPL-MCNC: 56 MG/DL (ref 40–60)
HGB BLD-MCNC: 13.1 G/DL (ref 12–15.9)
IMM GRANULOCYTES # BLD AUTO: 0.01 10*3/MM3 (ref 0–0.05)
IMM GRANULOCYTES NFR BLD AUTO: 0.1 % (ref 0–0.5)
LDLC SERPL CALC-MCNC: 88 MG/DL (ref 0–100)
LYMPHOCYTES # BLD AUTO: 3.34 10*3/MM3 (ref 0.7–3.1)
LYMPHOCYTES NFR BLD AUTO: 46.1 % (ref 19.6–45.3)
MCH RBC QN AUTO: 30 PG (ref 26.6–33)
MCHC RBC AUTO-ENTMCNC: 32.9 G/DL (ref 31.5–35.7)
MCV RBC AUTO: 91.1 FL (ref 79–97)
MONOCYTES # BLD AUTO: 0.44 10*3/MM3 (ref 0.1–0.9)
MONOCYTES NFR BLD AUTO: 6.1 % (ref 5–12)
NEUTROPHILS # BLD AUTO: 3.33 10*3/MM3 (ref 1.7–7)
NEUTROPHILS NFR BLD AUTO: 46.1 % (ref 42.7–76)
NRBC BLD AUTO-RTO: 0 /100 WBC (ref 0–0.2)
PLATELET # BLD AUTO: 219 10*3/MM3 (ref 140–450)
POTASSIUM SERPL-SCNC: 4.9 MMOL/L (ref 3.5–5.2)
PROT SERPL-MCNC: 6.8 G/DL (ref 6–8.5)
RBC # BLD AUTO: 4.37 10*6/MM3 (ref 3.77–5.28)
SODIUM SERPL-SCNC: 139 MMOL/L (ref 136–145)
TRIGL SERPL-MCNC: 80 MG/DL (ref 0–150)
VLDLC SERPL CALC-MCNC: 15 MG/DL (ref 5–40)
WBC # BLD AUTO: 7.24 10*3/MM3 (ref 3.4–10.8)

## 2024-04-03 RX ORDER — SIMVASTATIN 40 MG
40 TABLET ORAL NIGHTLY
Qty: 90 TABLET | Refills: 1 | Status: SHIPPED | OUTPATIENT
Start: 2024-04-03

## 2024-04-03 RX ORDER — LOSARTAN POTASSIUM 25 MG/1
25 TABLET ORAL DAILY
Qty: 90 TABLET | Refills: 1 | Status: SHIPPED | OUTPATIENT
Start: 2024-04-03

## 2024-04-18 ENCOUNTER — OFFICE VISIT (OUTPATIENT)
Dept: CARDIOLOGY | Facility: CLINIC | Age: 89
End: 2024-04-18
Payer: MEDICARE

## 2024-04-18 VITALS
HEART RATE: 74 BPM | HEIGHT: 65 IN | BODY MASS INDEX: 23.96 KG/M2 | DIASTOLIC BLOOD PRESSURE: 102 MMHG | OXYGEN SATURATION: 97 % | WEIGHT: 143.8 LBS | SYSTOLIC BLOOD PRESSURE: 180 MMHG

## 2024-04-18 DIAGNOSIS — I07.1 RHEUMATIC TRICUSPID VALVE REGURGITATION: Primary | ICD-10-CM

## 2024-04-18 DIAGNOSIS — I10 ESSENTIAL HYPERTENSION: ICD-10-CM

## 2024-04-18 DIAGNOSIS — I44.4 LEFT ANTERIOR FASCICULAR BLOCK: ICD-10-CM

## 2024-04-18 DIAGNOSIS — I27.20 PULMONARY HYPERTENSION: ICD-10-CM

## 2024-04-18 DIAGNOSIS — R00.2 PALPITATIONS: ICD-10-CM

## 2024-04-18 DIAGNOSIS — Q21.0 VENTRICULAR SEPTAL DEFECT: ICD-10-CM

## 2024-04-18 DIAGNOSIS — I35.8 AORTIC VALVE SCLEROSIS: ICD-10-CM

## 2024-04-18 PROBLEM — R42 ORTHOSTATIC LIGHTHEADEDNESS: Status: RESOLVED | Noted: 2017-06-05 | Resolved: 2024-04-18

## 2024-04-18 PROCEDURE — 99214 OFFICE O/P EST MOD 30 MIN: CPT | Performed by: NURSE PRACTITIONER

## 2024-04-18 PROCEDURE — 93000 ELECTROCARDIOGRAM COMPLETE: CPT | Performed by: NURSE PRACTITIONER

## 2024-04-18 PROCEDURE — 1160F RVW MEDS BY RX/DR IN RCRD: CPT | Performed by: NURSE PRACTITIONER

## 2024-04-18 PROCEDURE — 1159F MED LIST DOCD IN RCRD: CPT | Performed by: NURSE PRACTITIONER

## 2024-04-18 NOTE — PROGRESS NOTES
Date of Office Visit: 2024  Encounter Provider: PADMINI Ayers  Place of Service: [facility]  Patient Name: Riri Damon  :1934  Primary Cardiologist: Dr. Rowan Serrano    No chief complaint on file.  :     HPI: Riri Damon is a 89 y.o. female who presents today for 6-month cardiac follow-up visit.  I have reviewed her medical records.    She was diagnosed with rheumatic fever as a teenager and infectious mononucleosis.  She has known hypertension and hyperlipidemia.  In 2015, she was diagnosed with colon cancer and underwent surgery and radiation therapy.    In May 2023, echocardiogram showed the following: Normal LVEF, grade 1A diastolic dysfunction, left atrium mildly dilated, heavily calcified aortic valve leaflets (aortic sclerosis), mitral valve leaflets thickened with redundant chordae, mild to moderate tricuspid regurgitation, pulmonary hypertension (RVSP 49 mmHg) and saline test negative.  She has a known ventricular septal defect.    I reviewed the chart.  Last week, Dr. Velazquez discontinued amlodipine and atorvastatin.  He asked her to start simvastatin 40 mg daily and losartan 25 mg daily. Patient says this was due to cough and constipation.     She presents today for cardiac follow-up visit.  With the change of her medications, her dry cough has remained the same and her constipation has improved.  Her blood pressure was quite elevated on the second check (180/102).  She wonders if she has whitecoat syndrome because her blood pressure at home averages 100/60s.  On occasion she feels a brief skipped heartbeat.  Recently had vertigo.  She denies chest pain, shortness of breath, edema, dizziness, or syncope.      Past Medical History:   Diagnosis Date    Anxiety     Colon carcinoma     Stage IIIB, T4N1a; underwent radiation therapy and colon resection by Dr. Mcneil    Deep vein thrombosis 2017    right leg     History of edema     LE    History of rheumatic fever     Hx of radiation therapy 2015    for adenocarcinoma of the colon Stage IIIB, T4N1a    Hyperlipidemia     Hypertension     MEDS PRN    Infectious mononucleosis     Infectious viral hepatitis     Left anterior fascicular block     Mitral regurgitation     8/2010: mild per echo    OA (osteoarthritis)     Palpitations     Pulmonary hypertension     8/2010- mild per echo; 8/2012 - normal RVSP per echo    Spinal headache     Tricuspid regurgitation     8/2010: Mild to moderate per echo; 8/2012: Trace to mild per echo    Venous insufficiency     Ventricular septal defect     Per echocardiogram 2012    Vitamin D deficiency     Wellness examination 06/11/2020       Past Surgical History:   Procedure Laterality Date    APPENDECTOMY      COLECTOMY PARTIAL / TOTAL  02/02/2015 2/2015 due to adenocarcinoma    COLON SURGERY  small cancerous 2009    COLONOSCOPY      JAN 2015    COLONOSCOPY N/A 05/25/2016    Procedure: COLONOSCOPY to ANASTAMOSIS AND TERMINAL ILEUM;  Surgeon: Yfn Mcneil MD;  Location: [facility] ENDOSCOPY;  Service:     COLONOSCOPY N/A 07/11/2019    Procedure: COLONOSCOPY to cecum:;  Surgeon: Yfn Mcneil MD;  Location: [facility] ENDOSCOPY;  Service: General    HIP INTERTROCHANTERIC NAILING Left 10/31/2023    Procedure: HIP INTERTROCHANTERIC NAILING;  Surgeon: Yfn Reyes II, MD;  Location: [facility] OR;  Service: Orthopedics;  Laterality: Left;    HYSTERECTOMY      INTRAOCULAR LENS EXCHANGE Bilateral     JOINT MANIPULATION Right 01/09/2018    Procedure: MANIPULATION OF RT KNEE;  Surgeon: Andrea Caballero MD;  Location: [facility] OR;  Service:     JOINT REPLACEMENT Right 11/09/2017    KNEE SURGERY Left 2006    ARTHROSCOPY    UT ARTHRP KNE CONDYLE&PLATU MEDIAL&LAT COMPARTMENTS Right 11/09/2017    Procedure: RIGHT TOTAL KNEE ARTHROPLASTY;  Surgeon: Andrea Caballero MD;  Location: [facility] OR;  Service: Orthopedics    TONSILLECTOMY          Social History     Socioeconomic History    Marital status:      Spouse name: Marcus    Number of children: 3    Years of education: College   Tobacco Use    Smoking status: Never     Passive exposure: Never    Smokeless tobacco: Never   Vaping Use    Vaping status: Never Used   Substance and Sexual Activity    Alcohol use: No     Comment: Caffeine use: 3-4 cups half and half daily    Drug use: Never     Types: Oxycodone    Sexual activity: Defer       Family History   Problem Relation Age of Onset    Alzheimer's disease Mother     Heart disease Mother     Heart attack Father     Heart disease Father     Heart disease Other     Cancer Sister 60        Colon    Malig Hyperthermia Neg Hx     Breast cancer Neg Hx        The following portion of the patient's history were reviewed and updated as appropriate: past medical history, past surgical history, past social history, past family history, allergies, current medications, and problem list.    Review of Systems   Constitutional: Negative.   Cardiovascular:  Positive for palpitations.   Respiratory:  Positive for cough.    Hematologic/Lymphatic: Negative.    Neurological: Negative.        No Known Allergies      Current Outpatient Medications:     acetaminophen (TYLENOL) 325 MG tablet, Take 2 tablets by mouth Every 4 (Four) Hours As Needed for Mild Pain., Disp: , Rfl:     aspirin 81 MG chewable tablet, Chew 1 tablet 2 (Two) Times a Day., Disp: , Rfl:     Calcium Citrate (CITRACAL PO), Take 200 mg by mouth 3 (Three) Times a Week. Monday, Wednesday, friday, Disp: , Rfl:     docusate sodium 100 MG capsule, Take 1 capsule by mouth 2 (Two) Times a Day As Needed (constipation)., Disp: 40 capsule, Rfl: 1    Hydrocortisone, Perianal, (ANUSOL-HC) 2.5 % rectal cream, Insert  into the rectum 2 (Two) Times a Day., Disp: , Rfl:     losartan (Cozaar) 25 MG tablet, Take 1 tablet by mouth Daily., Disp: 90 tablet, Rfl: 1    magnesium chloride ER 64 MG DR tablet, Take 1  "tablet by mouth 3 (Three) Times a Week. Monday, Wednesday, Friday, Disp: , Rfl:     melatonin 1 MG tablet, Take 5 tablets by mouth Every Night. Indications: Trouble Sleeping, Disp: , Rfl:     meloxicam (MOBIC) 15 MG tablet, Take 1 tablet by mouth 3 (Three) Times a Week. Indications: Joint Damage causing Pain and Loss of Function, Disp: 90 tablet, Rfl: 3    Multiple Vitamins-Minerals (MULTIVITAMIN ADULT PO), Take 1 tablet by mouth Daily., Disp: , Rfl:     simvastatin (Zocor) 40 MG tablet, Take 1 tablet by mouth Every Night., Disp: 90 tablet, Rfl: 1         Objective:     Vitals:    04/18/24 1103 04/18/24 1129   BP: 144/90 (!) 180/102   BP Location: Left arm Left arm   Patient Position: Sitting Sitting   Cuff Size: Adult    Pulse: 74    SpO2: 97%    Weight: 65.2 kg (143 lb 12.8 oz)    Height: 165.1 cm (65\")      Body mass index is 23.93 kg/m².    PHYSICAL EXAM:    Vitals Reviewed.   General Appearance: No acute distress, well developed and well nourished.   HENT: No hearing loss noted.    Respiratory: No signs of respiratory distress. Respiration rhythm and depth normal.  Clear to auscultation.   Cardiovascular:  Jugular Venous Pressure: Normal  Heart Rate and Rhythm: Normal, Heart Sounds: Normal S1 and S2. No S3 or S4 noted.  Murmurs: RUSB grade 1/6 systolic murmur present.  Lower Extremities: No edema noted.  Musculoskeletal: Normal movement of extremities.  Skin: General appearance normal.    Psychiatric: Patient alert and oriented to person, place, and time. Speech and behavior appropriate. Normal mood and affect.       ECG 12 Lead    Date/Time: 4/18/2024 11:04 AM  Performed by: Sue Marin APRN    Authorized by: Sue Marin APRN  Comparison: compared with previous ECG from 10/29/2023  Similar to previous ECG  Rhythm: sinus rhythm  Rate: normal  BPM: 74  Conduction: left anterior fascicular block  ST Segments: ST segments normal  T inversion: aVL  QRS axis: normal  Other: no other findings    Clinical " impression: non-specific ECG            Assessment:       Diagnosis Plan   1. Rheumatic tricuspid valve regurgitation        2. Aortic valve sclerosis        3. Pulmonary hypertension        4. Ventricular septal defect        5. Left anterior fascicular block        6. Palpitations        7. Essential hypertension               Plan:       1/2.  She has known rheumatic fever as a child.  She has a heavily calcified aortic valve which is functioning normally and mild heart murmur.  She has rheumatic tricuspid valve regurgitation.  Continue to monitor.    3.  Moderate pulmonary hypertension per echocardiogram in 2023.  Denies shortness of breath.    4.  VSD noted on previous echocardiogram.  Continue aspirin.    5.  Left anterior fascicular block on EKG.    6.  She occasionally experiences skipped heartbeat, nothing sustained.    7.  Hypertension: Blood pressure quite elevated today in the office.  At home her blood pressure is running 100/60s.  I recommended the next time she is at our office or her PCP office, to bring her blood pressure machine so we can double check it for accuracy.  She feels that she may have whitecoat syndrome.    8.  She will follow-up with Dr. Serrano in 6 months.    As always, it has been a pleasure to participate in your patient's care. Thank you.         Sincerely,         PADMINI Hansen  [facility]      Dictated utilizing Dragon Dictation

## 2024-05-07 ENCOUNTER — TELEPHONE (OUTPATIENT)
Dept: INTERNAL MEDICINE | Facility: CLINIC | Age: 89
End: 2024-05-07
Payer: MEDICARE

## 2024-05-07 DIAGNOSIS — M19.90 ARTHRITIS: ICD-10-CM

## 2024-05-07 RX ORDER — MELOXICAM 15 MG/1
15 TABLET ORAL 3 TIMES WEEKLY
Qty: 90 TABLET | Refills: 3 | Status: SHIPPED | OUTPATIENT
Start: 2024-05-08

## 2024-05-08 RX ORDER — LOSARTAN POTASSIUM 25 MG/1
25 TABLET ORAL DAILY
Qty: 90 TABLET | Refills: 1 | Status: SHIPPED | OUTPATIENT
Start: 2024-05-08

## 2024-09-04 ENCOUNTER — OFFICE VISIT (OUTPATIENT)
Dept: INTERNAL MEDICINE | Facility: CLINIC | Age: 89
End: 2024-09-04
Payer: MEDICARE

## 2024-09-04 VITALS
HEART RATE: 82 BPM | RESPIRATION RATE: 16 BRPM | SYSTOLIC BLOOD PRESSURE: 160 MMHG | BODY MASS INDEX: 24.41 KG/M2 | TEMPERATURE: 99.2 F | DIASTOLIC BLOOD PRESSURE: 90 MMHG | HEIGHT: 65 IN | OXYGEN SATURATION: 99 % | WEIGHT: 146.5 LBS

## 2024-09-04 DIAGNOSIS — R73.9 HYPERGLYCEMIA: ICD-10-CM

## 2024-09-04 DIAGNOSIS — I10 ESSENTIAL HYPERTENSION: ICD-10-CM

## 2024-09-04 DIAGNOSIS — E78.2 MIXED HYPERLIPIDEMIA: Primary | ICD-10-CM

## 2024-09-04 DIAGNOSIS — M19.90 ARTHRITIS: ICD-10-CM

## 2024-09-04 PROCEDURE — 1160F RVW MEDS BY RX/DR IN RCRD: CPT | Performed by: FAMILY MEDICINE

## 2024-09-04 PROCEDURE — 1126F AMNT PAIN NOTED NONE PRSNT: CPT | Performed by: FAMILY MEDICINE

## 2024-09-04 PROCEDURE — G2211 COMPLEX E/M VISIT ADD ON: HCPCS | Performed by: FAMILY MEDICINE

## 2024-09-04 PROCEDURE — 1159F MED LIST DOCD IN RCRD: CPT | Performed by: FAMILY MEDICINE

## 2024-09-04 PROCEDURE — 99214 OFFICE O/P EST MOD 30 MIN: CPT | Performed by: FAMILY MEDICINE

## 2024-09-04 NOTE — PROGRESS NOTES
"Chief Complaint  Hyperlipidemia (6 MONTH F/U ) and Hypertension (6 MONTH F/U)    Subjective        Riri Damon presents to [facility]  Hyperlipidemia    Hypertension    Patient follows up for ongoing management of chronic problems of hypertension hyperlipidemia and hyperglycemia Home blood pressure readings are always less than 130/90 no chest pain no shortness of breath she have any rashes or increased thirst, hyperglycemia  No unwanted side effects of statin therapy  Arthritis improved with meloxicam 15 mg every other day  Objective   Vital Signs:  /90 (BP Location: Left arm, Patient Position: Sitting, Cuff Size: Adult)   Pulse 82   Temp 99.2 °F (37.3 °C) (Temporal)   Resp 16   Ht 165.1 cm (65\")   Wt 66.5 kg (146 lb 8 oz)   SpO2 99%   BMI 24.38 kg/m²   Estimated body mass index is 24.38 kg/m² as calculated from the following:    Height as of this encounter: 165.1 cm (65\").    Weight as of this encounter: 66.5 kg (146 lb 8 oz).    BMI is within normal parameters. No other follow-up for BMI required.      Physical Exam  Vitals and nursing note reviewed.   Constitutional:       Appearance: Normal appearance.   HENT:      Right Ear: Tympanic membrane, ear canal and external ear normal.      Left Ear: Tympanic membrane, ear canal and external ear normal.   Cardiovascular:      Rate and Rhythm: Normal rate and regular rhythm.      Pulses: Normal pulses.   Pulmonary:      Effort: Pulmonary effort is normal.   Musculoskeletal:      Right lower leg: No edema.      Left lower leg: No edema.   Neurological:      Mental Status: She is alert and oriented to person, place, and time.   Psychiatric:         Mood and Affect: Mood normal.         Behavior: Behavior normal.         Thought Content: Thought content normal.         Judgment: Judgment normal.        Result Review :    Common labs          11/7/2023    04:56 11/8/2023    06:17 3/20/2024    11:01   Common Labs   Glucose " "99  98  87    BUN 9  9  14    Creatinine 0.42  0.41  0.84    Sodium 141  142  139    Potassium 3.7  3.8  4.9    Chloride 108  108  101    Calcium 8.0  8.1  9.6    Total Protein   6.8    Albumin   4.4    Total Bilirubin   0.3    Alkaline Phosphatase   76    AST (SGOT)   20    ALT (SGPT)   16    WBC 8.23  9.65  7.24    Hemoglobin 9.5  9.8  13.1    Hematocrit 27.7  28.7  39.8    Platelets 256  262  219    Total Cholesterol   159    Triglycerides   80    HDL Cholesterol   56    LDL Cholesterol    88                Assessment and Plan   Diagnoses and all orders for this visit:    1. Mixed hyperlipidemia (Primary)  -     Lipid Panel    2. Essential hypertension  -     Comprehensive Metabolic Panel    3. Hyperglycemia  -     Hemoglobin A1c    4. Arthritis    Arthritis monitor blood pressure daily taking Mobic confirm normal blood pressure readings  Follow-up results of testing for further management of hyperlipidemia and hypertension hyperglycemia       This patient has a PCP that is the continuing focal point for all health care services, and the patient sees this physician to be evaluated for hypertension. The inherent complexity that this code () captures is not in the clinical condition itself-- hypertension --but rather the cognitition of the continued responsibility of being the focal point for all needed services for this patient.\"     Follow Up   Return if symptoms worsen or fail to improve, for Recheck, Annual physical, Medicare Wellness.  Patient was given instructions and counseling regarding her condition or for health maintenance advice. Please see specific information pulled into the AVS if appropriate.             "

## 2024-09-05 LAB
ALBUMIN SERPL-MCNC: 4.4 G/DL (ref 3.7–4.7)
ALP SERPL-CCNC: 80 IU/L (ref 44–121)
ALT SERPL-CCNC: 16 IU/L (ref 0–32)
AST SERPL-CCNC: 24 IU/L (ref 0–40)
BILIRUB SERPL-MCNC: 0.6 MG/DL (ref 0–1.2)
BUN SERPL-MCNC: 17 MG/DL (ref 8–27)
BUN/CREAT SERPL: 28 (ref 12–28)
CALCIUM SERPL-MCNC: 9.5 MG/DL (ref 8.7–10.3)
CHLORIDE SERPL-SCNC: 101 MMOL/L (ref 96–106)
CHOLEST SERPL-MCNC: 189 MG/DL (ref 100–199)
CO2 SERPL-SCNC: 24 MMOL/L (ref 20–29)
CREAT SERPL-MCNC: 0.6 MG/DL (ref 0.57–1)
EGFRCR SERPLBLD CKD-EPI 2021: 86 ML/MIN/1.73
GLOBULIN SER CALC-MCNC: 2.3 G/DL (ref 1.5–4.5)
GLUCOSE SERPL-MCNC: 83 MG/DL (ref 70–99)
HBA1C MFR BLD: 5.6 % (ref 4.8–5.6)
HDLC SERPL-MCNC: 61 MG/DL
LDLC SERPL CALC-MCNC: 108 MG/DL (ref 0–99)
POTASSIUM SERPL-SCNC: 5.3 MMOL/L (ref 3.5–5.2)
PROT SERPL-MCNC: 6.7 G/DL (ref 6–8.5)
SODIUM SERPL-SCNC: 140 MMOL/L (ref 134–144)
TRIGL SERPL-MCNC: 113 MG/DL (ref 0–149)
VLDLC SERPL CALC-MCNC: 20 MG/DL (ref 5–40)

## 2024-09-06 DIAGNOSIS — E87.6 HYPOKALEMIA: Primary | ICD-10-CM

## 2024-09-06 DIAGNOSIS — E87.5 HYPERKALEMIA: ICD-10-CM

## 2024-10-07 ENCOUNTER — FLU SHOT (OUTPATIENT)
Dept: INTERNAL MEDICINE | Facility: CLINIC | Age: 89
End: 2024-10-07
Payer: MEDICARE

## 2024-10-07 DIAGNOSIS — Z23 NEED FOR INFLUENZA VACCINATION: Primary | ICD-10-CM

## 2024-10-07 PROCEDURE — G0008 ADMIN INFLUENZA VIRUS VAC: HCPCS | Performed by: FAMILY MEDICINE

## 2024-10-07 PROCEDURE — 90662 IIV NO PRSV INCREASED AG IM: CPT | Performed by: FAMILY MEDICINE

## 2024-10-10 ENCOUNTER — OFFICE VISIT (OUTPATIENT)
Dept: ONCOLOGY | Facility: CLINIC | Age: 89
End: 2024-10-10
Payer: MEDICARE

## 2024-10-10 ENCOUNTER — LAB (OUTPATIENT)
Dept: LAB | Facility: HOSPITAL | Age: 89
End: 2024-10-10
Payer: MEDICARE

## 2024-10-10 VITALS
DIASTOLIC BLOOD PRESSURE: 85 MMHG | HEIGHT: 65 IN | SYSTOLIC BLOOD PRESSURE: 156 MMHG | BODY MASS INDEX: 24.68 KG/M2 | HEART RATE: 81 BPM | WEIGHT: 148.1 LBS | OXYGEN SATURATION: 96 % | TEMPERATURE: 98.6 F

## 2024-10-10 DIAGNOSIS — E87.5 HYPERKALEMIA: ICD-10-CM

## 2024-10-10 DIAGNOSIS — C18.2 MALIGNANT NEOPLASM OF ASCENDING COLON: ICD-10-CM

## 2024-10-10 DIAGNOSIS — C18.2 MALIGNANT NEOPLASM OF ASCENDING COLON: Primary | ICD-10-CM

## 2024-10-10 LAB
ALBUMIN SERPL-MCNC: 4.3 G/DL (ref 3.5–5.2)
ALBUMIN/GLOB SERPL: 1.8 G/DL
ALP SERPL-CCNC: 80 U/L (ref 39–117)
ALT SERPL W P-5'-P-CCNC: 13 U/L (ref 1–33)
ANION GAP SERPL CALCULATED.3IONS-SCNC: 9.2 MMOL/L (ref 5–15)
AST SERPL-CCNC: 25 U/L (ref 1–32)
BASOPHILS # BLD AUTO: 0.06 10*3/MM3 (ref 0–0.2)
BASOPHILS NFR BLD AUTO: 0.9 % (ref 0–1.5)
BILIRUB SERPL-MCNC: 0.4 MG/DL (ref 0–1.2)
BUN SERPL-MCNC: 16 MG/DL (ref 8–23)
BUN/CREAT SERPL: 27.1 (ref 7–25)
CALCIUM SPEC-SCNC: 9.2 MG/DL (ref 8.2–9.6)
CEA SERPL-MCNC: 2.49 NG/ML
CHLORIDE SERPL-SCNC: 105 MMOL/L (ref 98–107)
CO2 SERPL-SCNC: 24.8 MMOL/L (ref 22–29)
CREAT SERPL-MCNC: 0.59 MG/DL (ref 0.57–1)
DEPRECATED RDW RBC AUTO: 44.3 FL (ref 37–54)
EGFRCR SERPLBLD CKD-EPI 2021: 85.7 ML/MIN/1.73
EOSINOPHIL # BLD AUTO: 0.21 10*3/MM3 (ref 0–0.4)
EOSINOPHIL NFR BLD AUTO: 3.1 % (ref 0.3–6.2)
ERYTHROCYTE [DISTWIDTH] IN BLOOD BY AUTOMATED COUNT: 12.7 % (ref 12.3–15.4)
GLOBULIN UR ELPH-MCNC: 2.4 GM/DL
GLUCOSE SERPL-MCNC: 94 MG/DL (ref 65–99)
HCT VFR BLD AUTO: 44.7 % (ref 34–46.6)
HGB BLD-MCNC: 14.9 G/DL (ref 12–15.9)
IMM GRANULOCYTES # BLD AUTO: 0.02 10*3/MM3 (ref 0–0.05)
IMM GRANULOCYTES NFR BLD AUTO: 0.3 % (ref 0–0.5)
LYMPHOCYTES # BLD AUTO: 1.67 10*3/MM3 (ref 0.7–3.1)
LYMPHOCYTES NFR BLD AUTO: 24.9 % (ref 19.6–45.3)
MCH RBC QN AUTO: 31.7 PG (ref 26.6–33)
MCHC RBC AUTO-ENTMCNC: 33.3 G/DL (ref 31.5–35.7)
MCV RBC AUTO: 95.1 FL (ref 79–97)
MONOCYTES # BLD AUTO: 0.79 10*3/MM3 (ref 0.1–0.9)
MONOCYTES NFR BLD AUTO: 11.8 % (ref 5–12)
NEUTROPHILS NFR BLD AUTO: 3.96 10*3/MM3 (ref 1.7–7)
NEUTROPHILS NFR BLD AUTO: 59 % (ref 42.7–76)
NRBC BLD AUTO-RTO: 0 /100 WBC (ref 0–0.2)
PLATELET # BLD AUTO: 184 10*3/MM3 (ref 140–450)
PMV BLD AUTO: 11 FL (ref 6–12)
POTASSIUM SERPL-SCNC: 4.2 MMOL/L (ref 3.5–5.2)
PROT SERPL-MCNC: 6.7 G/DL (ref 6–8.5)
RBC # BLD AUTO: 4.7 10*6/MM3 (ref 3.77–5.28)
SODIUM SERPL-SCNC: 139 MMOL/L (ref 136–145)
WBC NRBC COR # BLD AUTO: 6.71 10*3/MM3 (ref 3.4–10.8)

## 2024-10-10 PROCEDURE — 36415 COLL VENOUS BLD VENIPUNCTURE: CPT

## 2024-10-10 PROCEDURE — 1159F MED LIST DOCD IN RCRD: CPT | Performed by: INTERNAL MEDICINE

## 2024-10-10 PROCEDURE — 82378 CARCINOEMBRYONIC ANTIGEN: CPT | Performed by: INTERNAL MEDICINE

## 2024-10-10 PROCEDURE — 1160F RVW MEDS BY RX/DR IN RCRD: CPT | Performed by: INTERNAL MEDICINE

## 2024-10-10 PROCEDURE — 1126F AMNT PAIN NOTED NONE PRSNT: CPT | Performed by: INTERNAL MEDICINE

## 2024-10-10 PROCEDURE — 99213 OFFICE O/P EST LOW 20 MIN: CPT | Performed by: INTERNAL MEDICINE

## 2024-10-10 PROCEDURE — 85025 COMPLETE CBC W/AUTO DIFF WBC: CPT

## 2024-10-10 PROCEDURE — 80053 COMPREHEN METABOLIC PANEL: CPT

## 2024-10-10 NOTE — PROGRESS NOTES
Subjective     CHIEF COMPLAINT:      Chief Complaint   Patient presents with    Follow-up    Labs Only     No new concerns     HISTORY OF PRESENT ILLNESS:     Riri Damon is a 89 y.o. female patient who returns today for follow up on her colon cancer. She returns today for follow up reporting that she had left hip fracture in late 2023 and she had surgery. She had PT and she is slowly improving. She is not having abdominal pain. She reports that she occasionally develops bloating but reports that she eats ice cream frequently.     ROS:  Pertinent ROS is in the HPI.     Past medical, surgical, social and family history were reviewed.     MEDICATIONS:    Current Outpatient Medications:     acetaminophen (TYLENOL) 325 MG tablet, Take 2 tablets by mouth Every 4 (Four) Hours As Needed for Mild Pain., Disp: , Rfl:     aspirin 81 MG chewable tablet, Chew 1 tablet 2 (Two) Times a Day., Disp: , Rfl:     Calcium Citrate (CITRACAL PO), Take 200 mg by mouth 3 (Three) Times a Week. Monday, Wednesday, friday, Disp: , Rfl:     docusate sodium 100 MG capsule, Take 1 capsule by mouth 2 (Two) Times a Day As Needed (constipation)., Disp: 40 capsule, Rfl: 1    Hydrocortisone, Perianal, (ANUSOL-HC) 2.5 % rectal cream, Insert  into the rectum 2 (Two) Times a Day., Disp: , Rfl:     losartan (Cozaar) 25 MG tablet, Take 1 tablet by mouth Daily., Disp: 90 tablet, Rfl: 1    magnesium chloride ER 64 MG DR tablet, Take 1 tablet by mouth 3 (Three) Times a Week. Monday, Wednesday, Friday, Disp: , Rfl:     melatonin 1 MG tablet, Take 5 tablets by mouth Every Night. Indications: Trouble Sleeping, Disp: , Rfl:     meloxicam (MOBIC) 15 MG tablet, Take 1 tablet by mouth 3 (Three) Times a Week. Indications: Joint Damage causing Pain and Loss of Function, Disp: 90 tablet, Rfl: 3    Multiple Vitamins-Minerals (MULTIVITAMIN ADULT PO), Take 1 tablet by mouth Daily., Disp: , Rfl:     simvastatin (Zocor) 40 MG tablet, Take 1 tablet by mouth Every  "Night., Disp: 90 tablet, Rfl: 1  Objective     VITAL SIGNS:     Vitals:    10/10/24 1054   BP: 156/85   Pulse: 81   Temp: 98.6 °F (37 °C)   TempSrc: Oral   SpO2: 96%   Weight: 67.2 kg (148 lb 1.6 oz)   Height: 165.1 cm (65\")   PainSc: 0-No pain     Body mass index is 24.65 kg/m².     Wt Readings from Last 5 Encounters:   10/10/24 67.2 kg (148 lb 1.6 oz)   09/04/24 66.5 kg (146 lb 8 oz)   04/18/24 65.2 kg (143 lb 12.8 oz)   03/04/24 60.8 kg (134 lb 1.6 oz)   12/04/23 61 kg (134 lb 8 oz)     PHYSICAL EXAMINATION:   GENERAL: The patient appears in good general condition, not in acute distress.   SKIN: No Ecchymosis.  EYES: No jaundice. No Pallor.  CHEST: Normal respiratory effort. Normal breathing sounds bilaterally. No added sounds.  CVS: Normal S1 and S2. No Murmur.  ABDOMEN: Soft. No tenderness. No Hepatomegaly. No Splenomegaly. No masses.  EXTREMITIES: No joint deformity.     DIAGNOSTIC DATA:     Results from last 7 days   Lab Units 10/10/24  1030   WBC 10*3/mm3 6.71   NEUTROS ABS 10*3/mm3 3.96   HEMOGLOBIN g/dL 14.9   HEMATOCRIT % 44.7   PLATELETS 10*3/mm3 184     Results from last 7 days   Lab Units 10/10/24  1030 10/07/24  1025   SODIUM mmol/L 139 140   POTASSIUM mmol/L 4.2 4.8   CHLORIDE mmol/L 105 104   CO2 mmol/L 24.8 26.5   BUN mg/dL 16 11   CREATININE mg/dL 0.59 0.55*   CALCIUM mg/dL 9.2 9.7*   ALBUMIN g/dL 4.3  --    BILIRUBIN mg/dL 0.4  --    ALK PHOS U/L 80  --    ALT (SGPT) U/L 13  --    AST (SGOT) U/L 25  --    GLUCOSE mg/dL 94 99     Lab Results   Component Value Date    CEA 2.49 10/10/2024    CEA 2.68 08/22/2023    CEA 2.85 08/22/2022    CEA 2.94 11/12/2021    CEA 3.18 08/11/2021      Assessment & Plan    *Adenocarcinoma of the ascending colon, stage III  S/P right hemicolectomy on 2/2/2015.  T4 a N1 a, stage III  1 out of 29 lymph nodes were positive.  The circumferential margin was involved by invasive carcinoma.  CT scans on 6/18/2019 showed no evidence of recurrence.  Colonoscopy on 7/11/2019 " showed no suspicious findings.  CT on 6/15/2020 showed no evidence of recurrence.  CT chest abdomen pelvis on 5/20/2021 revealed mild wall thickening at the level of the anastomosis?  Underdistention.  CT of the abdomen pelvis on 8/11/2021 revealed circumferential wall thickening and luminal narrowing of the small bowel at the ileocolonic anastomosis.  She was evaluated by her surgeon.  She had small bowel follow-through on 9/17/2021 that showed no wall thickening or a mass.  CT on 8/18/2022 revealed no abnormality in the colon.  The radiologist reported a 9 mm mediastinal lymph node to the left of the trachea without a change.  8/22/2023: CEA normal at 2.68.  10/10/2024: CEA remains normal at 2.49.  She is doing well with no evidence of recurrence.  I reassured the patient that with her being almost 10 years since diagnosis, likelihood of recurrence is very small.    *Hyperkalemia.  Potassium was 5.0 on 11/12/2021.    Potassium improved to 4.4 on 8/22/2022.  8/22/2023: Potassium increased to 5.2.  This is attributed to medications (losartan) and to her diet being high in potassium rich food (bananas).  10/10/2024: Potassium improved to 4.2.    PLAN:    1.  I reassured the patient that there is no evidence of recurrence of the colon cancer.  2.  Since she is almost at the 10-year jordan, I did not schedule her for additional follow-up at our office.  She will continue to follow-up with her PCP.        Asiya Hinton MD  10/10/24

## 2024-10-24 ENCOUNTER — OFFICE VISIT (OUTPATIENT)
Dept: CARDIOLOGY | Facility: CLINIC | Age: 89
End: 2024-10-24
Payer: MEDICARE

## 2024-10-24 VITALS
OXYGEN SATURATION: 97 % | HEART RATE: 87 BPM | WEIGHT: 147 LBS | HEIGHT: 65 IN | SYSTOLIC BLOOD PRESSURE: 158 MMHG | BODY MASS INDEX: 24.49 KG/M2 | DIASTOLIC BLOOD PRESSURE: 75 MMHG

## 2024-10-24 DIAGNOSIS — I38 VHD (VALVULAR HEART DISEASE): ICD-10-CM

## 2024-10-24 DIAGNOSIS — I10 ESSENTIAL HYPERTENSION: Primary | ICD-10-CM

## 2024-10-24 DIAGNOSIS — E78.2 MIXED HYPERLIPIDEMIA: ICD-10-CM

## 2024-10-24 PROCEDURE — 93000 ELECTROCARDIOGRAM COMPLETE: CPT | Performed by: FAMILY MEDICINE

## 2024-10-24 PROCEDURE — 1159F MED LIST DOCD IN RCRD: CPT | Performed by: FAMILY MEDICINE

## 2024-10-24 PROCEDURE — 1160F RVW MEDS BY RX/DR IN RCRD: CPT | Performed by: FAMILY MEDICINE

## 2024-10-24 PROCEDURE — 99214 OFFICE O/P EST MOD 30 MIN: CPT | Performed by: FAMILY MEDICINE

## 2024-10-24 NOTE — PROGRESS NOTES
Date of Office Visit: 10/24/2024  Encounter Provider: PADMINI Guido  Place of Service: [facility] CARDIOLOGY  Established cardiologist: Rowan Serrano MD  Patient Name: Riri Damon  :1934      Patient ID:  Riri Damon is a 89 y.o. female is here for  followup    With a pertinent medical history of rheumatic fever as a kid and infectious mononucleosis as a teenager, aortic valve calcification, lower extremity edema due to venous incompetence, hypertension, history of adenocarcinoma the right colon (), hyperlipidemia and osteoporosis.     History of Present Illness  Echo  EF 59%, mild systolic anterior motion of the anterior mitral leaflet, chordal structures but no LVOT obstruction, and mild MI.  Mild to moderate TI and mild pulmonary hypertension.  Nuclear perfusion study 2010 was nonischemic.     Echo  EF 60%, normal diastolic function, normal RVSP, trace to mild TI, small perimembranous VSD.      diagnosed with T4a, N1A adenocarcinoma of the right colon, stage III-C.  She underwent radiation therapy is now on Xeloda twice daily.  She had resection with Dr. Mcneil and lap right colectomy in 2015.      CT abdomen pelvis  follow-up colon cancer with right hemicolectomy with no evidence of metastatic disease but there was colonic thickening at the anastomosis.  Repeat CT abdomen pelvis 2021 showed thickening at anastomosis and concerning for recurrent cancer.  It was later determined there was no recurrent cancer.     2023 had hematoma of her left thumb and sent to hand surgery     Echo 2023 EF 61 to 65%, grade 1 DD, normal saline study, mild LAE, calcified aortic valve, mild to moderate TI with RVSP 49 mmHg.     Closed left hip fracture 2023 with intertrochanteric nailing with Dr. Reyes on 10/21/2023.      She saw PADMINI Hansen 2024 her blood pressure was elevated.  Riri natasha  she had true whitecoat hypertension with home blood pressures very well-controlled running 100 over 60s.  She was recommended to bring her BP cuff to her next office appointment here or with PCP for double check.  Medications were not changed.     Today Riri is feeling well.  She is doing light activities at home sometimes vacuuming.  She had extended physical therapy after her hip procedure last year.  She tries to continue with those exercises.  She does not have any exertional symptoms or complaints with that.  She has had palpitations that last seconds long occur maybe weekly and this has been present for many years that is not worsened or increased to her.  She has chronic left ankle edema that is mild and at baseline this gets worse with travel.  She has a history of dizziness related to vertigo.  She has not experienced any chest pain or pressure there is no shortness of breath, MARTE, presyncope/syncope.  Her  passed away recently.  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  Her daughter is also a retired teacher from [employer].     Current Outpatient Medications on File Prior to Visit   Medication Sig Dispense Refill    acetaminophen (TYLENOL) 325 MG tablet Take 2 tablets by mouth Every 4 (Four) Hours As Needed for Mild Pain.      aspirin 81 MG chewable tablet Chew 1 tablet 2 (Two) Times a Day.      Calcium Citrate (CITRACAL PO) Take 200 mg by mouth 3 (Three) Times a Week. Monday, Wednesday, friday      docusate sodium 100 MG capsule Take 1 capsule by mouth 2 (Two) Times a Day As Needed (constipation). 40 capsule 1    Hydrocortisone, Perianal, (ANUSOL-HC) 2.5 % rectal cream Insert  into the rectum 2 (Two) Times a Day.      losartan (Cozaar) 25 MG tablet Take 1 tablet by mouth Daily. 90 tablet 1    magnesium chloride ER 64 MG DR tablet Take 1 tablet by mouth 3 (Three) Times a Week. Monday, Wednesday, Friday      melatonin 1 MG tablet Take 5  "tablets by mouth Every Night. Indications: Trouble Sleeping      meloxicam (MOBIC) 15 MG tablet Take 1 tablet by mouth 3 (Three) Times a Week. Indications: Joint Damage causing Pain and Loss of Function 90 tablet 3    Multiple Vitamins-Minerals (MULTIVITAMIN ADULT PO) Take 1 tablet by mouth Daily.      simvastatin (Zocor) 40 MG tablet Take 1 tablet by mouth Every Night. 90 tablet 1     No current facility-administered medications on file prior to visit.         Procedures    ECG 12 Lead    Date/Time: 10/24/2024 11:32 AM  Performed by: Dalia Ding APRN    Authorized by: Dalia Ding APRN  Comparison: compared with previous ECG from 4/18/2024  Rhythm: sinus rhythm  Rate: normal  BPM: 80  Conduction: left anterior fascicular block  T inversion: aVR and aVL  QRS axis: left              Objective:      Vitals:    10/24/24 1054   BP: 158/75   Pulse: 87   SpO2: 97%   Weight: 66.7 kg (147 lb)   Height: 165.1 cm (65\")     Body mass index is 24.46 kg/m².  Wt Readings from Last 3 Encounters:   10/24/24 66.7 kg (147 lb)   10/10/24 67.2 kg (148 lb 1.6 oz)   09/04/24 66.5 kg (146 lb 8 oz)         Constitutional:       Comments: Riri is a 89-year-old  female who is slender, well-appearing, in no acute distress, ambulating with a cane   Pulmonary:      Effort: Pulmonary effort is normal.      Breath sounds: Normal breath sounds.   Cardiovascular:      Normal rate. Regular rhythm.      Murmurs: There is a grade 1/6 systolic murmur.   Pulses:     Radial: 2+ bilaterally.     Dorsalis pedis: 2+ bilaterally.     Posterior tibial: 2+ bilaterally.  Edema:     Peripheral edema absent.   Skin:     General: Skin is warm and dry.   Neurological:      Mental Status: Alert and oriented to person, place and time.       Lab Review:    No results found for: \"TSH\"    Lab Results   Component Value Date    CHOL 163 05/12/2023    CHOL 186 06/13/2022    CHOL 160 12/10/2020    CHLPL 189 09/04/2024    CHLPL 159 03/20/2024    " CHLPL 173 12/13/2022     Lab Results   Component Value Date    TRIG 113 09/04/2024    TRIG 80 03/20/2024    TRIG 97 05/12/2023     Lab Results   Component Value Date    HDL 61 09/04/2024    HDL 56 03/20/2024    HDL 54 05/12/2023     Lab Results   Component Value Date     (H) 09/04/2024    LDL 88 03/20/2024    LDL 91 05/12/2023       Lab Results   Component Value Date    WBC 6.71 10/10/2024    HGB 14.9 10/10/2024    HCT 44.7 10/10/2024    MCV 95.1 10/10/2024     10/10/2024       Lab Results   Component Value Date    GLUCOSE 94 10/10/2024    BUN 16 10/10/2024    CREATININE 0.59 10/10/2024    EGFRRESULT 87.2 10/07/2024    EGFR 85.7 10/10/2024    BCR 27.1 (H) 10/10/2024    K 4.2 10/10/2024    CO2 24.8 10/10/2024    CALCIUM 9.2 10/10/2024    PROTENTOTREF 6.7 09/04/2024    ALBUMIN 4.3 10/10/2024    BILITOT 0.4 10/10/2024    AST 25 10/10/2024    ALT 13 10/10/2024       Lab Results   Component Value Date    HGBA1C 5.6 09/04/2024       Assessment:     1. Essential hypertension    2. Mixed hyperlipidemia    3. VHD (valvular heart disease)      Hypertension; whitecoat hypertension on my recheck improved 135/80.  At home on her wrist cuff 110 over 50s.  Continue losartan.  Encouraged her to obtain an upper arm cuff and to also bring in blood pressure cuffs to office appointments for comparison readings.  Hyperlipidemia, well controlled on Lipitor.   VHD: History of rheumatic fever as a child, TTE 5/12/2023 heavily calcified AV, trace AR, mild MR, mild to moderate TR, moderately elevated RVSP  Stable palpitations greater than 2 years occurring less than weekly lasting seconds long these are unchanged  Small membranous VSD  LAFB  Osteoporosis  History of dizziness related to vertigo        Plan:   No medication changes were made  Return in about 6 months (around 4/24/2025) for Dr. Serrano.    Thank you for allowing me to participate in this patient's care. Please call with any questions or concerns.           Dragon dictation device was utilized in this note.

## 2024-11-12 RX ORDER — LOSARTAN POTASSIUM 25 MG/1
25 TABLET ORAL DAILY
Qty: 90 TABLET | Refills: 3 | Status: SHIPPED | OUTPATIENT
Start: 2024-11-12

## 2025-01-24 RX ORDER — SIMVASTATIN 40 MG
TABLET ORAL
Qty: 90 TABLET | Refills: 3 | Status: SHIPPED | OUTPATIENT
Start: 2025-01-24

## 2025-02-04 ENCOUNTER — OFFICE VISIT (OUTPATIENT)
Dept: INTERNAL MEDICINE | Facility: CLINIC | Age: OVER 89
End: 2025-02-04
Payer: MEDICARE

## 2025-02-04 VITALS
HEIGHT: 65 IN | SYSTOLIC BLOOD PRESSURE: 128 MMHG | WEIGHT: 147.9 LBS | OXYGEN SATURATION: 96 % | TEMPERATURE: 98.5 F | DIASTOLIC BLOOD PRESSURE: 82 MMHG | HEART RATE: 80 BPM | BODY MASS INDEX: 24.64 KG/M2

## 2025-02-04 DIAGNOSIS — M19.90 ARTHRITIS: ICD-10-CM

## 2025-02-04 DIAGNOSIS — I10 ESSENTIAL HYPERTENSION: ICD-10-CM

## 2025-02-04 DIAGNOSIS — Z00.00 WELLNESS EXAMINATION: ICD-10-CM

## 2025-02-04 DIAGNOSIS — E78.2 MIXED HYPERLIPIDEMIA: ICD-10-CM

## 2025-02-04 DIAGNOSIS — Z78.0 POSTMENOPAUSAL: ICD-10-CM

## 2025-02-04 DIAGNOSIS — Z00.00 MEDICARE ANNUAL WELLNESS VISIT, SUBSEQUENT: Primary | ICD-10-CM

## 2025-02-04 PROCEDURE — 99213 OFFICE O/P EST LOW 20 MIN: CPT | Performed by: FAMILY MEDICINE

## 2025-02-04 PROCEDURE — 1160F RVW MEDS BY RX/DR IN RCRD: CPT | Performed by: FAMILY MEDICINE

## 2025-02-04 PROCEDURE — 99397 PER PM REEVAL EST PAT 65+ YR: CPT | Performed by: FAMILY MEDICINE

## 2025-02-04 PROCEDURE — 1159F MED LIST DOCD IN RCRD: CPT | Performed by: FAMILY MEDICINE

## 2025-02-04 PROCEDURE — 96160 PT-FOCUSED HLTH RISK ASSMT: CPT | Performed by: FAMILY MEDICINE

## 2025-02-04 PROCEDURE — 1126F AMNT PAIN NOTED NONE PRSNT: CPT | Performed by: FAMILY MEDICINE

## 2025-02-04 PROCEDURE — G2211 COMPLEX E/M VISIT ADD ON: HCPCS | Performed by: FAMILY MEDICINE

## 2025-02-04 PROCEDURE — G0439 PPPS, SUBSEQ VISIT: HCPCS | Performed by: FAMILY MEDICINE

## 2025-02-04 PROCEDURE — 1170F FXNL STATUS ASSESSED: CPT | Performed by: FAMILY MEDICINE

## 2025-02-04 RX ORDER — ATORVASTATIN CALCIUM 20 MG/1
1 TABLET, FILM COATED ORAL DAILY
COMMUNITY

## 2025-02-04 RX ORDER — AMLODIPINE BESYLATE 2.5 MG/1
TABLET ORAL
COMMUNITY

## 2025-02-04 RX ORDER — AMOXICILLIN 500 MG/1
TABLET, FILM COATED ORAL
COMMUNITY
Start: 2024-11-12

## 2025-02-04 RX ORDER — MELOXICAM 15 MG/1
15 TABLET ORAL DAILY
Qty: 90 TABLET | Refills: 2 | Status: SHIPPED | OUTPATIENT
Start: 2025-02-04

## 2025-02-04 NOTE — PROGRESS NOTES
Subjective   The ABCs of the Annual Wellness Visit  Medicare Wellness Visit      Riri Damon is a 89 y.o. patient who presents for a Medicare Wellness Visit.    The following portions of the patient's history were reviewed and   updated as appropriate: allergies, current medications, past family history, past medical history, past social history, past surgical history, and problem list.    Compared to one year ago, the patient's physical   health is the same.  Compared to one year ago, the patient's mental   health is the same.    Recent Hospitalizations:  She was not admitted to the hospital during the last year.     Current Medical Providers:  Patient Care Team:  Phoenix Velazquez MD as PCP - General  Yfn Mcneil MD as Referring Physician (General Surgery)  Asiya Hinton MD as Consulting Physician (Hematology and Oncology)  Rowan Serrano MD as Cardiologist (Cardiology)    Outpatient Medications Prior to Visit   Medication Sig Dispense Refill    acetaminophen (TYLENOL) 325 MG tablet Take 2 tablets by mouth Every 4 (Four) Hours As Needed for Mild Pain.      amLODIPine (NORVASC) 2.5 MG tablet       aspirin 81 MG chewable tablet Chew 1 tablet 2 (Two) Times a Day.      atorvastatin (LIPITOR) 20 MG tablet Take 1 tablet by mouth Daily.      Calcium Citrate (CITRACAL PO) Take 200 mg by mouth 3 (Three) Times a Week. Monday, Wednesday, friday      docusate sodium 100 MG capsule Take 1 capsule by mouth 2 (Two) Times a Day As Needed (constipation). 40 capsule 1    Hydrocortisone, Perianal, (ANUSOL-HC) 2.5 % rectal cream Insert  into the rectum 2 (Two) Times a Day.      losartan (COZAAR) 25 MG tablet TAKE 1 TABLET EVERY DAY 90 tablet 3    magnesium chloride ER 64 MG DR tablet Take 1 tablet by mouth 3 (Three) Times a Week. Monday, Wednesday, Friday      melatonin 1 MG tablet Take 5 tablets by mouth Every Night. Indications: Trouble Sleeping      Multiple Vitamins-Minerals (MULTIVITAMIN ADULT PO) Take 1  tablet by mouth Daily.      simvastatin (ZOCOR) 40 MG tablet TAKE 1 TABLET EVERY NIGHT (HIGH AMOUNT OF FATS IN THE BLOOD) 90 tablet 3    meloxicam (MOBIC) 15 MG tablet Take 1 tablet by mouth 3 (Three) Times a Week. Indications: Joint Damage causing Pain and Loss of Function 90 tablet 3    amoxicillin (AMOXIL) 500 MG tablet  (Patient not taking: Reported on 2/4/2025)       No facility-administered medications prior to visit.     No opioid medication identified on active medication list. I have reviewed chart for other potential  high risk medication/s and harmful drug interactions in the elderly.      Aspirin is on active medication list. Aspirin use is indicated based on review of current medical condition/s. Pros and cons of this therapy have been discussed today. Benefits of this medication outweigh potential harm.  Patient has been encouraged to continue taking this medication.  .      Patient Active Problem List   Diagnosis    Hyperlipidemia    Low back pain    Mitral valve insufficiency    Palpitations    Knee pain    Tricuspid valve insufficiency    Arthritis    Medicare annual wellness visit, subsequent    Screening for osteoporosis    Essential hypertension    OA (osteoarthritis) of knee    Ventricular septal defect    Left anterior fascicular block    Malignant neoplasm of ascending colon    Family history of colon cancer    Osteopenia of left hip    Acute pain of left shoulder    Wellness examination    Anxiety    Cyst of right ovary    Primary insomnia    Vertigo    Chronic idiopathic constipation    Hip fracture    Leukocytosis    Hyperglycemia    Anemia    Hypokalemia    Pain of left hip    Aortic valve sclerosis    Pulmonary hypertension    VHD (valvular heart disease)     Advance Care Planning Advance Directive is on file.  ACP discussion was held with the patient during this visit. Patient has an advance directive in EMR which is still valid.             Objective   Vitals:    02/04/25 1219   BP:  "128/82   Pulse: 80   Temp: 98.5 °F (36.9 °C)   SpO2: 96%   Weight: 67.1 kg (147 lb 14.4 oz)   Height: 165.1 cm (65\")   PainSc: 0-No pain       Estimated body mass index is 24.61 kg/m² as calculated from the following:    Height as of this encounter: 165.1 cm (65\").    Weight as of this encounter: 67.1 kg (147 lb 14.4 oz).    BMI is within normal parameters. No other follow-up for BMI required.           Does the patient have evidence of cognitive impairment? No                                                                                               Health  Risk Assessment    Smoking Status:  Social History     Tobacco Use   Smoking Status Never    Passive exposure: Never   Smokeless Tobacco Never     Alcohol Consumption:  Social History     Substance and Sexual Activity   Alcohol Use No    Comment: Caffeine use: 3-4 cups half and half daily       Fall Risk Screen  STEADI Fall Risk Assessment was completed, and patient is at MODERATE risk for falls. Assessment completed on:2025    Depression Screening   Little interest or pleasure in doing things? Not at all   Feeling down, depressed, or hopeless? Not at all   PHQ-2 Total Score 0      Health Habits and Functional and Cognitive Screenin/4/2025    12:24 PM   Functional & Cognitive Status   Do you have difficulty preparing food and eating? No   Do you have difficulty bathing yourself, getting dressed or grooming yourself? Yes   Do you have difficulty using the toilet? No   Do you have difficulty moving around from place to place? Yes   Do you have trouble with steps or getting out of a bed or a chair? Yes   Current Diet Well Balanced Diet   Dental Exam Up to date   Eye Exam Not up to date   Exercise (times per week) Other        Exercise Frequency Comment every other day due to hip surgery   Current Exercises Include Home Exercise Program (TV, Computer, Etc.)        Exercise Comment PT excercises   Do you need help using the phone?  No   Are you deaf " or do you have serious difficulty hearing?  No   Do you need help to go to places out of walking distance? No   Do you need help shopping? Yes   Do you need help preparing meals?  No   Do you need help with housework?  Yes   Do you need help with laundry? Yes   Do you need help taking your medications? No   Do you need help managing money? No   Do you ever drive or ride in a car without wearing a seat belt? No   Have you felt unusual stress, anger or loneliness in the last month? No   Who do you live with? Alone   If you need help, do you have trouble finding someone available to you? No   Have you been bothered in the last four weeks by sexual problems? No   Do you have difficulty concentrating, remembering or making decisions? No           Age-appropriate Screening Schedule:  Refer to the list below for future screening recommendations based on patient's age, sex and/or medical conditions. Orders for these recommended tests are listed in the plan section. The patient has been provided with a written plan.    Health Maintenance List  Health Maintenance   Topic Date Due    RSV Vaccine - Adults (1 - 1-dose 75+ series) Never done    ZOSTER VACCINE (2 of 2) 02/26/2010    TDAP/TD VACCINES (2 - Td or Tdap) 10/29/2024    ANNUAL WELLNESS VISIT  03/04/2025    DXA SCAN  04/14/2025    LIPID PANEL  09/04/2025    COVID-19 Vaccine  Completed    INFLUENZA VACCINE  Completed    Pneumococcal Vaccine 65+  Completed    MAMMOGRAM  Discontinued                                                                                                                                                CMS Preventative Services Quick Reference  Risk Factors Identified During Encounter  Immunizations Discussed/Encouraged: RSV (Respiratory Syncytial Virus)  Inactivity/Sedentary: Patient was advised to exercise at least 150 minutes a week per CDC recommendations.    The above risks/problems have been discussed with the patient.  Pertinent information has  been shared with the patient in the After Visit Summary.  An After Visit Summary and PPPS were made available to the patient.    Follow Up:   Next Medicare Wellness visit to be scheduled in 1 year.     Assessment & Plan  Medicare annual wellness visit, subsequent         Wellness examination         Mixed hyperlipidemia            Essential hypertension           Arthritis    Orders:    meloxicam (MOBIC) 15 MG tablet; Take 1 tablet by mouth Daily. Indications: Joint Damage causing Pain and Loss of Function         Follow Up:   Return in about 1 year (around 2/4/2026) for Medicare Wellness.

## 2025-02-04 NOTE — PROGRESS NOTES
Subjective   Riri Damon is a 89 y.o. female.     Chief Complaint   Patient presents with    Annual Exam         History of Present Illness   Riri Damon 89 y.o. female who presents for an Annual Wellness Visit.  she has a history of   Patient Active Problem List   Diagnosis    Hyperlipidemia    Low back pain    Mitral valve insufficiency    Palpitations    Knee pain    Tricuspid valve insufficiency    Arthritis    Medicare annual wellness visit, subsequent    Screening for osteoporosis    Essential hypertension    OA (osteoarthritis) of knee    Ventricular septal defect    Left anterior fascicular block    Malignant neoplasm of ascending colon    Family history of colon cancer    Osteopenia of left hip    Acute pain of left shoulder    Wellness examination    Anxiety    Cyst of right ovary    Primary insomnia    Vertigo    Chronic idiopathic constipation    Hip fracture    Leukocytosis    Hyperglycemia    Anemia    Hypokalemia    Pain of left hip    Aortic valve sclerosis    Pulmonary hypertension    VHD (valvular heart disease)   .  she has been feeling fairly well.  Aggravating low back pain I  reviewed health maintenance with her as part of my preventative care plan.  Commend regular dental and eye exams  The following portions of the patient's history were reviewed and updated as appropriate: allergies, current medications, past family history, past medical history, past social history, past surgical history, and problem list.    Review of Systems   Constitutional:  Negative for appetite change, fever and unexpected weight change.   HENT:  Negative for ear pain, facial swelling and sore throat.    Eyes:  Negative for pain and visual disturbance.   Respiratory:  Negative for chest tightness, shortness of breath and wheezing.    Cardiovascular:  Negative for chest pain and palpitations.   Gastrointestinal:  Negative for abdominal pain and blood in stool.   Endocrine: Negative.    Genitourinary:   Negative for difficulty urinating and hematuria.   Musculoskeletal:  Positive for back pain. Negative for joint swelling.   Neurological:  Negative for tremors, seizures and syncope.   Hematological:  Negative for adenopathy.   Psychiatric/Behavioral:  Positive for sleep disturbance.         Uses melatonin and good sleep hygiene       Objective   Physical Exam  Vitals and nursing note reviewed.   Constitutional:       Appearance: Normal appearance. She is well-developed. She is not diaphoretic.   HENT:      Head: Normocephalic and atraumatic.      Right Ear: External ear normal.      Left Ear: External ear normal.      Mouth/Throat:      Pharynx: No posterior oropharyngeal erythema.   Eyes:      General: Lids are normal. No scleral icterus.     Extraocular Movements: Extraocular movements intact.      Conjunctiva/sclera: Conjunctivae normal.   Neck:      Thyroid: No thyroid mass or thyromegaly.      Vascular: No carotid bruit or JVD.   Cardiovascular:      Rate and Rhythm: Normal rate and regular rhythm.      Pulses: Normal pulses.           Radial pulses are 2+ on the right side and 2+ on the left side.      Heart sounds: Normal heart sounds. No murmur heard.  Pulmonary:      Effort: Pulmonary effort is normal. No respiratory distress.      Breath sounds: Normal breath sounds.   Abdominal:      Palpations: Abdomen is soft.      Tenderness: There is no right CVA tenderness or left CVA tenderness.   Musculoskeletal:      Cervical back: Normal range of motion.      Right lower leg: No edema.      Left lower leg: No edema.   Skin:     General: Skin is warm and dry.      Coloration: Skin is not pale.      Findings: No erythema or rash.   Neurological:      General: No focal deficit present.      Mental Status: She is alert and oriented to person, place, and time.      Sensory: No sensory deficit.      Deep Tendon Reflexes: Reflexes are normal and symmetric.   Psychiatric:         Mood and Affect: Mood normal.          Behavior: Behavior normal. Behavior is cooperative.         Thought Content: Thought content normal.         Judgment: Judgment normal.         Assessment & Plan   Diagnoses and all orders for this visit:      2. Wellness examination  Assessment & Plan:        3. Mixed hyperlipidemia  Assessment & Plan:         4. Essential hypertension  Assessment & Plan:      5. Arthritis  Assessment & Plan:    Orders:    meloxicam (MOBIC) 15 MG tablet; Take 1 tablet by mouth Daily. Indications: Joint Damage causing Pain and Loss of Function      Orders:  -     meloxicam (MOBIC) 15 MG tablet; Take 1 tablet by mouth Daily. Indications: Joint Damage causing Pain and Loss of Function  Dispense: 90 tablet; Refill: 2    Stable chronic medical problems of hypertension hyperlipidemia  Continue attempts at healthy lifestyle calorie appropriate diet and regular physical activity  Education provided regarding prevention of serious illness with immunizations recommend RSV vaccine  Education provided regarding screening DEXA scan will be ordered and due in April May 2025  Follow-up ongoing management of chronic problems otherwise preventively annually

## 2025-02-04 NOTE — PROGRESS NOTES
"Chief Complaint  Back Pain    Subjective        Riri Damon presents to [facility]  Back Pain      Patient follows up for chronic and acute low back pain she has been taking meloxicam every other day she occasionally takes some Tylenol she had more flareups with some left-sided leg involvement she did not fall she started to use her TENS unit again  Objective   Vital Signs:  /82   Pulse 80   Temp 98.5 °F (36.9 °C)   Ht 165.1 cm (65\")   Wt 67.1 kg (147 lb 14.4 oz)   SpO2 96%   BMI 24.61 kg/m²   Estimated body mass index is 24.61 kg/m² as calculated from the following:    Height as of this encounter: 165.1 cm (65\").    Weight as of this encounter: 67.1 kg (147 lb 14.4 oz).    BMI is within normal parameters. No other follow-up for BMI required.      Physical Exam  Vitals and nursing note reviewed.   Constitutional:       Appearance: Normal appearance.   Cardiovascular:      Rate and Rhythm: Normal rate and regular rhythm.      Pulses: Normal pulses.      Heart sounds: Normal heart sounds.   Pulmonary:      Effort: Pulmonary effort is normal.      Breath sounds: Normal breath sounds.   Musculoskeletal:         General: Tenderness present.      Right lower leg: No edema.      Left lower leg: No edema.   Neurological:      Mental Status: She is alert.   Psychiatric:         Mood and Affect: Mood normal.         Behavior: Behavior normal.         Thought Content: Thought content normal.         Judgment: Judgment normal.        Result Review :    Common labs          9/4/2024    12:15 10/7/2024    10:25 10/10/2024    10:30   Common Labs   Glucose 83  99  94    BUN 17  11  16    Creatinine 0.60  0.55  0.59    Sodium 140  140  139    Potassium 5.3  4.8  4.2    Chloride 101  104  105    Calcium 9.5  9.7  9.2    Total Protein 6.7      Albumin 4.4   4.3    Total Bilirubin 0.6   0.4    Alkaline Phosphatase 80   80    AST (SGOT) 24   25    ALT (SGPT) 16   13    WBC   6.71  " "  Hemoglobin   14.9    Hematocrit   44.7    Platelets   184    Total Cholesterol 189      Triglycerides 113      HDL Cholesterol 61      LDL Cholesterol  108      Hemoglobin A1C 5.6                  Assessment and Plan   Diagnoses and all orders for this visit:    1. Low Back [place]         2. Arthritis  Assessment & Plan:    Orders:    meloxicam (MOBIC) 15 MG tablet; Take 1 tablet by mouth Daily. Indications: Joint Damage causing Pain and Loss of Function      Orders:  -     meloxicam (MOBIC) 15 MG tablet; Take 1 tablet by mouth Daily. Indications: Joint Damage causing Pain and Loss of Function  Dispense: 90 tablet; Refill: 2    3. Postmenopausal  -     DEXA Bone Density Axial; Future    Recommend meloxicam and daily instead of every other day she can then reduce it to every other day between flares  TENS unit discussed placement       This patient has a PCP that is the continuing focal point for all health care services, and the patient sees this physician to be evaluated for back pain. The inherent complexity that this code () captures is not in the clinical condition itself-- back pain --but rather the cognitition of the continued responsibility of being the focal point for all needed services for this patient.\"     Follow Up   Return in about 6 months (around 8/4/2025), or if symptoms worsen or fail to improve, for Medicare Wellness, Recheck.  Patient was given instructions and counseling regarding her condition or for health maintenance advice. Please see specific information pulled into the AVS if appropriate.             "

## 2025-02-04 NOTE — ASSESSMENT & PLAN NOTE
Orders:    meloxicam (MOBIC) 15 MG tablet; Take 1 tablet by mouth Daily. Indications: Joint Damage causing Pain and Loss of Function

## 2025-04-22 ENCOUNTER — OFFICE VISIT (OUTPATIENT)
Dept: CARDIOLOGY | Age: OVER 89
End: 2025-04-22
Payer: MEDICARE

## 2025-04-22 VITALS
HEIGHT: 65 IN | DIASTOLIC BLOOD PRESSURE: 90 MMHG | BODY MASS INDEX: 25.66 KG/M2 | HEART RATE: 81 BPM | SYSTOLIC BLOOD PRESSURE: 142 MMHG | WEIGHT: 154 LBS

## 2025-04-22 DIAGNOSIS — R94.31 ABNORMAL ELECTROCARDIOGRAM (ECG) (EKG): ICD-10-CM

## 2025-04-22 DIAGNOSIS — I10 ESSENTIAL HYPERTENSION: Primary | ICD-10-CM

## 2025-04-22 DIAGNOSIS — E78.2 MIXED HYPERLIPIDEMIA: ICD-10-CM

## 2025-04-22 PROCEDURE — 99214 OFFICE O/P EST MOD 30 MIN: CPT | Performed by: INTERNAL MEDICINE

## 2025-04-22 PROCEDURE — 1160F RVW MEDS BY RX/DR IN RCRD: CPT | Performed by: INTERNAL MEDICINE

## 2025-04-22 PROCEDURE — 1159F MED LIST DOCD IN RCRD: CPT | Performed by: INTERNAL MEDICINE

## 2025-04-22 PROCEDURE — 93000 ELECTROCARDIOGRAM COMPLETE: CPT | Performed by: INTERNAL MEDICINE

## 2025-04-22 RX ORDER — SPIRONOLACTONE 25 MG/1
25 TABLET ORAL EVERY MORNING
Qty: 90 TABLET | Refills: 3 | Status: SHIPPED | OUTPATIENT
Start: 2025-04-22

## 2025-04-22 NOTE — PROGRESS NOTES
Date of Office Visit: 2025  Encounter Provider: Rowan Serrano MD  Place of Service: [facility] CARD[facility]  Patient Name: Riri Damon  :1934      Patient ID:  Riri Damon is a 89 y.o. female is here for  followup for LAFB, hypertension.        History of Present Illness    She had rheumatic fever as a kid and infectious mononucleosis as a teenager, aortic valve calcification, lower extremity edema due to venous incompetence, hypertension, history of adenocarcinoma the right colon (), hyperlipidemia and osteoporosis.     She had an echocardiogram done on 2010, showing an ejection  fraction of 59%, mild systolic anterior motion of the anterior mitral  leaflet, cordal structures but no LVOT obstruction, and mild mitral  insufficiency. She had mild to moderate tricuspid insufficiency and mild  pulmonary hypertension. She had a dual-isotope nuclear perfusion study done  for chest pain on 2010, which showed no ischemia.     She had an echocardiogram performed on 08/15/2012 which showed an ejection fraction of  60%, normal diastolic function, normal RVSP, trace to mild tricuspid insufficiency, small  perimembranous ventricular septal defect.       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 She underwent radiation therapy and is now on Xeloda twice daily. She had  resection done by Dr. Mcneil; a laparoscopic right colectomy on 2015.      She had a CT of the abdomen and pelvis done 2021 for follow-up from colon cancer with right hemicolectomy.  There is no evidence of metastatic disease but there was colonic thickening at the anastomosis.  A repeat CT of the abdomen pelvis on 2021 shows a thickening there at the anastomosis again concerning for recurrent cancer.  She saw Dr. Aquino  and the work-up after that visit showed that there was no recurrent cancer.  She saw him  follow-up August 2022.       At her visit March 2023, she had a hematoma over her left thumb and I sent her to hand surgery.     Echo done 5/12/2023 showed ejection fraction 61 to 65% with grade 1A diastolic dysfunction, normal saline study, mild left atrial enlargement, calcified aortic valve without stenosis, mild to moderate tricuspid insufficiency with RVSP 49 mmHg.    Labs in 10/10/2024 show normal CMP, normal CBC.  Labs on 9//24 showed total cholesterol 189, HDL 61, , triglycerides 113, normal hemoglobin A1c.    She is still works in the same house.  She has her bed downstairs.  She has to take showers upstairs when her daughter is there.  Her son drives her.  She has noticed more lower extremity edema and is using compression stockings.  She does not feel her heart racing or skipping and she has had no dizziness, syncope or falls.  She has no exertional chest tightness or pressure.  She is taking her medications as directed.    Past Medical History:   Diagnosis Date    Anxiety     Colon carcinoma 2015    Stage IIIB, T4N1a; underwent radiation therapy and colon resection by Dr. Mcneil    Deep vein thrombosis 11/24/2017    right leg    History of edema     LE    History of rheumatic fever     Hx of radiation therapy 2015    for adenocarcinoma of the colon Stage IIIB, T4N1a    Hyperlipidemia     Hypertension     MEDS PRN    Infectious mononucleosis     Infectious viral hepatitis     Left anterior fascicular block     Mitral regurgitation     8/2010: mild per echo    OA (osteoarthritis)     Palpitations     Pulmonary hypertension     8/2010- mild per echo; 8/2012 - normal RVSP per echo    Sciatica     Spinal headache     Tricuspid regurgitation     8/2010: Mild to moderate per echo; 8/2012: Trace to mild per echo    Venous insufficiency     Ventricular septal defect     Per echocardiogram 2012    Vitamin D deficiency     Wellness examination 06/11/2020         Past Surgical History:   Procedure Laterality  Date    APPENDECTOMY      COLECTOMY PARTIAL / TOTAL  02/02/2015 2/2015 due to adenocarcinoma    COLON SURGERY  small cancerous 2009    COLONOSCOPY      JAN 2015    COLONOSCOPY N/A 05/25/2016    Procedure: COLONOSCOPY to ANASTAMOSIS AND TERMINAL ILEUM;  Surgeon: Yfn Mcneil MD;  Location: [facility];  Service:     COLONOSCOPY N/A 07/11/2019    Procedure: COLONOSCOPY to cecum:;  Surgeon: Yfn Mcneil MD;  Location: [facility];  Service: General    HIP INTERTROCHANTERIC NAILING Left 10/31/2023    Procedure: HIP INTERTROCHANTERIC NAILING;  Surgeon: Yfn Reyes II, MD;  Location: [facility] MAIN OR;  Service: Orthopedics;  Laterality: Left;    HYSTERECTOMY      INTRAOCULAR LENS EXCHANGE Bilateral     JOINT MANIPULATION Right 01/09/2018    Procedure: MANIPULATION OF RT KNEE;  Surgeon: Andrea Caballero MD;  Location: [facility] MAIN OR;  Service:     JOINT REPLACEMENT Right 11/09/2017    KNEE SURGERY Left 2006    ARTHROSCOPY    NJ ARTHRP KNE CONDYLE&PLATU MEDIAL&LAT COMPARTMENTS Right 11/09/2017    Procedure: RIGHT TOTAL KNEE ARTHROPLASTY;  Surgeon: Andrea Caballero MD;  Location: [facility] MAIN OR;  Service: Orthopedics    TONSILLECTOMY         Current Outpatient Medications on File Prior to Visit   Medication Sig Dispense Refill    acetaminophen (TYLENOL) 325 MG tablet Take 2 tablets by mouth Every 4 (Four) Hours As Needed for Mild Pain.      aspirin 81 MG chewable tablet Chew 1 tablet 2 (Two) Times a Day.      Calcium Citrate (CITRACAL PO) Take 200 mg by mouth 3 (Three) Times a Week. Monday, Wednesday, friday      docusate sodium 100 MG capsule Take 1 capsule by mouth 2 (Two) Times a Day As Needed (constipation). 40 capsule 1    Hydrocortisone, Perianal, (ANUSOL-HC) 2.5 % rectal cream Insert  into the rectum 2 (Two) Times a Day.      losartan (COZAAR) 25 MG tablet TAKE 1 TABLET EVERY DAY 90 tablet 3    magnesium chloride ER 64 MG DR tablet Take 1 tablet by mouth 3 (Three) Times a Week. Monday,  "Wednesday, Friday      melatonin 1 MG tablet Take 5 tablets by mouth Every Night. Indications: Trouble Sleeping      meloxicam (MOBIC) 15 MG tablet Take 1 tablet by mouth Daily. Indications: Joint Damage causing Pain and Loss of Function 90 tablet 2    Multiple Vitamins-Minerals (MULTIVITAMIN ADULT PO) Take 1 tablet by mouth Daily.      simvastatin (ZOCOR) 40 MG tablet TAKE 1 TABLET EVERY NIGHT (HIGH AMOUNT OF FATS IN THE BLOOD) 90 tablet 3    [DISCONTINUED] amLODIPine (NORVASC) 2.5 MG tablet Take 1 tablet by mouth Daily.      amoxicillin (AMOXIL) 500 MG tablet  (Patient not taking: Reported on 4/22/2025)      [DISCONTINUED] atorvastatin (LIPITOR) 20 MG tablet Take 1 tablet by mouth Daily. (Patient not taking: Reported on 4/22/2025)       No current facility-administered medications on file prior to visit.       Social History     Socioeconomic History    Marital status:      Spouse name: Marcus    Number of children: 3    Years of education: College   Tobacco Use    Smoking status: Never     Passive exposure: Never    Smokeless tobacco: Never   Vaping Use    Vaping status: Never Used   Substance and Sexual Activity    Alcohol use: No     Comment: Caffeine use: 3-4 cups half and half daily    Drug use: Never    Sexual activity: Defer             Procedures    ECG 12 Lead    Date/Time: 4/22/2025 12:56 PM  Performed by: Rowan Serrano MD    Authorized by: Rowan Serrano MD  Comparison: compared with previous ECG   Similar to previous ECG  Rhythm: sinus rhythm  Conduction: left anterior fascicular block  Other findings: left ventricular hypertrophy    Clinical impression: abnormal EKG              Objective:      Vitals:    04/22/25 1248   BP: 142/90   Pulse: 81   Weight: 69.9 kg (154 lb)   Height: 165.1 cm (65\")     Body mass index is 25.63 kg/m².    Vitals reviewed.   Constitutional:       General: Not in acute distress.     Appearance: Not diaphoretic.   Neck:      Vascular: No carotid bruit or " "JVD.   Pulmonary:      Effort: Pulmonary effort is normal.      Breath sounds: Normal breath sounds.   Cardiovascular:      Normal rate. Regular rhythm.      Murmurs: There is no murmur.      No gallop.  No rub.   Pulses:     Intact distal pulses.      Carotid: 2+ bilaterally.     Radial: 2+ bilaterally.     Dorsalis pedis: 2+ bilaterally.     Posterior tibial: 2+ bilaterally.  Edema:     Peripheral edema absent.   Neurological:      Cranial Nerves: No cranial nerve deficit.         Lab Review:       Assessment:      Diagnosis Plan   1. Essential hypertension  Adult Transthoracic Echo Complete W/ Cont if Necessary Per Protocol    Basic Metabolic Panel      2. Mixed hyperlipidemia  Adult Transthoracic Echo Complete W/ Cont if Necessary Per Protocol      3. Abnormal electrocardiogram (ECG) (EKG)  Adult Transthoracic Echo Complete W/ Cont if Necessary Per Protocol    Basic Metabolic Panel          Hypertension, BP is high.  Stop amlodipine, remain on losartan 25 mg daily and add spironolactone 25 mg in the morning.  Check BMP in 1 month.  Hyperlipidemia, well controlled on Lipitor.   Lower extremity edema due to venous insufficiency.  Maybe spironolactone will help.  Osteoporosis.   Palpitations, stable.   Left anterior fascicular block on ECG.   Small membranous VSD  Murmur due to aortic valve calcification     Plan:       See Dalia in 6 months with an echocardiogram the same day.      STOP-Bang Score  Have you been diagnosed with Sleep Apnea?: no  Snoring?: no  Tired?: yes  Observed?: no  Pressure?: yes  Stop Score: 2  Body Mass Index more than 35 kg/m2?: no  Age older than 50 year old?: yes  Neck large? \">17\"/43cm-M, >16\"/41cm-F: no  Gender=Male?: no  Total Stop-Bang Score: 3      "

## 2025-04-28 ENCOUNTER — HOSPITAL ENCOUNTER (OUTPATIENT)
Dept: BONE DENSITY | Facility: HOSPITAL | Age: OVER 89
Discharge: HOME | End: 2025-04-28
Admitting: FAMILY MEDICINE
Payer: MEDICARE

## 2025-04-28 DIAGNOSIS — Z78.0 POSTMENOPAUSAL: ICD-10-CM

## 2025-04-28 PROCEDURE — 77080 DXA BONE DENSITY AXIAL: CPT

## 2025-05-22 ENCOUNTER — LAB (OUTPATIENT)
Dept: LAB | Facility: HOSPITAL | Age: OVER 89
End: 2025-05-22
Payer: MEDICARE

## 2025-05-22 DIAGNOSIS — R94.31 ABNORMAL ELECTROCARDIOGRAM (ECG) (EKG): ICD-10-CM

## 2025-05-22 DIAGNOSIS — I10 ESSENTIAL HYPERTENSION: ICD-10-CM

## 2025-05-22 LAB
ANION GAP SERPL CALCULATED.3IONS-SCNC: 12 MMOL/L (ref 5–15)
BUN SERPL-MCNC: 14 MG/DL (ref 8–23)
BUN/CREAT SERPL: 21.2 (ref 7–25)
CALCIUM SPEC-SCNC: 9.5 MG/DL (ref 8.2–9.6)
CHLORIDE SERPL-SCNC: 104 MMOL/L (ref 98–107)
CO2 SERPL-SCNC: 24 MMOL/L (ref 22–29)
CREAT SERPL-MCNC: 0.66 MG/DL (ref 0.57–1)
EGFRCR SERPLBLD CKD-EPI 2021: 83.5 ML/MIN/1.73
GLUCOSE SERPL-MCNC: 92 MG/DL (ref 65–99)
POTASSIUM SERPL-SCNC: 4.8 MMOL/L (ref 3.5–5.2)
SODIUM SERPL-SCNC: 140 MMOL/L (ref 136–145)

## 2025-05-22 PROCEDURE — 80048 BASIC METABOLIC PNL TOTAL CA: CPT

## 2025-05-22 PROCEDURE — 36415 COLL VENOUS BLD VENIPUNCTURE: CPT

## 2025-07-15 RX ORDER — SPIRONOLACTONE 25 MG/1
25 TABLET ORAL EVERY MORNING
Qty: 90 TABLET | Refills: 3 | Status: SHIPPED | OUTPATIENT
Start: 2025-07-15

## 2025-07-15 NOTE — TELEPHONE ENCOUNTER
NOV-10/23/25-EE  LOV-04/22/25-RM      Hypertension, BP is high.  Stop amlodipine, remain on losartan 25 mg daily and add spironolactone 25 mg in the morning.  Check BMP in 1 month.  Hyperlipidemia, well controlled on Lipitor.   Lower extremity edema due to venous insufficiency.  Maybe spironolactone will help.  Osteoporosis.   Palpitations, stable.   Left anterior fascicular block on ECG.   Small membranous VSD  Murmur due to aortic valve calcification     Plan:       See Dalia in 6 months with an echocardiogram the same day.

## 2025-08-05 ENCOUNTER — TELEPHONE (OUTPATIENT)
Dept: INTERNAL MEDICINE | Facility: CLINIC | Age: OVER 89
End: 2025-08-05

## 2025-08-05 ENCOUNTER — OFFICE VISIT (OUTPATIENT)
Dept: INTERNAL MEDICINE | Facility: CLINIC | Age: OVER 89
End: 2025-08-05
Payer: MEDICARE

## 2025-08-05 VITALS
SYSTOLIC BLOOD PRESSURE: 142 MMHG | DIASTOLIC BLOOD PRESSURE: 78 MMHG | TEMPERATURE: 98 F | RESPIRATION RATE: 16 BRPM | HEART RATE: 84 BPM | HEIGHT: 64 IN | BODY MASS INDEX: 26.46 KG/M2 | WEIGHT: 155 LBS | OXYGEN SATURATION: 99 %

## 2025-08-05 DIAGNOSIS — I10 ESSENTIAL HYPERTENSION: ICD-10-CM

## 2025-08-05 DIAGNOSIS — M19.90 ARTHRITIS: ICD-10-CM

## 2025-08-05 DIAGNOSIS — E78.2 MIXED HYPERLIPIDEMIA: Primary | ICD-10-CM

## 2025-08-05 DIAGNOSIS — F51.01 PRIMARY INSOMNIA: ICD-10-CM

## 2025-08-05 RX ORDER — LOSARTAN POTASSIUM 25 MG/1
25 TABLET ORAL DAILY
Qty: 90 TABLET | Refills: 3 | Status: SHIPPED | OUTPATIENT
Start: 2025-08-05 | End: 2025-08-05 | Stop reason: SDUPTHER

## 2025-08-05 RX ORDER — SIMVASTATIN 40 MG
40 TABLET ORAL NIGHTLY
Qty: 90 TABLET | Refills: 3 | Status: SHIPPED | OUTPATIENT
Start: 2025-08-05 | End: 2025-08-05 | Stop reason: SDUPTHER

## 2025-08-05 RX ORDER — MELOXICAM 15 MG/1
15 TABLET ORAL DAILY
Qty: 90 TABLET | Refills: 2 | Status: SHIPPED | OUTPATIENT
Start: 2025-08-05 | End: 2025-08-05 | Stop reason: SDUPTHER

## 2025-08-05 RX ORDER — MELOXICAM 15 MG/1
15 TABLET ORAL DAILY
Qty: 90 TABLET | Refills: 2 | Status: SHIPPED | OUTPATIENT
Start: 2025-08-05

## 2025-08-05 RX ORDER — SIMVASTATIN 40 MG
40 TABLET ORAL NIGHTLY
Qty: 90 TABLET | Refills: 3 | Status: SHIPPED | OUTPATIENT
Start: 2025-08-05

## 2025-08-05 RX ORDER — LOSARTAN POTASSIUM 25 MG/1
25 TABLET ORAL DAILY
Qty: 90 TABLET | Refills: 3 | Status: SHIPPED | OUTPATIENT
Start: 2025-08-05

## (undated) DEVICE — APPL CHLORAPREP HI/LITE 26ML ORNG

## (undated) DEVICE — THE TORRENT IRRIGATION SCOPE CONNECTOR IS USED WITH THE TORRENT IRRIGATION TUBING TO PROVIDE IRRIGATION FLUIDS SUCH AS STERILE WATER DURING GASTROINTESTINAL ENDOSCOPIC PROCEDURES WHEN USED IN CONJUNCTION WITH AN IRRIGATION PUMP (OR ELECTROSURGICAL UNIT).: Brand: TORRENT

## (undated) DEVICE — BNDG ELAS CO-FLEX SLF ADHR 6IN 5YD LF STRL

## (undated) DEVICE — GLV SURG SIGNATURE ESSENTIAL PF LTX SZ8.5

## (undated) DEVICE — KT DRN EVAC WND PVC PCH WTROC RND 10F400

## (undated) DEVICE — DRSNG WND GZ PAD BORDERED 4X8IN STRL

## (undated) DEVICE — GLV SURG PREMIERPRO ORTHO LTX PF SZ8.5 BRN

## (undated) DEVICE — INTERTAN LAG SCREW DRILL: Brand: TRIGEN

## (undated) DEVICE — CANN NASL CO2 TRULINK W/O2 A/

## (undated) DEVICE — ANTIBACTERIAL UNDYED BRAIDED (POLYGLACTIN 910), SYNTHETIC ABSORBABLE SUTURE: Brand: COATED VICRYL

## (undated) DEVICE — T4 ZIPPER TOGA, (L/XL)

## (undated) DEVICE — GUIDE PIN 3.2MM X 343MM: Brand: TRIGEN

## (undated) DEVICE — OCCLUSIVE GAUZE STRIP,3% BISMUTH TRIBROMOPHENATE IN PETROLATUM BLEND: Brand: XEROFORM

## (undated) DEVICE — DRAPE,REIN 53X77,STERILE: Brand: MEDLINE

## (undated) DEVICE — DUAL CUT SAGITTAL BLADE

## (undated) DEVICE — TUBING, SUCTION, 1/4" X 10', STRAIGHT: Brand: MEDLINE

## (undated) DEVICE — SYR LUERLOK 20CC BX/50

## (undated) DEVICE — NDL HYPO MAGELLAN SFTY 18G 1.5IN

## (undated) DEVICE — SOL ISO/ALC 70PCT 4OZ

## (undated) DEVICE — NEEDLE, QUINCKE, 20GX3.5": Brand: MEDLINE

## (undated) DEVICE — DRSNG PAD ABD 8X10IN STRL

## (undated) DEVICE — DECANT BG O JET

## (undated) DEVICE — MAT FLR ABSORBENT LG 4FT 10 2.5FT

## (undated) DEVICE — SYR LUERLOK 30CC

## (undated) DEVICE — BNDG ELAS ELITE V/CLOSE 4IN 5YD LF STRL

## (undated) DEVICE — PAD,ABDOMINAL,8"X10",ST,LF: Brand: MEDLINE

## (undated) DEVICE — APPL CHLORAPREP W/TINT 26ML ORNG

## (undated) DEVICE — PIN TROC SIGNATURE PK/2

## (undated) DEVICE — UNDERCAST PADDING: Brand: DEROYAL

## (undated) DEVICE — BNDG ELAS ELITE V/CLOSE 6IN 5YD LF STRL

## (undated) DEVICE — STPLR SKIN VISISTAT WD 35CT

## (undated) DEVICE — SPNG GZ WOVN 4X4IN 12PLY 10/BX STRL

## (undated) DEVICE — PK HIP PINNING 40

## (undated) DEVICE — Device: Brand: DEFENDO AIR/WATER/SUCTION AND BIOPSY VALVE

## (undated) DEVICE — BNDG ADHS PLSTC 1X3IN LF

## (undated) DEVICE — ENCORE® LATEX ORTHO SIZE 7.5, STERILE LATEX POWDER-FREE SURGICAL GLOVE: Brand: ENCORE

## (undated) DEVICE — VIOLET BRAIDED (POLYGLACTIN 910), SYNTHETIC ABSORBABLE SUTURE: Brand: COATED VICRYL

## (undated) DEVICE — PK KN TOTL 40

## (undated) DEVICE — 4.0MM LONG AO PILOT DRILL: Brand: TRIGEN

## (undated) DEVICE — SENSR O2 OXIMAX FNGR A/ 18IN NONSTR

## (undated) DEVICE — DRP C/ARMOR

## (undated) DEVICE — GLV SURG BIOGEL LTX PF 7 1/2

## (undated) DEVICE — NDL SPINE 20G 3 1/2 YEL STRL 1P/U

## (undated) DEVICE — DRSNG WND BORDR/ADHS NONADHR/GZ LF 4X4IN STRL